# Patient Record
Sex: FEMALE | Race: WHITE | NOT HISPANIC OR LATINO | Employment: OTHER | URBAN - METROPOLITAN AREA
[De-identification: names, ages, dates, MRNs, and addresses within clinical notes are randomized per-mention and may not be internally consistent; named-entity substitution may affect disease eponyms.]

---

## 2017-01-12 ENCOUNTER — LAB VISIT (OUTPATIENT)
Dept: LAB | Facility: HOSPITAL | Age: 67
End: 2017-01-12
Attending: INTERNAL MEDICINE
Payer: COMMERCIAL

## 2017-01-12 DIAGNOSIS — E11.9 TYPE 2 DIABETES MELLITUS WITHOUT COMPLICATION, WITHOUT LONG-TERM CURRENT USE OF INSULIN: Chronic | ICD-10-CM

## 2017-01-12 DIAGNOSIS — E78.5 HYPERLIPIDEMIA, UNSPECIFIED HYPERLIPIDEMIA TYPE: ICD-10-CM

## 2017-01-12 DIAGNOSIS — I10 ESSENTIAL HYPERTENSION: Chronic | ICD-10-CM

## 2017-01-12 DIAGNOSIS — Z11.59 NEED FOR HEPATITIS C SCREENING TEST: ICD-10-CM

## 2017-01-12 LAB
ALBUMIN SERPL BCP-MCNC: 4.3 G/DL
ALP SERPL-CCNC: 104 U/L
ALT SERPL W/O P-5'-P-CCNC: 24 U/L
ANION GAP SERPL CALC-SCNC: 9 MMOL/L
AST SERPL-CCNC: 24 U/L
BASOPHILS # BLD AUTO: 0.02 K/UL
BASOPHILS NFR BLD: 0.2 %
BILIRUB SERPL-MCNC: 0.5 MG/DL
BUN SERPL-MCNC: 11 MG/DL
CALCIUM SERPL-MCNC: 10.2 MG/DL
CHLORIDE SERPL-SCNC: 103 MMOL/L
CHOLEST/HDLC SERPL: 3.1 {RATIO}
CO2 SERPL-SCNC: 25 MMOL/L
CREAT SERPL-MCNC: 0.8 MG/DL
DIFFERENTIAL METHOD: NORMAL
EOSINOPHIL # BLD AUTO: 0.1 K/UL
EOSINOPHIL NFR BLD: 1.2 %
ERYTHROCYTE [DISTWIDTH] IN BLOOD BY AUTOMATED COUNT: 13.4 %
EST. GFR  (AFRICAN AMERICAN): >60 ML/MIN/1.73 M^2
EST. GFR  (NON AFRICAN AMERICAN): >60 ML/MIN/1.73 M^2
GLUCOSE SERPL-MCNC: 104 MG/DL
HCT VFR BLD AUTO: 43.7 %
HCV AB SERPL QL IA: NEGATIVE
HDL/CHOLESTEROL RATIO: 32.6 %
HDLC SERPL-MCNC: 178 MG/DL
HDLC SERPL-MCNC: 58 MG/DL
HGB BLD-MCNC: 15 G/DL
LDLC SERPL CALC-MCNC: 96.2 MG/DL
LYMPHOCYTES # BLD AUTO: 2.9 K/UL
LYMPHOCYTES NFR BLD: 27.2 %
MCH RBC QN AUTO: 29.8 PG
MCHC RBC AUTO-ENTMCNC: 34.3 %
MCV RBC AUTO: 87 FL
MONOCYTES # BLD AUTO: 0.8 K/UL
MONOCYTES NFR BLD: 7.4 %
NEUTROPHILS # BLD AUTO: 6.8 K/UL
NEUTROPHILS NFR BLD: 63.7 %
NONHDLC SERPL-MCNC: 120 MG/DL
PLATELET # BLD AUTO: 280 K/UL
PMV BLD AUTO: 11.5 FL
POTASSIUM SERPL-SCNC: 4 MMOL/L
PROT SERPL-MCNC: 8.2 G/DL
RBC # BLD AUTO: 5.04 M/UL
SODIUM SERPL-SCNC: 137 MMOL/L
TRIGL SERPL-MCNC: 119 MG/DL
WBC # BLD AUTO: 10.68 K/UL

## 2017-01-12 PROCEDURE — 36415 COLL VENOUS BLD VENIPUNCTURE: CPT | Mod: PO

## 2017-01-12 PROCEDURE — 83036 HEMOGLOBIN GLYCOSYLATED A1C: CPT

## 2017-01-12 PROCEDURE — 80053 COMPREHEN METABOLIC PANEL: CPT

## 2017-01-12 PROCEDURE — 80061 LIPID PANEL: CPT

## 2017-01-12 PROCEDURE — 85025 COMPLETE CBC W/AUTO DIFF WBC: CPT

## 2017-01-12 PROCEDURE — 86803 HEPATITIS C AB TEST: CPT

## 2017-01-13 LAB
ESTIMATED AVG GLUCOSE: 128 MG/DL
HBA1C MFR BLD HPLC: 6.1 %

## 2017-01-23 ENCOUNTER — OFFICE VISIT (OUTPATIENT)
Dept: INTERNAL MEDICINE | Facility: CLINIC | Age: 67
End: 2017-01-23
Payer: COMMERCIAL

## 2017-01-23 VITALS
SYSTOLIC BLOOD PRESSURE: 128 MMHG | RESPIRATION RATE: 16 BRPM | WEIGHT: 172.81 LBS | BODY MASS INDEX: 31.8 KG/M2 | TEMPERATURE: 99 F | DIASTOLIC BLOOD PRESSURE: 90 MMHG | HEIGHT: 62 IN | HEART RATE: 90 BPM

## 2017-01-23 DIAGNOSIS — E78.5 HYPERLIPIDEMIA, UNSPECIFIED HYPERLIPIDEMIA TYPE: ICD-10-CM

## 2017-01-23 DIAGNOSIS — E11.9 TYPE 2 DIABETES MELLITUS WITHOUT COMPLICATION, WITHOUT LONG-TERM CURRENT USE OF INSULIN: Primary | Chronic | ICD-10-CM

## 2017-01-23 DIAGNOSIS — E03.9 HYPOTHYROIDISM, UNSPECIFIED TYPE: ICD-10-CM

## 2017-01-23 DIAGNOSIS — I10 ESSENTIAL HYPERTENSION: Chronic | ICD-10-CM

## 2017-01-23 DIAGNOSIS — J45.20 MILD INTERMITTENT ASTHMA WITHOUT COMPLICATION: ICD-10-CM

## 2017-01-23 DIAGNOSIS — J31.0 CHRONIC RHINITIS: Chronic | ICD-10-CM

## 2017-01-23 PROCEDURE — 2022F DILAT RTA XM EVC RTNOPTHY: CPT | Mod: S$GLB,,, | Performed by: INTERNAL MEDICINE

## 2017-01-23 PROCEDURE — 3074F SYST BP LT 130 MM HG: CPT | Mod: S$GLB,,, | Performed by: INTERNAL MEDICINE

## 2017-01-23 PROCEDURE — 90662 IIV NO PRSV INCREASED AG IM: CPT | Mod: S$GLB,,, | Performed by: INTERNAL MEDICINE

## 2017-01-23 PROCEDURE — 99214 OFFICE O/P EST MOD 30 MIN: CPT | Mod: 25,S$GLB,, | Performed by: INTERNAL MEDICINE

## 2017-01-23 PROCEDURE — 99999 PR PBB SHADOW E&M-EST. PATIENT-LVL III: CPT | Mod: PBBFAC,,, | Performed by: INTERNAL MEDICINE

## 2017-01-23 PROCEDURE — 4010F ACE/ARB THERAPY RXD/TAKEN: CPT | Mod: S$GLB,,, | Performed by: INTERNAL MEDICINE

## 2017-01-23 PROCEDURE — 3044F HG A1C LEVEL LT 7.0%: CPT | Mod: S$GLB,,, | Performed by: INTERNAL MEDICINE

## 2017-01-23 PROCEDURE — 90471 IMMUNIZATION ADMIN: CPT | Mod: S$GLB,,, | Performed by: INTERNAL MEDICINE

## 2017-01-23 PROCEDURE — 3080F DIAST BP >= 90 MM HG: CPT | Mod: S$GLB,,, | Performed by: INTERNAL MEDICINE

## 2017-01-23 PROCEDURE — 1159F MED LIST DOCD IN RCRD: CPT | Mod: S$GLB,,, | Performed by: INTERNAL MEDICINE

## 2017-01-23 RX ORDER — METFORMIN HYDROCHLORIDE 500 MG/1
TABLET ORAL
Qty: 30 TABLET | Refills: 11 | Status: SHIPPED | OUTPATIENT
Start: 2017-01-23 | End: 2018-03-12 | Stop reason: SDUPTHER

## 2017-01-23 RX ORDER — LEVOTHYROXINE SODIUM 50 UG/1
50 TABLET ORAL EVERY MORNING
Qty: 30 TABLET | Refills: 11 | Status: SHIPPED | OUTPATIENT
Start: 2017-01-23 | End: 2018-05-18 | Stop reason: SDUPTHER

## 2017-01-23 RX ORDER — INSULIN PUMP SYRINGE, 3 ML
EACH MISCELLANEOUS
Qty: 1 EACH | Refills: 0 | Status: SHIPPED | OUTPATIENT
Start: 2017-01-23 | End: 2017-01-23 | Stop reason: SDUPTHER

## 2017-01-23 RX ORDER — ATORVASTATIN CALCIUM 20 MG/1
30 TABLET, FILM COATED ORAL DAILY
Qty: 30 TABLET | Refills: 11 | Status: SHIPPED | OUTPATIENT
Start: 2017-01-23 | End: 2017-10-27 | Stop reason: DRUGHIGH

## 2017-01-23 RX ORDER — AMLODIPINE BESYLATE 10 MG/1
10 TABLET ORAL DAILY
Qty: 30 TABLET | Refills: 11 | Status: ON HOLD | OUTPATIENT
Start: 2017-01-23 | End: 2017-07-14 | Stop reason: HOSPADM

## 2017-01-23 RX ORDER — INSULIN PUMP SYRINGE, 3 ML
EACH MISCELLANEOUS
Qty: 1 EACH | Refills: 0 | Status: SHIPPED | OUTPATIENT
Start: 2017-01-23 | End: 2022-07-06

## 2017-01-23 RX ORDER — LISINOPRIL 40 MG/1
40 TABLET ORAL DAILY
Qty: 30 TABLET | Refills: 11 | Status: SHIPPED | OUTPATIENT
Start: 2017-01-23 | End: 2017-10-27 | Stop reason: ALTCHOICE

## 2017-01-23 RX ORDER — ALPRAZOLAM 0.25 MG/1
0.25 TABLET ORAL NIGHTLY
Qty: 30 TABLET | Refills: 3 | Status: SHIPPED | OUTPATIENT
Start: 2017-01-23 | End: 2017-07-03 | Stop reason: SDUPTHER

## 2017-01-23 NOTE — PROGRESS NOTES
Subjective:       Patient ID: Noah Bailey is a 66 y.o. female.    Chief Complaint: Diabetes (4 mos fol up w/ labs to review )    HPI   The patient presents for follow-up of diabetes and other medical conditions.  Her blood sugar levels have been stable.  Her vision is good with her current glasses.  She has not experienced any lower extremity numbness or tingling.  Dietary compliance has been fair.  The recent holiday period.  Asthma symptoms have been stable with no recent exacerbations.  She does have chronic rhinitis with postnasal drainage and rhinorrhea.  No severe congestion is present.  She needs a new glucose meter.  Review of Systems   Constitutional: Negative for activity change, appetite change and unexpected weight change.   Eyes: Negative for visual disturbance.   Respiratory: Negative for shortness of breath.    Cardiovascular: Negative for chest pain, palpitations and leg swelling.   Gastrointestinal: Negative for abdominal pain, blood in stool and diarrhea.   Genitourinary: Negative for dysuria, frequency, hematuria and urgency.   Neurological: Negative for weakness, numbness and headaches.   Psychiatric/Behavioral: Negative for sleep disturbance.       Objective:      Physical Exam   Constitutional: She is oriented to person, place, and time. She appears well-developed and well-nourished. No distress.   HENT:   Head: Normocephalic and atraumatic.   Eyes: Conjunctivae and EOM are normal. Pupils are equal, round, and reactive to light. No scleral icterus.   Neck: Normal range of motion. Neck supple. No JVD present. No thyromegaly present.   Cardiovascular: Normal rate, regular rhythm, normal heart sounds and intact distal pulses.  Exam reveals no gallop and no friction rub.    No murmur heard.  Pulmonary/Chest: Effort normal and breath sounds normal. No respiratory distress. She has no wheezes. She has no rales.   Abdominal: Soft. Bowel sounds are normal. She exhibits no mass. There is no  tenderness.   Musculoskeletal: Normal range of motion. She exhibits no edema or tenderness.   Lymphadenopathy:     She has no cervical adenopathy.   Neurological: She is alert and oriented to person, place, and time. No cranial nerve deficit.   Sensory exam is intact in both feet on monofilament and vibration testing.   Skin: Skin is warm and dry. No rash noted.   No foot lesions are present.   Psychiatric: She has a normal mood and affect. Her behavior is normal.   Nursing note and vitals reviewed.      Results for orders placed or performed in visit on 01/12/17   Hepatitis C antibody   Result Value Ref Range    Hepatitis C Ab Negative    CBC auto differential   Result Value Ref Range    WBC 10.68 3.90 - 12.70 K/uL    RBC 5.04 4.00 - 5.40 M/uL    Hemoglobin 15.0 12.0 - 16.0 g/dL    Hematocrit 43.7 37.0 - 48.5 %    MCV 87 82 - 98 fL    MCH 29.8 27.0 - 31.0 pg    MCHC 34.3 32.0 - 36.0 %    RDW 13.4 11.5 - 14.5 %    Platelets 280 150 - 350 K/uL    MPV 11.5 9.2 - 12.9 fL    Gran # 6.8 1.8 - 7.7 K/uL    Lymph # 2.9 1.0 - 4.8 K/uL    Mono # 0.8 0.3 - 1.0 K/uL    Eos # 0.1 0.0 - 0.5 K/uL    Baso # 0.02 0.00 - 0.20 K/uL    Gran% 63.7 38.0 - 73.0 %    Lymph% 27.2 18.0 - 48.0 %    Mono% 7.4 4.0 - 15.0 %    Eosinophil% 1.2 0.0 - 8.0 %    Basophil% 0.2 0.0 - 1.9 %    Differential Method Automated    Comprehensive metabolic panel   Result Value Ref Range    Sodium 137 136 - 145 mmol/L    Potassium 4.0 3.5 - 5.1 mmol/L    Chloride 103 95 - 110 mmol/L    CO2 25 23 - 29 mmol/L    Glucose 104 70 - 110 mg/dL    BUN, Bld 11 8 - 23 mg/dL    Creatinine 0.8 0.5 - 1.4 mg/dL    Calcium 10.2 8.7 - 10.5 mg/dL    Total Protein 8.2 6.0 - 8.4 g/dL    Albumin 4.3 3.5 - 5.2 g/dL    Total Bilirubin 0.5 0.1 - 1.0 mg/dL    Alkaline Phosphatase 104 55 - 135 U/L    AST 24 10 - 40 U/L    ALT 24 10 - 44 U/L    Anion Gap 9 8 - 16 mmol/L    eGFR if African American >60.0 >60 mL/min/1.73 m^2    eGFR if non African American >60.0 >60 mL/min/1.73 m^2    Lipid panel   Result Value Ref Range    Cholesterol 178 120 - 199 mg/dL    Triglycerides 119 30 - 150 mg/dL    HDL 58 40 - 75 mg/dL    LDL Cholesterol 96.2 63.0 - 159.0 mg/dL    HDL/Chol Ratio 32.6 20.0 - 50.0 %    Total Cholesterol/HDL Ratio 3.1 2.0 - 5.0    Non-HDL Cholesterol 120 mg/dL   Hemoglobin A1c   Result Value Ref Range    Hemoglobin A1C 6.1 4.5 - 6.2 %    Estimated Avg Glucose 128 68 - 131 mg/dL       Assessment:       1. Type 2 diabetes mellitus without complication, without long-term current use of insulin    2. Essential hypertension    3. Mild intermittent asthma without complication    4. Hyperlipidemia, unspecified hyperlipidemia type    5. Hypothyroidism, unspecified type    6. Chronic rhinitis        Plan:           Noah was seen today for diabetes.  Prescription orders for Freestyle lite glucose meter and test strips were written.  Influenza vaccine will be administered today.  The patient is up-to-date on pneumonia immunizations.  Current medications will be continued.  Blood tests and a follow-up visit in 4 months will be obtained.  The patient has been encouraged to increase her exercise level.    Diagnoses and all orders for this visit:    Type 2 diabetes mellitus without complication, without long-term current use of insulin    Essential hypertension    Mild intermittent asthma without complication    Hyperlipidemia, unspecified hyperlipidemia type    Hypothyroidism, unspecified type    Chronic rhinitis    Other orders  -     amlodipine (NORVASC) 10 MG tablet; Take 1 tablet (10 mg total) by mouth once daily.  -     levothyroxine (SYNTHROID) 50 MCG tablet; Take 1 tablet (50 mcg total) by mouth every morning.  -     lisinopril (PRINIVIL,ZESTRIL) 40 MG tablet; Take 1 tablet (40 mg total) by mouth once daily.  -     metformin (GLUCOPHAGE) 500 MG tablet; 1 TABLET BY MOUTH before dinner  -     alprazolam (XANAX) 0.25 MG tablet; Take 1 tablet (0.25 mg total) by mouth nightly.  -      atorvastatin (LIPITOR) 20 MG tablet; Take 1.5 tablets (30 mg total) by mouth once daily.  -     theophylline (THEODUR) 200 mg 24 hr capsule; Take 200 mg by mouth once daily.  -     Discontinue: blood sugar diagnostic (FREESTYLE TEST) Strp; Test blood glucose level once daily as directed.  -     Discontinue: blood-glucose meter (FREESTYLE LITE METER) kit; Use as instructed  -     blood sugar diagnostic (FREESTYLE TEST) Strp; Test blood glucose level once daily as directed.  -     blood-glucose meter (FREESTYLE LITE METER) kit; Use as instructed

## 2017-01-24 ENCOUNTER — TELEPHONE (OUTPATIENT)
Dept: INTERNAL MEDICINE | Facility: CLINIC | Age: 67
End: 2017-01-24

## 2017-01-24 NOTE — TELEPHONE ENCOUNTER
Pharmacy called to verify lipitor script. Last note states pt is to take 1.5 tablets daily #30. Discrepancy with quantity; gave verbal for 45 tablets to be dispensed instead of 30 (to compensate for the sig)

## 2017-01-24 NOTE — TELEPHONE ENCOUNTER
----- Message from Wesley Landaverde sent at 1/24/2017 11:31 AM CST -----  Contact: self [phone]  Patient like a printout of her lab result      Please advise pt

## 2017-03-01 RX ORDER — METFORMIN HYDROCHLORIDE 500 MG/1
TABLET ORAL
Qty: 30 TABLET | Refills: 10 | Status: SHIPPED | OUTPATIENT
Start: 2017-03-01 | End: 2017-07-28 | Stop reason: SDUPTHER

## 2017-05-17 ENCOUNTER — LAB VISIT (OUTPATIENT)
Dept: LAB | Facility: HOSPITAL | Age: 67
End: 2017-05-17
Attending: INTERNAL MEDICINE
Payer: COMMERCIAL

## 2017-05-17 DIAGNOSIS — E78.5 HYPERLIPIDEMIA, UNSPECIFIED HYPERLIPIDEMIA TYPE: ICD-10-CM

## 2017-05-17 DIAGNOSIS — E11.9 TYPE 2 DIABETES MELLITUS WITHOUT COMPLICATION, WITHOUT LONG-TERM CURRENT USE OF INSULIN: Chronic | ICD-10-CM

## 2017-05-17 LAB
ALBUMIN SERPL BCP-MCNC: 4.1 G/DL
ALP SERPL-CCNC: 96 U/L
ALT SERPL W/O P-5'-P-CCNC: 17 U/L
ANION GAP SERPL CALC-SCNC: 11 MMOL/L
AST SERPL-CCNC: 17 U/L
BILIRUB SERPL-MCNC: 0.5 MG/DL
BUN SERPL-MCNC: 12 MG/DL
CALCIUM SERPL-MCNC: 9.8 MG/DL
CHLORIDE SERPL-SCNC: 106 MMOL/L
CHOLEST/HDLC SERPL: 3.3 {RATIO}
CO2 SERPL-SCNC: 23 MMOL/L
CREAT SERPL-MCNC: 0.8 MG/DL
EST. GFR  (AFRICAN AMERICAN): >60 ML/MIN/1.73 M^2
EST. GFR  (NON AFRICAN AMERICAN): >60 ML/MIN/1.73 M^2
GLUCOSE SERPL-MCNC: 97 MG/DL
HDL/CHOLESTEROL RATIO: 30.1 %
HDLC SERPL-MCNC: 156 MG/DL
HDLC SERPL-MCNC: 47 MG/DL
LDLC SERPL CALC-MCNC: 87.6 MG/DL
NONHDLC SERPL-MCNC: 109 MG/DL
POTASSIUM SERPL-SCNC: 4.3 MMOL/L
PROT SERPL-MCNC: 7.8 G/DL
SODIUM SERPL-SCNC: 140 MMOL/L
TRIGL SERPL-MCNC: 107 MG/DL

## 2017-05-17 PROCEDURE — 36415 COLL VENOUS BLD VENIPUNCTURE: CPT | Mod: PO

## 2017-05-17 PROCEDURE — 83036 HEMOGLOBIN GLYCOSYLATED A1C: CPT

## 2017-05-17 PROCEDURE — 80061 LIPID PANEL: CPT

## 2017-05-17 PROCEDURE — 80053 COMPREHEN METABOLIC PANEL: CPT

## 2017-05-18 LAB
ESTIMATED AVG GLUCOSE: 137 MG/DL
HBA1C MFR BLD HPLC: 6.4 %

## 2017-05-22 ENCOUNTER — TELEPHONE (OUTPATIENT)
Dept: INTERNAL MEDICINE | Facility: CLINIC | Age: 67
End: 2017-05-22

## 2017-05-22 NOTE — TELEPHONE ENCOUNTER
----- Message from Paty Linares sent at 5/22/2017  8:18 AM CDT -----  Contact: call [phone]     Sooner appointment than the  can schedule.  When is the first available appointment: 08/3/17  What is the nature of the appointment: follow up   What visit type: 4 month follow up   Labs done last week   Patient preference of timeframe to be scheduled:  May   Comments: patient is calling to reschedule because of the weather expected tomorrow

## 2017-05-24 ENCOUNTER — TELEPHONE (OUTPATIENT)
Dept: INTERNAL MEDICINE | Facility: CLINIC | Age: 67
End: 2017-05-24

## 2017-05-24 NOTE — TELEPHONE ENCOUNTER
----- Message from Donny Mcbride sent at 5/24/2017  3:02 PM CDT -----  Contact: self [phone]  Type: Sooner appointment than  is able to schedule    When is the first available appointment?  08/04/17    What is the nature of the appointment? Follow up      What appointment type: EP     Comments:  Patient stated she already had Blood work done , stated she is not able to do 05/29/17 . Please advise , Thanks !

## 2017-06-02 ENCOUNTER — LAB VISIT (OUTPATIENT)
Dept: LAB | Facility: HOSPITAL | Age: 67
End: 2017-06-02
Attending: INTERNAL MEDICINE
Payer: COMMERCIAL

## 2017-06-02 ENCOUNTER — OFFICE VISIT (OUTPATIENT)
Dept: INTERNAL MEDICINE | Facility: CLINIC | Age: 67
End: 2017-06-02
Payer: COMMERCIAL

## 2017-06-02 VITALS
DIASTOLIC BLOOD PRESSURE: 76 MMHG | RESPIRATION RATE: 18 BRPM | WEIGHT: 176.81 LBS | HEART RATE: 111 BPM | HEIGHT: 63 IN | TEMPERATURE: 98 F | SYSTOLIC BLOOD PRESSURE: 140 MMHG | BODY MASS INDEX: 31.33 KG/M2

## 2017-06-02 DIAGNOSIS — F41.9 ANXIETY: Chronic | ICD-10-CM

## 2017-06-02 DIAGNOSIS — J31.0 CHRONIC RHINITIS: Chronic | ICD-10-CM

## 2017-06-02 DIAGNOSIS — E03.9 HYPOTHYROIDISM, UNSPECIFIED TYPE: ICD-10-CM

## 2017-06-02 DIAGNOSIS — J45.20 MILD INTERMITTENT ASTHMA WITHOUT COMPLICATION: ICD-10-CM

## 2017-06-02 DIAGNOSIS — I10 ESSENTIAL HYPERTENSION: Chronic | ICD-10-CM

## 2017-06-02 DIAGNOSIS — E78.5 HYPERLIPIDEMIA, UNSPECIFIED HYPERLIPIDEMIA TYPE: ICD-10-CM

## 2017-06-02 DIAGNOSIS — E11.9 TYPE 2 DIABETES MELLITUS WITHOUT COMPLICATION, WITHOUT LONG-TERM CURRENT USE OF INSULIN: Chronic | ICD-10-CM

## 2017-06-02 DIAGNOSIS — E11.9 TYPE 2 DIABETES MELLITUS WITHOUT COMPLICATION, WITHOUT LONG-TERM CURRENT USE OF INSULIN: Primary | Chronic | ICD-10-CM

## 2017-06-02 LAB
BILIRUB UR QL STRIP: NEGATIVE
CLARITY UR REFRACT.AUTO: CLEAR
COLOR UR AUTO: YELLOW
CREAT UR-MCNC: 69 MG/DL
GLUCOSE UR QL STRIP: NEGATIVE
HGB UR QL STRIP: NEGATIVE
KETONES UR QL STRIP: NEGATIVE
LEUKOCYTE ESTERASE UR QL STRIP: NEGATIVE
MICROALBUMIN UR DL<=1MG/L-MCNC: 5 UG/ML
MICROALBUMIN/CREATININE RATIO: 7.2 UG/MG
NITRITE UR QL STRIP: NEGATIVE
PH UR STRIP: 6 [PH] (ref 5–8)
PROT UR QL STRIP: NEGATIVE
SP GR UR STRIP: 1.01 (ref 1–1.03)
URN SPEC COLLECT METH UR: NORMAL
UROBILINOGEN UR STRIP-ACNC: NEGATIVE EU/DL

## 2017-06-02 PROCEDURE — 99999 PR PBB SHADOW E&M-EST. PATIENT-LVL III: CPT | Mod: PBBFAC,,, | Performed by: INTERNAL MEDICINE

## 2017-06-02 PROCEDURE — 82570 ASSAY OF URINE CREATININE: CPT

## 2017-06-02 PROCEDURE — 4010F ACE/ARB THERAPY RXD/TAKEN: CPT | Mod: S$GLB,,, | Performed by: INTERNAL MEDICINE

## 2017-06-02 PROCEDURE — 81003 URINALYSIS AUTO W/O SCOPE: CPT

## 2017-06-02 PROCEDURE — 99214 OFFICE O/P EST MOD 30 MIN: CPT | Mod: S$GLB,,, | Performed by: INTERNAL MEDICINE

## 2017-06-02 PROCEDURE — 3044F HG A1C LEVEL LT 7.0%: CPT | Mod: S$GLB,,, | Performed by: INTERNAL MEDICINE

## 2017-06-02 NOTE — PROGRESS NOTES
Subjective:       Patient ID: Noah Bailey is a 66 y.o. female.    Chief Complaint: Follow-up    HPI   The patient presents for follow-up of medical conditions.  She has type 2 diabetes mellitus, hypertension, hyperlipidemia, bronchial asthma, and hypothyroidism.  She feels she is doing well overall.  She does not exercise regularly.  She remains physically active.  Her blood sugar levels have been stable.  Her vision is good although she does not drive at night or in the rain.  She has not experienced any flareups of asthma.  No lower extremity paresthesias are noted.  Review of Systems   Constitutional: Negative for activity change, appetite change and unexpected weight change.   Eyes: Negative for visual disturbance.   Respiratory: Negative for shortness of breath.    Cardiovascular: Negative for chest pain, palpitations and leg swelling.   Gastrointestinal: Negative for abdominal pain, blood in stool and diarrhea.   Genitourinary: Negative for dysuria, frequency, hematuria and urgency.   Neurological: Negative for weakness, numbness and headaches.   Psychiatric/Behavioral: Negative for sleep disturbance.       Objective:      Physical Exam   Constitutional: She is oriented to person, place, and time. She appears well-developed and well-nourished. No distress.   HENT:   Head: Normocephalic and atraumatic.   Eyes: Conjunctivae and EOM are normal. Pupils are equal, round, and reactive to light. No scleral icterus.   Neck: Normal range of motion. Neck supple. No JVD present. No thyromegaly present.   Cardiovascular: Normal rate, regular rhythm, normal heart sounds and intact distal pulses.  Exam reveals no gallop and no friction rub.    No murmur heard.  Pulmonary/Chest: Effort normal and breath sounds normal. No respiratory distress. She has no wheezes. She has no rales.   Abdominal: Soft. Bowel sounds are normal. She exhibits no mass. There is no tenderness.   Musculoskeletal: Normal range of motion. She  exhibits no edema or tenderness.   Lymphadenopathy:     She has no cervical adenopathy.   Neurological: She is alert and oriented to person, place, and time. No cranial nerve deficit.   Sensory exam is intact in both feet on monofilament and vibration testing.   Skin: Skin is warm and dry. No rash noted.   No foot lesions are present.   Psychiatric: She has a normal mood and affect. Her behavior is normal.   Nursing note and vitals reviewed.      Results for orders placed or performed in visit on 05/17/17   Comprehensive metabolic panel   Result Value Ref Range    Sodium 140 136 - 145 mmol/L    Potassium 4.3 3.5 - 5.1 mmol/L    Chloride 106 95 - 110 mmol/L    CO2 23 23 - 29 mmol/L    Glucose 97 70 - 110 mg/dL    BUN, Bld 12 8 - 23 mg/dL    Creatinine 0.8 0.5 - 1.4 mg/dL    Calcium 9.8 8.7 - 10.5 mg/dL    Total Protein 7.8 6.0 - 8.4 g/dL    Albumin 4.1 3.5 - 5.2 g/dL    Total Bilirubin 0.5 0.1 - 1.0 mg/dL    Alkaline Phosphatase 96 55 - 135 U/L    AST 17 10 - 40 U/L    ALT 17 10 - 44 U/L    Anion Gap 11 8 - 16 mmol/L    eGFR if African American >60.0 >60 mL/min/1.73 m^2    eGFR if non African American >60.0 >60 mL/min/1.73 m^2   Lipid panel   Result Value Ref Range    Cholesterol 156 120 - 199 mg/dL    Triglycerides 107 30 - 150 mg/dL    HDL 47 40 - 75 mg/dL    LDL Cholesterol 87.6 63.0 - 159.0 mg/dL    HDL/Chol Ratio 30.1 20.0 - 50.0 %    Total Cholesterol/HDL Ratio 3.3 2.0 - 5.0    Non-HDL Cholesterol 109 mg/dL   Hemoglobin A1c   Result Value Ref Range    Hemoglobin A1C 6.4 (H) 4.5 - 6.2 %    Estimated Avg Glucose 137 (H) 68 - 131 mg/dL       Assessment:       1. Type 2 diabetes mellitus without complication, without long-term current use of insulin    2. Essential hypertension    3. Chronic rhinitis    4. Hyperlipidemia, unspecified hyperlipidemia type    5. Hypothyroidism, unspecified type    6. Mild intermittent asthma without complication    7. Anxiety        Plan:          Noah was seen today for follow-up.   Urine studies will be obtained today.  Current medications will be continued.  A prescription for the Zostavax shingles vaccine was given to the patient to take to her pharmacy.  A follow-up visit in 4 months will be obtained along with repeat blood testing.    Diagnoses and all orders for this visit:    Type 2 diabetes mellitus without complication, without long-term current use of insulin  -     Urinalysis; Future  -     Microalbumin/creatinine urine ratio; Future  -     Hemoglobin A1c; Future    Essential hypertension  -     CBC auto differential; Future  -     TSH; Future    Chronic rhinitis    Hyperlipidemia, unspecified hyperlipidemia type  -     Comprehensive metabolic panel; Future    Hypothyroidism, unspecified type  -     TSH; Future  -     T4, free; Future    Mild intermittent asthma without complication    Anxiety    Other orders  -     zoster vaccine live, PF, (ZOSTAVAX) 19,400 unit/0.65 mL injection; Inject 19,400 Units into the skin once. To Pharmacist: Inject once

## 2017-07-07 RX ORDER — ALPRAZOLAM 0.25 MG/1
TABLET ORAL
Qty: 30 TABLET | Refills: 3 | Status: SHIPPED | OUTPATIENT
Start: 2017-07-07 | End: 2017-11-13 | Stop reason: SDUPTHER

## 2017-07-12 ENCOUNTER — HOSPITAL ENCOUNTER (INPATIENT)
Facility: HOSPITAL | Age: 67
LOS: 2 days | Discharge: HOME | DRG: 247 | End: 2017-07-14
Attending: EMERGENCY MEDICINE | Admitting: INTERNAL MEDICINE
Payer: COMMERCIAL

## 2017-07-12 DIAGNOSIS — J45.909 UNCOMPLICATED ASTHMA: ICD-10-CM

## 2017-07-12 DIAGNOSIS — I21.4 NSTEMI (NON-ST ELEVATED MYOCARDIAL INFARCTION): Primary | ICD-10-CM

## 2017-07-12 DIAGNOSIS — R07.9 CHEST PAIN: ICD-10-CM

## 2017-07-12 LAB
ALBUMIN SERPL BCP-MCNC: 4.2 G/DL
ALP SERPL-CCNC: 107 U/L
ALT SERPL W/O P-5'-P-CCNC: 31 U/L
ANION GAP SERPL CALC-SCNC: 11 MMOL/L
AST SERPL-CCNC: 81 U/L
BASOPHILS # BLD AUTO: 0.03 K/UL
BASOPHILS NFR BLD: 0.2 %
BILIRUB SERPL-MCNC: 0.8 MG/DL
BNP SERPL-MCNC: 59 PG/ML
BUN SERPL-MCNC: 10 MG/DL
CALCIUM SERPL-MCNC: 10.4 MG/DL
CHLORIDE SERPL-SCNC: 98 MMOL/L
CK SERPL-CCNC: 1008 U/L
CO2 SERPL-SCNC: 26 MMOL/L
CREAT SERPL-MCNC: 0.8 MG/DL
DIFFERENTIAL METHOD: ABNORMAL
EOSINOPHIL # BLD AUTO: 0 K/UL
EOSINOPHIL NFR BLD: 0.1 %
ERYTHROCYTE [DISTWIDTH] IN BLOOD BY AUTOMATED COUNT: 13 %
EST. GFR  (AFRICAN AMERICAN): >60 ML/MIN/1.73 M^2
EST. GFR  (NON AFRICAN AMERICAN): >60 ML/MIN/1.73 M^2
GLUCOSE SERPL-MCNC: 169 MG/DL
HCT VFR BLD AUTO: 40.6 %
HGB BLD-MCNC: 14.1 G/DL
LYMPHOCYTES # BLD AUTO: 2.4 K/UL
LYMPHOCYTES NFR BLD: 16.5 %
MCH RBC QN AUTO: 29.3 PG
MCHC RBC AUTO-ENTMCNC: 34.7 %
MCV RBC AUTO: 84 FL
MONOCYTES # BLD AUTO: 1 K/UL
MONOCYTES NFR BLD: 6.7 %
NEUTROPHILS # BLD AUTO: 11.2 K/UL
NEUTROPHILS NFR BLD: 76.3 %
PLATELET # BLD AUTO: 302 K/UL
PMV BLD AUTO: 11.5 FL
POTASSIUM SERPL-SCNC: 4.5 MMOL/L
PROT SERPL-MCNC: 8 G/DL
RBC # BLD AUTO: 4.82 M/UL
SODIUM SERPL-SCNC: 135 MMOL/L
TROPONIN I SERPL DL<=0.01 NG/ML-MCNC: 4.4 NG/ML
WBC # BLD AUTO: 14.7 K/UL

## 2017-07-12 PROCEDURE — 80053 COMPREHEN METABOLIC PANEL: CPT

## 2017-07-12 PROCEDURE — 82550 ASSAY OF CK (CPK): CPT

## 2017-07-12 PROCEDURE — 84484 ASSAY OF TROPONIN QUANT: CPT

## 2017-07-12 PROCEDURE — 25000003 PHARM REV CODE 250: Performed by: EMERGENCY MEDICINE

## 2017-07-12 PROCEDURE — 99285 EMERGENCY DEPT VISIT HI MDM: CPT | Mod: 25

## 2017-07-12 PROCEDURE — 11000001 HC ACUTE MED/SURG PRIVATE ROOM

## 2017-07-12 PROCEDURE — 83880 ASSAY OF NATRIURETIC PEPTIDE: CPT

## 2017-07-12 PROCEDURE — 93005 ELECTROCARDIOGRAM TRACING: CPT

## 2017-07-12 PROCEDURE — 93010 ELECTROCARDIOGRAM REPORT: CPT | Mod: ,,, | Performed by: INTERNAL MEDICINE

## 2017-07-12 PROCEDURE — 12000002 HC ACUTE/MED SURGE SEMI-PRIVATE ROOM

## 2017-07-12 PROCEDURE — 63600175 PHARM REV CODE 636 W HCPCS: Performed by: EMERGENCY MEDICINE

## 2017-07-12 PROCEDURE — 96372 THER/PROPH/DIAG INJ SC/IM: CPT

## 2017-07-12 PROCEDURE — 85025 COMPLETE CBC W/AUTO DIFF WBC: CPT

## 2017-07-12 RX ORDER — ENOXAPARIN SODIUM 100 MG/ML
1 INJECTION SUBCUTANEOUS
Status: COMPLETED | OUTPATIENT
Start: 2017-07-12 | End: 2017-07-12

## 2017-07-12 RX ORDER — NITROGLYCERIN 0.4 MG/1
0.4 TABLET SUBLINGUAL EVERY 5 MIN PRN
Status: DISCONTINUED | OUTPATIENT
Start: 2017-07-12 | End: 2017-07-14 | Stop reason: HOSPADM

## 2017-07-12 RX ADMIN — ENOXAPARIN SODIUM 80 MG: 100 INJECTION SUBCUTANEOUS at 10:07

## 2017-07-12 RX ADMIN — LIDOCAINE HYDROCHLORIDE: 20 SOLUTION ORAL; TOPICAL at 08:07

## 2017-07-12 RX ADMIN — TICAGRELOR 180 MG: 90 TABLET ORAL at 09:07

## 2017-07-13 LAB
CHOLEST/HDLC SERPL: 3.5 {RATIO}
DIASTOLIC DYSFUNCTION: YES
ESTIMATED AVG GLUCOSE: 134 MG/DL
HBA1C MFR BLD HPLC: 6.3 %
HDL/CHOLESTEROL RATIO: 28.2 %
HDLC SERPL-MCNC: 156 MG/DL
HDLC SERPL-MCNC: 44 MG/DL
LDLC SERPL CALC-MCNC: 84 MG/DL
NONHDLC SERPL-MCNC: 112 MG/DL
POCT GLUCOSE: 106 MG/DL (ref 70–110)
POCT GLUCOSE: 171 MG/DL (ref 70–110)
POCT GLUCOSE: 99 MG/DL (ref 70–110)
RETIRED EF AND QEF - SEE NOTES: 45 (ref 55–65)
TRIGL SERPL-MCNC: 140 MG/DL
TROPONIN I SERPL DL<=0.01 NG/ML-MCNC: 9.1 NG/ML
TROPONIN I SERPL DL<=0.01 NG/ML-MCNC: 9.2 NG/ML
TSH SERPL DL<=0.005 MIU/L-ACNC: 1.71 UIU/ML

## 2017-07-13 PROCEDURE — B215YZZ FLUOROSCOPY OF LEFT HEART USING OTHER CONTRAST: ICD-10-PCS | Performed by: INTERNAL MEDICINE

## 2017-07-13 PROCEDURE — 80061 LIPID PANEL: CPT

## 2017-07-13 PROCEDURE — 84484 ASSAY OF TROPONIN QUANT: CPT

## 2017-07-13 PROCEDURE — 25000003 PHARM REV CODE 250: Performed by: EMERGENCY MEDICINE

## 2017-07-13 PROCEDURE — 93306 TTE W/DOPPLER COMPLETE: CPT | Mod: 26,,, | Performed by: INTERNAL MEDICINE

## 2017-07-13 PROCEDURE — 83036 HEMOGLOBIN GLYCOSYLATED A1C: CPT

## 2017-07-13 PROCEDURE — 25000003 PHARM REV CODE 250: Performed by: INTERNAL MEDICINE

## 2017-07-13 PROCEDURE — 93010 ELECTROCARDIOGRAM REPORT: CPT | Mod: 77,,, | Performed by: INTERNAL MEDICINE

## 2017-07-13 PROCEDURE — 93010 ELECTROCARDIOGRAM REPORT: CPT | Mod: ,,, | Performed by: INTERNAL MEDICINE

## 2017-07-13 PROCEDURE — 84443 ASSAY THYROID STIM HORMONE: CPT

## 2017-07-13 PROCEDURE — 27000221 HC OXYGEN, UP TO 24 HOURS

## 2017-07-13 PROCEDURE — 93005 ELECTROCARDIOGRAM TRACING: CPT

## 2017-07-13 PROCEDURE — B211YZZ FLUOROSCOPY OF MULTIPLE CORONARY ARTERIES USING OTHER CONTRAST: ICD-10-PCS | Performed by: INTERNAL MEDICINE

## 2017-07-13 PROCEDURE — C1769 GUIDE WIRE: HCPCS

## 2017-07-13 PROCEDURE — 36415 COLL VENOUS BLD VENIPUNCTURE: CPT

## 2017-07-13 PROCEDURE — 84484 ASSAY OF TROPONIN QUANT: CPT | Mod: 91

## 2017-07-13 PROCEDURE — 027034Z DILATION OF CORONARY ARTERY, ONE ARTERY WITH DRUG-ELUTING INTRALUMINAL DEVICE, PERCUTANEOUS APPROACH: ICD-10-PCS | Performed by: INTERNAL MEDICINE

## 2017-07-13 PROCEDURE — 93306 TTE W/DOPPLER COMPLETE: CPT

## 2017-07-13 PROCEDURE — 99152 MOD SED SAME PHYS/QHP 5/>YRS: CPT

## 2017-07-13 PROCEDURE — 4A023N7 MEASUREMENT OF CARDIAC SAMPLING AND PRESSURE, LEFT HEART, PERCUTANEOUS APPROACH: ICD-10-PCS | Performed by: INTERNAL MEDICINE

## 2017-07-13 PROCEDURE — 63600175 PHARM REV CODE 636 W HCPCS

## 2017-07-13 PROCEDURE — 25000003 PHARM REV CODE 250: Performed by: NURSE PRACTITIONER

## 2017-07-13 PROCEDURE — 25500020 PHARM REV CODE 255

## 2017-07-13 PROCEDURE — 92928 PRQ TCAT PLMT NTRAC ST 1 LES: CPT | Mod: LC,,, | Performed by: INTERNAL MEDICINE

## 2017-07-13 PROCEDURE — 99152 MOD SED SAME PHYS/QHP 5/>YRS: CPT | Mod: ,,, | Performed by: INTERNAL MEDICINE

## 2017-07-13 PROCEDURE — 99223 1ST HOSP IP/OBS HIGH 75: CPT | Mod: 25,,, | Performed by: NURSE PRACTITIONER

## 2017-07-13 PROCEDURE — 93458 L HRT ARTERY/VENTRICLE ANGIO: CPT | Mod: 26,59,, | Performed by: INTERNAL MEDICINE

## 2017-07-13 PROCEDURE — 94761 N-INVAS EAR/PLS OXIMETRY MLT: CPT

## 2017-07-13 PROCEDURE — 25000003 PHARM REV CODE 250

## 2017-07-13 PROCEDURE — 11000001 HC ACUTE MED/SURG PRIVATE ROOM

## 2017-07-13 RX ORDER — SODIUM CHLORIDE 9 MG/ML
3 INJECTION, SOLUTION INTRAVENOUS CONTINUOUS
Status: ACTIVE | OUTPATIENT
Start: 2017-07-13 | End: 2017-07-13

## 2017-07-13 RX ORDER — ATORVASTATIN CALCIUM 40 MG/1
80 TABLET, FILM COATED ORAL DAILY
Status: DISCONTINUED | OUTPATIENT
Start: 2017-07-13 | End: 2017-07-13

## 2017-07-13 RX ORDER — GLUCAGON 1 MG
1 KIT INJECTION
Status: DISCONTINUED | OUTPATIENT
Start: 2017-07-13 | End: 2017-07-14 | Stop reason: HOSPADM

## 2017-07-13 RX ORDER — IBUPROFEN 200 MG
24 TABLET ORAL
Status: DISCONTINUED | OUTPATIENT
Start: 2017-07-13 | End: 2017-07-14 | Stop reason: HOSPADM

## 2017-07-13 RX ORDER — FLUTICASONE PROPIONATE 50 MCG
2 SPRAY, SUSPENSION (ML) NASAL DAILY
Status: DISCONTINUED | OUTPATIENT
Start: 2017-07-13 | End: 2017-07-14 | Stop reason: HOSPADM

## 2017-07-13 RX ORDER — INSULIN ASPART 100 [IU]/ML
0-5 INJECTION, SOLUTION INTRAVENOUS; SUBCUTANEOUS
Status: DISCONTINUED | OUTPATIENT
Start: 2017-07-13 | End: 2017-07-14 | Stop reason: HOSPADM

## 2017-07-13 RX ORDER — LEVOTHYROXINE SODIUM 25 UG/1
50 TABLET ORAL EVERY MORNING
Status: DISCONTINUED | OUTPATIENT
Start: 2017-07-13 | End: 2017-07-14 | Stop reason: HOSPADM

## 2017-07-13 RX ORDER — ASPIRIN 81 MG/1
81 TABLET ORAL DAILY
Status: DISCONTINUED | OUTPATIENT
Start: 2017-07-13 | End: 2017-07-14 | Stop reason: HOSPADM

## 2017-07-13 RX ORDER — METOPROLOL TARTRATE 25 MG/1
25 TABLET, FILM COATED ORAL 2 TIMES DAILY
Status: DISCONTINUED | OUTPATIENT
Start: 2017-07-13 | End: 2017-07-14

## 2017-07-13 RX ORDER — OXYCODONE HYDROCHLORIDE 5 MG/1
5 TABLET ORAL EVERY 4 HOURS PRN
Status: DISCONTINUED | OUTPATIENT
Start: 2017-07-13 | End: 2017-07-14 | Stop reason: HOSPADM

## 2017-07-13 RX ORDER — ONDANSETRON 2 MG/ML
4 INJECTION INTRAMUSCULAR; INTRAVENOUS EVERY 12 HOURS PRN
Status: DISCONTINUED | OUTPATIENT
Start: 2017-07-13 | End: 2017-07-14 | Stop reason: HOSPADM

## 2017-07-13 RX ORDER — LISINOPRIL 20 MG/1
40 TABLET ORAL DAILY
Status: DISCONTINUED | OUTPATIENT
Start: 2017-07-14 | End: 2017-07-14 | Stop reason: HOSPADM

## 2017-07-13 RX ORDER — IBUPROFEN 200 MG
16 TABLET ORAL
Status: DISCONTINUED | OUTPATIENT
Start: 2017-07-13 | End: 2017-07-14 | Stop reason: HOSPADM

## 2017-07-13 RX ORDER — DIPHENHYDRAMINE HCL 50 MG
50 CAPSULE ORAL ONCE
Status: DISCONTINUED | OUTPATIENT
Start: 2017-07-13 | End: 2017-07-14 | Stop reason: HOSPADM

## 2017-07-13 RX ADMIN — FLUTICASONE PROPIONATE 2 SPRAY: 50 SPRAY, METERED NASAL at 11:07

## 2017-07-13 RX ADMIN — SODIUM CHLORIDE 3 ML/KG/HR: 0.9 INJECTION, SOLUTION INTRAVENOUS at 03:07

## 2017-07-13 RX ADMIN — METOPROLOL TARTRATE 25 MG: 25 TABLET ORAL at 09:07

## 2017-07-13 RX ADMIN — TICAGRELOR 90 MG: 90 TABLET ORAL at 10:07

## 2017-07-13 RX ADMIN — ASPIRIN 81 MG: 81 TABLET, COATED ORAL at 09:07

## 2017-07-13 RX ADMIN — TICAGRELOR 90 MG: 90 TABLET ORAL at 09:07

## 2017-07-13 RX ADMIN — NITROGLYCERIN 0.4 MG: 0.4 TABLET SUBLINGUAL at 12:07

## 2017-07-13 RX ADMIN — LEVOTHYROXINE SODIUM 50 MCG: 25 TABLET ORAL at 09:07

## 2017-07-13 RX ADMIN — METOPROLOL TARTRATE 25 MG: 25 TABLET ORAL at 10:07

## 2017-07-13 NOTE — PLAN OF CARE
Pt bilateral wrists and groins cleaned and prepped. New gown placed. Verbalized understanding to remain NPO. Will continue to monitor.

## 2017-07-13 NOTE — ED TRIAGE NOTES
Pt states that she has been dealing with chest pain for the past 2 days, states that she has been taking tums, ibuprofen and xanax to relieve to pain but pain came back today, hx htn. Pt states that she feels like she has to belch but unable to get it up.

## 2017-07-13 NOTE — NURSING
More swelling noted at right radial vasc band site.  Dr. Fox assessed pt.  MD ordered to put on another vasc band above the existing one and keep on for 1 hour. Will continue to monitor

## 2017-07-13 NOTE — NURSING TRANSFER
Patient transferred to floor on tele monitor. VSS no c/o of pain.  Patient attached to tele monitor upon arrival in room.   Right radial  site reviewed with RN.  CDI, no hematoma no bleeding.   All questions answered. .

## 2017-07-13 NOTE — PLAN OF CARE
Problem: Patient Care Overview  Goal: Plan of Care Review  Outcome: Ongoing (interventions implemented as appropriate)  Reviewed plan of care with patient. Patient verbalized understanding. Pt returned from cathlab at 1815. VSS per flowsheet. Blood sugar 99. Two radial vascbands to right wrist due to swelling post cath lab. Per Dr. Pimentel, start to remove air at 1900 and continue to monitor swelling. If increase noted; notify MD on call. Pt placed on cardiac diet. Denies N/V, pain. Patient on continuous tele monitoring; NSR; HR 60s-80s. No true red alarms or ectopy noted. Bed alarm set, bed in lowest position, call bell in reach. Will continue to monitor.

## 2017-07-13 NOTE — H&P
[facility]  Cardiology  History and Physical     Patient Name: Noah Bailey  MRN: [id number]  Admission Date: 2017  Code Status: Full Code   Attending Provider: Sameer Pimentel MD   Primary Care Physician: Henry Mcbride MD  Principal Problem:NSTEMI (non-ST elevated myocardial infarction)    Patient information was obtained from patient, past medical records and ER records.     Subjective:     Chief Complaint:  Chest pain     HPI:  67yo female with history of HTN, HLP, DMII and hypothyroidism who presented to the ER with complaints of chest pain for the past 2 days. She reports awakening Tuesday morning with complaints of chest heaviness. She attributed it to GERD due to lack of eating and drinking red wine. She took OTC medications (Tums, Zantac and Pepto) with easing of her symptoms with intermittent recurrence throughout the day. Yesterday morning upon awakening she had recurrent symptoms described as a chest heaviness with no associated symptoms. She reports the pain was worse than Tuesday and continued to occur intermittently throughout the day. Yesterday evening, she went to Urgent Care since her symptoms were not completely resolved and was directed to the ER. Upon arrival to the ER, she continued with mild symptoms and was given ASA and NTG spray with improvement in her pain. She was admitted to [facility] for NSTEMI. She reports a strong family history of CAD with mother with MI age 39-40 with CABG, sister  age 42?CAD and grandmother massive MI age 40s    Past Medical History:   Diagnosis Date    ALLERGIC RHINITIS     Asthma     Colon polyps     Diabetes mellitus     Hyperlipidemia     Hypertension     Hypothyroidism     Umbilical hernia        History reviewed. No pertinent surgical history.    Review of patient's allergies indicates:   Allergen Reactions    Sulfa (sulfonamide antibiotics) Rash       No current facility-administered medications on file prior to  encounter.      Current Outpatient Prescriptions on File Prior to Encounter   Medication Sig    alprazolam (XANAX) 0.25 MG tablet TAKE 1 TABLET BY MOUTH EVERY NIGHT    amlodipine (NORVASC) 10 MG tablet Take 1 tablet (10 mg total) by mouth once daily.    atorvastatin (LIPITOR) 20 MG tablet Take 1.5 tablets (30 mg total) by mouth once daily.    blood sugar diagnostic (FREESTYLE TEST) Strp Test blood glucose level once daily as directed.    blood-glucose meter (FREESTYLE LITE METER) kit Use as instructed    lancets (MICROLET LANCET) Misc Test blood sugar once daily.    levothyroxine (SYNTHROID) 50 MCG tablet Take 1 tablet (50 mcg total) by mouth every morning.    lisinopril (PRINIVIL,ZESTRIL) 40 MG tablet Take 1 tablet (40 mg total) by mouth once daily.    metformin (GLUCOPHAGE) 500 MG tablet 1 TABLET BY MOUTH before dinner    metformin (GLUCOPHAGE) 500 MG tablet TAKE 1 TABLET BY MOUTH DAILY before dinner    theophylline (THEODUR) 200 mg 24 hr capsule Take 200 mg by mouth once daily.    albuterol 90 mcg/actuation inhaler Inhale 2 puffs into the lungs every 6 (six) hours as needed.    beclomethasone (QVAR) 80 mcg/actuation Aero Inhale 1 puff into the lungs 2 (two) times daily. For asthma control.     Family History     Problem Relation (Age of Onset)    Cancer Mother (49)    Glaucoma Paternal Grandfather    Heart disease Mother (40), Father        Social History Main Topics    Smoking status: Former Smoker     Packs/day: 1.00     Years: 20.00     Quit date: 12/16/1994    Smokeless tobacco: Never Used    Alcohol use Yes      Comment: occasional use    Drug use: No    Sexual activity: Not Currently     Review of Systems   Constitution: Negative for chills, decreased appetite, diaphoresis, fever and weakness.   Cardiovascular: Positive for chest pain. Negative for claudication, cyanosis, dyspnea on exertion, irregular heartbeat, leg swelling, near-syncope, orthopnea, palpitations, paroxysmal nocturnal  dyspnea and syncope.   Respiratory: Negative for cough, hemoptysis, shortness of breath and wheezing.    Gastrointestinal: Negative for bloating, abdominal pain, constipation, diarrhea, melena, nausea and vomiting.   Neurological: Negative for dizziness.     Objective:     Vital Signs (Most Recent):  Temp: 98.8 °F (37.1 °C) (07/13/17 0800)  Pulse: 103 (07/13/17 0800)  Resp: 20 (07/13/17 0800)  BP: 113/70 (07/13/17 0800)  SpO2: (!) 94 % (07/13/17 0817) Vital Signs (24h Range):  Temp:  [97.7 °F (36.5 °C)-99.1 °F (37.3 °C)] 98.8 °F (37.1 °C)  Pulse:  [] 103  Resp:  [16-20] 20  SpO2:  [92 %-98 %] 94 %  BP: (104-144)/(63-82) 113/70     Weight: 77.1 kg (169 lb 15.6 oz)  Body mass index is 31.09 kg/m².    SpO2: (!) 94 %  O2 Device (Oxygen Therapy): nasal cannula      Intake/Output Summary (Last 24 hours) at 07/13/17 1020  Last data filed at 07/13/17 0500   Gross per 24 hour   Intake                0 ml   Output                2 ml   Net               -2 ml       Lines/Drains/Airways     Peripheral Intravenous Line                 Peripheral IV - Single Lumen 07/12/17 1947 Left Hand less than 1 day                Physical Exam   Constitutional: She is oriented to person, place, and time. She appears well-developed and well-nourished. No distress.   Cardiovascular: Normal rate and regular rhythm.  Exam reveals no gallop.    No murmur heard.  Pulses:       Radial pulses are 2+ on the right side, and 2+ on the left side.        Dorsalis pedis pulses are 2+ on the right side, and 2+ on the left side.        Posterior tibial pulses are 2+ on the right side, and 2+ on the left side.   Pulmonary/Chest: Effort normal and breath sounds normal. No respiratory distress. She has no wheezes.   Abdominal: Soft. Bowel sounds are normal. She exhibits no distension. There is no tenderness.   Neurological: She is alert and oriented to person, place, and time.   Skin: Skin is warm and dry.       Significant Labs:       Recent Labs  Lab  07/12/17  1942   WBC 14.70*   RBC 4.82   HGB 14.1   HCT 40.6      MCV 84   MCH 29.3   MCHC 34.7       Recent Labs  Lab 07/12/17 2043   *   K 4.5   CL 98   CO2 26   BUN 10   CREATININE 0.8       Recent Labs  Lab 07/12/17 2043 07/13/17  0340   CPK 1008*  --    TROPONINI  --  9.096*       Significant Imaging: Echocardiogram: 2D echo with color flow doppler: ordered; pending results    EKG: NSR with normal axis; STTW abnormality to inferior and lateral leads new when compared to EKG from 2004; subsequent EKG with continued changes but no worsening of abnormality noted     Assessment and Plan:     Type 2 diabetes mellitus    On Metformin; recent HgbA1c 6.4; will hold Metformin due to interaction with IV contrast; accuchecks Q6hrs while NPO then AC & HS when eating; SSI prn; will place on diabetic diet when non fasting         Hypothyroidism    TSH 3.8 in 2016; continue Levothyroxine; will draw TSH with next lab draw         Hyperlipidemia    Continue Lipitor; FLP with LDL 87 earlier this year; repeat FLP pending         Essential hypertension    SBP 110s-140s; on ACEI and CCB at home; will continue ACEI and add BB to regimen due to NSTEMI; will hold CCB for now; monitor BP and adjust meds prn         * NSTEMI (non-ST elevated myocardial infarction)    Initial troponin 4.3 with trend up to 9.0 this AM; EKG with STTW abnormality to inferior and lateral leads with no further changes; loaded with ASA and Brilinta; will place on daily ASA and Brilinta along with statin, ACEI and BB; JIA score 5 (age 66, 3 risk factor, abnormal EKG, elevated troponin and 2 episodes of angina in 24 hours); will proceed with [facility] over stress test given JIA score and concern for USA; will do echocardiogram as well             VTE Risk Mitigation         Ordered     Low Risk of VTE  Once      07/12/17 5617          MICHELLE Jones, ANP  Cardiology   [facility]

## 2017-07-13 NOTE — PLAN OF CARE
07/13/17 1107   Discharge Assessment   Assessment Type Discharge Planning Assessment   Confirmed/corrected address and phone number on facesheet? Yes   Assessment information obtained from? Patient   Expected Length of Stay (days) 1   Communicated expected length of stay with patient/caregiver yes   Prior to hospitilization cognitive status: Alert/Oriented   Prior to hospitalization functional status: Independent   Current cognitive status: Alert/Oriented   Current Functional Status: Independent   Arrived From home or self-care   Lives With alone   Able to Return to Prior Arrangements yes   Is patient able to care for self after discharge? Yes   How many people do you have in your home that can help with your care after discharge? 1   Who are your caregiver(s) and their phone number(s)? Elva Everett (cousin) [phone]   Patient's perception of discharge disposition home or selfcare   Readmission Within The Last 30 Days no previous admission in last 30 days   Patient currently being followed by outpatient case management? No   Patient currently receives home health services? No   Does the patient currently use HME? No   Patient currently receives private duty nursing? No   Patient currently receives any other outside agency services? No   Equipment Currently Used at Home (has RW but does not use)   Do you have any problems affording any of your prescribed medications? No   Is the patient taking medications as prescribed? yes   Do you have any financial concerns preventing you from receiving the healthcare you need? No   Does the patient have transportation to healthcare appointments? Yes   Transportation Available family or friend will provide;car   On Dialysis? No   Does the patient receive services at the [facility]? No   Discharge Plan A Home   Discharge Plan B Home;Home Health   Patient/Family In Agreement With Plan yes

## 2017-07-13 NOTE — PROGRESS NOTES
Pharmacy New Medication Education     Patient accepted medication education.     Pharmacy educated patient on the following medications, using the teach-back method.   ntg      Learners of pharmacy medication education included:  patient     Patient +/- learner response:  verbalize understanding

## 2017-07-13 NOTE — HOSPITAL COURSE
7/12/2017 Presented to the ER with complaints of chest pain. Initial troponin 4.398 with EKG with NSR, normal axis and STTW abnormality to inferior and lateral leads. Loaded with ASA and Brilinta and given dose of SQ Lovenox. Admitted to [facility] for further treatment 7/13/2017 No recurrent chest pain overnight. Repeat EKG with no progressive of STTW abnormality and trend up of troponin to 9.096. Placed on good medication management with ASA, statin, BB, ACEI and Brilinta with plans for C this afternoon for further ischemic evaluation

## 2017-07-13 NOTE — PLAN OF CARE
Problem: Fall Risk (Adult)  Goal: Absence of Falls  Patient will demonstrate the desired outcomes by discharge/transition of care.   Outcome: Ongoing (interventions implemented as appropriate)  No evidence of falls or injury. Refused bed alarm. Gait steady. No distress at this time.

## 2017-07-13 NOTE — ASSESSMENT & PLAN NOTE
SBP 110s-140s; on ACEI and CCB at home; will continue ACEI and add BB to regimen due to NSTEMI; will hold CCB for now; monitor BP and adjust meds prn

## 2017-07-13 NOTE — ASSESSMENT & PLAN NOTE
Initial troponin 4.3 with trend up to 9.0 this AM; EKG with STTW abnormality to inferior and lateral leads with no further changes; loaded with ASA and Brilinta; will place on daily ASA and Brilinta along with statin, ACEI and BB; JIA score 5 (age 66, 3 risk factor, abnormal EKG, elevated troponin and 2 episodes of angina in 24 hours); will proceed with [facility] over stress test given JIA score and concern for USA; will do echocardiogram as well

## 2017-07-13 NOTE — PLAN OF CARE
Problem: Patient Care Overview  Goal: Plan of Care Review  Pt received on 1 lpm NC with SpO2 96 %. No respiratory distress noted. Will continue to monitor.

## 2017-07-13 NOTE — ED PROVIDER NOTES
Encounter Date: 7/12/2017       History     Chief Complaint   Patient presents with    Chest Pain     substernal chest pain that began yesterday, returned today, seen at urgent care, sent to ED, denies n/v or diaphoresis, 324 mg of ASA and 2 SL NTG per EMS pain still 7/10, improves with sitting up     The history is provided by the patient.   Chest Pain   The current episode started yesterday. Chest pain occurs intermittently. The chest pain is currently at 2/10. The quality of the pain is described as heavy. The pain radiates to the left shoulder. Primary symptoms include shortness of breath. Pertinent negatives for primary symptoms include no palpitations and no vomiting.   Associated symptoms include diaphoresis. Treatments tried: ASA and NTG by EMS.   Her past medical history is significant for diabetes, hyperlipidemia and hypertension.   Her family medical history is significant for CAD.     Review of patient's allergies indicates:   Allergen Reactions    Sulfa (sulfonamide antibiotics) Rash     Past Medical History:   Diagnosis Date    ALLERGIC RHINITIS     Asthma     Colon polyps     Diabetes mellitus     Hyperlipidemia     Hypertension     Hypothyroidism     Umbilical hernia      History reviewed. No pertinent surgical history.  Family History   Problem Relation Age of Onset    Cancer Mother 49     ovarian    Heart disease Mother 40     M.I.    Heart disease Father     Glaucoma Paternal Grandfather     Amblyopia Neg Hx     Blindness Neg Hx     Cataracts Neg Hx     Diabetes Neg Hx     Hypertension Neg Hx     Macular degeneration Neg Hx     Retinal detachment Neg Hx     Strabismus Neg Hx     Stroke Neg Hx     Thyroid disease Neg Hx      Social History   Substance Use Topics    Smoking status: Former Smoker     Packs/day: 1.00     Years: 20.00     Quit date: 12/16/1994    Smokeless tobacco: Never Used    Alcohol use Yes      Comment: occasional use     Review of Systems    Constitutional: Positive for diaphoresis.   Respiratory: Positive for shortness of breath.    Cardiovascular: Positive for chest pain. Negative for palpitations.   Gastrointestinal: Negative for vomiting.       Physical Exam     Initial Vitals [07/12/17 1928]   BP Pulse Resp Temp SpO2   124/82 94 16 98.2 °F (36.8 °C) (!) 92 %      MAP       96         Physical Exam    Nursing note and vitals reviewed.  Constitutional: She appears well-developed and well-nourished.   HENT:   Head: Normocephalic and atraumatic.   Eyes: Conjunctivae and EOM are normal. Pupils are equal, round, and reactive to light.   Neck: Normal range of motion. Neck supple.   Cardiovascular: Normal rate and regular rhythm.   Pulmonary/Chest: Breath sounds normal.   Abdominal: Soft. There is no tenderness. There is no rebound and no guarding.   Musculoskeletal: Normal range of motion.   Neurological: She is alert and oriented to person, place, and time. She has normal strength.   Skin: Skin is warm and dry.   Psychiatric: She has a normal mood and affect.         ED Course   Procedures  Labs Reviewed   TROPONIN I - Abnormal; Notable for the following:        Result Value    Troponin I 4.398 (*)     All other components within normal limits   CBC W/ AUTO DIFFERENTIAL - Abnormal; Notable for the following:     WBC 14.70 (*)     Gran # 11.2 (*)     Gran% 76.3 (*)     Lymph% 16.5 (*)     All other components within normal limits   COMPREHENSIVE METABOLIC PANEL - Abnormal; Notable for the following:     Sodium 135 (*)     Glucose 169 (*)     AST 81 (*)     All other components within normal limits   CK - Abnormal; Notable for the following:     CPK 1008 (*)     All other components within normal limits   B-TYPE NATRIURETIC PEPTIDE        Imaging Results          X-Ray Chest 1 View (Final result)     Abnormal  Result time 07/12/17 20:12:59    Final result by Aden Hewitt MD (07/12/17 20:12:59)                 Impression:        1. Bibasilar artifact  versus small pleural effusions.    2. Borderline enlarged cardiac silhouette without evidence of failure.    3. Medial left hemidiaphragm 2 cm convex appearance, presumably secondary to a small area of eventration, with localized pleural thickening or soft tissue mass not entirely excluded. Correlate with any prior imaging from outside facility if/when available. Further evaluation with elective/nonemergent CT thorax can be obtained if 2 year stability cannot be confirmed.          EPIC notification system activated.      Electronically signed by: EBONI CORRALES MD, MD  Date:     07/12/17  Time:    20:12              Narrative:    COMPARISON: Chest radiograph 12/19/03 and right shoulder series 5/10/11    FINDINGS: AP portable  view of the chest. Large body habitus. Cardiac silhouette is upper limits of normal in size without definite evidence of failure. Symmetric subtle increased attenuation at the bilateral lung bases with obscuring of both costophrenic angles, favored to be secondary to artifact from chest wall soft tissues; however, small pleural effusions not entirely excluded. Bibasilar subsegmental scarring versus atelectasis. No large consolidation or pneumothorax. Approximate 2 cm convex appearance of the medial left hemidiaphragm. No acute osseous process seen.   PA and lateral views can be obtained.                                                   ED Course     Clinical Impression:   The primary encounter diagnosis was NSTEMI (non-ST elevated myocardial infarction). A diagnosis of Chest pain was also pertinent to this visit.                           Burke Tomlinson MD  07/12/17 5055

## 2017-07-13 NOTE — NURSING
Dr. Knapp at bedside assessing right radial access.  Swelling noted and MD is aware and is ok with site. Will continue to monitor

## 2017-07-13 NOTE — ASSESSMENT & PLAN NOTE
On Metformin; recent HgbA1c 6.4; will hold Metformin due to interaction with IV contrast; accuchecks Q6hrs while NPO then AC & HS when eating; SSI prn; will place on diabetic diet when non fasting

## 2017-07-13 NOTE — PLAN OF CARE
Problem: Patient Care Overview  Goal: Plan of Care Review  Outcome: Ongoing (interventions implemented as appropriate)  Plan of care reviewed with patient. Instructed plan of care includes labs, and cardiac monitoring. Instructed to call for chest pains or distress. Telemetry sinus. No complaints of shortness of breath. Remained npo as ordered.side rails up x2. Call light in reach. No evidence of ectopy during shift.

## 2017-07-13 NOTE — SUBJECTIVE & OBJECTIVE
Past Medical History:   Diagnosis Date    ALLERGIC RHINITIS     Asthma     Colon polyps     Diabetes mellitus     Hyperlipidemia     Hypertension     Hypothyroidism     Umbilical hernia        History reviewed. No pertinent surgical history.    Review of patient's allergies indicates:   Allergen Reactions    Sulfa (sulfonamide antibiotics) Rash       No current facility-administered medications on file prior to encounter.      Current Outpatient Prescriptions on File Prior to Encounter   Medication Sig    alprazolam (XANAX) 0.25 MG tablet TAKE 1 TABLET BY MOUTH EVERY NIGHT    amlodipine (NORVASC) 10 MG tablet Take 1 tablet (10 mg total) by mouth once daily.    atorvastatin (LIPITOR) 20 MG tablet Take 1.5 tablets (30 mg total) by mouth once daily.    blood sugar diagnostic (FREESTYLE TEST) Strp Test blood glucose level once daily as directed.    blood-glucose meter (FREESTYLE LITE METER) kit Use as instructed    lancets (MICROLET LANCET) Misc Test blood sugar once daily.    levothyroxine (SYNTHROID) 50 MCG tablet Take 1 tablet (50 mcg total) by mouth every morning.    lisinopril (PRINIVIL,ZESTRIL) 40 MG tablet Take 1 tablet (40 mg total) by mouth once daily.    metformin (GLUCOPHAGE) 500 MG tablet 1 TABLET BY MOUTH before dinner    metformin (GLUCOPHAGE) 500 MG tablet TAKE 1 TABLET BY MOUTH DAILY before dinner    theophylline (THEODUR) 200 mg 24 hr capsule Take 200 mg by mouth once daily.    albuterol 90 mcg/actuation inhaler Inhale 2 puffs into the lungs every 6 (six) hours as needed.    beclomethasone (QVAR) 80 mcg/actuation Aero Inhale 1 puff into the lungs 2 (two) times daily. For asthma control.     Family History     Problem Relation (Age of Onset)    Cancer Mother (49)    Glaucoma Paternal Grandfather    Heart disease Mother (40), Father        Social History Main Topics    Smoking status: Former Smoker     Packs/day: 1.00     Years: 20.00     Quit date: 12/16/1994    Smokeless tobacco:  Never Used    Alcohol use Yes      Comment: occasional use    Drug use: No    Sexual activity: Not Currently     Review of Systems   Constitution: Negative for chills, decreased appetite, diaphoresis, fever and weakness.   Cardiovascular: Positive for chest pain. Negative for claudication, cyanosis, dyspnea on exertion, irregular heartbeat, leg swelling, near-syncope, orthopnea, palpitations, paroxysmal nocturnal dyspnea and syncope.   Respiratory: Negative for cough, hemoptysis, shortness of breath and wheezing.    Gastrointestinal: Negative for bloating, abdominal pain, constipation, diarrhea, melena, nausea and vomiting.   Neurological: Negative for dizziness.     Objective:     Vital Signs (Most Recent):  Temp: 98.8 °F (37.1 °C) (07/13/17 0800)  Pulse: 103 (07/13/17 0800)  Resp: 20 (07/13/17 0800)  BP: 113/70 (07/13/17 0800)  SpO2: (!) 94 % (07/13/17 0817) Vital Signs (24h Range):  Temp:  [97.7 °F (36.5 °C)-99.1 °F (37.3 °C)] 98.8 °F (37.1 °C)  Pulse:  [] 103  Resp:  [16-20] 20  SpO2:  [92 %-98 %] 94 %  BP: (104-144)/(63-82) 113/70     Weight: 77.1 kg (169 lb 15.6 oz)  Body mass index is 31.09 kg/m².    SpO2: (!) 94 %  O2 Device (Oxygen Therapy): nasal cannula      Intake/Output Summary (Last 24 hours) at 07/13/17 1020  Last data filed at 07/13/17 0500   Gross per 24 hour   Intake                0 ml   Output                2 ml   Net               -2 ml       Lines/Drains/Airways     Peripheral Intravenous Line                 Peripheral IV - Single Lumen 07/12/17 1947 Left Hand less than 1 day                Physical Exam   Constitutional: She is oriented to person, place, and time. She appears well-developed and well-nourished. No distress.   Cardiovascular: Normal rate and regular rhythm.  Exam reveals no gallop.    No murmur heard.  Pulses:       Radial pulses are 2+ on the right side, and 2+ on the left side.        Dorsalis pedis pulses are 2+ on the right side, and 2+ on the left side.         Posterior tibial pulses are 2+ on the right side, and 2+ on the left side.   Pulmonary/Chest: Effort normal and breath sounds normal. No respiratory distress. She has no wheezes.   Abdominal: Soft. Bowel sounds are normal. She exhibits no distension. There is no tenderness.   Neurological: She is alert and oriented to person, place, and time.   Skin: Skin is warm and dry.       Significant Labs:       Recent Labs  Lab 07/12/17  1942   WBC 14.70*   RBC 4.82   HGB 14.1   HCT 40.6      MCV 84   MCH 29.3   MCHC 34.7       Recent Labs  Lab 07/12/17 2043   *   K 4.5   CL 98   CO2 26   BUN 10   CREATININE 0.8       Recent Labs  Lab 07/12/17 2043 07/13/17  0340   CPK 1008*  --    TROPONINI  --  9.096*       Significant Imaging: Echocardiogram: 2D echo with color flow doppler: ordered; pending results    EKG: NSR with normal axis; STTW abnormality to inferior and lateral leads new when compared to EKG from 2004; subsequent EKG with continued changes but no worsening of abnormality noted

## 2017-07-13 NOTE — PLAN OF CARE
Patient transported to room per stretcher.alert, oriented x4. Oriented to room , call light and plan  Of care, instructed noting to eat or drink. Verbalizes understanding of teaching. Reports chest heaviness and not chest pain. Received I nitro under tongue that relieves chest heaviness. Vital signs stable.

## 2017-07-14 VITALS
DIASTOLIC BLOOD PRESSURE: 65 MMHG | RESPIRATION RATE: 20 BRPM | SYSTOLIC BLOOD PRESSURE: 121 MMHG | HEART RATE: 87 BPM | BODY MASS INDEX: 31.28 KG/M2 | TEMPERATURE: 99 F | WEIGHT: 170 LBS | HEIGHT: 62 IN | OXYGEN SATURATION: 95 %

## 2017-07-14 PROBLEM — I50.30 (HFPEF) HEART FAILURE WITH PRESERVED EJECTION FRACTION: Status: ACTIVE | Noted: 2017-07-14

## 2017-07-14 LAB
ANION GAP SERPL CALC-SCNC: 9 MMOL/L
BASOPHILS # BLD AUTO: 0.02 K/UL
BASOPHILS NFR BLD: 0.2 %
BUN SERPL-MCNC: 12 MG/DL
CALCIUM SERPL-MCNC: 9 MG/DL
CHLORIDE SERPL-SCNC: 107 MMOL/L
CO2 SERPL-SCNC: 20 MMOL/L
CREAT SERPL-MCNC: 0.7 MG/DL
DIFFERENTIAL METHOD: ABNORMAL
EOSINOPHIL # BLD AUTO: 0 K/UL
EOSINOPHIL NFR BLD: 0.3 %
ERYTHROCYTE [DISTWIDTH] IN BLOOD BY AUTOMATED COUNT: 13.3 %
EST. GFR  (AFRICAN AMERICAN): >60 ML/MIN/1.73 M^2
EST. GFR  (NON AFRICAN AMERICAN): >60 ML/MIN/1.73 M^2
GLUCOSE SERPL-MCNC: 99 MG/DL
HCT VFR BLD AUTO: 37.6 %
HGB BLD-MCNC: 12.6 G/DL
LYMPHOCYTES # BLD AUTO: 2 K/UL
LYMPHOCYTES NFR BLD: 15.4 %
MCH RBC QN AUTO: 28.7 PG
MCHC RBC AUTO-ENTMCNC: 33.5 %
MCV RBC AUTO: 86 FL
MONOCYTES # BLD AUTO: 1.5 K/UL
MONOCYTES NFR BLD: 11.7 %
NEUTROPHILS # BLD AUTO: 9.2 K/UL
NEUTROPHILS NFR BLD: 72.1 %
PLATELET # BLD AUTO: 288 K/UL
PMV BLD AUTO: 10.7 FL
POCT GLUCOSE: 96 MG/DL (ref 70–110)
POTASSIUM SERPL-SCNC: 3.6 MMOL/L
RBC # BLD AUTO: 4.39 M/UL
SODIUM SERPL-SCNC: 136 MMOL/L
WBC # BLD AUTO: 12.69 K/UL

## 2017-07-14 PROCEDURE — 99238 HOSP IP/OBS DSCHRG MGMT 30/<: CPT | Mod: ,,, | Performed by: NURSE PRACTITIONER

## 2017-07-14 PROCEDURE — 94761 N-INVAS EAR/PLS OXIMETRY MLT: CPT

## 2017-07-14 PROCEDURE — 36415 COLL VENOUS BLD VENIPUNCTURE: CPT

## 2017-07-14 PROCEDURE — 85025 COMPLETE CBC W/AUTO DIFF WBC: CPT

## 2017-07-14 PROCEDURE — 25000003 PHARM REV CODE 250: Performed by: NURSE PRACTITIONER

## 2017-07-14 PROCEDURE — 80048 BASIC METABOLIC PNL TOTAL CA: CPT

## 2017-07-14 RX ORDER — METOPROLOL SUCCINATE 25 MG/1
25 TABLET, EXTENDED RELEASE ORAL DAILY
Status: DISCONTINUED | OUTPATIENT
Start: 2017-07-14 | End: 2017-07-14 | Stop reason: HOSPADM

## 2017-07-14 RX ORDER — METOPROLOL SUCCINATE 25 MG/1
25 TABLET, EXTENDED RELEASE ORAL DAILY
Qty: 30 TABLET | Refills: 11 | Status: SHIPPED | OUTPATIENT
Start: 2017-07-14 | End: 2017-10-27 | Stop reason: ALTCHOICE

## 2017-07-14 RX ORDER — NITROGLYCERIN 0.4 MG/1
0.4 TABLET SUBLINGUAL EVERY 5 MIN PRN
Qty: 25 TABLET | Refills: 11 | Status: SHIPPED | OUTPATIENT
Start: 2017-07-14 | End: 2019-07-19 | Stop reason: SDUPTHER

## 2017-07-14 RX ORDER — ASPIRIN 81 MG/1
81 TABLET ORAL DAILY
Qty: 30 TABLET | Refills: 11 | Status: SHIPPED | OUTPATIENT
Start: 2017-07-14 | End: 2018-05-31 | Stop reason: SDUPTHER

## 2017-07-14 RX ADMIN — LISINOPRIL 40 MG: 20 TABLET ORAL at 08:07

## 2017-07-14 RX ADMIN — FLUTICASONE PROPIONATE 2 SPRAY: 50 SPRAY, METERED NASAL at 08:07

## 2017-07-14 RX ADMIN — ATORVASTATIN CALCIUM 30 MG: 20 TABLET, FILM COATED ORAL at 08:07

## 2017-07-14 RX ADMIN — METOPROLOL SUCCINATE 25 MG: 25 TABLET, EXTENDED RELEASE ORAL at 08:07

## 2017-07-14 RX ADMIN — LEVOTHYROXINE SODIUM 50 MCG: 25 TABLET ORAL at 06:07

## 2017-07-14 RX ADMIN — TICAGRELOR 90 MG: 90 TABLET ORAL at 09:07

## 2017-07-14 RX ADMIN — ASPIRIN 81 MG: 81 TABLET, COATED ORAL at 08:07

## 2017-07-14 NOTE — PLAN OF CARE
Frequent monitoring of patient , appears sleepy . Blood pressure 100/50. No complaints of chest pains or distress,

## 2017-07-14 NOTE — PLAN OF CARE
Problem: Cardiac: ACS (Acute Coronary Syndrome) (Adult)  Goal: Signs and Symptoms of Listed Potential Problems Will be Absent, Minimized or Managed (Cardiac: ACS)  Signs and symptoms of listed potential problems will be absent, minimized or managed by discharge/transition of care (reference Cardiac: ACS (Acute Coronary Syndrome) (Adult) CPG).   Patient has not experience any signs and symptoms since cardiac procedure. vasc band removed with dressing intact. Right radial pulse palpable. Patient is aware of cardiac stent placed. No evidence of chest heaviness. Instructed to remained careful with upper extremity.telemetry remains sinus.

## 2017-07-14 NOTE — DISCHARGE INSTRUCTIONS
Take Aspirin and Brilinta every day faithfully for one year without interruption to avoid abrupt stent closure. Please do not stop either medication without speaking with your Cardiologist, Dr. Kalyan Knapp    Stop you Amlodipine or Norvasc and this has been replaced by Metoprolol Succinate/Toprol. Your blood pressure has been well controlled on while in the hospital. Monitor your blood pressure at home if possible and your blood pressure will be re evaluated at your follow up to determine if Amlodipine/Norvasc needs to be resumed    Continue all your other medications as prescribed

## 2017-07-14 NOTE — PLAN OF CARE
Attempted to discontinue lower vasc band but small amount of trickle of blood from site. Reapplied air tovasc band.call placed to dr. Sal.

## 2017-07-14 NOTE — PLAN OF CARE
Problem: Patient Care Overview  Goal: Plan of Care Review  Outcome: Ongoing (interventions implemented as appropriate)  Pt's SpO2 91% on RA. No respiratory distress noted. Will continue to monitor SpO2.

## 2017-07-14 NOTE — PROGRESS NOTES
Pharmacy New Medication Education     Patient accepted medication education.     Pharmacy educated patient on the following medications, using the teach-back method.   Asa  Lipitor  D50%  Benadryl  Flonase  Novolog  Synthroid  Lisinopril  Toprol  Zofran  Oxy  brilinta      Learners of pharmacy medication education included:  patient     Patient +/- learner response:  verbalize understanding

## 2017-07-14 NOTE — PLAN OF CARE
vasc band removed from right radial area. No evidence of bleeding or hematoma to site. Slight puffiness noted to sme area. Pressure dressing applied to site. Instructed patient not to raise arm above heart level. Will continue to monitor. Sitting up on side of bed in no distress.

## 2017-07-14 NOTE — DISCHARGE SUMMARY
[facility]  Cardiology  Discharge Summary      Patient Name: Noah Bailey  MRN: [id number]  Admission Date: 2017  Hospital Length of Stay: 2 days  Discharge Date and Time: No discharge date for patient encounter.  Attending Physician: Sameer Pimentel MD  Discharging Provider: MICHELLE Jones, ANP  Primary Care Physician: Henry Mcbride MD    HPI:  65yo female with history of HTN, HLP, DMII and hypothyroidism who presented to the ER with complaints of chest pain for the past 2 days. She reports awakening Tuesday morning with complaints of chest heaviness. She attributed it to GERD due to lack of eating and drinking red wine. She took OTC medications (Tums, Zantac and Pepto) with easing of her symptoms with intermittent recurrence throughout the day. Yesterday morning upon awakening she had recurrent symptoms described as a chest heaviness with no associated symptoms. She reports the pain was worse than Tuesday and continued to occur intermittently throughout the day. Yesterday evening, she went to Urgent Care since her symptoms were not completely resolved and was directed to the ER. Upon arrival to the ER, she continued with mild symptoms and was given ASA and NTG spray with improvement in her pain. She was admitted to [facility] for NSTEMI. She reports a strong family history of CAD with mother with MI age 39-40 with CABG, sister  age 42?CAD and grandmother massive MI age 40s      Procedure(s) (LRB):  [id number]-[id number]-CORONARY (PTCA) (N/A)     Indwelling Lines/Drains at time of discharge:      Hospital Course 2017 admitted with complaints of chest pain concerning for ischemic etiology. EKG with nonspecific changes and initial troponin  4 with trend up to 9. Admitted to [facility] for NSTEMI management. No complaints of recurrent chest pain overnight. Underwent echocardiogram with mildly depressed LV function with EF 45%. Taken to the cath  lab for LHC via right radial access with following results:    LVEDP  5 mmHg  LVEF 55%  Wall Motion: dyskinetic in the inferobasilar wall  LM normal  LAD normal  mLCX 99%  RCA normal  mLCX  Xience 3.0 X 18 (AUGUSTO).    Tolerated procedure well and recovered on telemetry floor overnight. TR band in place post procedure with slight oozing noted requiring long application. Successful removal overnight with no active bleeding. Slight swelling and ecchymosis noted to right radial site with no active bleeding or hematoma noted. Ambulating without any complaints of chest pain or SOB. Deemed ready for discharge and sent home on ASA, statin, BB, ACEI and Brilinta. Was on Norvasc upon admission with holding while admitted with stable BP with SBP 90s-100s. Norvasc held upon discharge and continued on ACEI and BB. Will follow up in cardiology clinic in 2-3 weeks     Consults: none    Significant Diagnostic Studies: Cardiac Graphics: Echocardiogram:   2D echo with color flow doppler:   Results for orders placed or performed during the hospital encounter of 07/12/17   2D echo with color flow doppler   Result Value Ref Range    EF 45 55 - 65    Diastolic Dysfunction Yes (A)        Pending Diagnostic Studies:     None          Final Active Diagnoses:    Diagnosis Date Noted POA    PRINCIPAL PROBLEM:  NSTEMI (non-ST elevated myocardial infarction) [I21.4] 07/12/2017 Yes    (HFpEF) heart failure with preserved ejection fraction [I50.30] 07/14/2017 Yes    Type 2 diabetes mellitus [E11.9] 05/08/2014 Yes     Chronic    Essential hypertension [I10]  Yes     Chronic    Hyperlipidemia [E78.5]  Yes    Hypothyroidism [E03.9]  Yes      Problems Resolved During this Admission:    Diagnosis Date Noted Date Resolved POA       Discharged Condition: good    Follow Up:  Follow-up Information     MICHELLE Jones, ANP. Go on 7/31/2017.    Specialty:  Cardiology  Why:  [email or web address], [email or web address]  Contact information:  [address]  [address]  [phone]                 Patient Instructions:     Diet general   Order Specific Question Answer Comments   Total calories: 1800 Calorie    Additional restrictions: Cardiac (Low Na/Chol)      Activity as tolerated     Lifting restrictions   Order Comments: No heavy lifting over 10lbs for 5-7 days     No dressing needed     Call MD for:   Order Comments: Take Aspirin and Brilinta every day faithfully for one year without interruption to avoid abrupt stent closure. Please do not stop either medication without speaking with your Cardiologist, Dr. Kalyan Knapp    Stop you Amlodipine or Norvasc and this has been replaced by Metoprolol Succinate/Toprol. Your blood pressure has been well controlled on while in the hospital. Monitor your blood pressure at home if possible and your blood pressure will be re evaluated at your follow up to determine if Amlodipine/Norvasc needs to be resumed    Continue all your other medications as prescribed       Medications:  Reconciled Home Medications:   Current Discharge Medication List      START taking these medications    Details   aspirin (ECOTRIN) 81 MG EC tablet Take 1 tablet (81 mg total) by mouth once daily.  Qty: 30 tablet, Refills: 11      metoprolol succinate (TOPROL-XL) 25 MG 24 hr tablet Take 1 tablet (25 mg total) by mouth once daily.  Qty: 30 tablet, Refills: 11      nitroGLYCERIN (NITROSTAT) 0.4 MG SL tablet Place 1 tablet (0.4 mg total) under the tongue every 5 (five) minutes as needed for Chest pain ((Notify MD)).  Qty: 25 tablet, Refills: 11      ticagrelor (BRILINTA) 90 mg tablet Take 1 tablet (90 mg total) by mouth 2 (two) times daily.  Qty: 60 tablet, Refills: 11         CONTINUE these medications which have NOT CHANGED    Details   alprazolam (XANAX) 0.25 MG tablet TAKE 1 TABLET BY MOUTH EVERY NIGHT  Qty: 30 tablet, Refills: 3    Comments: This prescription was filled on 7/3/2017. Any refills authorized will be placed on file.      atorvastatin  (LIPITOR) 20 MG tablet Take 1.5 tablets (30 mg total) by mouth once daily.  Qty: 30 tablet, Refills: 11      blood sugar diagnostic (FREESTYLE TEST) Strp Test blood glucose level once daily as directed.  Qty: 100 strip, Refills: 3    Comments: ICD-10 E11.9      blood-glucose meter (FREESTYLE LITE METER) kit Use as instructed  Qty: 1 each, Refills: 0    Comments: ICD-10 E11.9      lancets (MICROLET LANCET) Misc Test blood sugar once daily.  Qty: 100 each, Refills: 3      levothyroxine (SYNTHROID) 50 MCG tablet Take 1 tablet (50 mcg total) by mouth every morning.  Qty: 30 tablet, Refills: 11      lisinopril (PRINIVIL,ZESTRIL) 40 MG tablet Take 1 tablet (40 mg total) by mouth once daily.  Qty: 30 tablet, Refills: 11      !! metformin (GLUCOPHAGE) 500 MG tablet 1 TABLET BY MOUTH before dinner  Qty: 30 tablet, Refills: 11      !! metformin (GLUCOPHAGE) 500 MG tablet TAKE 1 TABLET BY MOUTH DAILY before dinner  Qty: 30 tablet, Refills: 10    Comments: This prescription was filled today(3/1/2017). Any refills authorized will be placed on file.      theophylline (THEODUR) 200 mg 24 hr capsule Take 200 mg by mouth once daily.  Qty: 30 capsule, Refills: 11      albuterol 90 mcg/actuation inhaler Inhale 2 puffs into the lungs every 6 (six) hours as needed.  Qty: 1 Inhaler, Refills: 3      beclomethasone (QVAR) 80 mcg/actuation Aero Inhale 1 puff into the lungs 2 (two) times daily. For asthma control.  Qty: 120 each, Refills: 3    Comments: replaces Flovent inhaler.       !! - Potential duplicate medications found. Please discuss with provider.      STOP taking these medications       amlodipine (NORVASC) 10 MG tablet Comments:   Reason for Stopping:               Time spent on the discharge of patient: 15 minutes    MICHELLE Jones, ANP  Cardiology  [facility]    I have seen patient with Nurse Practitioner Shellie Gottlieb. I agree with her assessment and plan as written in this note.        Kalyan HENNING  MD Aria, FACC, CCDS  Interventional Cardiology  [facility]

## 2017-07-14 NOTE — PLAN OF CARE
Pt is being discharged. Discharge teaching was reviewed with patient.  Pt verbalized understanding. Refer to clinical references for pt education. IV removed, tip intact, pt tolerated well. Cardiac monitor removed. Pt personal transport picking up pt at 12 noon. Will continue to monitor.

## 2017-07-14 NOTE — PLAN OF CARE
Instructed by dr. Sal to reapplied air to band for one hour and removed air then. To call again if site bleeds again.

## 2017-07-17 ENCOUNTER — PATIENT OUTREACH (OUTPATIENT)
Dept: ADMINISTRATIVE | Facility: CLINIC | Age: 67
End: 2017-07-17

## 2017-07-17 ENCOUNTER — TELEPHONE (OUTPATIENT)
Dept: INTERNAL MEDICINE | Facility: CLINIC | Age: 67
End: 2017-07-17

## 2017-07-17 NOTE — TELEPHONE ENCOUNTER
----- Message from Wesley Landaverde sent at 7/17/2017  2:56 PM CDT -----  Contact: self  or cell   Patient called to give information regarding heart attack she had  07/12.Place a stint in her on 13 of July and was release 14th of July. Patient was admitted in [facility]   would like to speak to nurse to provide more information.    Please advise

## 2017-07-17 NOTE — PATIENT INSTRUCTIONS
Discharge Instructions for Heart Attack  You have had a heart attack (acute myocardial infarction). A heart attack occurs when a vessel that sends blood to your heart suddenly becomes blocked. This causes your heart not to work as well as it should. Follow these guidelines for home care and lifestyle changes.  Home care  · Take your medicines exactly as directed. Dont skip doses. Talk with your healthcare provider if your medicines aren't working for you. Together you can come up with another treatment plan.  · Remember that recovery after a heart attack takes time. Plan to rest for at least 4 to 8 weeks while you recover. Then return to normal activity when your doctor says its OK.  · Ask your doctor about joining a heart rehabilitation program. This can help strengthen your heart and lungs and give you more energy and confidence.  · Tell your doctor if you are feeling depressed. Feelings of sadness are common after a heart attack. But it is important to speak to someone or seek counseling if you are feeling overwhelmed by these feelings.  · Call 911 right away if you have chest pain or pain that goes to your shoulder, neck, or back. Don't drive yourself to the hospital.  · Ask your family members to learn CPR. This is an important skill that can save lives when it's needed.  · Learn to take your own blood pressure and pulse. Keep a record of your results. Ask your doctor when you should seek emergency medical attention. He or she will tell you which blood pressure reading is dangerous.  Lifestyle changes  Your heart attack might have been caused by cardiovascular disease. Your healthcare provider will work with you to make changes to your lifestyle. This will help the heart disease from getting worse. These changes will most likely be a combination of diet and exercise.  Diet  Your healthcare provider will tell you what changes you need to make to your diet. You may need to see a registered dietitian for help  with these diet changes. These changes may include:  · Cutting back on how much fat and cholesterol you eat  · Cutting back on how much salt (sodium) you eat, especially if you have high blood pressure  · Eating more fresh vegetables and fruits  · Eating lean proteins such as fish, poultry, beans, and peas, and eating less red meat and processed meats  · Using low-fat dairy products  · Using vegetable and nut oils in limited amounts  · Limiting how many sweets and processed foods such as chips, cookies, and baked goods you eat  · Limiting how often you eat out. And when you do eat out, making better food choices.  · Not eating fried or greasy foods, or foods high in saturated fat  Exercise  Your healthcare provider may tell you to get more exercise if you haven't been physically active. Depending on your case, your provider may recommend that you get moderate to vigorous physical activity for at least 40 minutes each day, and for at least 3 to 4 days each week. A few examples of moderate to vigorous activity include:  · Walking at a brisk pace, about 3 to 4 miles per hour  · Jogging or running  · Swimming or water aerobics  · Hiking  · Dancing  · Martial arts  · Tennis  · Riding a bicycle or stationary bike  Other changes  Your healthcare provider may also recommend that you:  · Lose weight. If you are overweight or obese, your provider will work with you to lose extra pounds. Making diet changes and getting more exercise can help. A good goal is to lose your 10% of your body weight in one year.  · Stop smoking. Sign up for a stop-smoking program to make it more likely for you to quit for good. You can join a stop-smoking support group. Or ask your doctor about nicotine replacement products.  · Learn to manage stress. Stress management techniques to help you deal with stress in your home and work life. This will help you feel better emotionally and ease the strain on your heart.  Follow-up  Make a follow-up  appointment as directed by our staff.     When to seek medical advice  Call 911 right away if you have:  · Chest pain that goes to your neck, jaw, back, or shoulder  · Shortness of breath  Otherwise, call your doctor immediately if you have:  · Lightheadedness, dizziness, or fainting  · Feeling of irregular heartbeat or fast pulse.   Date Last Reviewed: 10/1/2017© [id number] [employer]. [address] [place] [address], [place] [address]. All rights reserved. This information is not intended as a substitute for professional medical care. Always follow your healthcare professional's instructions.

## 2017-07-28 ENCOUNTER — OFFICE VISIT (OUTPATIENT)
Dept: INTERNAL MEDICINE | Facility: CLINIC | Age: 67
End: 2017-07-28
Payer: COMMERCIAL

## 2017-07-28 VITALS
TEMPERATURE: 98 F | HEART RATE: 78 BPM | DIASTOLIC BLOOD PRESSURE: 84 MMHG | HEIGHT: 63 IN | BODY MASS INDEX: 31.36 KG/M2 | SYSTOLIC BLOOD PRESSURE: 138 MMHG | WEIGHT: 177 LBS

## 2017-07-28 DIAGNOSIS — I25.2 STATUS POST NON-ST ELEVATION MYOCARDIAL INFARCTION (NSTEMI): ICD-10-CM

## 2017-07-28 DIAGNOSIS — I10 ESSENTIAL HYPERTENSION: Chronic | ICD-10-CM

## 2017-07-28 DIAGNOSIS — Z95.5 S/P DRUG ELUTING CORONARY STENT PLACEMENT: ICD-10-CM

## 2017-07-28 DIAGNOSIS — Z98.61 S/P PTCA (PERCUTANEOUS TRANSLUMINAL CORONARY ANGIOPLASTY): ICD-10-CM

## 2017-07-28 DIAGNOSIS — I25.10 CORONARY ARTERY DISEASE INVOLVING NATIVE CORONARY ARTERY OF NATIVE HEART WITHOUT ANGINA PECTORIS: Primary | Chronic | ICD-10-CM

## 2017-07-28 DIAGNOSIS — E11.9 TYPE 2 DIABETES MELLITUS WITHOUT COMPLICATION, WITHOUT LONG-TERM CURRENT USE OF INSULIN: Chronic | ICD-10-CM

## 2017-07-28 PROCEDURE — 3044F HG A1C LEVEL LT 7.0%: CPT | Mod: S$GLB,,, | Performed by: INTERNAL MEDICINE

## 2017-07-28 PROCEDURE — 99999 PR PBB SHADOW E&M-EST. PATIENT-LVL III: CPT | Mod: PBBFAC,,, | Performed by: INTERNAL MEDICINE

## 2017-07-28 PROCEDURE — 4010F ACE/ARB THERAPY RXD/TAKEN: CPT | Mod: S$GLB,,, | Performed by: INTERNAL MEDICINE

## 2017-07-28 PROCEDURE — 99214 OFFICE O/P EST MOD 30 MIN: CPT | Mod: S$GLB,,, | Performed by: INTERNAL MEDICINE

## 2017-07-28 PROCEDURE — 3008F BODY MASS INDEX DOCD: CPT | Mod: S$GLB,,, | Performed by: INTERNAL MEDICINE

## 2017-07-28 RX ORDER — THEOPHYLLINE 400 MG/1
1 TABLET, EXTENDED RELEASE ORAL DAILY
Refills: 11 | COMMUNITY
Start: 2017-07-03 | End: 2017-09-28 | Stop reason: SDUPTHER

## 2017-07-31 ENCOUNTER — OFFICE VISIT (OUTPATIENT)
Dept: CARDIOLOGY | Facility: CLINIC | Age: 67
End: 2017-07-31
Payer: COMMERCIAL

## 2017-07-31 VITALS
WEIGHT: 177 LBS | DIASTOLIC BLOOD PRESSURE: 101 MMHG | HEART RATE: 70 BPM | BODY MASS INDEX: 31.36 KG/M2 | SYSTOLIC BLOOD PRESSURE: 161 MMHG | HEIGHT: 63 IN | OXYGEN SATURATION: 95 %

## 2017-07-31 DIAGNOSIS — I25.10 CORONARY ARTERY DISEASE INVOLVING NATIVE CORONARY ARTERY OF NATIVE HEART WITHOUT ANGINA PECTORIS: Primary | Chronic | ICD-10-CM

## 2017-07-31 DIAGNOSIS — I10 ESSENTIAL HYPERTENSION: Chronic | ICD-10-CM

## 2017-07-31 DIAGNOSIS — I21.4 NSTEMI (NON-ST ELEVATED MYOCARDIAL INFARCTION): ICD-10-CM

## 2017-07-31 DIAGNOSIS — I50.30 (HFPEF) HEART FAILURE WITH PRESERVED EJECTION FRACTION: ICD-10-CM

## 2017-07-31 PROCEDURE — 99999 PR PBB SHADOW E&M-EST. PATIENT-LVL III: CPT | Mod: PBBFAC,,, | Performed by: NURSE PRACTITIONER

## 2017-07-31 PROCEDURE — 99214 OFFICE O/P EST MOD 30 MIN: CPT | Mod: S$GLB,,, | Performed by: NURSE PRACTITIONER

## 2017-07-31 RX ORDER — CLOPIDOGREL BISULFATE 75 MG/1
75 TABLET ORAL DAILY
Qty: 30 TABLET | Refills: 11 | Status: SHIPPED | OUTPATIENT
Start: 2017-07-31 | End: 2018-08-06 | Stop reason: SDUPTHER

## 2017-08-01 NOTE — PROGRESS NOTES
Subjective:       Patient ID: Noah Bailey is a 66 y.o. female history of HTN, HLP, DMII, hypothyroidism, and CAD s/p AUGUSTO mLCx here for hospital follow up.     Chief Complaint: Follow-up    65yo female with history of HTN, HLP, DMII and hypothyroidism who presented to the ER with complaints of chest pain x 2 days. She took OTC medications (Tums, Zantac and Pepto) with easing of her symptoms with intermittent recurrence throughout the day.  She was admitted to [facility] for [place]STEMI. She reports a strong family history of CAD with mother with MI age 39-40 with CABG, sister  age 42?CAD and grandmother massive MI age 40s.   EKG with nonspecific changes and initial troponin  4 with trend up to 9.   Underwent echocardiogram with mildly depressed LV function with EF 45%.   Taken to the cath lab for LHC via right radial access with following results:   LVEDP  5 mmHg  LVEF 55%  Wall Motion: dyskinetic in the inferobasilar wall  LM normal  LAD normal  mLCX 99%  RCA normal  mLCX  Xience 3.0 X 18 (AUGUSTO).   Tolerated procedure well and recovered on telemetry floor overnight. TR band in place post procedure with slight oozing noted requiring long application. Successful removal overnight with no active bleeding. Slight swelling and ecchymosis noted to right radial site with no active bleeding or hematoma noted. Ambulating without any complaints of chest pain or SOB. Deemed ready for discharge and sent home on ASA, statin, BB, ACEI and Brilinta. Was on Norvasc upon admission with holding while admitted with stable BP with SBP 90s-100s. Norvasc held upon discharge and continued on ACEI and BB.  Patient presents today for hospital follow up and is doing well without recurrent chest pain. She reports compliance with cardiac diet and medications including DAPT. She has resumed her ADLs at baseline. Her largest complaint is radicular pain to her right thigh and shoulder pain on the left which is chronic for her and  unchanged from baseline.       Review of Systems   Constitutional: Negative for diaphoresis, fatigue and fever.   HENT: Negative.    Eyes: Negative.    Respiratory: Negative.  Negative for cough, chest tightness and shortness of breath.    Cardiovascular: Negative for chest pain, palpitations and leg swelling.   Gastrointestinal: Negative.    Endocrine: Negative.    Genitourinary: Negative.    Musculoskeletal: Positive for arthralgias and myalgias.   Skin: Negative.    Allergic/Immunologic: Negative.    Neurological: Negative.    Hematological: Negative.    Psychiatric/Behavioral: Negative.        Objective:      Physical Exam   Constitutional: She is oriented to person, place, and time. She appears well-developed and well-nourished. No distress.   HENT:   Head: Normocephalic and atraumatic.   Eyes: Right eye exhibits no discharge. Left eye exhibits no discharge.   Neck: No JVD present.   Cardiovascular: Normal rate and regular rhythm.  Exam reveals no gallop and no friction rub.    No murmur heard.  Pulmonary/Chest: Effort normal and breath sounds normal.   Abdominal: Soft. Bowel sounds are normal.   Musculoskeletal: She exhibits no edema.   Neurological: She is alert and oriented to person, place, and time.   Skin: Skin is warm and dry. Capillary refill takes less than 2 seconds. She is not diaphoretic.   Psychiatric: She has a normal mood and affect. Her behavior is normal. Judgment and thought content normal.       Assessment:       1. Coronary artery disease involving native coronary artery of native heart without angina pectoris    2. Essential hypertension    3. (HFpEF) heart failure with preserved ejection fraction    4. NSTEMI (non-ST elevated myocardial infarction)        Plan:     Noah was seen today for follow-up.    Diagnoses and all orders for this visit:    Coronary artery disease involving native coronary artery of native heart without angina pectoris  -     clopidogrel (PLAVIX) 75 mg tablet; Take  "1 tablet (75 mg total) by mouth once daily.    Essential hypertension    (HFpEF) heart failure with preserved ejection fraction    NSTEMI (non-ST elevated myocardial infarction)  -     clopidogrel (PLAVIX) 75 mg tablet; Take 1 tablet (75 mg total) by mouth once daily.      [facility] reviewed with patient and all recent labs reviewed; all questions answered to her satisfaction   Compliance with medication was discussed with particular attention paid to importance of  DAPT for one year without interruption. The risk of abrupt stent occlusion and MI with early discontinuation was specifically stressed.  Continue current medications and cardiac diet  Regular CV exercise encouraged   RTC 3 mo; sooner for any questions or concers  Proceed to the ED for acute concerns or chest pain unrelieved by NTG    Vitals:    07/31/17 1323   BP: (!) 161/101 BP rechecked manually at end of visit 142/80    Pulse: 70   SpO2: 95%   Weight: 80.3 kg (177 lb)   Height: 5' 2.5" (1.588 m)       Current Outpatient Prescriptions on File Prior to Visit   Medication Sig Dispense Refill    alprazolam (XANAX) 0.25 MG tablet TAKE 1 TABLET BY MOUTH EVERY NIGHT 30 tablet 3    aspirin (ECOTRIN) 81 MG EC tablet Take 1 tablet (81 mg total) by mouth once daily. 30 tablet 11    atorvastatin (LIPITOR) 20 MG tablet Take 1.5 tablets (30 mg total) by mouth once daily. 30 tablet 11    blood sugar diagnostic (FREESTYLE TEST) Strp Test blood glucose level once daily as directed. 100 strip 3    blood-glucose meter (FREESTYLE LITE METER) kit Use as instructed 1 each 0    lancets (MICROLET LANCET) Misc Test blood sugar once daily. 100 each 3    levothyroxine (SYNTHROID) 50 MCG tablet Take 1 tablet (50 mcg total) by mouth every morning. 30 tablet 11    lisinopril (PRINIVIL,ZESTRIL) 40 MG tablet Take 1 tablet (40 mg total) by mouth once daily. 30 tablet 11    metformin (GLUCOPHAGE) 500 MG tablet 1 TABLET BY MOUTH before dinner 30 tablet 11    metoprolol succinate " (TOPROL-XL) 25 MG 24 hr tablet Take 1 tablet (25 mg total) by mouth once daily. 30 tablet 11    nitroGLYCERIN (NITROSTAT) 0.4 MG SL tablet Place 1 tablet (0.4 mg total) under the tongue every 5 (five) minutes as needed for Chest pain ((Shakira RUST)). 25 tablet 11    theophylline (THEODUR) 200 mg 24 hr capsule Take 200 mg by mouth once daily. 30 capsule 11    theophylline 400 mg Tb24 Take 1 tablet by mouth once daily.  11    albuterol 90 mcg/actuation inhaler Inhale 2 puffs into the lungs every 6 (six) hours as needed. 1 Inhaler 3    beclomethasone (QVAR) 80 mcg/actuation Aero Inhale 1 puff into the lungs 2 (two) times daily. For asthma control. 120 each 3     No current facility-administered medications on file prior to visit.        Results for orders placed or performed during the hospital encounter of 07/12/17   Troponin I   Result Value Ref Range    Troponin I 4.398 (H) 0.000 - 0.026 ng/mL   CBC auto differential   Result Value Ref Range    WBC 14.70 (H) 3.90 - 12.70 K/uL    RBC 4.82 4.00 - 5.40 M/uL    Hemoglobin 14.1 12.0 - 16.0 g/dL    Hematocrit 40.6 37.0 - 48.5 %    MCV 84 82 - 98 fL    MCH 29.3 27.0 - 31.0 pg    MCHC 34.7 32.0 - 36.0 %    RDW 13.0 11.5 - 14.5 %    Platelets 302 150 - 350 K/uL    MPV 11.5 9.2 - 12.9 fL    Gran # 11.2 (H) 1.8 - 7.7 K/uL    Lymph # 2.4 1.0 - 4.8 K/uL    Mono # 1.0 0.3 - 1.0 K/uL    Eos # 0.0 0.0 - 0.5 K/uL    Baso # 0.03 0.00 - 0.20 K/uL    Gran% 76.3 (H) 38.0 - 73.0 %    Lymph% 16.5 (L) 18.0 - 48.0 %    Mono% 6.7 4.0 - 15.0 %    Eosinophil% 0.1 0.0 - 8.0 %    Basophil% 0.2 0.0 - 1.9 %    Differential Method Automated    Brain natriuretic peptide   Result Value Ref Range    BNP 59 0 - 99 pg/mL   Comprehensive metabolic panel   Result Value Ref Range    Sodium 135 (L) 136 - 145 mmol/L    Potassium 4.5 3.5 - 5.1 mmol/L    Chloride 98 95 - 110 mmol/L    CO2 26 23 - 29 mmol/L    Glucose 169 (H) 70 - 110 mg/dL    BUN, Bld 10 8 - 23 mg/dL    Creatinine 0.8 0.5 - 1.4 mg/dL     Calcium 10.4 8.7 - 10.5 mg/dL    Total Protein 8.0 6.0 - 8.4 g/dL    Albumin 4.2 3.5 - 5.2 g/dL    Total Bilirubin 0.8 0.1 - 1.0 mg/dL    Alkaline Phosphatase 107 55 - 135 U/L    AST 81 (H) 10 - 40 U/L    ALT 31 10 - 44 U/L    Anion Gap 11 8 - 16 mmol/L    eGFR if African American >60 >60 mL/min/1.73 m^2    eGFR if non African American >60 >60 mL/min/1.73 m^2   CPK   Result Value Ref Range    CPK 1008 (H) 20 - 180 U/L   Troponin I   Result Value Ref Range    Troponin I 9.096 (H) 0.000 - 0.026 ng/mL   Hemoglobin A1c if not done in past 6 weeks   Result Value Ref Range    Hemoglobin A1C 6.3 (H) 4.0 - 5.6 %    Estimated Avg Glucose 134 (H) 68 - 131 mg/dL   Lipid panel   Result Value Ref Range    Cholesterol 156 120 - 199 mg/dL    Triglycerides 140 30 - 150 mg/dL    HDL 44 40 - 75 mg/dL    LDL Cholesterol 84.0 63.0 - 159.0 mg/dL    HDL/Chol Ratio 28.2 20.0 - 50.0 %    Total Cholesterol/HDL Ratio 3.5 2.0 - 5.0    Non-HDL Cholesterol 112 mg/dL   TSH   Result Value Ref Range    TSH 1.712 0.400 - 4.000 uIU/mL   Troponin I   Result Value Ref Range    Troponin I 9.197 (H) 0.000 - 0.026 ng/mL   CBC auto differential   Result Value Ref Range    WBC 12.69 3.90 - 12.70 K/uL    RBC 4.39 4.00 - 5.40 M/uL    Hemoglobin 12.6 12.0 - 16.0 g/dL    Hematocrit 37.6 37.0 - 48.5 %    MCV 86 82 - 98 fL    MCH 28.7 27.0 - 31.0 pg    MCHC 33.5 32.0 - 36.0 %    RDW 13.3 11.5 - 14.5 %    Platelets 288 150 - 350 K/uL    MPV 10.7 9.2 - 12.9 fL    Gran # 9.2 (H) 1.8 - 7.7 K/uL    Lymph # 2.0 1.0 - 4.8 K/uL    Mono # 1.5 (H) 0.3 - 1.0 K/uL    Eos # 0.0 0.0 - 0.5 K/uL    Baso # 0.02 0.00 - 0.20 K/uL    Gran% 72.1 38.0 - 73.0 %    Lymph% 15.4 (L) 18.0 - 48.0 %    Mono% 11.7 4.0 - 15.0 %    Eosinophil% 0.3 0.0 - 8.0 %    Basophil% 0.2 0.0 - 1.9 %    Differential Method Automated    Basic metabolic panel   Result Value Ref Range    Sodium 136 136 - 145 mmol/L    Potassium 3.6 3.5 - 5.1 mmol/L    Chloride 107 95 - 110 mmol/L    CO2 20 (L) 23 - 29 mmol/L     Glucose 99 70 - 110 mg/dL    BUN, Bld 12 8 - 23 mg/dL    Creatinine 0.7 0.5 - 1.4 mg/dL    Calcium 9.0 8.7 - 10.5 mg/dL    Anion Gap 9 8 - 16 mmol/L    eGFR if African American >60 >60 mL/min/1.73 m^2    eGFR if non African American >60 >60 mL/min/1.73 m^2   2D echo with color flow doppler   Result Value Ref Range    EF 45 55 - 65    Diastolic Dysfunction Yes (A)    Cath lab procedure   Result Value Ref Range    Coronary Stenosis >= 50% (A)     Coronary Stent Yes (A)    POCT glucose   Result Value Ref Range    POCT Glucose 106 70 - 110 mg/dL   POCT glucose   Result Value Ref Range    POCT Glucose 99 70 - 110 mg/dL   POCT glucose   Result Value Ref Range    POCT Glucose 171 (H) 70 - 110 mg/dL   POCT glucose   Result Value Ref Range    POCT Glucose 96 70 - 110 mg/dL

## 2017-08-04 NOTE — PROGRESS NOTES
Subjective:       Patient ID: Noah Bailey is a 66 y.o. female.    Chief Complaint: Follow-up (hospital)    HPI   The patient presents following hospitalization on 7/12/17 for NSTEMI.  The patient underwent PTCA with stenting of the left mid circumflex artery.  The patient had presented with recurrent chest pain of 2 days duration.  She is currently on aspirin and Brilinta which she is to use for the next year without interruption.  She is doing well.  Amlodipine was discontinued and metoprolol was added.    Blood sugar levels have been ranging from 80-90.  Her vision is stable.  No lower extremity paresthesias are noted.  Review of Systems   Constitutional: Negative for activity change, appetite change and unexpected weight change.   Eyes: Negative for visual disturbance.   Respiratory: Negative for shortness of breath.    Cardiovascular: Negative for chest pain, palpitations and leg swelling.   Gastrointestinal: Negative for abdominal pain, blood in stool and diarrhea.   Genitourinary: Negative for dysuria, frequency, hematuria and urgency.   Neurological: Negative for weakness, numbness and headaches.   Psychiatric/Behavioral: Negative for sleep disturbance.       Objective:      Physical Exam   Constitutional: She is oriented to person, place, and time. She appears well-developed and well-nourished. No distress.   HENT:   Head: Normocephalic and atraumatic.   Eyes: Conjunctivae and EOM are normal. No scleral icterus.   Neck: Normal range of motion. Neck supple. No JVD present. No thyromegaly present.   Cardiovascular: Normal rate, regular rhythm, normal heart sounds and intact distal pulses.  Exam reveals no gallop and no friction rub.    No murmur heard.  Pulmonary/Chest: Effort normal and breath sounds normal. No respiratory distress. She has no wheezes. She has no rales.   Abdominal: Soft. Bowel sounds are normal. She exhibits no mass. There is no tenderness.   Musculoskeletal: Normal range of motion. She  exhibits no tenderness.   Lymphadenopathy:     She has no cervical adenopathy.   Neurological: She is alert and oriented to person, place, and time.   Skin: Skin is warm and dry. No rash noted.   No foot lesions are present.  Ecchymoses of the anterior forearm are present.   Nursing note and vitals reviewed.      Assessment:       1. Coronary artery disease involving native coronary artery of native heart without angina pectoris    2. Status post non-ST elevation myocardial infarction (NSTEMI)    3. S/P PTCA (percutaneous transluminal coronary angioplasty)    4. S/P drug eluting coronary stent placement    5. Type 2 diabetes mellitus without complication, without long-term current use of insulin    6. Essential hypertension        Plan:       Noah was seen today for follow-up.  Current therapy will be continued.  The patient will follow-up with cardiology as recommended.  A routine follow-up visit in 10/17 as scheduled will be obtained.    Diagnoses and all orders for this visit:    Coronary artery disease involving native coronary artery of native heart without angina pectoris    Status post non-ST elevation myocardial infarction (NSTEMI)    S/P PTCA (percutaneous transluminal coronary angioplasty)    S/P drug eluting coronary stent placement    Type 2 diabetes mellitus without complication, without long-term current use of insulin    Essential hypertension

## 2017-08-07 LAB
CORONARY STENOSIS: ABNORMAL
CORONARY STENT: YES

## 2017-09-29 RX ORDER — THEOPHYLLINE 400 MG/1
1 TABLET, EXTENDED RELEASE ORAL DAILY
Qty: 30 TABLET | Refills: 6 | Status: SHIPPED | OUTPATIENT
Start: 2017-09-29 | End: 2018-05-31 | Stop reason: SDUPTHER

## 2017-10-27 ENCOUNTER — OFFICE VISIT (OUTPATIENT)
Dept: CARDIOLOGY | Facility: CLINIC | Age: 67
End: 2017-10-27
Payer: COMMERCIAL

## 2017-10-27 ENCOUNTER — LAB VISIT (OUTPATIENT)
Dept: LAB | Facility: HOSPITAL | Age: 67
End: 2017-10-27
Attending: INTERNAL MEDICINE
Payer: COMMERCIAL

## 2017-10-27 VITALS
BODY MASS INDEX: 31.66 KG/M2 | SYSTOLIC BLOOD PRESSURE: 171 MMHG | DIASTOLIC BLOOD PRESSURE: 103 MMHG | OXYGEN SATURATION: 94 % | HEIGHT: 63 IN | WEIGHT: 178.69 LBS | HEART RATE: 85 BPM

## 2017-10-27 DIAGNOSIS — I10 ESSENTIAL HYPERTENSION: Chronic | ICD-10-CM

## 2017-10-27 DIAGNOSIS — E03.9 HYPOTHYROIDISM, UNSPECIFIED TYPE: ICD-10-CM

## 2017-10-27 DIAGNOSIS — E11.9 TYPE 2 DIABETES MELLITUS WITHOUT COMPLICATION, WITHOUT LONG-TERM CURRENT USE OF INSULIN: Chronic | ICD-10-CM

## 2017-10-27 DIAGNOSIS — I50.30 (HFPEF) HEART FAILURE WITH PRESERVED EJECTION FRACTION: ICD-10-CM

## 2017-10-27 DIAGNOSIS — E78.5 HYPERLIPIDEMIA, UNSPECIFIED HYPERLIPIDEMIA TYPE: ICD-10-CM

## 2017-10-27 DIAGNOSIS — I25.10 CORONARY ARTERY DISEASE INVOLVING NATIVE CORONARY ARTERY OF NATIVE HEART WITHOUT ANGINA PECTORIS: Primary | Chronic | ICD-10-CM

## 2017-10-27 PROBLEM — I21.4 NSTEMI (NON-ST ELEVATED MYOCARDIAL INFARCTION): Status: RESOLVED | Noted: 2017-07-12 | Resolved: 2017-10-27

## 2017-10-27 LAB
ALBUMIN SERPL BCP-MCNC: 4 G/DL
ALP SERPL-CCNC: 108 U/L
ALT SERPL W/O P-5'-P-CCNC: 25 U/L
ANION GAP SERPL CALC-SCNC: 9 MMOL/L
AST SERPL-CCNC: 22 U/L
BASOPHILS # BLD AUTO: 0.06 K/UL
BASOPHILS NFR BLD: 0.7 %
BILIRUB SERPL-MCNC: 0.5 MG/DL
BUN SERPL-MCNC: 14 MG/DL
CALCIUM SERPL-MCNC: 9.9 MG/DL
CHLORIDE SERPL-SCNC: 104 MMOL/L
CO2 SERPL-SCNC: 25 MMOL/L
CREAT SERPL-MCNC: 0.8 MG/DL
DIFFERENTIAL METHOD: NORMAL
EOSINOPHIL # BLD AUTO: 0.1 K/UL
EOSINOPHIL NFR BLD: 1.6 %
ERYTHROCYTE [DISTWIDTH] IN BLOOD BY AUTOMATED COUNT: 14 %
EST. GFR  (AFRICAN AMERICAN): >60 ML/MIN/1.73 M^2
EST. GFR  (NON AFRICAN AMERICAN): >60 ML/MIN/1.73 M^2
ESTIMATED AVG GLUCOSE: 126 MG/DL
GLUCOSE SERPL-MCNC: 110 MG/DL
HBA1C MFR BLD HPLC: 6 %
HCT VFR BLD AUTO: 42.1 %
HGB BLD-MCNC: 13.7 G/DL
IMM GRANULOCYTES # BLD AUTO: 0.03 K/UL
IMM GRANULOCYTES NFR BLD AUTO: 0.3 %
LYMPHOCYTES # BLD AUTO: 2.2 K/UL
LYMPHOCYTES NFR BLD: 24.4 %
MCH RBC QN AUTO: 28.7 PG
MCHC RBC AUTO-ENTMCNC: 32.5 G/DL
MCV RBC AUTO: 88 FL
MONOCYTES # BLD AUTO: 0.8 K/UL
MONOCYTES NFR BLD: 9.2 %
NEUTROPHILS # BLD AUTO: 5.7 K/UL
NEUTROPHILS NFR BLD: 63.8 %
NRBC BLD-RTO: 0 /100 WBC
PLATELET # BLD AUTO: 282 K/UL
PMV BLD AUTO: 11.1 FL
POTASSIUM SERPL-SCNC: 4.5 MMOL/L
PROT SERPL-MCNC: 7.9 G/DL
RBC # BLD AUTO: 4.77 M/UL
SODIUM SERPL-SCNC: 138 MMOL/L
T4 FREE SERPL-MCNC: 1.05 NG/DL
TSH SERPL DL<=0.005 MIU/L-ACNC: 3.24 UIU/ML
WBC # BLD AUTO: 8.99 K/UL

## 2017-10-27 PROCEDURE — 84443 ASSAY THYROID STIM HORMONE: CPT

## 2017-10-27 PROCEDURE — 84439 ASSAY OF FREE THYROXINE: CPT

## 2017-10-27 PROCEDURE — 85025 COMPLETE CBC W/AUTO DIFF WBC: CPT

## 2017-10-27 PROCEDURE — 36415 COLL VENOUS BLD VENIPUNCTURE: CPT | Mod: PO

## 2017-10-27 PROCEDURE — 99999 PR PBB SHADOW E&M-EST. PATIENT-LVL III: CPT | Mod: PBBFAC,,, | Performed by: INTERNAL MEDICINE

## 2017-10-27 PROCEDURE — 99215 OFFICE O/P EST HI 40 MIN: CPT | Mod: S$GLB,,, | Performed by: INTERNAL MEDICINE

## 2017-10-27 PROCEDURE — 83036 HEMOGLOBIN GLYCOSYLATED A1C: CPT

## 2017-10-27 PROCEDURE — 80053 COMPREHEN METABOLIC PANEL: CPT

## 2017-10-27 RX ORDER — AMLODIPINE AND BENAZEPRIL HYDROCHLORIDE 10; 40 MG/1; MG/1
1 CAPSULE ORAL DAILY
Qty: 90 CAPSULE | Refills: 3 | Status: SHIPPED | OUTPATIENT
Start: 2017-10-27 | End: 2018-10-05 | Stop reason: SDUPTHER

## 2017-10-27 RX ORDER — ATORVASTATIN CALCIUM 40 MG/1
40 TABLET, FILM COATED ORAL DAILY
Qty: 90 TABLET | Refills: 3 | Status: SHIPPED | OUTPATIENT
Start: 2017-10-27 | End: 2018-03-12

## 2017-10-31 NOTE — PROGRESS NOTES
"Chief Complaint:  Noah Bailey is a 67 y.o. female who presents for evaluation of CAD    HPI:   67 year-old female.  NSTEMI in 2017 - PCI of LAD.  Returns for 3 month follow-up.  Asymptomatic.  Compliant with medications.  No specific complaints.  Did not complete cardiac rehab and does not desire to participate (costs "too much.").    Patient Active Problem List    Diagnosis Date Noted    Coronary artery disease involving native coronary artery of native heart without angina pectoris 2017    (HFpEF) heart failure with preserved ejection fraction 2017    Anxiety 2015    Type 2 diabetes mellitus 2014    Chalazion - Right Eye 2012    Myopia - Both Eyes 2012    Nuclear sclerosis - Both Eyes 2012    Bronchial asthma     Chronic rhinitis     Essential hypertension     Hyperlipidemia     Hypothyroidism     Colon polyps     Umbilical hernia        Right Arm BP - Sittin/103  Left Arm BP - Sittin/90    Current Outpatient Prescriptions   Medication Sig    alprazolam (XANAX) 0.25 MG tablet TAKE 1 TABLET BY MOUTH EVERY NIGHT    aspirin (ECOTRIN) 81 MG EC tablet Take 1 tablet (81 mg total) by mouth once daily.    blood sugar diagnostic (FREESTYLE TEST) Strp Test blood glucose level once daily as directed.    blood-glucose meter (FREESTYLE LITE METER) kit Use as instructed    clopidogrel (PLAVIX) 75 mg tablet Take 1 tablet (75 mg total) by mouth once daily.    lancets (MICROLET LANCET) Misc Test blood sugar once daily.    levothyroxine (SYNTHROID) 50 MCG tablet Take 1 tablet (50 mcg total) by mouth every morning.    metformin (GLUCOPHAGE) 500 MG tablet 1 TABLET BY MOUTH before dinner    nitroGLYCERIN (NITROSTAT) 0.4 MG SL tablet Place 1 tablet (0.4 mg total) under the tongue every 5 (five) minutes as needed for Chest pain ((Notify MD)).    theophylline 400 mg Tb24 TAKE 1 TABLET BY MOUTH once DAILY (Patient taking differently: Take 0.5 tablets " by mouth once daily. )    albuterol 90 mcg/actuation inhaler Inhale 2 puffs into the lungs every 6 (six) hours as needed.    amlodipine-benazepril (LOTREL) 10-40 mg per capsule Take 1 capsule by mouth once daily.    atorvastatin (LIPITOR) 40 MG tablet Take 1 tablet (40 mg total) by mouth once daily.    beclomethasone (QVAR) 80 mcg/actuation Aero Inhale 1 puff into the lungs 2 (two) times daily. For asthma control.     No current facility-administered medications for this visit.        Review of Systems   Constitution: Negative for diaphoresis, weakness, malaise/fatigue, weight gain and weight loss.   HENT: Negative for nosebleeds.    Cardiovascular: Negative for chest pain, claudication, cyanosis, dyspnea on exertion, irregular heartbeat, leg swelling, near-syncope, orthopnea, palpitations, paroxysmal nocturnal dyspnea and syncope.   Respiratory: Negative for cough, hemoptysis, shortness of breath, sleep disturbances due to breathing, snoring, sputum production and wheezing.    Hematologic/Lymphatic: Negative for bleeding problem. Does not bruise/bleed easily.   Skin: Negative for poor wound healing and rash.   Musculoskeletal: Negative for myalgias.   Gastrointestinal: Negative for abdominal pain, heartburn, hematemesis, hematochezia, hemorrhoids and melena.   Genitourinary: Negative for hematuria.   Neurological: Negative for dizziness, focal weakness, light-headedness and loss of balance.   Psychiatric/Behavioral: Negative for altered mental status, depression and memory loss.         Objective:     Physical Exam   Constitutional: She is oriented to person, place, and time. She appears well-developed and well-nourished. No distress. She is not intubated.   HENT:   Head: Atraumatic.   Neck: No JVD present.   Cardiovascular: Normal rate, regular rhythm, S1 normal, S2 normal, normal heart sounds and intact distal pulses.  PMI is not displaced.  Exam reveals no gallop, no S3, no S4, no distant heart sounds, no  friction rub, no midsystolic click and no opening snap.    No murmur heard.  Pulses:       Carotid pulses are 2+ on the right side, and 2+ on the left side.       Radial pulses are 2+ on the right side, and 2+ on the left side.   Pulmonary/Chest: Effort normal and breath sounds normal. No accessory muscle usage. No apnea, no tachypnea and no bradypnea. She is not intubated. No respiratory distress. She has no decreased breath sounds. She has no wheezes. She has no rhonchi. She has no rales. She exhibits no tenderness.   Abdominal: Soft. Normal aorta and bowel sounds are normal. She exhibits no distension, no abdominal bruit, no pulsatile midline mass and no mass. There is no tenderness.   Musculoskeletal: She exhibits no edema.   Neurological: She is alert and oriented to person, place, and time.   Skin: Skin is warm. No rash noted. No erythema. No pallor.   Psychiatric: She has a normal mood and affect. Her behavior is normal. Judgment and thought content normal.   Vitals reviewed.      LABS    CMP  Sodium   Date Value Ref Range Status   10/27/2017 138 136 - 145 mmol/L Final     Potassium   Date Value Ref Range Status   10/27/2017 4.5 3.5 - 5.1 mmol/L Final     Chloride   Date Value Ref Range Status   10/27/2017 104 95 - 110 mmol/L Final     CO2   Date Value Ref Range Status   10/27/2017 25 23 - 29 mmol/L Final     Glucose   Date Value Ref Range Status   10/27/2017 110 70 - 110 mg/dL Final     BUN, Bld   Date Value Ref Range Status   10/27/2017 14 8 - 23 mg/dL Final     Creatinine   Date Value Ref Range Status   10/27/2017 0.8 0.5 - 1.4 mg/dL Final     Calcium   Date Value Ref Range Status   10/27/2017 9.9 8.7 - 10.5 mg/dL Final     Total Protein   Date Value Ref Range Status   10/27/2017 7.9 6.0 - 8.4 g/dL Final     Albumin   Date Value Ref Range Status   10/27/2017 4.0 3.5 - 5.2 g/dL Final     Total Bilirubin   Date Value Ref Range Status   10/27/2017 0.5 0.1 - 1.0 mg/dL Final     Comment:     For infants and  newborns, interpretation of results should be based  on gestational age, weight and in agreement with clinical  observations.  Premature Infant recommended reference ranges:  Up to 24 hours.............<8.0 mg/dL  Up to 48 hours............<12.0 mg/dL  3-5 days..................<15.0 mg/dL  6-29 days.................<15.0 mg/dL       Alkaline Phosphatase   Date Value Ref Range Status   10/27/2017 108 55 - 135 U/L Final     AST   Date Value Ref Range Status   10/27/2017 22 10 - 40 U/L Final     ALT   Date Value Ref Range Status   10/27/2017 25 10 - 44 U/L Final     Anion Gap   Date Value Ref Range Status   10/27/2017 9 8 - 16 mmol/L Final     eGFR if    Date Value Ref Range Status   10/27/2017 >60.0 >60 mL/min/1.73 m^2 Final     eGFR if non    Date Value Ref Range Status   10/27/2017 >60.0 >60 mL/min/1.73 m^2 Final     Comment:     Calculation used to obtain the estimated glomerular filtration  rate (eGFR) is the CKD-EPI equation. Since race is unknown   in our information system, the eGFR values for   -American and Non--American patients are given   for each creatinine result.         Lab Results   Component Value Date    WBC 8.99 10/27/2017    HGB 13.7 10/27/2017    HCT 42.1 10/27/2017    MCV 88 10/27/2017     10/27/2017       ECHOCARDIOGRAM: July 2017  EF 45-50%  Grade 1 dd    ECG:    STRESS TESTING:    CARDIAC CATHETERIZATION:    Assessment:     1. Coronary artery disease involving native coronary artery of native heart without angina pectoris    2. (HFpEF) heart failure with preserved ejection fraction    3. Essential hypertension    4. Hyperlipidemia, unspecified hyperlipidemia type    5. Type 2 diabetes mellitus without complication, without long-term current use of insulin        Plan:       Continue with current medical plan and lifestyle changes:  GDMT for CAD and h/o PCI:  DAPT for 12+ months; ACEi (for DM2); high intensity statin.  RTC 3 months    I  have reviewed the patient's medical history in detail and updated the computerized patient record.    Orders Placed This Encounter   Procedures    Lipid panel     Standing Status:   Future     Standing Expiration Date:   12/26/2018     -In today's visit, at least 4 established conditions that pose a risk to life or bodily function have been addressed and the conditions are severe.    -In today's visit, monitoring for drug toxicity was accomplished.      Follow up as scheduled. Return sooner for concerns or questions      She expressed verbal understanding and agreed with the plan          Kalyan Knapp MD, FACC, CCDS  Interventional Cardiology  [facility]

## 2017-11-13 ENCOUNTER — OFFICE VISIT (OUTPATIENT)
Dept: INTERNAL MEDICINE | Facility: CLINIC | Age: 67
End: 2017-11-13
Payer: COMMERCIAL

## 2017-11-13 VITALS
BODY MASS INDEX: 31.68 KG/M2 | DIASTOLIC BLOOD PRESSURE: 80 MMHG | HEART RATE: 126 BPM | HEIGHT: 63 IN | WEIGHT: 178.81 LBS | TEMPERATURE: 98 F | SYSTOLIC BLOOD PRESSURE: 134 MMHG

## 2017-11-13 DIAGNOSIS — I50.30 (HFPEF) HEART FAILURE WITH PRESERVED EJECTION FRACTION: ICD-10-CM

## 2017-11-13 DIAGNOSIS — F41.9 ANXIETY: Chronic | ICD-10-CM

## 2017-11-13 DIAGNOSIS — J45.20 MILD INTERMITTENT ASTHMA WITHOUT COMPLICATION: ICD-10-CM

## 2017-11-13 DIAGNOSIS — E03.9 HYPOTHYROIDISM, UNSPECIFIED TYPE: ICD-10-CM

## 2017-11-13 DIAGNOSIS — E11.9 TYPE 2 DIABETES MELLITUS WITHOUT COMPLICATION, WITHOUT LONG-TERM CURRENT USE OF INSULIN: Chronic | ICD-10-CM

## 2017-11-13 DIAGNOSIS — E78.5 HYPERLIPIDEMIA, UNSPECIFIED HYPERLIPIDEMIA TYPE: ICD-10-CM

## 2017-11-13 DIAGNOSIS — I10 ESSENTIAL HYPERTENSION: Primary | Chronic | ICD-10-CM

## 2017-11-13 DIAGNOSIS — J31.0 CHRONIC RHINITIS, UNSPECIFIED TYPE: Chronic | ICD-10-CM

## 2017-11-13 DIAGNOSIS — I25.10 CORONARY ARTERY DISEASE INVOLVING NATIVE CORONARY ARTERY OF NATIVE HEART WITHOUT ANGINA PECTORIS: Chronic | ICD-10-CM

## 2017-11-13 PROCEDURE — 99214 OFFICE O/P EST MOD 30 MIN: CPT | Mod: S$GLB,,, | Performed by: INTERNAL MEDICINE

## 2017-11-13 PROCEDURE — 99999 PR PBB SHADOW E&M-EST. PATIENT-LVL IV: CPT | Mod: PBBFAC,,, | Performed by: INTERNAL MEDICINE

## 2017-11-13 RX ORDER — METOPROLOL SUCCINATE 25 MG/1
25 TABLET, EXTENDED RELEASE ORAL DAILY
Qty: 30 TABLET | Refills: 11 | Status: SHIPPED | OUTPATIENT
Start: 2017-11-13 | End: 2018-03-12 | Stop reason: SDUPTHER

## 2017-11-13 RX ORDER — ALPRAZOLAM 0.25 MG/1
0.25 TABLET ORAL NIGHTLY
Qty: 30 TABLET | Refills: 3 | Status: SHIPPED | OUTPATIENT
Start: 2017-11-13 | End: 2017-11-30 | Stop reason: SDUPTHER

## 2017-11-21 NOTE — PROGRESS NOTES
Subjective:       Patient ID: Noah Bailey is a 67 y.o. female.    Chief Complaint: Follow-up    HPI   The patient presents for follow-up of hypertension and other medical conditions.  She is currently on Lotrel in place of lisinopril and amlodipine.  She is also using Plavix.  Blood glucose diary was reviewed.  Blood sugar levels have been less than 100.  Her vision is stable.  No lower extremity paresthesias are noted.  Review of Systems   Constitutional: Negative for activity change, appetite change and unexpected weight change.   Eyes: Negative for visual disturbance.   Respiratory: Negative for shortness of breath.    Cardiovascular: Negative for chest pain, palpitations and leg swelling.   Gastrointestinal: Negative for abdominal pain, blood in stool and diarrhea.   Genitourinary: Negative for dysuria, frequency, hematuria and urgency.   Neurological: Negative for weakness, numbness and headaches.   Psychiatric/Behavioral: Negative for sleep disturbance.       Objective:      Physical Exam   Constitutional: She is oriented to person, place, and time. She appears well-developed and well-nourished. No distress.   HENT:   Head: Normocephalic and atraumatic.   Eyes: Conjunctivae and EOM are normal. Pupils are equal, round, and reactive to light. No scleral icterus.   Neck: Normal range of motion. Neck supple. No JVD present. No thyromegaly present.   Cardiovascular: Normal rate, regular rhythm, normal heart sounds and intact distal pulses.  Exam reveals no gallop and no friction rub.    No murmur heard.  Pulmonary/Chest: Effort normal and breath sounds normal. No respiratory distress. She has no wheezes. She has no rales.   Abdominal: Soft. Bowel sounds are normal. She exhibits no mass. There is no tenderness.   Musculoskeletal: Normal range of motion. She exhibits no edema or tenderness.   Lymphadenopathy:     She has no cervical adenopathy.   Neurological: She is alert and oriented to person, place, and  time. No cranial nerve deficit.   Sensory exam is intact in both feet on monofilament and vibration testing.   Skin: Skin is warm and dry. No rash noted.   No foot lesions are present.   Psychiatric: She has a normal mood and affect. Her behavior is normal.   Nursing note and vitals reviewed.      Results for orders placed or performed in visit on 10/27/17   CBC auto differential   Result Value Ref Range    WBC 8.99 3.90 - 12.70 K/uL    RBC 4.77 4.00 - 5.40 M/uL    Hemoglobin 13.7 12.0 - 16.0 g/dL    Hematocrit 42.1 37.0 - 48.5 %    MCV 88 82 - 98 fL    MCH 28.7 27.0 - 31.0 pg    MCHC 32.5 32.0 - 36.0 g/dL    RDW 14.0 11.5 - 14.5 %    Platelets 282 150 - 350 K/uL    MPV 11.1 9.2 - 12.9 fL    Immature Granulocytes 0.3 0.0 - 0.5 %    Gran # 5.7 1.8 - 7.7 K/uL    Immature Grans (Abs) 0.03 0.00 - 0.04 K/uL    Lymph # 2.2 1.0 - 4.8 K/uL    Mono # 0.8 0.3 - 1.0 K/uL    Eos # 0.1 0.0 - 0.5 K/uL    Baso # 0.06 0.00 - 0.20 K/uL    nRBC 0 0 /100 WBC    Gran% 63.8 38.0 - 73.0 %    Lymph% 24.4 18.0 - 48.0 %    Mono% 9.2 4.0 - 15.0 %    Eosinophil% 1.6 0.0 - 8.0 %    Basophil% 0.7 0.0 - 1.9 %    Differential Method Automated    Comprehensive metabolic panel   Result Value Ref Range    Sodium 138 136 - 145 mmol/L    Potassium 4.5 3.5 - 5.1 mmol/L    Chloride 104 95 - 110 mmol/L    CO2 25 23 - 29 mmol/L    Glucose 110 70 - 110 mg/dL    BUN, Bld 14 8 - 23 mg/dL    Creatinine 0.8 0.5 - 1.4 mg/dL    Calcium 9.9 8.7 - 10.5 mg/dL    Total Protein 7.9 6.0 - 8.4 g/dL    Albumin 4.0 3.5 - 5.2 g/dL    Total Bilirubin 0.5 0.1 - 1.0 mg/dL    Alkaline Phosphatase 108 55 - 135 U/L    AST 22 10 - 40 U/L    ALT 25 10 - 44 U/L    Anion Gap 9 8 - 16 mmol/L    eGFR if African American >60.0 >60 mL/min/1.73 m^2    eGFR if non African American >60.0 >60 mL/min/1.73 m^2   Hemoglobin A1c   Result Value Ref Range    Hemoglobin A1C 6.0 (H) 4.0 - 5.6 %    Estimated Avg Glucose 126 68 - 131 mg/dL   TSH   Result Value Ref Range    TSH 3.238 0.400 - 4.000  uIU/mL   T4, free   Result Value Ref Range    Free T4 1.05 0.71 - 1.51 ng/dL       Assessment:       1. Essential hypertension    2. Type 2 diabetes mellitus without complication, without long-term current use of insulin    3. Mild intermittent asthma without complication    4. Anxiety    5. Chronic rhinitis, unspecified type    6. Hyperlipidemia, unspecified hyperlipidemia type    7. Hypothyroidism, unspecified type    8. (HFpEF) heart failure with preserved ejection fraction    9. Coronary artery disease involving native coronary artery of native heart without angina pectoris        Plan:       Noah was seen today for follow-up.  Cardiology consultation will be obtained for coronary artery disease follow-up.  Influenza vaccine will be administered today.  Current medications will be continued.  The patient is to return to clinic in 4 months.    Diagnoses and all orders for this visit:    Essential hypertension    Type 2 diabetes mellitus without complication, without long-term current use of insulin    Mild intermittent asthma without complication    Anxiety    Chronic rhinitis, unspecified type    Hyperlipidemia, unspecified hyperlipidemia type    Hypothyroidism, unspecified type    (HFpEF) heart failure with preserved ejection fraction  -     Ambulatory consult to Cardiology    Coronary artery disease involving native coronary artery of native heart without angina pectoris  -     Ambulatory consult to Cardiology    Other orders  -     ALPRAZolam (XANAX) 0.25 MG tablet; Take 1 tablet (0.25 mg total) by mouth nightly.  -     metoprolol succinate (TOPROL-XL) 25 MG 24 hr tablet; Take 1 tablet (25 mg total) by mouth once daily.

## 2017-11-30 RX ORDER — ALPRAZOLAM 0.25 MG/1
TABLET ORAL
Qty: 30 TABLET | Refills: 3 | Status: SHIPPED | OUTPATIENT
Start: 2017-11-30 | End: 2018-03-12 | Stop reason: SDUPTHER

## 2017-11-30 RX ORDER — LEVOTHYROXINE SODIUM 50 UG/1
TABLET ORAL
Qty: 30 TABLET | Refills: 11 | Status: SHIPPED | OUTPATIENT
Start: 2017-11-30 | End: 2018-11-16 | Stop reason: SDUPTHER

## 2017-12-29 RX ORDER — LISINOPRIL 40 MG/1
TABLET ORAL
Qty: 30 TABLET | Refills: 10 | Status: SHIPPED | OUTPATIENT
Start: 2017-12-29 | End: 2018-03-12

## 2018-01-30 RX ORDER — METFORMIN HYDROCHLORIDE 500 MG/1
TABLET ORAL
Qty: 30 TABLET | Refills: 10 | Status: SHIPPED | OUTPATIENT
Start: 2018-01-30 | End: 2018-11-16 | Stop reason: SDUPTHER

## 2018-01-30 RX ORDER — ATORVASTATIN CALCIUM 20 MG/1
TABLET, FILM COATED ORAL
Qty: 45 TABLET | Refills: 10 | Status: SHIPPED | OUTPATIENT
Start: 2018-01-30 | End: 2018-03-02 | Stop reason: CLARIF

## 2018-03-02 DIAGNOSIS — I25.10 CORONARY ARTERY DISEASE INVOLVING NATIVE CORONARY ARTERY OF NATIVE HEART WITHOUT ANGINA PECTORIS: Chronic | ICD-10-CM

## 2018-03-02 RX ORDER — ATORVASTATIN CALCIUM 20 MG/1
20 TABLET, FILM COATED ORAL DAILY
Qty: 30 TABLET | Refills: 11 | Status: SHIPPED | OUTPATIENT
Start: 2018-03-02 | End: 2018-03-12

## 2018-03-02 RX ORDER — ATORVASTATIN CALCIUM 40 MG/1
40 TABLET, FILM COATED ORAL DAILY
Qty: 90 TABLET | Refills: 3 | Status: CANCELLED | OUTPATIENT
Start: 2018-03-02

## 2018-03-02 RX ORDER — BLOOD SUGAR DIAGNOSTIC
STRIP MISCELLANEOUS
Qty: 100 STRIP | Refills: 3 | Status: SHIPPED | OUTPATIENT
Start: 2018-03-02 | End: 2019-08-23 | Stop reason: SDUPTHER

## 2018-03-02 NOTE — TELEPHONE ENCOUNTER
----- Message from Wesley Landaverde sent at 3/2/2018  3:24 PM CST -----  Contact: self [phone]  Patient would like to speak to nurse regarding atorvastatin (LIPITOR)20 MG tablet.  Patient will be charge $9.00 per pharmacy per direction as 1&1/2 per day if she take 1 per day it $0 charge.    Please advise

## 2018-03-02 NOTE — TELEPHONE ENCOUNTER
----- Message from Wesley Landaverde sent at 3/2/2018  3:24 PM CST -----  Contact: self [phone]  Patient would like to speak to nurse regarding atorvastatin (LIPITOR)20 MG tablet.  Patient will be charge $9.00 per pharmacy per direction as 1&1/2 per day if she take 1 per day it $0 charge.    Please advise

## 2018-03-12 ENCOUNTER — LAB VISIT (OUTPATIENT)
Dept: LAB | Facility: HOSPITAL | Age: 68
End: 2018-03-12
Attending: INTERNAL MEDICINE
Payer: COMMERCIAL

## 2018-03-12 ENCOUNTER — TELEPHONE (OUTPATIENT)
Dept: CARDIOLOGY | Facility: CLINIC | Age: 68
End: 2018-03-12

## 2018-03-12 ENCOUNTER — OFFICE VISIT (OUTPATIENT)
Dept: INTERNAL MEDICINE | Facility: CLINIC | Age: 68
End: 2018-03-12
Payer: COMMERCIAL

## 2018-03-12 VITALS
HEART RATE: 103 BPM | BODY MASS INDEX: 32.46 KG/M2 | RESPIRATION RATE: 16 BRPM | WEIGHT: 180.31 LBS | SYSTOLIC BLOOD PRESSURE: 122 MMHG | DIASTOLIC BLOOD PRESSURE: 74 MMHG

## 2018-03-12 DIAGNOSIS — I10 ESSENTIAL HYPERTENSION: Chronic | ICD-10-CM

## 2018-03-12 DIAGNOSIS — E03.9 HYPOTHYROIDISM, UNSPECIFIED TYPE: ICD-10-CM

## 2018-03-12 DIAGNOSIS — J45.20 MILD INTERMITTENT ASTHMA WITHOUT COMPLICATION: ICD-10-CM

## 2018-03-12 DIAGNOSIS — I25.10 CORONARY ARTERY DISEASE INVOLVING NATIVE CORONARY ARTERY OF NATIVE HEART WITHOUT ANGINA PECTORIS: Chronic | ICD-10-CM

## 2018-03-12 DIAGNOSIS — E11.9 TYPE 2 DIABETES MELLITUS WITHOUT COMPLICATION, WITHOUT LONG-TERM CURRENT USE OF INSULIN: Primary | Chronic | ICD-10-CM

## 2018-03-12 DIAGNOSIS — E11.9 TYPE 2 DIABETES MELLITUS WITHOUT COMPLICATION, WITHOUT LONG-TERM CURRENT USE OF INSULIN: Chronic | ICD-10-CM

## 2018-03-12 DIAGNOSIS — E78.5 HYPERLIPIDEMIA, UNSPECIFIED HYPERLIPIDEMIA TYPE: ICD-10-CM

## 2018-03-12 LAB
ALBUMIN SERPL BCP-MCNC: 4.2 G/DL
ALP SERPL-CCNC: 109 U/L
ALT SERPL W/O P-5'-P-CCNC: 21 U/L
ANION GAP SERPL CALC-SCNC: 13 MMOL/L
AST SERPL-CCNC: 21 U/L
BILIRUB SERPL-MCNC: 0.6 MG/DL
BUN SERPL-MCNC: 13 MG/DL
CALCIUM SERPL-MCNC: 9.8 MG/DL
CHLORIDE SERPL-SCNC: 104 MMOL/L
CHOLEST SERPL-MCNC: 164 MG/DL
CHOLEST/HDLC SERPL: 3.1 {RATIO}
CO2 SERPL-SCNC: 22 MMOL/L
CREAT SERPL-MCNC: 0.8 MG/DL
EST. GFR  (AFRICAN AMERICAN): >60 ML/MIN/1.73 M^2
EST. GFR  (NON AFRICAN AMERICAN): >60 ML/MIN/1.73 M^2
ESTIMATED AVG GLUCOSE: 126 MG/DL
GLUCOSE SERPL-MCNC: 104 MG/DL
HBA1C MFR BLD HPLC: 6 %
HDLC SERPL-MCNC: 53 MG/DL
HDLC SERPL: 32.3 %
LDLC SERPL CALC-MCNC: 87 MG/DL
NONHDLC SERPL-MCNC: 111 MG/DL
POTASSIUM SERPL-SCNC: 3.8 MMOL/L
PROT SERPL-MCNC: 8 G/DL
SODIUM SERPL-SCNC: 139 MMOL/L
TRIGL SERPL-MCNC: 120 MG/DL

## 2018-03-12 PROCEDURE — 36415 COLL VENOUS BLD VENIPUNCTURE: CPT | Mod: PO

## 2018-03-12 PROCEDURE — 99999 PR PBB SHADOW E&M-EST. PATIENT-LVL III: CPT | Mod: PBBFAC,,, | Performed by: INTERNAL MEDICINE

## 2018-03-12 PROCEDURE — 80061 LIPID PANEL: CPT

## 2018-03-12 PROCEDURE — 3044F HG A1C LEVEL LT 7.0%: CPT | Mod: CPTII,S$GLB,, | Performed by: INTERNAL MEDICINE

## 2018-03-12 PROCEDURE — 83036 HEMOGLOBIN GLYCOSYLATED A1C: CPT

## 2018-03-12 PROCEDURE — 80053 COMPREHEN METABOLIC PANEL: CPT

## 2018-03-12 PROCEDURE — 3078F DIAST BP <80 MM HG: CPT | Mod: CPTII,S$GLB,, | Performed by: INTERNAL MEDICINE

## 2018-03-12 PROCEDURE — 99214 OFFICE O/P EST MOD 30 MIN: CPT | Mod: S$GLB,,, | Performed by: INTERNAL MEDICINE

## 2018-03-12 PROCEDURE — 3074F SYST BP LT 130 MM HG: CPT | Mod: CPTII,S$GLB,, | Performed by: INTERNAL MEDICINE

## 2018-03-12 RX ORDER — ALPRAZOLAM 0.25 MG/1
0.25 TABLET ORAL NIGHTLY
Qty: 30 TABLET | Refills: 3 | Status: SHIPPED | OUTPATIENT
Start: 2018-03-12 | End: 2018-03-29 | Stop reason: SDUPTHER

## 2018-03-12 RX ORDER — METOPROLOL SUCCINATE 50 MG/1
50 TABLET, EXTENDED RELEASE ORAL DAILY
Qty: 30 TABLET | Refills: 11 | Status: SHIPPED | OUTPATIENT
Start: 2018-03-12 | End: 2018-11-16 | Stop reason: SDUPTHER

## 2018-03-12 NOTE — PROGRESS NOTES
Subjective:       Patient ID: Noah Bailey is a 67 y.o. female.    Chief Complaint: Follow-up; Hypertension; and Diabetes    HPI   The patient presents for follow-up of diabetes, hypertension, hyperlipidemia, asthma, and coronary artery disease.  The patient reports her blood sugar levels have ranged from .  She has not experienced any hypoglycemia.  Her asthma has been stable.  She has not used her rescue inhaler in recent months.  She has moderate symptoms of ALLERGIC rhinitis.  She has congestion and postnasal drainage.    Intermittent acute anxiety episodes and noted.  She denies having any chronic depression or chronic anxiety.    Review of Systems   Constitutional: Negative for activity change, appetite change and unexpected weight change.   Eyes: Negative for visual disturbance.   Respiratory: Negative for shortness of breath.    Cardiovascular: Negative for chest pain, palpitations and leg swelling.   Gastrointestinal: Negative for abdominal pain, blood in stool and diarrhea.   Genitourinary: Negative for dysuria, frequency, hematuria and urgency.   Neurological: Negative for weakness, numbness and headaches.   Psychiatric/Behavioral: Negative for sleep disturbance. The patient is nervous/anxious.        Objective:      Physical Exam   Constitutional: She is oriented to person, place, and time. She appears well-developed and well-nourished. No distress.   HENT:   Head: Normocephalic and atraumatic.   Eyes: Conjunctivae and EOM are normal. Pupils are equal, round, and reactive to light. No scleral icterus.   Neck: Normal range of motion. Neck supple. No JVD present. No thyromegaly present.   Cardiovascular: Normal rate, regular rhythm, normal heart sounds and intact distal pulses.  Exam reveals no gallop and no friction rub.    No murmur heard.  Pulmonary/Chest: Effort normal and breath sounds normal. No respiratory distress. She has no wheezes. She has no rales.   Abdominal: Soft. Bowel sounds are  normal. She exhibits no mass. There is no tenderness.   Musculoskeletal: Normal range of motion. She exhibits no edema or tenderness.   Lymphadenopathy:     She has no cervical adenopathy.   Neurological: She is alert and oriented to person, place, and time. No cranial nerve deficit.   Sensory exam is intact in both feet on monofilament and vibration testing.   Skin: Skin is warm and dry. No rash noted.   No foot lesions are present.   Psychiatric: She has a normal mood and affect. Her behavior is normal.   Nursing note and vitals reviewed.      Assessment:       1. Type 2 diabetes mellitus without complication, without long-term current use of insulin    2. Essential hypertension    3. Hyperlipidemia, unspecified hyperlipidemia type    4. Mild intermittent asthma without complication    5. Hypothyroidism, unspecified type    6. Coronary artery disease involving native coronary artery of native heart without angina pectoris        Plan:       Noah was seen today for follow-up, hypertension and diabetes.  Lisinopril will be discontinued.  The patient should continue Lotrel as recommended.  She should resume metoprolol.  Fasting blood tests will be obtained today and follow-up visit in 4 months will be obtained.    Diagnoses and all orders for this visit:    Type 2 diabetes mellitus without complication, without long-term current use of insulin  -     Hemoglobin A1c; Future  -     Comprehensive metabolic panel; Future  -     Lipid panel; Future    Essential hypertension  -     Hemoglobin A1c; Future  -     Comprehensive metabolic panel; Future  -     Lipid panel; Future    Hyperlipidemia, unspecified hyperlipidemia type    Mild intermittent asthma without complication    Hypothyroidism, unspecified type    Coronary artery disease involving native coronary artery of native heart without angina pectoris    Other orders  -     metoprolol succinate (TOPROL-XL) 50 MG 24 hr tablet; Take 1 tablet (50 mg total) by mouth  once daily. For heart and blood presure  -     ALPRAZolam (XANAX) 0.25 MG tablet; Take 1 tablet (0.25 mg total) by mouth nightly.

## 2018-03-21 ENCOUNTER — TELEPHONE (OUTPATIENT)
Dept: INTERNAL MEDICINE | Facility: CLINIC | Age: 68
End: 2018-03-21

## 2018-03-21 NOTE — TELEPHONE ENCOUNTER
----- Message from Sarah Kapoor sent at 3/21/2018 11:19 AM CDT -----  Contact: Self/[phone]  Patient would like to get test results.  Name of test (lab, mammo, etc.):  Lab  Date of test:  3/12  Ordering provider: HERACLIO CARLOS  Where was the test performed:  [facility]  Comments:  Pt is requesting results be mailed.

## 2018-03-21 NOTE — TELEPHONE ENCOUNTER
The patient's blood tests showed very good control of diabetes with a hemoglobin A1c of 6%.  Her fasting blood sugar level of 104 was within acceptable range.  Her kidney and liver function tests were normal.

## 2018-03-21 NOTE — TELEPHONE ENCOUNTER
Patient calling for your comments on 3/12 labs.  please advise what we can tell her.    Thanks aguilar

## 2018-03-29 RX ORDER — ALPRAZOLAM 0.25 MG/1
TABLET ORAL
Qty: 30 TABLET | Refills: 2 | Status: SHIPPED | OUTPATIENT
Start: 2018-03-29 | End: 2018-06-28 | Stop reason: SDUPTHER

## 2018-03-29 NOTE — TELEPHONE ENCOUNTER
----- Message from Alexa Cesar sent at 3/29/2018 10:23 AM CDT -----  Contact: self/ [phone]  Patient would like to get test results.  Name of test (lab, mammo, etc.):  lab  Date of test:  3/12/18  Ordering provider:   Where was the test performed:    Comments:  2nd request for her labs to be mailed to her. Please call to confirm.    Please call patient about her Bp  Medication/ dosage.

## 2018-03-29 NOTE — TELEPHONE ENCOUNTER
----- Message from Alexa Cesar sent at 3/29/2018 10:23 AM CDT -----  Contact: self/ [phone]  Patient would like to get test results.  Name of test (lab, mammo, etc.):  lab  Date of test:  3/12/18  Ordering provider:   Where was the test performed:    Comments:  2nd request for her labs to be mailed to her. Please call to confirm.    Please call patient about her Bp  Medication/ dosage.

## 2018-05-18 ENCOUNTER — OFFICE VISIT (OUTPATIENT)
Dept: CARDIOLOGY | Facility: CLINIC | Age: 68
End: 2018-05-18
Payer: COMMERCIAL

## 2018-05-18 ENCOUNTER — TELEPHONE (OUTPATIENT)
Dept: INTERNAL MEDICINE | Facility: CLINIC | Age: 68
End: 2018-05-18

## 2018-05-18 VITALS
BODY MASS INDEX: 31.64 KG/M2 | WEIGHT: 178.56 LBS | HEIGHT: 63 IN | HEART RATE: 74 BPM | SYSTOLIC BLOOD PRESSURE: 134 MMHG | DIASTOLIC BLOOD PRESSURE: 72 MMHG

## 2018-05-18 DIAGNOSIS — J45.20 MILD INTERMITTENT ASTHMA WITHOUT COMPLICATION: ICD-10-CM

## 2018-05-18 DIAGNOSIS — E03.9 HYPOTHYROIDISM, UNSPECIFIED TYPE: ICD-10-CM

## 2018-05-18 DIAGNOSIS — E11.9 TYPE 2 DIABETES MELLITUS WITHOUT COMPLICATION, WITHOUT LONG-TERM CURRENT USE OF INSULIN: Chronic | ICD-10-CM

## 2018-05-18 DIAGNOSIS — E78.5 HYPERLIPIDEMIA, UNSPECIFIED HYPERLIPIDEMIA TYPE: ICD-10-CM

## 2018-05-18 DIAGNOSIS — I50.30 (HFPEF) HEART FAILURE WITH PRESERVED EJECTION FRACTION: ICD-10-CM

## 2018-05-18 DIAGNOSIS — I10 ESSENTIAL HYPERTENSION: Chronic | ICD-10-CM

## 2018-05-18 DIAGNOSIS — I25.10 CORONARY ARTERY DISEASE INVOLVING NATIVE CORONARY ARTERY OF NATIVE HEART WITHOUT ANGINA PECTORIS: Primary | Chronic | ICD-10-CM

## 2018-05-18 PROCEDURE — 99214 OFFICE O/P EST MOD 30 MIN: CPT | Mod: S$GLB,,, | Performed by: INTERNAL MEDICINE

## 2018-05-18 PROCEDURE — 3044F HG A1C LEVEL LT 7.0%: CPT | Mod: CPTII,S$GLB,, | Performed by: INTERNAL MEDICINE

## 2018-05-18 PROCEDURE — 99999 PR PBB SHADOW E&M-EST. PATIENT-LVL III: CPT | Mod: PBBFAC,,, | Performed by: INTERNAL MEDICINE

## 2018-05-18 PROCEDURE — 3078F DIAST BP <80 MM HG: CPT | Mod: CPTII,S$GLB,, | Performed by: INTERNAL MEDICINE

## 2018-05-18 PROCEDURE — 3075F SYST BP GE 130 - 139MM HG: CPT | Mod: CPTII,S$GLB,, | Performed by: INTERNAL MEDICINE

## 2018-05-18 RX ORDER — ALBUTEROL SULFATE 90 UG/1
2 AEROSOL, METERED RESPIRATORY (INHALATION) EVERY 6 HOURS PRN
Qty: 1 INHALER | Refills: 3 | Status: SHIPPED | OUTPATIENT
Start: 2018-05-18 | End: 2019-08-05 | Stop reason: SDUPTHER

## 2018-05-18 RX ORDER — ATORVASTATIN CALCIUM 20 MG/1
20 TABLET, FILM COATED ORAL DAILY
Refills: 11 | COMMUNITY
Start: 2018-04-30 | End: 2018-10-31 | Stop reason: SDUPTHER

## 2018-05-18 NOTE — PROGRESS NOTES
"Subjective:   Patient ID:  Noah Bailey is a 67 y.o. female who presents for follow-up of Coronary Artery Disease      HPI: Former patient of Dr. Knapp present for a follow up. Known CAD, s/p LCX stent in July 2017.  Decided not to participate in the cardiac rehab do to co-pay. Exercises sporadically.    Mos of the symptoms are from seasonal allergies and chronic asthma     Lab Results   Component Value Date     03/12/2018    K 3.8 03/12/2018     03/12/2018    CO2 22 (L) 03/12/2018    BUN 13 03/12/2018    CREATININE 0.8 03/12/2018     03/12/2018    HGBA1C 6.0 (H) 03/12/2018    AST 21 03/12/2018    ALT 21 03/12/2018    ALBUMIN 4.2 03/12/2018    PROT 8.0 03/12/2018    BILITOT 0.6 03/12/2018    WBC 8.99 10/27/2017    HGB 13.7 10/27/2017    HCT 42.1 10/27/2017    MCV 88 10/27/2017     10/27/2017    TSH 3.238 10/27/2017    CHOL 164 03/12/2018    HDL 53 03/12/2018    LDLCALC 87.0 03/12/2018    TRIG 120 03/12/2018       Review of Systems   Constitution: Positive for malaise/fatigue and weight gain.   HENT: Negative.    Eyes: Negative.    Cardiovascular: Positive for dyspnea on exertion, leg swelling and palpitations. Negative for chest pain, claudication, irregular heartbeat, near-syncope and syncope.   Respiratory: Positive for wheezing. Negative for cough, shortness of breath and snoring.    Endocrine: Negative.  Negative for cold intolerance, heat intolerance, polydipsia, polyphagia and polyuria.   Skin: Negative.    Musculoskeletal: Positive for muscle cramps and muscle weakness.   Gastrointestinal: Positive for heartburn.   Genitourinary: Negative.    Neurological: Positive for dizziness.   Psychiatric/Behavioral: Positive for depression. The patient is nervous/anxious.        Objective:   Physical Exam   Constitutional: She is oriented to person, place, and time. She appears well-developed and well-nourished.   /72   Pulse 74   Ht 5' 2.5" (1.588 m)   Wt 81 kg (178 lb 9.2 oz)   " BMI 32.14 kg/m²      HENT:   Head: Normocephalic.   Eyes: Pupils are equal, round, and reactive to light.   Neck: Normal range of motion. Neck supple. Carotid bruit is not present. No thyromegaly present.   Cardiovascular: Normal rate, regular rhythm, normal heart sounds and intact distal pulses.  Exam reveals no gallop and no friction rub.    No murmur heard.  Pulses:       Carotid pulses are 2+ on the right side, and 2+ on the left side.       Radial pulses are 2+ on the right side, and 2+ on the left side.        Femoral pulses are 2+ on the right side, and 2+ on the left side.       Popliteal pulses are 2+ on the right side, and 2+ on the left side.        Dorsalis pedis pulses are 2+ on the right side, and 2+ on the left side.        Posterior tibial pulses are 2+ on the right side, and 2+ on the left side.   Pulmonary/Chest: Effort normal. No respiratory distress. She has wheezes. She has no rales. She exhibits no tenderness.   Abdominal: Soft. Bowel sounds are normal.   Musculoskeletal: Normal range of motion. She exhibits no edema.   Neurological: She is alert and oriented to person, place, and time.   Skin: Skin is warm and dry.   Psychiatric: She has a normal mood and affect.   Nursing note and vitals reviewed.        Assessment and Plan:     Problem List Items Addressed This Visit        Cardiology Problems    Essential hypertension (Chronic)    Relevant Orders    2D echo with color flow doppler    Coronary artery disease involving native coronary artery of native heart without angina pectoris - Primary (Chronic)    Relevant Orders    2D echo with color flow doppler    Hyperlipidemia    (HFpEF) heart failure with preserved ejection fraction    Relevant Orders    2D echo with color flow doppler       Other    Type 2 diabetes mellitus (Chronic)    Bronchial asthma    Hypothyroidism          Patient's Medications   New Prescriptions    No medications on file   Previous Medications    ALBUTEROL 90  MCG/ACTUATION INHALER    Inhale 2 puffs into the lungs every 6 (six) hours as needed.    ALPRAZOLAM (XANAX) 0.25 MG TABLET    TAKE 1 TABLET BY MOUTH AT BEDTIME    AMLODIPINE-BENAZEPRIL (LOTREL) 10-40 MG PER CAPSULE    Take 1 capsule by mouth once daily.    ASPIRIN (ECOTRIN) 81 MG EC TABLET    Take 1 tablet (81 mg total) by mouth once daily.    ATORVASTATIN (LIPITOR) 20 MG TABLET    Take 20 mg by mouth once daily.    BECLOMETHASONE (QVAR) 80 MCG/ACTUATION AERO    Inhale 1 puff into the lungs 2 (two) times daily. For asthma control.    BLOOD-GLUCOSE METER (FREESTYLE LITE METER) KIT    Use as instructed    CLOPIDOGREL (PLAVIX) 75 MG TABLET    Take 1 tablet (75 mg total) by mouth once daily.    LANCETS (MICROLET LANCET) MISC    Test blood sugar once daily.    LEVOTHYROXINE (SYNTHROID) 50 MCG TABLET    TAKE 1 TABLET BY MOUTH EVERY MORNING    METFORMIN (GLUCOPHAGE) 500 MG TABLET    TAKE 1 TABLET BY MOUTH DAILY before dinner    METOPROLOL SUCCINATE (TOPROL-XL) 50 MG 24 HR TABLET    Take 1 tablet (50 mg total) by mouth once daily. For heart and blood presure    NITROGLYCERIN (NITROSTAT) 0.4 MG SL TABLET    Place 1 tablet (0.4 mg total) under the tongue every 5 (five) minutes as needed for Chest pain ((Notify MD)).    PRECISION XTRA TEST STRP    test blood glucose level once daily as directed    THEOPHYLLINE 400 MG TB24    TAKE 1 TABLET BY MOUTH once DAILY   Modified Medications    No medications on file   Discontinued Medications    LEVOTHYROXINE (SYNTHROID) 50 MCG TABLET    Take 1 tablet (50 mcg total) by mouth every morning.   Advised to follow with PCP regarding asthma and use of inhalers.  Continue on the present regimen.  No assessment of LV function after intervention. Prior to it it was 45%, which is mildly reduced.  Review and advise.  40 minutes face to face time with Counseling More Than 50% of the Appointment Time was spent discussing cardiac rehab. Life style modifications, exercise and weight loss  Follow-up in  about 1 year (around 5/18/2019).

## 2018-05-18 NOTE — TELEPHONE ENCOUNTER
----- Message from Paty Linares sent at 5/18/2018 12:11 PM CDT -----  Contact: call pt at [phone]  Calling to speak with you about an appointment she needs, patient states you would know what it is she needs

## 2018-05-18 NOTE — TELEPHONE ENCOUNTER
----- Message from Paty Linares sent at 5/18/2018 12:11 PM CDT -----  Contact: call pt at [phone]  Calling to speak with you about an appointment she needs, patient states you would know what it is she needs

## 2018-05-31 RX ORDER — THEOPHYLLINE 400 MG/1
TABLET, EXTENDED RELEASE ORAL
Qty: 30 TABLET | Refills: 10 | Status: SHIPPED | OUTPATIENT
Start: 2018-05-31 | End: 2019-07-24 | Stop reason: SDUPTHER

## 2018-06-18 ENCOUNTER — TELEPHONE (OUTPATIENT)
Dept: INTERNAL MEDICINE | Facility: CLINIC | Age: 68
End: 2018-06-18

## 2018-06-18 NOTE — TELEPHONE ENCOUNTER
----- Message from Otilia Yoon sent at 6/18/2018  1:14 PM CDT -----  Contact: Patient [phone]  Patient got a summons for jury duty for 07/17/2018 and she needs a letter stating that she cannot participate.    Please call and advise.    Thank You

## 2018-06-19 NOTE — TELEPHONE ENCOUNTER
Spoke with patient she was notiifed that her letter is ready, she requested that letter be mailed out to her .

## 2018-06-29 RX ORDER — ALPRAZOLAM 0.25 MG/1
TABLET ORAL
Qty: 30 TABLET | Refills: 0 | Status: SHIPPED | OUTPATIENT
Start: 2018-06-29 | End: 2018-07-16 | Stop reason: SDUPTHER

## 2018-07-16 ENCOUNTER — CLINICAL SUPPORT (OUTPATIENT)
Dept: CARDIOLOGY | Facility: CLINIC | Age: 68
End: 2018-07-16
Attending: INTERNAL MEDICINE
Payer: COMMERCIAL

## 2018-07-16 ENCOUNTER — OFFICE VISIT (OUTPATIENT)
Dept: INTERNAL MEDICINE | Facility: CLINIC | Age: 68
End: 2018-07-16
Payer: COMMERCIAL

## 2018-07-16 ENCOUNTER — LAB VISIT (OUTPATIENT)
Dept: LAB | Facility: HOSPITAL | Age: 68
End: 2018-07-16
Attending: INTERNAL MEDICINE
Payer: COMMERCIAL

## 2018-07-16 VITALS
SYSTOLIC BLOOD PRESSURE: 130 MMHG | DIASTOLIC BLOOD PRESSURE: 63 MMHG | BODY MASS INDEX: 32.5 KG/M2 | HEART RATE: 63 BPM | HEIGHT: 63 IN | TEMPERATURE: 98 F | OXYGEN SATURATION: 95 % | WEIGHT: 183.44 LBS

## 2018-07-16 DIAGNOSIS — I10 ESSENTIAL HYPERTENSION: Chronic | ICD-10-CM

## 2018-07-16 DIAGNOSIS — I50.30 (HFPEF) HEART FAILURE WITH PRESERVED EJECTION FRACTION: ICD-10-CM

## 2018-07-16 DIAGNOSIS — J45.20 MILD INTERMITTENT ASTHMA WITHOUT COMPLICATION: ICD-10-CM

## 2018-07-16 DIAGNOSIS — E11.9 TYPE 2 DIABETES MELLITUS WITHOUT COMPLICATION, WITHOUT LONG-TERM CURRENT USE OF INSULIN: Primary | Chronic | ICD-10-CM

## 2018-07-16 DIAGNOSIS — I25.10 CORONARY ARTERY DISEASE INVOLVING NATIVE CORONARY ARTERY OF NATIVE HEART WITHOUT ANGINA PECTORIS: Chronic | ICD-10-CM

## 2018-07-16 DIAGNOSIS — E03.9 HYPOTHYROIDISM, UNSPECIFIED TYPE: ICD-10-CM

## 2018-07-16 DIAGNOSIS — E11.9 TYPE 2 DIABETES MELLITUS WITHOUT COMPLICATION, WITHOUT LONG-TERM CURRENT USE OF INSULIN: Chronic | ICD-10-CM

## 2018-07-16 DIAGNOSIS — E78.5 HYPERLIPIDEMIA, UNSPECIFIED HYPERLIPIDEMIA TYPE: ICD-10-CM

## 2018-07-16 DIAGNOSIS — F41.9 ANXIETY: Chronic | ICD-10-CM

## 2018-07-16 LAB
ALBUMIN SERPL BCP-MCNC: 4.3 G/DL
ALP SERPL-CCNC: 105 U/L
ALT SERPL W/O P-5'-P-CCNC: 22 U/L
ANION GAP SERPL CALC-SCNC: 11 MMOL/L
AORTIC VALVE STENOSIS: NORMAL
AST SERPL-CCNC: 20 U/L
BASOPHILS # BLD AUTO: 0.05 K/UL
BASOPHILS NFR BLD: 0.5 %
BILIRUB SERPL-MCNC: 0.7 MG/DL
BUN SERPL-MCNC: 9 MG/DL
CALCIUM SERPL-MCNC: 10.5 MG/DL
CHLORIDE SERPL-SCNC: 105 MMOL/L
CHOLEST SERPL-MCNC: 156 MG/DL
CHOLEST/HDLC SERPL: 3.7 {RATIO}
CO2 SERPL-SCNC: 24 MMOL/L
CREAT SERPL-MCNC: 0.8 MG/DL
DIFFERENTIAL METHOD: NORMAL
EOSINOPHIL # BLD AUTO: 0.1 K/UL
EOSINOPHIL NFR BLD: 1.1 %
ERYTHROCYTE [DISTWIDTH] IN BLOOD BY AUTOMATED COUNT: 14 %
EST. GFR  (AFRICAN AMERICAN): >60 ML/MIN/1.73 M^2
EST. GFR  (NON AFRICAN AMERICAN): >60 ML/MIN/1.73 M^2
ESTIMATED PA SYSTOLIC PRESSURE: 31.94
GLUCOSE SERPL-MCNC: 106 MG/DL
HCT VFR BLD AUTO: 44.7 %
HDLC SERPL-MCNC: 42 MG/DL
HDLC SERPL: 26.9 %
HGB BLD-MCNC: 14.3 G/DL
IMM GRANULOCYTES # BLD AUTO: 0.04 K/UL
IMM GRANULOCYTES NFR BLD AUTO: 0.4 %
LDLC SERPL CALC-MCNC: 83.6 MG/DL
LYMPHOCYTES # BLD AUTO: 2.4 K/UL
LYMPHOCYTES NFR BLD: 24.3 %
MCH RBC QN AUTO: 28.6 PG
MCHC RBC AUTO-ENTMCNC: 32 G/DL
MCV RBC AUTO: 89 FL
MONOCYTES # BLD AUTO: 0.8 K/UL
MONOCYTES NFR BLD: 8.2 %
NEUTROPHILS # BLD AUTO: 6.6 K/UL
NEUTROPHILS NFR BLD: 65.5 %
NONHDLC SERPL-MCNC: 114 MG/DL
NRBC BLD-RTO: 0 /100 WBC
PLATELET # BLD AUTO: 324 K/UL
PMV BLD AUTO: 11.2 FL
POTASSIUM SERPL-SCNC: 4.2 MMOL/L
PROT SERPL-MCNC: 8.1 G/DL
RBC # BLD AUTO: 5 M/UL
RETIRED EF AND QEF - SEE NOTES: 65 (ref 55–65)
SODIUM SERPL-SCNC: 140 MMOL/L
TRICUSPID VALVE REGURGITATION: NORMAL
TRIGL SERPL-MCNC: 152 MG/DL
TSH SERPL DL<=0.005 MIU/L-ACNC: 2.25 UIU/ML
WBC # BLD AUTO: 10.03 K/UL

## 2018-07-16 PROCEDURE — 3044F HG A1C LEVEL LT 7.0%: CPT | Mod: CPTII,S$GLB,, | Performed by: INTERNAL MEDICINE

## 2018-07-16 PROCEDURE — 80053 COMPREHEN METABOLIC PANEL: CPT

## 2018-07-16 PROCEDURE — 84443 ASSAY THYROID STIM HORMONE: CPT

## 2018-07-16 PROCEDURE — 99214 OFFICE O/P EST MOD 30 MIN: CPT | Mod: S$GLB,,, | Performed by: INTERNAL MEDICINE

## 2018-07-16 PROCEDURE — 3075F SYST BP GE 130 - 139MM HG: CPT | Mod: CPTII,S$GLB,, | Performed by: INTERNAL MEDICINE

## 2018-07-16 PROCEDURE — 99999 PR PBB SHADOW E&M-EST. PATIENT-LVL III: CPT | Mod: PBBFAC,,, | Performed by: INTERNAL MEDICINE

## 2018-07-16 PROCEDURE — 85025 COMPLETE CBC W/AUTO DIFF WBC: CPT

## 2018-07-16 PROCEDURE — 36415 COLL VENOUS BLD VENIPUNCTURE: CPT | Mod: PO

## 2018-07-16 PROCEDURE — 83036 HEMOGLOBIN GLYCOSYLATED A1C: CPT

## 2018-07-16 PROCEDURE — 80061 LIPID PANEL: CPT

## 2018-07-16 PROCEDURE — 93306 TTE W/DOPPLER COMPLETE: CPT | Mod: S$GLB,,, | Performed by: INTERNAL MEDICINE

## 2018-07-16 PROCEDURE — 3078F DIAST BP <80 MM HG: CPT | Mod: CPTII,S$GLB,, | Performed by: INTERNAL MEDICINE

## 2018-07-16 RX ORDER — ALPRAZOLAM 0.25 MG/1
0.25 TABLET ORAL NIGHTLY
Qty: 30 TABLET | Refills: 2 | Status: SHIPPED | OUTPATIENT
Start: 2018-07-16 | End: 2018-10-30 | Stop reason: SDUPTHER

## 2018-07-17 ENCOUNTER — TELEPHONE (OUTPATIENT)
Dept: CARDIOLOGY | Facility: CLINIC | Age: 68
End: 2018-07-17

## 2018-07-17 LAB
ESTIMATED AVG GLUCOSE: 128 MG/DL
HBA1C MFR BLD HPLC: 6.1 %

## 2018-07-17 NOTE — TELEPHONE ENCOUNTER
----- Message from Roshni Manzano MD sent at 7/16/2018 12:41 PM CDT -----  Please inform the patient that CFD was ormal. Heart function has normalized. Great news and no changes in the present management. Follow up in 1 year

## 2018-07-20 DIAGNOSIS — Z12.39 BREAST CANCER SCREENING: ICD-10-CM

## 2018-07-23 NOTE — PROGRESS NOTES
Subjective:       Patient ID: Noah Bailey is a 67 y.o. female.    Chief Complaint: Follow-up (4 month   )    HPI    the patient presents for follow-up of diabetes, hypertension, asthma, hyperlipidemia, hypothyroidism, and coronary artery disease.  The patient is not experienced any chest pain or decrease in exercise tolerance with her current level of activity.  The she has not experienced any wheezing or shortness of breath.  She checks her blood sugars once a day.  Blood sugar readings have been in the 80-90 range -nonfasting.  She has noted decreased visual acuity in the left time.  She also has noted floaters in her field of vision.    She is using the nicotine gum intermittently.  She quit smoking on 12/16/1994.  Review of Systems   Constitutional: Negative for activity change, appetite change and unexpected weight change.   Eyes: Positive for visual disturbance.   Respiratory: Negative for shortness of breath.    Cardiovascular: Negative for chest pain, palpitations and leg swelling.   Gastrointestinal: Negative for abdominal pain, blood in stool and diarrhea.   Genitourinary: Negative for dysuria, frequency, hematuria and urgency.   Neurological: Negative for weakness, numbness and headaches.   Psychiatric/Behavioral: Negative for sleep disturbance.       Objective:      Physical Exam   Constitutional: She is oriented to person, place, and time. She appears well-developed and well-nourished. No distress.   The patient has gained 3 lb since 03/12/2018.    HENT:   Head: Normocephalic and atraumatic.   Eyes: Conjunctivae and EOM are normal. Pupils are equal, round, and reactive to light. No scleral icterus.   Neck: Normal range of motion. Neck supple. No JVD present. No thyromegaly present.   Cardiovascular: Normal rate, regular rhythm, normal heart sounds and intact distal pulses.  Exam reveals no gallop and no friction rub.    No murmur heard.  Pulmonary/Chest: Effort normal and breath sounds normal. No  respiratory distress. She has no wheezes. She has no rales.   Abdominal: Soft. Bowel sounds are normal. She exhibits no mass. There is no tenderness.   Musculoskeletal: Normal range of motion. She exhibits no edema or tenderness.   Lymphadenopathy:     She has no cervical adenopathy.   Neurological: She is alert and oriented to person, place, and time. No cranial nerve deficit.   Sensory exam is intact in both feet on monofilament and vibration testing.   Skin: Skin is warm and dry. No rash noted.   No foot lesions are present.   Psychiatric: She has a normal mood and affect. Her behavior is normal.   Nursing note and vitals reviewed.          Assessment:       1. Type 2 diabetes mellitus without complication, without long-term current use of insulin    2. Essential hypertension    3. Coronary artery disease involving native coronary artery of native heart without angina pectoris    4. Mild intermittent asthma without complication    5. Hyperlipidemia, unspecified hyperlipidemia type    6. Hypothyroidism, unspecified type    7. Anxiety        Plan:           Noah was seen today for follow-up. Eye examination was recommended.  Blood and urine tests will be obtained.  A prescription for the Shingrix shingles vaccine was given to the patient to take to her pharmacist.  Current therapy will be continued.  A follow-up visit in 4 months is recommended.    Diagnoses and all orders for this visit:    Type 2 diabetes mellitus without complication, without long-term current use of insulin  -     URINALYSIS  -     MICROALBUMIN / CREATININE RATIO URINE  -     Comprehensive metabolic panel; Future  -     Hemoglobin A1c; Future  -     TSH; Future  -     Lipid panel; Future  -     CBC auto differential; Future  -     Microalbumin/creatinine urine ratio; Future    Essential hypertension  -     URINALYSIS  -     MICROALBUMIN / CREATININE RATIO URINE  -     Comprehensive metabolic panel; Future  -     Hemoglobin A1c; Future  -      TSH; Future  -     Lipid panel; Future  -     CBC auto differential; Future  -     Urinalysis; Future    Coronary artery disease involving native coronary artery of native heart without angina pectoris    Mild intermittent asthma without complication    Hyperlipidemia, unspecified hyperlipidemia type    Hypothyroidism, unspecified type    Anxiety    Other orders  -     ALPRAZolam (XANAX) 0.25 MG tablet; Take 1 tablet (0.25 mg total) by mouth every evening.  -     varicella-zoster [id number], PF, (SHINGRIX, PF,) 50 mcg/0.5 mL injection; Inject 0.5 mLs into the muscle once. for 1 dose

## 2018-07-27 ENCOUNTER — TELEPHONE (OUTPATIENT)
Dept: INTERNAL MEDICINE | Facility: CLINIC | Age: 68
End: 2018-07-27

## 2018-07-27 NOTE — TELEPHONE ENCOUNTER
----- Message from Shobha Villanueva sent at 7/27/2018 12:13 PM CDT -----  Contact: Self Call  [phone]  Patient would like to get test results    Name of test (lab, mammo, etc.):   Labs    Date of test:  7/16    Ordering provider: Dr. Mcbride    Where was the test performed:  [facility]    Would you prefer a response via [facility]?:  Phone     Comments:  Patient is still waiting on her hard copy of her labs from 7/16/18.

## 2018-08-06 DIAGNOSIS — I21.4 NSTEMI (NON-ST ELEVATED MYOCARDIAL INFARCTION): ICD-10-CM

## 2018-08-06 DIAGNOSIS — I25.10 CORONARY ARTERY DISEASE INVOLVING NATIVE CORONARY ARTERY OF NATIVE HEART WITHOUT ANGINA PECTORIS: Chronic | ICD-10-CM

## 2018-08-06 RX ORDER — CLOPIDOGREL BISULFATE 75 MG/1
75 TABLET ORAL DAILY
Qty: 30 TABLET | Refills: 11 | Status: SHIPPED | OUTPATIENT
Start: 2018-08-06 | End: 2019-07-19 | Stop reason: ALTCHOICE

## 2018-08-06 RX ORDER — ASPIRIN 81 MG/1
81 TABLET ORAL DAILY
Qty: 30 TABLET | Refills: 6 | Status: SHIPPED | OUTPATIENT
Start: 2018-08-06 | End: 2019-03-19 | Stop reason: SDUPTHER

## 2018-08-24 ENCOUNTER — TELEPHONE (OUTPATIENT)
Dept: ENDOSCOPY | Facility: HOSPITAL | Age: 68
End: 2018-08-24

## 2018-08-24 NOTE — TELEPHONE ENCOUNTER
Letter sent to pt regarding colonoscopy that was due November 2017 and not booked. Pt was asked to call to schedule.

## 2018-08-30 ENCOUNTER — TELEPHONE (OUTPATIENT)
Dept: ENDOSCOPY | Facility: HOSPITAL | Age: 68
End: 2018-08-30

## 2018-10-05 DIAGNOSIS — I50.30 (HFPEF) HEART FAILURE WITH PRESERVED EJECTION FRACTION: ICD-10-CM

## 2018-10-05 DIAGNOSIS — I25.10 CORONARY ARTERY DISEASE INVOLVING NATIVE CORONARY ARTERY OF NATIVE HEART WITHOUT ANGINA PECTORIS: Chronic | ICD-10-CM

## 2018-10-05 DIAGNOSIS — I10 ESSENTIAL HYPERTENSION: Chronic | ICD-10-CM

## 2018-10-05 RX ORDER — AMLODIPINE AND BENAZEPRIL HYDROCHLORIDE 10; 40 MG/1; MG/1
1 CAPSULE ORAL DAILY
Qty: 90 CAPSULE | Refills: 3 | Status: SHIPPED | OUTPATIENT
Start: 2018-10-05 | End: 2019-08-23 | Stop reason: SDUPTHER

## 2018-10-05 NOTE — TELEPHONE ENCOUNTER
----- Message from Melissa Obando sent at 10/5/2018 10:09 AM CDT -----  Contact: patient [phone]  Pt called to request a refill of amlodipine and pharmacy told pt they have requested this with no results.Pt is almost out of this and needs it filled today.Please call pt to confirm when this has been ordered.    [facility] [phone]

## 2018-10-30 RX ORDER — ALPRAZOLAM 0.25 MG/1
TABLET ORAL
Qty: 30 TABLET | Refills: 1 | Status: SHIPPED | OUTPATIENT
Start: 2018-10-30 | End: 2018-11-16 | Stop reason: SDUPTHER

## 2018-10-31 RX ORDER — ATORVASTATIN CALCIUM 20 MG/1
20 TABLET, FILM COATED ORAL DAILY
Qty: 30 TABLET | Refills: 11 | Status: SHIPPED | OUTPATIENT
Start: 2018-10-31 | End: 2019-09-09 | Stop reason: SDUPTHER

## 2018-11-16 ENCOUNTER — OFFICE VISIT (OUTPATIENT)
Dept: INTERNAL MEDICINE | Facility: CLINIC | Age: 68
End: 2018-11-16
Payer: COMMERCIAL

## 2018-11-16 ENCOUNTER — LAB VISIT (OUTPATIENT)
Dept: LAB | Facility: HOSPITAL | Age: 68
End: 2018-11-16
Attending: INTERNAL MEDICINE
Payer: COMMERCIAL

## 2018-11-16 VITALS
DIASTOLIC BLOOD PRESSURE: 70 MMHG | HEIGHT: 63 IN | RESPIRATION RATE: 18 BRPM | SYSTOLIC BLOOD PRESSURE: 134 MMHG | BODY MASS INDEX: 33.28 KG/M2 | WEIGHT: 187.81 LBS | HEART RATE: 88 BPM | OXYGEN SATURATION: 96 %

## 2018-11-16 DIAGNOSIS — I10 ESSENTIAL HYPERTENSION: Primary | Chronic | ICD-10-CM

## 2018-11-16 DIAGNOSIS — E78.5 HYPERLIPIDEMIA, UNSPECIFIED HYPERLIPIDEMIA TYPE: ICD-10-CM

## 2018-11-16 DIAGNOSIS — E03.9 HYPOTHYROIDISM, UNSPECIFIED TYPE: ICD-10-CM

## 2018-11-16 DIAGNOSIS — I50.30 (HFPEF) HEART FAILURE WITH PRESERVED EJECTION FRACTION: ICD-10-CM

## 2018-11-16 DIAGNOSIS — J45.20 MILD INTERMITTENT ASTHMA WITHOUT COMPLICATION: ICD-10-CM

## 2018-11-16 DIAGNOSIS — J31.0 CHRONIC RHINITIS: Chronic | ICD-10-CM

## 2018-11-16 DIAGNOSIS — E11.9 TYPE 2 DIABETES MELLITUS WITHOUT COMPLICATION, WITHOUT LONG-TERM CURRENT USE OF INSULIN: Chronic | ICD-10-CM

## 2018-11-16 LAB
ALBUMIN SERPL BCP-MCNC: 4.2 G/DL
ALP SERPL-CCNC: 115 U/L
ALT SERPL W/O P-5'-P-CCNC: 25 U/L
ANION GAP SERPL CALC-SCNC: 9 MMOL/L
AST SERPL-CCNC: 21 U/L
BILIRUB SERPL-MCNC: 0.6 MG/DL
BUN SERPL-MCNC: 9 MG/DL
CALCIUM SERPL-MCNC: 10.1 MG/DL
CHLORIDE SERPL-SCNC: 104 MMOL/L
CHOLEST SERPL-MCNC: 155 MG/DL
CHOLEST/HDLC SERPL: 3.4 {RATIO}
CO2 SERPL-SCNC: 26 MMOL/L
CREAT SERPL-MCNC: 0.8 MG/DL
EST. GFR  (AFRICAN AMERICAN): >60 ML/MIN/1.73 M^2
EST. GFR  (NON AFRICAN AMERICAN): >60 ML/MIN/1.73 M^2
ESTIMATED AVG GLUCOSE: 134 MG/DL
GLUCOSE SERPL-MCNC: 131 MG/DL
HBA1C MFR BLD HPLC: 6.3 %
HDLC SERPL-MCNC: 45 MG/DL
HDLC SERPL: 29 %
LDLC SERPL CALC-MCNC: 79.6 MG/DL
NONHDLC SERPL-MCNC: 110 MG/DL
POTASSIUM SERPL-SCNC: 3.8 MMOL/L
PROT SERPL-MCNC: 8 G/DL
SODIUM SERPL-SCNC: 139 MMOL/L
TRIGL SERPL-MCNC: 152 MG/DL

## 2018-11-16 PROCEDURE — 99999 PR PBB SHADOW E&M-EST. PATIENT-LVL III: CPT | Mod: PBBFAC,,, | Performed by: INTERNAL MEDICINE

## 2018-11-16 PROCEDURE — 3044F HG A1C LEVEL LT 7.0%: CPT | Mod: CPTII,S$GLB,, | Performed by: INTERNAL MEDICINE

## 2018-11-16 PROCEDURE — 1101F PT FALLS ASSESS-DOCD LE1/YR: CPT | Mod: CPTII,S$GLB,, | Performed by: INTERNAL MEDICINE

## 2018-11-16 PROCEDURE — 83036 HEMOGLOBIN GLYCOSYLATED A1C: CPT

## 2018-11-16 PROCEDURE — 3075F SYST BP GE 130 - 139MM HG: CPT | Mod: CPTII,S$GLB,, | Performed by: INTERNAL MEDICINE

## 2018-11-16 PROCEDURE — 36415 COLL VENOUS BLD VENIPUNCTURE: CPT | Mod: PO

## 2018-11-16 PROCEDURE — 80061 LIPID PANEL: CPT

## 2018-11-16 PROCEDURE — 90471 IMMUNIZATION ADMIN: CPT | Mod: S$GLB,,, | Performed by: INTERNAL MEDICINE

## 2018-11-16 PROCEDURE — 99214 OFFICE O/P EST MOD 30 MIN: CPT | Mod: 25,S$GLB,, | Performed by: INTERNAL MEDICINE

## 2018-11-16 PROCEDURE — 80053 COMPREHEN METABOLIC PANEL: CPT

## 2018-11-16 PROCEDURE — 90662 IIV NO PRSV INCREASED AG IM: CPT | Mod: S$GLB,,, | Performed by: INTERNAL MEDICINE

## 2018-11-16 PROCEDURE — 3078F DIAST BP <80 MM HG: CPT | Mod: CPTII,S$GLB,, | Performed by: INTERNAL MEDICINE

## 2018-11-16 RX ORDER — METFORMIN HYDROCHLORIDE 500 MG/1
TABLET ORAL
Qty: 30 TABLET | Refills: 10
Start: 2018-11-16 | End: 2019-09-09 | Stop reason: SDUPTHER

## 2018-11-16 RX ORDER — ALPRAZOLAM 0.25 MG/1
0.25 TABLET ORAL NIGHTLY
Qty: 30 TABLET | Refills: 2
Start: 2018-11-16 | End: 2018-12-27 | Stop reason: SDUPTHER

## 2018-11-16 RX ORDER — METOPROLOL SUCCINATE 50 MG/1
50 TABLET, EXTENDED RELEASE ORAL DAILY
Qty: 30 TABLET | Refills: 11
Start: 2018-11-16 | End: 2019-03-19 | Stop reason: SDUPTHER

## 2018-11-16 RX ORDER — LEVOTHYROXINE SODIUM 50 UG/1
50 TABLET ORAL EVERY MORNING
Qty: 30 TABLET | Refills: 11
Start: 2018-11-16 | End: 2019-09-09 | Stop reason: SDUPTHER

## 2018-11-16 NOTE — PROGRESS NOTES
Subjective:       Patient ID: Noah Bailey is a 68 y.o. female.    Chief Complaint: Follow-up (4 month)    HPI     The patient presents for follow-up of diabetes, hypertension, hyperlipidemia, and asthma.  She also has hypothyroidism and chronic rhinitis.  Her asthma has been stable.  She uses Benadryl as needed for rhinitis symptoms.  The patient reports she has not checked her blood sugars recently.  Dietary compliance is fair.  No hypoglycemia has been noted.  She is tolerating her blood pressure medication side effects.  Review of Systems   Constitutional: Negative for activity change, appetite change and unexpected weight change.   Eyes: Negative for visual disturbance.   Respiratory: Negative for shortness of breath.    Cardiovascular: Negative for chest pain, palpitations and leg swelling.   Gastrointestinal: Negative for abdominal pain, blood in stool and diarrhea.   Genitourinary: Negative for dysuria, frequency, hematuria and urgency.   Neurological: Negative for weakness, numbness and headaches.   Psychiatric/Behavioral: Negative for sleep disturbance.       Objective:      Physical Exam   Constitutional: She is oriented to person, place, and time. She appears well-developed and well-nourished. No distress.   The patient has gained 4 lb since 7/16/2018.   HENT:   Head: Normocephalic and atraumatic.   Eyes: Conjunctivae and EOM are normal. Pupils are equal, round, and reactive to light. No scleral icterus.   Neck: Normal range of motion. Neck supple. No JVD present. No thyromegaly present.   Cardiovascular: Normal rate, regular rhythm, normal heart sounds and intact distal pulses. Exam reveals no gallop and no friction rub.   No murmur heard.  Pulmonary/Chest: Effort normal and breath sounds normal. No respiratory distress. She has no wheezes. She has no rales.   Abdominal: Soft. Bowel sounds are normal. She exhibits no mass. There is no tenderness.   Musculoskeletal: Normal range of motion. She  exhibits no edema or tenderness.   Lymphadenopathy:     She has no cervical adenopathy.   Neurological: She is alert and oriented to person, place, and time. No cranial nerve deficit.   Sensory exam is intact in both feet on monofilament testing.   Skin: Skin is warm and dry. No rash noted.   No foot lesions are present.   Psychiatric: She has a normal mood and affect. Her behavior is normal.   Nursing note and vitals reviewed.      Results for orders placed or performed in visit on 07/16/18   Urinalysis   Result Value Ref Range    Specimen UA Urine, Clean Catch     Color, UA Straw Yellow, Straw, Sienna    Appearance, UA Clear Clear    pH, UA 6.0 5.0 - 8.0    Specific Gravity, UA 1.005 1.005 - 1.030    Protein, UA Negative Negative    Glucose, UA Negative Negative    Ketones, UA Negative Negative    Bilirubin (UA) Negative Negative    Occult Blood UA 2+ (A) Negative    Nitrite, UA Negative Negative    Urobilinogen, UA Negative <2.0 EU/dL    Leukocytes, UA Trace (A) Negative   Microalbumin/creatinine urine ratio   Result Value Ref Range    Microalbum.,U,Random 7.0 ug/mL    Creatinine, Random Ur 50.0 15.0 - 325.0 mg/dL    Microalb Creat Ratio 14.0 0.0 - 30.0 ug/mg   Urinalysis Microscopic   Result Value Ref Range    RBC, UA 5 (H) 0 - 4 /hpf    WBC, UA 0 0 - 5 /hpf    Bacteria, UA Rare None-Occ /hpf    Squam Epithel, UA 0 /hpf    Microscopic Comment SEE COMMENT        Other labs reviewed with the patient.  Assessment:       1. Essential hypertension    2. Type 2 diabetes mellitus without complication, without long-term current use of insulin    3. Mild intermittent asthma without complication    4. Hyperlipidemia, unspecified hyperlipidemia type    5. Hypothyroidism, unspecified type    6. (HFpEF) heart failure with preserved ejection fraction    7. Chronic rhinitis        Plan:       Noah was seen today for follow-up.  Influenza vaccine will be administered today.  Current medications will be continued.  Blood tests  will be obtained today.  The patient is to return to clinic in 4 months.    Diagnoses and all orders for this visit:    Essential hypertension    Type 2 diabetes mellitus without complication, without long-term current use of insulin    Mild intermittent asthma without complication  -     Hemoglobin A1c; Future    Hyperlipidemia, unspecified hyperlipidemia type  -     Comprehensive metabolic panel; Future  -     Lipid panel; Future    Hypothyroidism, unspecified type    (HFpEF) heart failure with preserved ejection fraction    Chronic rhinitis    Other orders  -     metoprolol succinate (TOPROL-XL) 50 MG 24 hr tablet; Take 1 tablet (50 mg total) by mouth once daily. For heart and blood presure  -     levothyroxine (SYNTHROID) 50 MCG tablet; Take 1 tablet (50 mcg total) by mouth every morning.  -     ALPRAZolam (XANAX) 0.25 MG tablet; Take 1 tablet (0.25 mg total) by mouth every evening.  -     metFORMIN (GLUCOPHAGE) 500 MG tablet; TAKE 1 TABLET BY MOUTH DAILY before dinner  -     Influenza - High Dose (65+) (PF) (IM)

## 2018-11-30 ENCOUNTER — TELEPHONE (OUTPATIENT)
Dept: INTERNAL MEDICINE | Facility: CLINIC | Age: 68
End: 2018-11-30

## 2018-12-19 ENCOUNTER — TELEPHONE (OUTPATIENT)
Dept: INTERNAL MEDICINE | Facility: CLINIC | Age: 68
End: 2018-12-19

## 2018-12-19 NOTE — TELEPHONE ENCOUNTER
----- Message from Melissa Obando sent at 12/19/2018  1:53 PM CST -----  Contact: patient [phone]  Pt is waiting for lab results to be mailed to her from November. Please call patient and send the results again. Pt understands it is probably just a mix up but she likes to keep her results.

## 2018-12-19 NOTE — TELEPHONE ENCOUNTER
----- Message from Melissa Obando sent at 12/19/2018  1:53 PM CST -----  Contact: patient [phone]  Pt is waiting for lab results to be mailed to her from November. Please call patient and send the results again. Pt understands it is probably just a mix up but she likes to keep her results.

## 2018-12-27 RX ORDER — METFORMIN HYDROCHLORIDE 500 MG/1
TABLET ORAL
Qty: 30 TABLET | Refills: 10 | Status: SHIPPED | OUTPATIENT
Start: 2018-12-27 | End: 2019-03-19 | Stop reason: SDUPTHER

## 2018-12-27 RX ORDER — ALPRAZOLAM 0.25 MG/1
TABLET ORAL
Qty: 30 TABLET | Refills: 1 | Status: SHIPPED | OUTPATIENT
Start: 2018-12-27 | End: 2019-03-19 | Stop reason: SDUPTHER

## 2018-12-27 NOTE — TELEPHONE ENCOUNTER
Xanax refills called to [facility] at pharmacy    [facility] - [place], [place] - [address]   [address] [place] [address]   Phone: [phone] Fax: [phone]     As dr cronin approved.

## 2018-12-28 ENCOUNTER — TELEPHONE (OUTPATIENT)
Dept: INTERNAL MEDICINE | Facility: CLINIC | Age: 68
End: 2018-12-28

## 2018-12-28 NOTE — TELEPHONE ENCOUNTER
rx's were refilled in November but rx was marked as not print.    I called levothyroxine  into pharmacy recorder as dr cronin approved in November.

## 2018-12-28 NOTE — TELEPHONE ENCOUNTER
----- Message from Keke Winston sent at 12/28/2018  1:50 PM CST -----  Contact: Noah Bailey [phone]  The patient is stating that the pharmacy is still waiting on approval for levothyroxine (SYNTHROID) 50 MCG tablet. The pharmacy is [facility] - [place], [place] - [address] [phone] (Phone)  [phone] (Fax)

## 2019-03-19 ENCOUNTER — LAB VISIT (OUTPATIENT)
Dept: LAB | Facility: HOSPITAL | Age: 69
End: 2019-03-19
Attending: INTERNAL MEDICINE
Payer: COMMERCIAL

## 2019-03-19 ENCOUNTER — OFFICE VISIT (OUTPATIENT)
Dept: INTERNAL MEDICINE | Facility: CLINIC | Age: 69
End: 2019-03-19
Payer: COMMERCIAL

## 2019-03-19 VITALS
DIASTOLIC BLOOD PRESSURE: 84 MMHG | TEMPERATURE: 99 F | OXYGEN SATURATION: 95 % | SYSTOLIC BLOOD PRESSURE: 121 MMHG | WEIGHT: 182.31 LBS | HEIGHT: 63 IN | HEART RATE: 87 BPM | BODY MASS INDEX: 32.3 KG/M2

## 2019-03-19 DIAGNOSIS — E11.9 TYPE 2 DIABETES MELLITUS WITHOUT COMPLICATION, WITHOUT LONG-TERM CURRENT USE OF INSULIN: Primary | Chronic | ICD-10-CM

## 2019-03-19 DIAGNOSIS — I25.10 CORONARY ARTERY DISEASE INVOLVING NATIVE CORONARY ARTERY OF NATIVE HEART WITHOUT ANGINA PECTORIS: Chronic | ICD-10-CM

## 2019-03-19 DIAGNOSIS — I10 ESSENTIAL HYPERTENSION: Chronic | ICD-10-CM

## 2019-03-19 DIAGNOSIS — J45.20 MILD INTERMITTENT ASTHMA WITHOUT COMPLICATION: ICD-10-CM

## 2019-03-19 DIAGNOSIS — E03.9 HYPOTHYROIDISM, UNSPECIFIED TYPE: ICD-10-CM

## 2019-03-19 DIAGNOSIS — E11.9 TYPE 2 DIABETES MELLITUS WITHOUT COMPLICATION, WITHOUT LONG-TERM CURRENT USE OF INSULIN: Chronic | ICD-10-CM

## 2019-03-19 DIAGNOSIS — I21.4 NSTEMI (NON-ST ELEVATED MYOCARDIAL INFARCTION): ICD-10-CM

## 2019-03-19 DIAGNOSIS — E78.5 HYPERLIPIDEMIA, UNSPECIFIED HYPERLIPIDEMIA TYPE: ICD-10-CM

## 2019-03-19 DIAGNOSIS — F41.9 ANXIETY: Chronic | ICD-10-CM

## 2019-03-19 LAB
ALBUMIN SERPL BCP-MCNC: 4.2 G/DL
ALP SERPL-CCNC: 108 U/L
ALT SERPL W/O P-5'-P-CCNC: 22 U/L
ANION GAP SERPL CALC-SCNC: 11 MMOL/L
AST SERPL-CCNC: 20 U/L
BILIRUB SERPL-MCNC: 0.5 MG/DL
BUN SERPL-MCNC: 10 MG/DL
CALCIUM SERPL-MCNC: 10 MG/DL
CHLORIDE SERPL-SCNC: 109 MMOL/L
CHOLEST SERPL-MCNC: 160 MG/DL
CHOLEST/HDLC SERPL: 3.6 {RATIO}
CO2 SERPL-SCNC: 19 MMOL/L
CREAT SERPL-MCNC: 0.7 MG/DL
EST. GFR  (AFRICAN AMERICAN): >60 ML/MIN/1.73 M^2
EST. GFR  (NON AFRICAN AMERICAN): >60 ML/MIN/1.73 M^2
ESTIMATED AVG GLUCOSE: 131 MG/DL
GLUCOSE SERPL-MCNC: 110 MG/DL
HBA1C MFR BLD HPLC: 6.2 %
HDLC SERPL-MCNC: 44 MG/DL
HDLC SERPL: 27.5 %
LDLC SERPL CALC-MCNC: 92.2 MG/DL
NONHDLC SERPL-MCNC: 116 MG/DL
POTASSIUM SERPL-SCNC: 4.3 MMOL/L
PROT SERPL-MCNC: 7.8 G/DL
SODIUM SERPL-SCNC: 139 MMOL/L
TRIGL SERPL-MCNC: 119 MG/DL

## 2019-03-19 PROCEDURE — 99999 PR PBB SHADOW E&M-EST. PATIENT-LVL III: ICD-10-PCS | Mod: PBBFAC,,, | Performed by: INTERNAL MEDICINE

## 2019-03-19 PROCEDURE — 3074F PR MOST RECENT SYSTOLIC BLOOD PRESSURE < 130 MM HG: ICD-10-PCS | Mod: CPTII,S$GLB,, | Performed by: INTERNAL MEDICINE

## 2019-03-19 PROCEDURE — 3074F SYST BP LT 130 MM HG: CPT | Mod: CPTII,S$GLB,, | Performed by: INTERNAL MEDICINE

## 2019-03-19 PROCEDURE — 1101F PT FALLS ASSESS-DOCD LE1/YR: CPT | Mod: CPTII,S$GLB,, | Performed by: INTERNAL MEDICINE

## 2019-03-19 PROCEDURE — 3044F HG A1C LEVEL LT 7.0%: CPT | Mod: CPTII,S$GLB,, | Performed by: INTERNAL MEDICINE

## 2019-03-19 PROCEDURE — 99214 PR OFFICE/OUTPT VISIT, EST, LEVL IV, 30-39 MIN: ICD-10-PCS | Mod: S$GLB,,, | Performed by: INTERNAL MEDICINE

## 2019-03-19 PROCEDURE — 3044F PR MOST RECENT HEMOGLOBIN A1C LEVEL <7.0%: ICD-10-PCS | Mod: CPTII,S$GLB,, | Performed by: INTERNAL MEDICINE

## 2019-03-19 PROCEDURE — 1101F PR PT FALLS ASSESS DOC 0-1 FALLS W/OUT INJ PAST YR: ICD-10-PCS | Mod: CPTII,S$GLB,, | Performed by: INTERNAL MEDICINE

## 2019-03-19 PROCEDURE — 80053 COMPREHEN METABOLIC PANEL: CPT

## 2019-03-19 PROCEDURE — 3079F DIAST BP 80-89 MM HG: CPT | Mod: CPTII,S$GLB,, | Performed by: INTERNAL MEDICINE

## 2019-03-19 PROCEDURE — 36415 COLL VENOUS BLD VENIPUNCTURE: CPT | Mod: PO

## 2019-03-19 PROCEDURE — 99999 PR PBB SHADOW E&M-EST. PATIENT-LVL III: CPT | Mod: PBBFAC,,, | Performed by: INTERNAL MEDICINE

## 2019-03-19 PROCEDURE — 83036 HEMOGLOBIN GLYCOSYLATED A1C: CPT

## 2019-03-19 PROCEDURE — 3079F PR MOST RECENT DIASTOLIC BLOOD PRESSURE 80-89 MM HG: ICD-10-PCS | Mod: CPTII,S$GLB,, | Performed by: INTERNAL MEDICINE

## 2019-03-19 PROCEDURE — 99214 OFFICE O/P EST MOD 30 MIN: CPT | Mod: S$GLB,,, | Performed by: INTERNAL MEDICINE

## 2019-03-19 PROCEDURE — 80061 LIPID PANEL: CPT

## 2019-03-19 RX ORDER — ALPRAZOLAM 0.25 MG/1
0.25 TABLET ORAL NIGHTLY
Qty: 30 TABLET | Refills: 2 | Status: SHIPPED | OUTPATIENT
Start: 2019-03-19 | End: 2019-09-09 | Stop reason: SDUPTHER

## 2019-03-19 RX ORDER — METOPROLOL SUCCINATE 50 MG/1
50 TABLET, EXTENDED RELEASE ORAL DAILY
Qty: 30 TABLET | Refills: 11 | Status: SHIPPED | OUTPATIENT
Start: 2019-03-19 | End: 2020-03-10 | Stop reason: SDUPTHER

## 2019-03-19 RX ORDER — ASPIRIN 81 MG/1
81 TABLET ORAL DAILY
Qty: 30 TABLET | Refills: 11 | Status: SHIPPED | OUTPATIENT
Start: 2019-03-19 | End: 2020-04-15 | Stop reason: SDUPTHER

## 2019-03-19 NOTE — PROGRESS NOTES
Subjective:       Patient ID: Noah Bailey is a 68 y.o. female.    Chief Complaint: Follow-up (4 mos)    HPI   The patient presents for follow-up of medical conditions which include type 2 diabetes mellitus, hypertension, hyperlipidemia, and asthma.  Patient relates her asthma has been stable.  No severe exacerbations have been noted. She does have chronic allergic rhinitis.  She uses Benadryl as needed.  She did not obtain relief with use of Flonase.  She occasionally uses Afrin nasal spray.    She occasionally checks her blood sugars.  She feels she is managing her carbohydrate intake fairly well.  She is tolerating her blood pressure medication well without side effects.    She has not been exercising recently.  She denies having any exertional chest pain dyspnea.  She states she just isn't very motivated to exercise these days.    Review of Systems   Constitutional: Positive for activity change. Negative for appetite change and unexpected weight change.   HENT: Positive for congestion and postnasal drip.    Eyes: Negative for visual disturbance.   Respiratory: Negative for shortness of breath.    Cardiovascular: Negative for chest pain, palpitations and leg swelling.   Gastrointestinal: Negative for abdominal pain, blood in stool and diarrhea.   Genitourinary: Negative for dysuria, frequency, hematuria and urgency.   Neurological: Negative for weakness, numbness and headaches.   Psychiatric/Behavioral: Negative for sleep disturbance.       Objective:      Physical Exam   Constitutional: She is oriented to person, place, and time. She appears well-developed and well-nourished. No distress.   HENT:   Head: Normocephalic and atraumatic.   Eyes: Conjunctivae and EOM are normal. Pupils are equal, round, and reactive to light. No scleral icterus.   Neck: Normal range of motion. Neck supple. No JVD present. No thyromegaly present.   Cardiovascular: Normal rate, regular rhythm, normal heart sounds and intact distal  pulses. Exam reveals no gallop and no friction rub.   No murmur heard.  Pulmonary/Chest: Effort normal and breath sounds normal. No respiratory distress. She has no wheezes. She has no rales.   Abdominal: Soft. Bowel sounds are normal. She exhibits no mass. There is no tenderness.   Musculoskeletal: Normal range of motion. She exhibits no edema or tenderness.   Lymphadenopathy:     She has no cervical adenopathy.   Neurological: She is alert and oriented to person, place, and time. No cranial nerve deficit.   Sensory exam is intact in both feet on monofilament and vibration testing.   Skin: Skin is warm and dry. No rash noted.   No foot lesions are present.   Psychiatric: She has a normal mood and affect. Her behavior is normal.   Nursing note and vitals reviewed.      Results for orders placed or performed in visit on 11/16/18   Comprehensive metabolic panel   Result Value Ref Range    Sodium 139 136 - 145 mmol/L    Potassium 3.8 3.5 - 5.1 mmol/L    Chloride 104 95 - 110 mmol/L    CO2 26 23 - 29 mmol/L    Glucose 131 (H) 70 - 110 mg/dL    BUN, Bld 9 8 - 23 mg/dL    Creatinine 0.8 0.5 - 1.4 mg/dL    Calcium 10.1 8.7 - 10.5 mg/dL    Total Protein 8.0 6.0 - 8.4 g/dL    Albumin 4.2 3.5 - 5.2 g/dL    Total Bilirubin 0.6 0.1 - 1.0 mg/dL    Alkaline Phosphatase 115 55 - 135 U/L    AST 21 10 - 40 U/L    ALT 25 10 - 44 U/L    Anion Gap 9 8 - 16 mmol/L    eGFR if African American >60.0 >60 mL/min/1.73 m^2    eGFR if non African American >60.0 >60 mL/min/1.73 m^2   Lipid panel   Result Value Ref Range    Cholesterol 155 120 - 199 mg/dL    Triglycerides 152 (H) 30 - 150 mg/dL    HDL 45 40 - 75 mg/dL    LDL Cholesterol 79.6 63.0 - 159.0 mg/dL    HDL/Chol Ratio 29.0 20.0 - 50.0 %    Total Cholesterol/HDL Ratio 3.4 2.0 - 5.0    Non-HDL Cholesterol 110 mg/dL   Hemoglobin A1c   Result Value Ref Range    Hemoglobin A1C 6.3 (H) 4.0 - 5.6 %    Estimated Avg Glucose 134 (H) 68 - 131 mg/dL       Assessment:       1. Type 2 diabetes  mellitus without complication, without long-term current use of insulin    2. Hypothyroidism, unspecified type    3. Hyperlipidemia, unspecified hyperlipidemia type    4. Essential hypertension    5. Anxiety    6. Mild intermittent asthma without complication    7. Coronary artery disease involving native coronary artery of native heart without angina pectoris    8. NSTEMI (non-ST elevated myocardial infarction)        Plan:       Noah was seen today for follow-up.  Blood tests will be obtained today.  Current medications will be continued.  The patient was advised to take the new shingles vaccine.  She will consider it.  A follow-up visit in September 2019 is recommended.    Diagnoses and all orders for this visit:    Type 2 diabetes mellitus without complication, without long-term current use of insulin    Hypothyroidism, unspecified type    Hyperlipidemia, unspecified hyperlipidemia type    Essential hypertension    Anxiety    Mild intermittent asthma without complication    Coronary artery disease involving native coronary artery of native heart without angina pectoris  -     aspirin (ASPIR-LOW) 81 MG EC tablet; Take 1 tablet (81 mg total) by mouth once daily.    NSTEMI (non-ST elevated myocardial infarction)  -     aspirin (ASPIR-LOW) 81 MG EC tablet; Take 1 tablet (81 mg total) by mouth once daily.    Other orders  -     metoprolol succinate (TOPROL-XL) 50 MG 24 hr tablet; Take 1 tablet (50 mg total) by mouth once daily. For heart and blood presure  -     ALPRAZolam (XANAX) 0.25 MG tablet; Take 1 tablet (0.25 mg total) by mouth every evening.

## 2019-04-02 ENCOUNTER — TELEPHONE (OUTPATIENT)
Dept: CARDIOLOGY | Facility: CLINIC | Age: 69
End: 2019-04-02

## 2019-04-02 DIAGNOSIS — I10 ESSENTIAL HYPERTENSION: ICD-10-CM

## 2019-04-02 DIAGNOSIS — I25.10 CORONARY ARTERY DISEASE INVOLVING NATIVE CORONARY ARTERY OF NATIVE HEART WITHOUT ANGINA PECTORIS: Primary | ICD-10-CM

## 2019-04-02 DIAGNOSIS — E03.9 HYPOTHYROIDISM, UNSPECIFIED TYPE: ICD-10-CM

## 2019-04-02 DIAGNOSIS — E78.5 HYPERLIPIDEMIA, UNSPECIFIED HYPERLIPIDEMIA TYPE: ICD-10-CM

## 2019-04-02 DIAGNOSIS — E11.9 TYPE 2 DIABETES MELLITUS WITHOUT COMPLICATION, WITHOUT LONG-TERM CURRENT USE OF INSULIN: ICD-10-CM

## 2019-04-02 DIAGNOSIS — I21.4 NSTEMI (NON-ST ELEVATED MYOCARDIAL INFARCTION): ICD-10-CM

## 2019-04-02 DIAGNOSIS — I50.30 (HFPEF) HEART FAILURE WITH PRESERVED EJECTION FRACTION: ICD-10-CM

## 2019-07-09 ENCOUNTER — LAB VISIT (OUTPATIENT)
Dept: LAB | Facility: HOSPITAL | Age: 69
End: 2019-07-09
Attending: INTERNAL MEDICINE
Payer: COMMERCIAL

## 2019-07-09 DIAGNOSIS — E03.9 HYPOTHYROIDISM, UNSPECIFIED TYPE: ICD-10-CM

## 2019-07-09 DIAGNOSIS — I10 ESSENTIAL HYPERTENSION: ICD-10-CM

## 2019-07-09 DIAGNOSIS — E11.9 TYPE 2 DIABETES MELLITUS WITHOUT COMPLICATION, WITHOUT LONG-TERM CURRENT USE OF INSULIN: ICD-10-CM

## 2019-07-09 DIAGNOSIS — I21.4 NSTEMI (NON-ST ELEVATED MYOCARDIAL INFARCTION): ICD-10-CM

## 2019-07-09 DIAGNOSIS — I50.30 (HFPEF) HEART FAILURE WITH PRESERVED EJECTION FRACTION: ICD-10-CM

## 2019-07-09 DIAGNOSIS — E78.5 HYPERLIPIDEMIA, UNSPECIFIED HYPERLIPIDEMIA TYPE: ICD-10-CM

## 2019-07-09 DIAGNOSIS — I25.10 CORONARY ARTERY DISEASE INVOLVING NATIVE CORONARY ARTERY OF NATIVE HEART WITHOUT ANGINA PECTORIS: ICD-10-CM

## 2019-07-09 LAB
ALBUMIN SERPL BCP-MCNC: 4 G/DL (ref 3.5–5.2)
ALP SERPL-CCNC: 124 U/L (ref 55–135)
ALT SERPL W/O P-5'-P-CCNC: 16 U/L (ref 10–44)
ANION GAP SERPL CALC-SCNC: 13 MMOL/L (ref 8–16)
AST SERPL-CCNC: 17 U/L (ref 10–40)
BILIRUB SERPL-MCNC: 0.5 MG/DL (ref 0.1–1)
BUN SERPL-MCNC: 10 MG/DL (ref 8–23)
CALCIUM SERPL-MCNC: 10.3 MG/DL (ref 8.7–10.5)
CHLORIDE SERPL-SCNC: 103 MMOL/L (ref 95–110)
CHOLEST SERPL-MCNC: 153 MG/DL (ref 120–199)
CHOLEST/HDLC SERPL: 3.3 {RATIO} (ref 2–5)
CO2 SERPL-SCNC: 21 MMOL/L (ref 23–29)
CREAT SERPL-MCNC: 0.7 MG/DL (ref 0.5–1.4)
EST. GFR  (AFRICAN AMERICAN): >60 ML/MIN/1.73 M^2
EST. GFR  (NON AFRICAN AMERICAN): >60 ML/MIN/1.73 M^2
ESTIMATED AVG GLUCOSE: 134 MG/DL (ref 68–131)
GLUCOSE SERPL-MCNC: 101 MG/DL (ref 70–110)
HBA1C MFR BLD HPLC: 6.3 % (ref 4–5.6)
HDLC SERPL-MCNC: 47 MG/DL (ref 40–75)
HDLC SERPL: 30.7 % (ref 20–50)
LDLC SERPL CALC-MCNC: 80 MG/DL (ref 63–159)
NONHDLC SERPL-MCNC: 106 MG/DL
POTASSIUM SERPL-SCNC: 3.9 MMOL/L (ref 3.5–5.1)
PROT SERPL-MCNC: 8 G/DL (ref 6–8.4)
SODIUM SERPL-SCNC: 137 MMOL/L (ref 136–145)
TRIGL SERPL-MCNC: 130 MG/DL (ref 30–150)
TSH SERPL DL<=0.005 MIU/L-ACNC: 1.26 UIU/ML (ref 0.4–4)

## 2019-07-09 PROCEDURE — 83036 HEMOGLOBIN GLYCOSYLATED A1C: CPT

## 2019-07-09 PROCEDURE — 36415 COLL VENOUS BLD VENIPUNCTURE: CPT | Mod: PO

## 2019-07-09 PROCEDURE — 84443 ASSAY THYROID STIM HORMONE: CPT

## 2019-07-09 PROCEDURE — 80061 LIPID PANEL: CPT

## 2019-07-09 PROCEDURE — 80053 COMPREHEN METABOLIC PANEL: CPT

## 2019-07-10 ENCOUNTER — TELEPHONE (OUTPATIENT)
Dept: CARDIOLOGY | Facility: CLINIC | Age: 69
End: 2019-07-10

## 2019-07-10 NOTE — TELEPHONE ENCOUNTER
----- Message from Roshni Manzano MD sent at 7/10/2019 11:43 AM CDT -----  Please mail patient the blood work results and inform the patient that we will discuss it in detail during the upcoming visit. It looks good.

## 2019-07-18 NOTE — TELEPHONE ENCOUNTER
Elva called stating that the only number she has for pt is the one we have listed. Elva stated that she will tell pt call me if she talks to her.

## 2019-07-19 ENCOUNTER — DOCUMENTATION ONLY (OUTPATIENT)
Dept: CARDIOLOGY | Facility: CLINIC | Age: 69
End: 2019-07-19

## 2019-07-19 ENCOUNTER — OFFICE VISIT (OUTPATIENT)
Dept: CARDIOLOGY | Facility: CLINIC | Age: 69
End: 2019-07-19
Payer: COMMERCIAL

## 2019-07-19 VITALS
SYSTOLIC BLOOD PRESSURE: 116 MMHG | DIASTOLIC BLOOD PRESSURE: 59 MMHG | BODY MASS INDEX: 32.36 KG/M2 | HEIGHT: 63 IN | HEART RATE: 89 BPM | WEIGHT: 182.63 LBS

## 2019-07-19 DIAGNOSIS — I50.30 (HFPEF) HEART FAILURE WITH PRESERVED EJECTION FRACTION: ICD-10-CM

## 2019-07-19 DIAGNOSIS — E03.4 HYPOTHYROIDISM DUE TO ACQUIRED ATROPHY OF THYROID: ICD-10-CM

## 2019-07-19 DIAGNOSIS — I10 ESSENTIAL HYPERTENSION: Chronic | ICD-10-CM

## 2019-07-19 DIAGNOSIS — E11.00 TYPE 2 DIABETES MELLITUS WITH HYPEROSMOLARITY WITHOUT COMA, WITHOUT LONG-TERM CURRENT USE OF INSULIN: Chronic | ICD-10-CM

## 2019-07-19 DIAGNOSIS — E78.2 MIXED HYPERLIPIDEMIA: ICD-10-CM

## 2019-07-19 DIAGNOSIS — F41.9 ANXIETY: Chronic | ICD-10-CM

## 2019-07-19 DIAGNOSIS — I25.10 CORONARY ARTERY DISEASE INVOLVING NATIVE CORONARY ARTERY OF NATIVE HEART WITHOUT ANGINA PECTORIS: Primary | Chronic | ICD-10-CM

## 2019-07-19 PROCEDURE — 3074F SYST BP LT 130 MM HG: CPT | Mod: CPTII,S$GLB,, | Performed by: INTERNAL MEDICINE

## 2019-07-19 PROCEDURE — 3078F DIAST BP <80 MM HG: CPT | Mod: CPTII,S$GLB,, | Performed by: INTERNAL MEDICINE

## 2019-07-19 PROCEDURE — 1101F PT FALLS ASSESS-DOCD LE1/YR: CPT | Mod: CPTII,S$GLB,, | Performed by: INTERNAL MEDICINE

## 2019-07-19 PROCEDURE — 3044F HG A1C LEVEL LT 7.0%: CPT | Mod: CPTII,S$GLB,, | Performed by: INTERNAL MEDICINE

## 2019-07-19 PROCEDURE — 1101F PR PT FALLS ASSESS DOC 0-1 FALLS W/OUT INJ PAST YR: ICD-10-PCS | Mod: CPTII,S$GLB,, | Performed by: INTERNAL MEDICINE

## 2019-07-19 PROCEDURE — 99999 PR PBB SHADOW E&M-EST. PATIENT-LVL III: CPT | Mod: PBBFAC,,, | Performed by: INTERNAL MEDICINE

## 2019-07-19 PROCEDURE — 3078F PR MOST RECENT DIASTOLIC BLOOD PRESSURE < 80 MM HG: ICD-10-PCS | Mod: CPTII,S$GLB,, | Performed by: INTERNAL MEDICINE

## 2019-07-19 PROCEDURE — 99214 PR OFFICE/OUTPT VISIT, EST, LEVL IV, 30-39 MIN: ICD-10-PCS | Mod: S$GLB,,, | Performed by: INTERNAL MEDICINE

## 2019-07-19 PROCEDURE — 3074F PR MOST RECENT SYSTOLIC BLOOD PRESSURE < 130 MM HG: ICD-10-PCS | Mod: CPTII,S$GLB,, | Performed by: INTERNAL MEDICINE

## 2019-07-19 PROCEDURE — 99214 OFFICE O/P EST MOD 30 MIN: CPT | Mod: S$GLB,,, | Performed by: INTERNAL MEDICINE

## 2019-07-19 PROCEDURE — 3044F PR MOST RECENT HEMOGLOBIN A1C LEVEL <7.0%: ICD-10-PCS | Mod: CPTII,S$GLB,, | Performed by: INTERNAL MEDICINE

## 2019-07-19 PROCEDURE — 99999 PR PBB SHADOW E&M-EST. PATIENT-LVL III: ICD-10-PCS | Mod: PBBFAC,,, | Performed by: INTERNAL MEDICINE

## 2019-07-19 RX ORDER — NITROGLYCERIN 0.4 MG/1
0.4 TABLET SUBLINGUAL EVERY 5 MIN PRN
Qty: 25 TABLET | Refills: 11 | Status: SHIPPED | OUTPATIENT
Start: 2019-07-19 | End: 2021-09-30 | Stop reason: SDUPTHER

## 2019-07-19 NOTE — PROGRESS NOTES
"Subjective:   Patient ID:  Noah Bailey is a 68 y.o. female who presents for follow-up of Coronary Artery Disease      HPI: Recent history: taking medications as instructed, no medication side effects noted, no TIA's, no chest pain on exertion, no dyspnea on exertion and no swelling of ankles. Patient's symptoms have been unchanged. No medication side effects.  2 years since coronary stent placement.      Lab Results   Component Value Date     07/09/2019    K 3.9 07/09/2019     07/09/2019    CO2 21 (L) 07/09/2019    BUN 10 07/09/2019    CREATININE 0.7 07/09/2019     07/09/2019    HGBA1C 6.3 (H) 07/09/2019    AST 17 07/09/2019    ALT 16 07/09/2019    ALBUMIN 4.0 07/09/2019    PROT 8.0 07/09/2019    BILITOT 0.5 07/09/2019    WBC 10.03 07/16/2018    HGB 14.3 07/16/2018    HCT 44.7 07/16/2018    MCV 89 07/16/2018     07/16/2018    TSH 1.263 07/09/2019    CHOL 153 07/09/2019    HDL 47 07/09/2019    LDLCALC 80.0 07/09/2019    TRIG 130 07/09/2019       Review of Systems   Constitution: Positive for malaise/fatigue.   HENT: Negative.    Eyes: Negative.    Cardiovascular: Positive for leg swelling. Negative for chest pain, claudication, dyspnea on exertion, irregular heartbeat, near-syncope, palpitations and syncope.   Respiratory: Positive for cough and wheezing. Negative for shortness of breath and snoring.    Endocrine: Negative.  Negative for cold intolerance, heat intolerance, polydipsia, polyphagia and polyuria.   Skin: Negative.    Musculoskeletal: Negative.    Gastrointestinal: Negative.    Genitourinary: Negative.    Neurological: Negative.    Psychiatric/Behavioral: The patient is nervous/anxious.        Objective:   Physical Exam   Constitutional: She is oriented to person, place, and time. She appears well-developed and well-nourished.   BP (!) 116/59   Pulse 89   Ht 5' 2.5" (1.588 m)   Wt 82.9 kg (182 lb 10.4 oz)   BMI 32.87 kg/m²   Anxious   HENT:   Head: Normocephalic.   Eyes: " Pupils are equal, round, and reactive to light.   Neck: Normal range of motion. Neck supple. Carotid bruit is not present. No thyromegaly present.   Cardiovascular: Normal rate, regular rhythm, normal heart sounds and intact distal pulses. Exam reveals no gallop and no friction rub.   No murmur heard.  Pulses:       Carotid pulses are 2+ on the right side, and 2+ on the left side.       Radial pulses are 2+ on the right side, and 2+ on the left side.        Femoral pulses are 2+ on the right side, and 2+ on the left side.       Popliteal pulses are 2+ on the right side, and 2+ on the left side.        Dorsalis pedis pulses are 2+ on the right side, and 2+ on the left side.        Posterior tibial pulses are 2+ on the right side, and 2+ on the left side.   Pulmonary/Chest: Effort normal and breath sounds normal. No respiratory distress. She has no wheezes. She has no rales. She exhibits no tenderness.   Abdominal: Soft. Bowel sounds are normal.   obese   Musculoskeletal: Normal range of motion. She exhibits no edema.   Neurological: She is alert and oriented to person, place, and time.   Skin: Skin is warm and dry.   Psychiatric: She has a normal mood and affect.   Nursing note and vitals reviewed.        Assessment and Plan:     Problem List Items Addressed This Visit        Cardiology Problems    Essential hypertension (Chronic)    Coronary artery disease involving native coronary artery of native heart without angina pectoris - Primary (Chronic)    Hyperlipidemia    (HFpEF) heart failure with preserved ejection fraction       Other    Type 2 diabetes mellitus (Chronic)    Anxiety (Chronic)    Hypothyroidism due to acquired atrophy of thyroid          Patient's Medications   New Prescriptions    No medications on file   Previous Medications    ALBUTEROL 90 MCG/ACTUATION INHALER    Inhale 2 puffs into the lungs every 6 (six) hours as needed.    ALPRAZOLAM (XANAX) 0.25 MG TABLET    Take 1 tablet (0.25 mg total) by  mouth every evening.    AMLODIPINE-BENAZEPRIL (LOTREL) 10-40 MG PER CAPSULE    Take 1 capsule by mouth once daily.    ASPIRIN (ASPIR-LOW) 81 MG EC TABLET    Take 1 tablet (81 mg total) by mouth once daily.    ATORVASTATIN (LIPITOR) 20 MG TABLET    Take 1 tablet (20 mg total) by mouth once daily.    BLOOD-GLUCOSE METER (FREESTYLE LITE METER) KIT    Use as instructed    CLOPIDOGREL (PLAVIX) 75 MG TABLET    Take 1 tablet (75 mg total) by mouth once daily.    LANCETS (MICROLET LANCET) MISC    Test blood sugar once daily.    LEVOTHYROXINE (SYNTHROID) 50 MCG TABLET    Take 1 tablet (50 mcg total) by mouth every morning.    METFORMIN (GLUCOPHAGE) 500 MG TABLET    TAKE 1 TABLET BY MOUTH DAILY before dinner    METOPROLOL SUCCINATE (TOPROL-XL) 50 MG 24 HR TABLET    Take 1 tablet (50 mg total) by mouth once daily. For heart and blood presure    NITROGLYCERIN (NITROSTAT) 0.4 MG SL TABLET    Place 1 tablet (0.4 mg total) under the tongue every 5 (five) minutes as needed for Chest pain ((Notify MD)).    PRECISION XTRA TEST STRP    test blood glucose level once daily as directed    THEOPHYLLINE 400 MG TB24    TAKE 1 TABLET BY MOUTH DAILY   Modified Medications    No medications on file   Discontinued Medications    No medications on file   May d/c Plavix, continue on baby ASA  Weight loss, exercise, low salt diet  encouraged  Follow up in about 1 year (around 7/19/2020).

## 2019-07-22 RX ORDER — CLOPIDOGREL BISULFATE 75 MG/1
TABLET ORAL
Qty: 30 TABLET | Refills: 11 | Status: SHIPPED | OUTPATIENT
Start: 2019-07-22 | End: 2019-09-09

## 2019-07-24 DIAGNOSIS — Z01.00 DIABETIC EYE EXAM: Primary | ICD-10-CM

## 2019-07-24 DIAGNOSIS — E11.9 DIABETIC EYE EXAM: Primary | ICD-10-CM

## 2019-07-24 RX ORDER — THEOPHYLLINE 400 MG/1
TABLET, EXTENDED RELEASE ORAL
Qty: 30 TABLET | Refills: 10 | Status: SHIPPED | OUTPATIENT
Start: 2019-07-24 | End: 2020-05-18 | Stop reason: SDUPTHER

## 2019-07-26 ENCOUNTER — TELEPHONE (OUTPATIENT)
Dept: INTERNAL MEDICINE | Facility: CLINIC | Age: 69
End: 2019-07-26

## 2019-07-26 NOTE — TELEPHONE ENCOUNTER
----- Message from Linda Lane sent at 7/26/2019 11:19 AM CDT -----  Contact: [facility] [phone]  Patient is calling for an RX refill or new RX.  Is this a refill or new RX:  New   RX name and strength: theophylline 400 mg Tb24  Directions (copy/paste from chart):    Is this a 30 day or 90 day RX:  90  Local pharmacy or mail order pharmacy:  Local   Pharmacy name and phone # (copy/paste from chart): [facility]/pharmacy #[id number] - [place] - [address] [facility] [phone] (Phone)  [phone] (Fax)  Comments:        
Pharmacy notified.  
- - -

## 2019-08-02 ENCOUNTER — TELEPHONE (OUTPATIENT)
Dept: INTERNAL MEDICINE | Facility: CLINIC | Age: 69
End: 2019-08-02

## 2019-08-02 NOTE — TELEPHONE ENCOUNTER
----- Message from Linda Lane sent at 8/2/2019 11:52 AM CDT -----  Contact: [facility] [phone]  Patient is calling for an RX refill or new RX.  Is this a refill or new RX:  New   RX name and strength: theophylline 400 mg Tb24  Directions (copy/paste from chart):  TAKE 1 TABLET BY MOUTH DAILY  Is this a 30 day or 90 day RX:  30  Local pharmacy or mail order pharmacy:  Local   Pharmacy name and phone # (copy/paste from chart):   [facility]/pharmacy #[id number] - [place] - [address] [phone] (Phone)  [phone] (Fax)  Comments:  Per requesting to have script resent

## 2019-08-05 ENCOUNTER — HOSPITAL ENCOUNTER (EMERGENCY)
Facility: HOSPITAL | Age: 69
Discharge: HOME | End: 2019-08-06
Attending: EMERGENCY MEDICINE
Payer: COMMERCIAL

## 2019-08-05 DIAGNOSIS — J45.901 MILD ASTHMA WITH EXACERBATION, UNSPECIFIED WHETHER PERSISTENT: Primary | ICD-10-CM

## 2019-08-05 DIAGNOSIS — R06.02 SHORTNESS OF BREATH: ICD-10-CM

## 2019-08-05 PROCEDURE — 99284 EMERGENCY DEPT VISIT MOD MDM: CPT | Mod: 25

## 2019-08-05 PROCEDURE — 25000242 PHARM REV CODE 250 ALT 637 W/ HCPCS: Performed by: EMERGENCY MEDICINE

## 2019-08-05 PROCEDURE — 94644 CONT INHLJ TX 1ST HOUR: CPT

## 2019-08-05 PROCEDURE — 94640 AIRWAY INHALATION TREATMENT: CPT

## 2019-08-05 RX ORDER — DOXYCYCLINE 100 MG/1
100 CAPSULE ORAL 2 TIMES DAILY
Qty: 20 CAPSULE | Refills: 0 | Status: SHIPPED | OUTPATIENT
Start: 2019-08-05 | End: 2019-08-15

## 2019-08-05 RX ORDER — PREDNISONE 20 MG/1
40 TABLET ORAL
Status: COMPLETED | OUTPATIENT
Start: 2019-08-05 | End: 2019-08-06

## 2019-08-05 RX ORDER — ALBUTEROL SULFATE 90 UG/1
2 AEROSOL, METERED RESPIRATORY (INHALATION) EVERY 6 HOURS PRN
Qty: 1 INHALER | Refills: 3 | Status: SHIPPED | OUTPATIENT
Start: 2019-08-05 | End: 2019-08-06

## 2019-08-05 RX ORDER — ALBUTEROL SULFATE 2.5 MG/.5ML
10 SOLUTION RESPIRATORY (INHALATION) ONCE
Status: COMPLETED | OUTPATIENT
Start: 2019-08-05 | End: 2019-08-05

## 2019-08-05 RX ADMIN — ALBUTEROL SULFATE 10 MG: 2.5 SOLUTION RESPIRATORY (INHALATION) at 10:08

## 2019-08-05 NOTE — TELEPHONE ENCOUNTER
Patient  C/o having chest tighted  Unable to cough ,c/o wheezing,needs refill on her inhaler ,requested antibiotic ,she c/o slight fever .She was notified that we would call in medication to her pharmacy.

## 2019-08-05 NOTE — TELEPHONE ENCOUNTER
----- Message from May Lynn sent at 8/5/2019  8:03 AM CDT -----  Contact: self    Patient would like to get medical advice.  Symptoms (please be specific):  Cough, stuffy nose slight fever  How long has patient had these symptoms:  2  Pharmacy name and phone # (copy/paste from chart):  [facility]/pharmacy #[id number] - [place] - [address] [phone] (Phone)  [phone] (Fax)  Any drug allergies (copy/paste from chart):      Would the patient rather a call back or a response via [facility]?:  Call back  Comments:

## 2019-08-06 VITALS
OXYGEN SATURATION: 99 % | DIASTOLIC BLOOD PRESSURE: 64 MMHG | HEART RATE: 97 BPM | TEMPERATURE: 98 F | WEIGHT: 180 LBS | HEIGHT: 62 IN | RESPIRATION RATE: 23 BRPM | SYSTOLIC BLOOD PRESSURE: 134 MMHG | BODY MASS INDEX: 33.13 KG/M2

## 2019-08-06 PROCEDURE — 63600175 PHARM REV CODE 636 W HCPCS: Performed by: EMERGENCY MEDICINE

## 2019-08-06 RX ORDER — PREDNISONE 20 MG/1
40 TABLET ORAL DAILY
Qty: 10 TABLET | Refills: 0 | Status: SHIPPED | OUTPATIENT
Start: 2019-08-06 | End: 2019-08-11

## 2019-08-06 RX ORDER — BENZONATATE 100 MG/1
100 CAPSULE ORAL 3 TIMES DAILY PRN
Qty: 20 CAPSULE | Refills: 0 | Status: SHIPPED | OUTPATIENT
Start: 2019-08-06 | End: 2019-09-09

## 2019-08-06 RX ORDER — ALBUTEROL SULFATE 90 UG/1
1-2 AEROSOL, METERED RESPIRATORY (INHALATION) EVERY 6 HOURS PRN
Qty: 1 INHALER | Refills: 0 | Status: SHIPPED | OUTPATIENT
Start: 2019-08-06 | End: 2022-01-14 | Stop reason: SDUPTHER

## 2019-08-06 RX ADMIN — PREDNISONE 40 MG: 20 TABLET ORAL at 12:08

## 2019-08-06 NOTE — ED NOTES
Report received, care assumed. Patient sitting up on stretcher. Reports that she has been coughing and wheezing unrelieved by home neb tx and inhaler.  Patient denies home O2 use. Resp labored, tachypneac. Patient talking a lot. Having long conversations. Smiling and friendly. Patient reports that she has NP cough and chest feels tight. Patient states she came to hospital because she has been trying to clear her chest but is unable to expectorate sputum.

## 2019-08-06 NOTE — ED PROVIDER NOTES
"Encounter Date: 8/5/2019    SCRIBE #1 NOTE: I, Jorge L Plummer, am scribing for, and in the presence of,  Dr. Chen. I have scribed the entire note.       History     Chief Complaint   Patient presents with    Shortness of Breath     pt reports hx of asthma and pneumonia and states that this feels like both. reports SOB, speaking in long complete sentences. Pt reports sore throat, ear pain, wheezing, and coughing. Moving too fast makes pt "lose breath". Pt reports began taking doxycycline today and has started feeling better yet.        Pt is a 69 yo WF with pmhx of asthma, DM, HTN who presents to the ED with the complaint of SOB. Pt states that since Saturday evening she has been experiencing a productive cough, a flare up of her asthma with intermittent SOB. Pt states that she has been using her home nebulizer with some relief. Pt states that she called her PCP today regarding the aforementioned complaints and the PCP called in a prescription for doxycycline. Pt denies any CP/fever/chills when asked. Furthermore, pt denies any leg swelling, or hx of DVT/PE when asked.     The history is provided by the patient.     Review of patient's allergies indicates:   Allergen Reactions    Sulfa (sulfonamide antibiotics) Rash     Past Medical History:   Diagnosis Date    ALLERGIC RHINITIS     Asthma     Colon polyps     Diabetes mellitus     Hyperlipidemia     Hypertension     Hypothyroidism     Umbilical hernia      No past surgical history on file.  Family History   Problem Relation Age of Onset    Cancer Mother 49        ovarian    Heart disease Mother 40        M.I.    Hyperlipidemia Mother     Heart disease Father     Glaucoma Paternal Grandfather     Heart failure Sister     Hyperlipidemia Maternal Grandmother     Amblyopia Neg Hx     Blindness Neg Hx     Cataracts Neg Hx     Diabetes Neg Hx     Hypertension Neg Hx     Macular degeneration Neg Hx     Retinal detachment Neg Hx     " Strabismus Neg Hx     Stroke Neg Hx     Thyroid disease Neg Hx     Heart attack Neg Hx      Social History     Tobacco Use    Smoking status: Former Smoker     Packs/day: 1.00     Years: 20.00     Pack years: 20.00     Last attempt to quit: 1994     Years since quittin.6    Smokeless tobacco: Never Used   Substance Use Topics    Alcohol use: Yes     Comment: occasional use    Drug use: No     Review of Systems   Constitutional: Negative for chills, diaphoresis and fever.   HENT: Negative for ear pain, facial swelling, sinus pressure and sore throat.    Eyes: Negative for photophobia and visual disturbance.   Respiratory: Positive for cough, shortness of breath and wheezing. Negative for choking and chest tightness.    Cardiovascular: Negative for chest pain, palpitations and leg swelling.   Gastrointestinal: Negative for abdominal pain, diarrhea, nausea and vomiting.   Genitourinary: Negative for dysuria.   Musculoskeletal: Negative for back pain and myalgias.   Skin: Negative for pallor and wound.   Neurological: Negative for dizziness, light-headedness and headaches.   Psychiatric/Behavioral: Negative for agitation and confusion.   All other systems reviewed and are negative.      Physical Exam     Initial Vitals [19]   BP Pulse Resp Temp SpO2   (!) 182/81 (!) 127 (!) 24 97.8 °F (36.6 °C) 96 %      MAP       --         Physical Exam    Nursing note and vitals reviewed.  Constitutional: She appears well-developed and well-nourished. No distress.   Well appearing  No acute distress  Non toxic appearing  Speaking in complete sentences   HENT:   Head: Normocephalic and atraumatic.   Right Ear: External ear normal.   Left Ear: External ear normal.   Nose: Nose normal.   Mouth/Throat: Oropharynx is clear and moist. No oropharyngeal exudate.   Eyes: Conjunctivae and EOM are normal. Pupils are equal, round, and reactive to light.   Neck: Normal range of motion. Neck supple.   Cardiovascular:  Normal rate, regular rhythm, normal heart sounds and intact distal pulses.   Pulmonary/Chest: No respiratory distress. She has wheezes in the right upper field, the right middle field, the right lower field, the left upper field, the left middle field and the left lower field.   Speaking in complete sentences  RR WNL  Diffuse wheezing throughout  No accessory muscle usage  No respiratory distress    Abdominal: Soft. Bowel sounds are normal. She exhibits no distension. There is no tenderness. There is no rebound.   Musculoskeletal: Normal range of motion. She exhibits no edema or tenderness.   No leg swelling, tenderness or edema noted.    Neurological: She is alert and oriented to person, place, and time. She has normal strength and normal reflexes. No cranial nerve deficit.   Skin: Skin is warm and dry.   Psychiatric: She has a normal mood and affect.         ED Course   Procedures  Labs Reviewed - No data to display       Imaging Results          X-Ray Chest PA And Lateral (Final result)  Result time 08/05/19 22:52:01    Final result by Rena Ravi MD (08/05/19 22:52:01)                 Impression:      Mild bibasilar chronic atelectasis or scarring.  Otherwise no acute cardiopulmonary process identified.      Electronically signed by: Rena Ravi MD  Date:    08/05/2019  Time:    22:52             Narrative:    EXAMINATION:  XR CHEST PA AND LATERAL    CLINICAL HISTORY:  Shortness of breath    TECHNIQUE:  PA and lateral views of the chest were performed.    COMPARISON:  July 2017.    FINDINGS:  Cardiac silhouette is normal in size.  Lungs are symmetrically expanded.  Linear opacities are visualized within the lung bases which may reflect chronic plate atelectasis or scarring.  Otherwise no evidence of focal consolidative process, pneumothorax, or significant effusion.  No acute osseous abnormality identified.                                 Medical Decision Making:   Independently Interpreted Test(s):   I  have ordered and independently interpreted X-rays - see prior notes.  Clinical Tests:   Radiological Study: Ordered and Reviewed  ED Management:  - Pt administered breathing treatment and PO steroid with improvement of wheezing. No findings on CXR to suggest PNA or other abnormal cardiopulmonary process per my interpretation. Pt not requiring supplemental O2 during her ED to stay to maintain adequate O2 saturation. Will discharge pt to home with rx for PO steroids, inhaler as she states she used her last one. No further emergent intervention required at this time. Will have pt f/u with her PCP in next 2-3 days for a formal evaluation.   - No further intervention is indicated at this time after having taken into account the patient's history, physical exam findings, and empirical and objective data obtained during the patient's emergency department workup.   - The patient is at low risk for an emergent medical condition at this time, and I am of the belief that that it is safe to discharge the patient from the emergency department.   - The patient is instructed to follow up as outpatient as indicated on the discharge paperwork.    - I have discussed the specifics of the workup with the patient and the patient has verbalized understanding of the details of the workup, the diagnosis, the treatment plan, and the need for outpatient follow-up.    - Although the patient has no emergent etiology today this does not preclude the development of an emergent condition so, in addition, I have advised the patient that they can return to the ED and/or activate EMS at any time with worsening of their symptoms, change of their symptoms, or with any other medical complaint.    - The patient remained comfortable and stable during their visit in the ED.    - Discharge and follow-up instructions discussed with the patient who expressed understanding and willingness to comply with my recommendations.  - Results of all emergency  department tests  discussed thoroughly with patient; all patient questions answered; pt in agreement with plan  - Pt instructed to follow up with PCP in 2-3 days for recheck of today's complaints  - Pt given strict emergency department return precautions for any new or worsening of symptoms  - Pt discharged from the emergency department in stable condition, in no acute distress              Scribe Attestation:   Scribe #1: I performed the above scribed service and the documentation accurately describes the services I performed. I attest to the accuracy of the note.               Clinical Impression:       ICD-10-CM ICD-9-CM   1. Mild asthma with exacerbation, unspecified whether persistent J45.901 493.92   2. Shortness of breath R06.02 786.05           I, Sanjay Chen,  personally performed the services described in this documentation. All medical record entries made by the scribe were at my direction and in my presence.  I have reviewed the chart and agree that the record reflects my personal performance and is accurate and complete. Sanjay Chen M.D. 8:46 PM08/07/2019                      Sanjay Chen MD  08/07/19 [id number]

## 2019-08-06 NOTE — ED NOTES
"After vitals pt reports taking breathing treatment at home. Reports used breathing treatment at 1800, states has been feeling "shaky" since treatment. Reports feeling difference following treatment.   "

## 2019-08-06 NOTE — ED NOTES
Patient asking to be placed on oxygen stating she is SOB. 97% on RA. Placed patient on 1L NC for comfort.

## 2019-08-08 ENCOUNTER — TELEPHONE (OUTPATIENT)
Dept: INTERNAL MEDICINE | Facility: CLINIC | Age: 69
End: 2019-08-08

## 2019-08-08 NOTE — TELEPHONE ENCOUNTER
----- Message from Luma Yang sent at 8/8/2019 10:25 AM CDT -----  Contact: Pt self Home [phone] or Mobile [phone]  Patient said that she was at [facility] location on Monday due to her having shortness of breath. She would like a call back from you so that she can speak with you about this please.

## 2019-08-23 DIAGNOSIS — I50.30 (HFPEF) HEART FAILURE WITH PRESERVED EJECTION FRACTION: ICD-10-CM

## 2019-08-23 DIAGNOSIS — I25.10 CORONARY ARTERY DISEASE INVOLVING NATIVE CORONARY ARTERY OF NATIVE HEART WITHOUT ANGINA PECTORIS: Chronic | ICD-10-CM

## 2019-08-23 DIAGNOSIS — I10 ESSENTIAL HYPERTENSION: Chronic | ICD-10-CM

## 2019-08-23 RX ORDER — BLOOD SUGAR DIAGNOSTIC
STRIP MISCELLANEOUS
Qty: 100 STRIP | Refills: 3 | Status: SHIPPED | OUTPATIENT
Start: 2019-08-23 | End: 2020-08-18

## 2019-08-23 RX ORDER — AMLODIPINE AND BENAZEPRIL HYDROCHLORIDE 10; 40 MG/1; MG/1
CAPSULE ORAL
Qty: 90 CAPSULE | Refills: 3 | Status: SHIPPED | OUTPATIENT
Start: 2019-08-23 | End: 2019-09-09

## 2019-08-30 RX ORDER — LANCETS
EACH MISCELLANEOUS
Qty: 100 EACH | Refills: 3 | Status: SHIPPED | OUTPATIENT
Start: 2019-08-30

## 2019-09-09 ENCOUNTER — OFFICE VISIT (OUTPATIENT)
Dept: INTERNAL MEDICINE | Facility: CLINIC | Age: 69
End: 2019-09-09
Payer: COMMERCIAL

## 2019-09-09 VITALS
SYSTOLIC BLOOD PRESSURE: 128 MMHG | TEMPERATURE: 98 F | HEART RATE: 88 BPM | WEIGHT: 186.5 LBS | DIASTOLIC BLOOD PRESSURE: 68 MMHG | BODY MASS INDEX: 34.32 KG/M2 | HEIGHT: 62 IN

## 2019-09-09 DIAGNOSIS — I50.30 (HFPEF) HEART FAILURE WITH PRESERVED EJECTION FRACTION: ICD-10-CM

## 2019-09-09 DIAGNOSIS — E11.00 TYPE 2 DIABETES MELLITUS WITH HYPEROSMOLARITY WITHOUT COMA, WITHOUT LONG-TERM CURRENT USE OF INSULIN: Chronic | ICD-10-CM

## 2019-09-09 DIAGNOSIS — J45.20 MILD INTERMITTENT ASTHMA WITHOUT COMPLICATION: Primary | ICD-10-CM

## 2019-09-09 DIAGNOSIS — E78.2 MIXED HYPERLIPIDEMIA: ICD-10-CM

## 2019-09-09 DIAGNOSIS — E03.4 HYPOTHYROIDISM DUE TO ACQUIRED ATROPHY OF THYROID: ICD-10-CM

## 2019-09-09 DIAGNOSIS — I10 ESSENTIAL HYPERTENSION: Chronic | ICD-10-CM

## 2019-09-09 PROCEDURE — 3044F HG A1C LEVEL LT 7.0%: CPT | Mod: CPTII,S$GLB,, | Performed by: INTERNAL MEDICINE

## 2019-09-09 PROCEDURE — 1101F PT FALLS ASSESS-DOCD LE1/YR: CPT | Mod: CPTII,S$GLB,, | Performed by: INTERNAL MEDICINE

## 2019-09-09 PROCEDURE — 3078F DIAST BP <80 MM HG: CPT | Mod: CPTII,S$GLB,, | Performed by: INTERNAL MEDICINE

## 2019-09-09 PROCEDURE — 3074F SYST BP LT 130 MM HG: CPT | Mod: CPTII,S$GLB,, | Performed by: INTERNAL MEDICINE

## 2019-09-09 PROCEDURE — 1101F PR PT FALLS ASSESS DOC 0-1 FALLS W/OUT INJ PAST YR: ICD-10-PCS | Mod: CPTII,S$GLB,, | Performed by: INTERNAL MEDICINE

## 2019-09-09 PROCEDURE — 99999 PR PBB SHADOW E&M-EST. PATIENT-LVL III: ICD-10-PCS | Mod: PBBFAC,,, | Performed by: INTERNAL MEDICINE

## 2019-09-09 PROCEDURE — 99214 PR OFFICE/OUTPT VISIT, EST, LEVL IV, 30-39 MIN: ICD-10-PCS | Mod: S$GLB,,, | Performed by: INTERNAL MEDICINE

## 2019-09-09 PROCEDURE — 3074F PR MOST RECENT SYSTOLIC BLOOD PRESSURE < 130 MM HG: ICD-10-PCS | Mod: CPTII,S$GLB,, | Performed by: INTERNAL MEDICINE

## 2019-09-09 PROCEDURE — 3044F PR MOST RECENT HEMOGLOBIN A1C LEVEL <7.0%: ICD-10-PCS | Mod: CPTII,S$GLB,, | Performed by: INTERNAL MEDICINE

## 2019-09-09 PROCEDURE — 3078F PR MOST RECENT DIASTOLIC BLOOD PRESSURE < 80 MM HG: ICD-10-PCS | Mod: CPTII,S$GLB,, | Performed by: INTERNAL MEDICINE

## 2019-09-09 PROCEDURE — 99999 PR PBB SHADOW E&M-EST. PATIENT-LVL III: CPT | Mod: PBBFAC,,, | Performed by: INTERNAL MEDICINE

## 2019-09-09 PROCEDURE — 99214 OFFICE O/P EST MOD 30 MIN: CPT | Mod: S$GLB,,, | Performed by: INTERNAL MEDICINE

## 2019-09-09 RX ORDER — ALBUTEROL SULFATE 0.83 MG/ML
2.5 SOLUTION RESPIRATORY (INHALATION) EVERY 4 HOURS PRN
Qty: 1 BOX | Refills: 3 | Status: SHIPPED | OUTPATIENT
Start: 2019-09-09 | End: 2020-09-08

## 2019-09-09 RX ORDER — LEVOTHYROXINE SODIUM 50 UG/1
50 TABLET ORAL EVERY MORNING
Qty: 30 TABLET | Refills: 11 | Status: SHIPPED | OUTPATIENT
Start: 2019-09-09 | End: 2020-09-13

## 2019-09-09 RX ORDER — ALPRAZOLAM 0.25 MG/1
0.25 TABLET ORAL NIGHTLY
Qty: 30 TABLET | Refills: 2 | Status: SHIPPED | OUTPATIENT
Start: 2019-09-09 | End: 2020-01-13 | Stop reason: SDUPTHER

## 2019-09-09 RX ORDER — AMLODIPINE AND OLMESARTAN MEDOXOMIL 10; 40 MG/1; MG/1
1 TABLET ORAL DAILY
Qty: 90 TABLET | Refills: 3 | Status: SHIPPED | OUTPATIENT
Start: 2019-09-09 | End: 2020-09-08

## 2019-09-09 RX ORDER — ATORVASTATIN CALCIUM 20 MG/1
20 TABLET, FILM COATED ORAL DAILY
Qty: 30 TABLET | Refills: 11 | Status: SHIPPED | OUTPATIENT
Start: 2019-09-09 | End: 2020-09-12 | Stop reason: SDUPTHER

## 2019-09-09 RX ORDER — METFORMIN HYDROCHLORIDE 500 MG/1
TABLET ORAL
Qty: 30 TABLET | Refills: 11 | Status: SHIPPED | OUTPATIENT
Start: 2019-09-09 | End: 2020-09-12 | Stop reason: SDUPTHER

## 2019-09-09 NOTE — PROGRESS NOTES
Subjective:       Patient ID: Noah Bailey is a 68 y.o. female.    Chief Complaint: Diabetes (4 month )    HPI   The patient presents for follow-up of medical conditions.  She was recently treated for an exacerbation of asthma.  Her symptoms have stabilized.    Blood sugar levels have ranged from .  No hypoglycemia has been noted. She is tolerating her blood pressure medication well without side effects.    Active medical conditions include asthma, hypertension, type 2 diabetes mellitus, hyperlipidemia, coronary artery disease, hypothyroidism, and anxiety.    Review of Systems   Constitutional: Negative for activity change, appetite change and unexpected weight change.   Eyes: Negative for visual disturbance.   Respiratory: Negative for shortness of breath.    Cardiovascular: Negative for chest pain, palpitations and leg swelling.   Gastrointestinal: Negative for abdominal pain, blood in stool and diarrhea.   Genitourinary: Negative for dysuria, frequency, hematuria and urgency.   Neurological: Negative for weakness, numbness and headaches.   Psychiatric/Behavioral: Negative for sleep disturbance.       Objective:      Physical Exam   Constitutional: She is oriented to person, place, and time. She appears well-developed and well-nourished. No distress.   The patient has gained 4 lb since 03/19/2019.   HENT:   Head: Normocephalic and atraumatic.   Eyes: Pupils are equal, round, and reactive to light. Conjunctivae and EOM are normal. No scleral icterus.   Neck: Normal range of motion. Neck supple. No JVD present. No thyromegaly present.   Cardiovascular: Normal rate, regular rhythm, normal heart sounds and intact distal pulses. Exam reveals no gallop and no friction rub.   No murmur heard.  Pulmonary/Chest: Effort normal and breath sounds normal. No respiratory distress. She has no wheezes. She has no rales.   Abdominal: Soft. Bowel sounds are normal. She exhibits no mass. There is no tenderness.    Musculoskeletal: Normal range of motion. She exhibits no edema or tenderness.   Lymphadenopathy:     She has no cervical adenopathy.   Neurological: She is alert and oriented to person, place, and time. No cranial nerve deficit.   Sensory exam is intact in both feet on monofilament and vibration testing.   Skin: Skin is warm and dry. No rash noted.   No foot lesions are present.   Psychiatric: She has a normal mood and affect. Her behavior is normal.   Nursing note and vitals reviewed.      Results for orders placed or performed in visit on 07/09/19   Lipid panel   Result Value Ref Range    Cholesterol 153 120 - 199 mg/dL    Triglycerides 130 30 - 150 mg/dL    HDL 47 40 - 75 mg/dL    LDL Cholesterol 80.0 63.0 - 159.0 mg/dL    Hdl/Cholesterol Ratio 30.7 20.0 - 50.0 %    Total Cholesterol/HDL Ratio 3.3 2.0 - 5.0    Non-HDL Cholesterol 106 mg/dL   TSH   Result Value Ref Range    TSH 1.263 0.400 - 4.000 uIU/mL   Comprehensive metabolic panel   Result Value Ref Range    Sodium 137 136 - 145 mmol/L    Potassium 3.9 3.5 - 5.1 mmol/L    Chloride 103 95 - 110 mmol/L    CO2 21 (L) 23 - 29 mmol/L    Glucose 101 70 - 110 mg/dL    BUN, Bld 10 8 - 23 mg/dL    Creatinine 0.7 0.5 - 1.4 mg/dL    Calcium 10.3 8.7 - 10.5 mg/dL    Total Protein 8.0 6.0 - 8.4 g/dL    Albumin 4.0 3.5 - 5.2 g/dL    Total Bilirubin 0.5 0.1 - 1.0 mg/dL    Alkaline Phosphatase 124 55 - 135 U/L    AST 17 10 - 40 U/L    ALT 16 10 - 44 U/L    Anion Gap 13 8 - 16 mmol/L    eGFR if African American >60.0 >60 mL/min/1.73 m^2    eGFR if non African American >60.0 >60 mL/min/1.73 m^2   Hemoglobin A1c   Result Value Ref Range    Hemoglobin A1C 6.3 (H) 4.0 - 5.6 %    Estimated Avg Glucose 134 (H) 68 - 131 mg/dL       Assessment:       1. Mild intermittent asthma without complication    2. Type 2 diabetes mellitus with hyperosmolarity without coma, without long-term current use of insulin    3. (HFpEF) heart failure with preserved ejection fraction    4. Essential  hypertension    5. Mixed hyperlipidemia    6. Hypothyroidism due to acquired atrophy of thyroid        Plan:       Noah was seen today for diabetes.  Lotrel will be discontinued in view of concerns regarding the long-term use of ACE inhibitors and the reported increased incidence of lung cancer.  Amlodipine/olmesartan will be ordered as a substitute.  Routine medications will be continued.  Blood tests and a follow-up visit in 4 months will be obtained.    Diagnoses and all orders for this visit:    Mild intermittent asthma without complication    Type 2 diabetes mellitus with hyperosmolarity without coma, without long-term current use of insulin  -     Hemoglobin A1c; Future    (HFpEF) heart failure with preserved ejection fraction    Essential hypertension    Mixed hyperlipidemia  -     Comprehensive metabolic panel; Future    Hypothyroidism due to acquired atrophy of thyroid    Other orders  -     amlodipine-olmesartan (KARISSA) 10-40 mg per tablet; Take 1 tablet by mouth once daily. For blood pressure  -     ALPRAZolam (XANAX) 0.25 MG tablet; Take 1 tablet (0.25 mg total) by mouth every evening.  -     levothyroxine (SYNTHROID) 50 MCG tablet; Take 1 tablet (50 mcg total) by mouth every morning.  -     metFORMIN (GLUCOPHAGE) 500 MG tablet; TAKE 1 TABLET BY MOUTH DAILY before dinner  -     atorvastatin (LIPITOR) 20 MG tablet; Take 1 tablet (20 mg total) by mouth once daily.  -     albuterol (PROVENTIL) 2.5 mg /3 mL (0.083 %) nebulizer solution; Take 3 mLs (2.5 mg total) by nebulization every 4 (four) hours as needed for Wheezing or Shortness of Breath. Rescue

## 2019-10-04 DIAGNOSIS — Z12.39 BREAST CANCER SCREENING: ICD-10-CM

## 2019-12-30 ENCOUNTER — PATIENT OUTREACH (OUTPATIENT)
Dept: ADMINISTRATIVE | Facility: HOSPITAL | Age: 69
End: 2019-12-30

## 2020-01-08 ENCOUNTER — CLINICAL SUPPORT (OUTPATIENT)
Dept: INTERNAL MEDICINE | Facility: CLINIC | Age: 70
End: 2020-01-08
Payer: COMMERCIAL

## 2020-01-08 ENCOUNTER — LAB VISIT (OUTPATIENT)
Dept: LAB | Facility: HOSPITAL | Age: 70
End: 2020-01-08
Attending: INTERNAL MEDICINE
Payer: COMMERCIAL

## 2020-01-08 DIAGNOSIS — E78.2 MIXED HYPERLIPIDEMIA: ICD-10-CM

## 2020-01-08 DIAGNOSIS — E11.00 TYPE 2 DIABETES MELLITUS WITH HYPEROSMOLARITY WITHOUT COMA, WITHOUT LONG-TERM CURRENT USE OF INSULIN: Chronic | ICD-10-CM

## 2020-01-08 LAB
ALBUMIN SERPL BCP-MCNC: 4.3 G/DL (ref 3.5–5.2)
ALP SERPL-CCNC: 133 U/L (ref 55–135)
ALT SERPL W/O P-5'-P-CCNC: 21 U/L (ref 10–44)
ANION GAP SERPL CALC-SCNC: 9 MMOL/L (ref 8–16)
AST SERPL-CCNC: 18 U/L (ref 10–40)
BILIRUB SERPL-MCNC: 0.6 MG/DL (ref 0.1–1)
BUN SERPL-MCNC: 10 MG/DL (ref 8–23)
CALCIUM SERPL-MCNC: 10.8 MG/DL (ref 8.7–10.5)
CHLORIDE SERPL-SCNC: 105 MMOL/L (ref 95–110)
CO2 SERPL-SCNC: 27 MMOL/L (ref 23–29)
CREAT SERPL-MCNC: 0.9 MG/DL (ref 0.5–1.4)
EST. GFR  (AFRICAN AMERICAN): >60 ML/MIN/1.73 M^2
EST. GFR  (NON AFRICAN AMERICAN): >60 ML/MIN/1.73 M^2
ESTIMATED AVG GLUCOSE: 146 MG/DL (ref 68–131)
GLUCOSE SERPL-MCNC: 126 MG/DL (ref 70–110)
HBA1C MFR BLD HPLC: 6.7 % (ref 4–5.6)
POTASSIUM SERPL-SCNC: 5.8 MMOL/L (ref 3.5–5.1)
PROT SERPL-MCNC: 8.3 G/DL (ref 6–8.4)
SODIUM SERPL-SCNC: 141 MMOL/L (ref 136–145)

## 2020-01-08 PROCEDURE — 36415 COLL VENOUS BLD VENIPUNCTURE: CPT | Mod: PO

## 2020-01-08 PROCEDURE — 80053 COMPREHEN METABOLIC PANEL: CPT

## 2020-01-08 PROCEDURE — 83036 HEMOGLOBIN GLYCOSYLATED A1C: CPT

## 2020-01-13 ENCOUNTER — OFFICE VISIT (OUTPATIENT)
Dept: INTERNAL MEDICINE | Facility: CLINIC | Age: 70
End: 2020-01-13
Payer: COMMERCIAL

## 2020-01-13 VITALS
RESPIRATION RATE: 14 BRPM | WEIGHT: 184.75 LBS | HEART RATE: 88 BPM | HEIGHT: 62 IN | DIASTOLIC BLOOD PRESSURE: 82 MMHG | SYSTOLIC BLOOD PRESSURE: 140 MMHG | BODY MASS INDEX: 34 KG/M2 | TEMPERATURE: 98 F

## 2020-01-13 DIAGNOSIS — E11.00 TYPE 2 DIABETES MELLITUS WITH HYPEROSMOLARITY WITHOUT COMA, WITHOUT LONG-TERM CURRENT USE OF INSULIN: Primary | ICD-10-CM

## 2020-01-13 DIAGNOSIS — E78.2 MIXED HYPERLIPIDEMIA: ICD-10-CM

## 2020-01-13 DIAGNOSIS — F41.9 ANXIETY: ICD-10-CM

## 2020-01-13 DIAGNOSIS — I10 ESSENTIAL HYPERTENSION: ICD-10-CM

## 2020-01-13 DIAGNOSIS — E03.9 HYPOTHYROIDISM, UNSPECIFIED TYPE: ICD-10-CM

## 2020-01-13 PROCEDURE — 3044F PR MOST RECENT HEMOGLOBIN A1C LEVEL <7.0%: ICD-10-PCS | Mod: CPTII,S$GLB,, | Performed by: INTERNAL MEDICINE

## 2020-01-13 PROCEDURE — 1126F PR PAIN SEVERITY QUANTIFIED, NO PAIN PRESENT: ICD-10-PCS | Mod: S$GLB,,, | Performed by: INTERNAL MEDICINE

## 2020-01-13 PROCEDURE — 1159F MED LIST DOCD IN RCRD: CPT | Mod: S$GLB,,, | Performed by: INTERNAL MEDICINE

## 2020-01-13 PROCEDURE — 3077F SYST BP >= 140 MM HG: CPT | Mod: CPTII,S$GLB,, | Performed by: INTERNAL MEDICINE

## 2020-01-13 PROCEDURE — 3079F PR MOST RECENT DIASTOLIC BLOOD PRESSURE 80-89 MM HG: ICD-10-PCS | Mod: CPTII,S$GLB,, | Performed by: INTERNAL MEDICINE

## 2020-01-13 PROCEDURE — 99214 OFFICE O/P EST MOD 30 MIN: CPT | Mod: S$GLB,,, | Performed by: INTERNAL MEDICINE

## 2020-01-13 PROCEDURE — 99999 PR PBB SHADOW E&M-EST. PATIENT-LVL III: CPT | Mod: PBBFAC,,, | Performed by: INTERNAL MEDICINE

## 2020-01-13 PROCEDURE — 99214 PR OFFICE/OUTPT VISIT, EST, LEVL IV, 30-39 MIN: ICD-10-PCS | Mod: S$GLB,,, | Performed by: INTERNAL MEDICINE

## 2020-01-13 PROCEDURE — 1126F AMNT PAIN NOTED NONE PRSNT: CPT | Mod: S$GLB,,, | Performed by: INTERNAL MEDICINE

## 2020-01-13 PROCEDURE — 1101F PR PT FALLS ASSESS DOC 0-1 FALLS W/OUT INJ PAST YR: ICD-10-PCS | Mod: CPTII,S$GLB,, | Performed by: INTERNAL MEDICINE

## 2020-01-13 PROCEDURE — 99999 PR PBB SHADOW E&M-EST. PATIENT-LVL III: ICD-10-PCS | Mod: PBBFAC,,, | Performed by: INTERNAL MEDICINE

## 2020-01-13 PROCEDURE — 3077F PR MOST RECENT SYSTOLIC BLOOD PRESSURE >= 140 MM HG: ICD-10-PCS | Mod: CPTII,S$GLB,, | Performed by: INTERNAL MEDICINE

## 2020-01-13 PROCEDURE — 1101F PT FALLS ASSESS-DOCD LE1/YR: CPT | Mod: CPTII,S$GLB,, | Performed by: INTERNAL MEDICINE

## 2020-01-13 PROCEDURE — 3044F HG A1C LEVEL LT 7.0%: CPT | Mod: CPTII,S$GLB,, | Performed by: INTERNAL MEDICINE

## 2020-01-13 PROCEDURE — 3079F DIAST BP 80-89 MM HG: CPT | Mod: CPTII,S$GLB,, | Performed by: INTERNAL MEDICINE

## 2020-01-13 PROCEDURE — 1159F PR MEDICATION LIST DOCUMENTED IN MEDICAL RECORD: ICD-10-PCS | Mod: S$GLB,,, | Performed by: INTERNAL MEDICINE

## 2020-01-13 RX ORDER — ALPRAZOLAM 0.25 MG/1
0.25 TABLET ORAL NIGHTLY
Qty: 30 TABLET | Refills: 2 | Status: SHIPPED | OUTPATIENT
Start: 2020-01-13 | End: 2020-05-18 | Stop reason: SDUPTHER

## 2020-01-13 NOTE — PROGRESS NOTES
Subjective:       Patient ID: Noah Bailey is a 69 y.o. female.    Chief Complaint: Follow-up (4 Month)    HPI   The patient presents for follow-up of medical conditions which include type 2 diabetes mellitus, hypertension hyperlipidemia hypothyroidism anxiety.  She also has coronary disease and asthma.  She does note occasional wheezing.  She reports that blood sugar levels have been variable.  No hypoglycemia has been noted. She exercises occasionally.    Review of Systems   Constitutional: Negative for activity change, appetite change and unexpected weight change.   Eyes: Negative for visual disturbance.   Respiratory: Positive for shortness of breath and wheezing.    Cardiovascular: Negative for chest pain, palpitations and leg swelling.   Gastrointestinal: Negative for abdominal pain, blood in stool and diarrhea.   Genitourinary: Negative for dysuria, frequency, hematuria and urgency.   Neurological: Negative for weakness, numbness and headaches.   Psychiatric/Behavioral: Negative for sleep disturbance.       Objective:      Physical Exam   Constitutional: She is oriented to person, place, and time. She appears well-developed and well-nourished. No distress.   HENT:   Head: Normocephalic and atraumatic.   Eyes: Pupils are equal, round, and reactive to light. Conjunctivae and EOM are normal. No scleral icterus.   Neck: Normal range of motion. Neck supple. No JVD present. No thyromegaly present.   Cardiovascular: Normal rate, regular rhythm, normal heart sounds and intact distal pulses. Exam reveals no gallop and no friction rub.   No murmur heard.  Pulmonary/Chest: Effort normal and breath sounds normal. No respiratory distress. She has no wheezes. She has no rales.   Abdominal: Soft. Bowel sounds are normal. She exhibits no mass. There is no tenderness.   Musculoskeletal: Normal range of motion. She exhibits no edema or tenderness.   Lymphadenopathy:     She has no cervical adenopathy.   Neurological: She  is alert and oriented to person, place, and time. No cranial nerve deficit.   Sensory exam is intact in both feet on monofilament and vibration testing.   Skin: Skin is warm and dry. No rash noted.   No foot lesions are present.   Psychiatric: She has a normal mood and affect. Her behavior is normal.   Nursing note and vitals reviewed.      Results for orders placed or performed in visit on 01/08/20   Comprehensive metabolic panel   Result Value Ref Range    Sodium 141 136 - 145 mmol/L    Potassium 5.8 (H) 3.5 - 5.1 mmol/L    Chloride 105 95 - 110 mmol/L    CO2 27 23 - 29 mmol/L    Glucose 126 (H) 70 - 110 mg/dL    BUN, Bld 10 8 - 23 mg/dL    Creatinine 0.9 0.5 - 1.4 mg/dL    Calcium 10.8 (H) 8.7 - 10.5 mg/dL    Total Protein 8.3 6.0 - 8.4 g/dL    Albumin 4.3 3.5 - 5.2 g/dL    Total Bilirubin 0.6 0.1 - 1.0 mg/dL    Alkaline Phosphatase 133 55 - 135 U/L    AST 18 10 - 40 U/L    ALT 21 10 - 44 U/L    Anion Gap 9 8 - 16 mmol/L    eGFR if African American >60.0 >60 mL/min/1.73 m^2    eGFR if non African American >60.0 >60 mL/min/1.73 m^2   Hemoglobin A1c   Result Value Ref Range    Hemoglobin A1C 6.7 (H) 4.0 - 5.6 %    Estimated Avg Glucose 146 (H) 68 - 131 mg/dL       Assessment:       1. Type 2 diabetes mellitus with hyperosmolarity without coma, without long-term current use of insulin    2. Mixed hyperlipidemia    3. Essential hypertension    4. Hypothyroidism, unspecified type    5. Anxiety        Plan:       Noah was seen today for follow-up.  Current therapy will be continued.  Diet and exercise therapy were discussed with the patient.  She has been very compliant with her medication.  Current medications will be continued.  Blood and urine tests will be obtained 4 months.  The patient is to return to clinic in 4 months.    Diagnoses and all orders for this visit:    Type 2 diabetes mellitus with hyperosmolarity without coma, without long-term current use of insulin  -     Comprehensive metabolic panel;  Future  -     Hemoglobin A1c; Future  -     CBC auto differential; Future  -     Lipid panel; Future  -     URINALYSIS; Future  -     MICROALBUMIN / CREATININE RATIO URINE; Future    Mixed hyperlipidemia    Essential hypertension  -     Comprehensive metabolic panel; Future  -     Hemoglobin A1c; Future  -     CBC auto differential; Future  -     Lipid panel; Future  -     URINALYSIS; Future  -     MICROALBUMIN / CREATININE RATIO URINE; Future    Hypothyroidism, unspecified type    Anxiety    Other orders  -     varicella-zoster [id number], PF, (SHINGRIX, PF,) 50 mcg/0.5 mL injection; Inject 0.5 mLs into the muscle once. for 1 dose  -     ALPRAZolam (XANAX) 0.25 MG tablet; Take 1 tablet (0.25 mg total) by mouth every evening.

## 2020-02-29 ENCOUNTER — HOSPITAL ENCOUNTER (OUTPATIENT)
Facility: HOSPITAL | Age: 70
Discharge: HOME | End: 2020-03-02
Attending: EMERGENCY MEDICINE | Admitting: HOSPITALIST
Payer: COMMERCIAL

## 2020-02-29 DIAGNOSIS — I50.30 HEART FAILURE WITH PRESERVED EJECTION FRACTION, UNSPECIFIED HF CHRONICITY: ICD-10-CM

## 2020-02-29 DIAGNOSIS — R07.9 CHEST PAIN: ICD-10-CM

## 2020-02-29 DIAGNOSIS — I25.10 CORONARY ARTERY DISEASE INVOLVING NATIVE CORONARY ARTERY OF NATIVE HEART WITHOUT ANGINA PECTORIS: Chronic | ICD-10-CM

## 2020-02-29 DIAGNOSIS — J44.1 COPD EXACERBATION: Primary | ICD-10-CM

## 2020-02-29 LAB
ALBUMIN SERPL BCP-MCNC: 4.2 G/DL (ref 3.5–5.2)
ALLENS TEST: ABNORMAL
ALP SERPL-CCNC: 123 U/L (ref 55–135)
ALT SERPL W/O P-5'-P-CCNC: 21 U/L (ref 10–44)
ANION GAP SERPL CALC-SCNC: 11 MMOL/L (ref 8–16)
AST SERPL-CCNC: 22 U/L (ref 10–40)
BASOPHILS # BLD AUTO: 0.03 K/UL (ref 0–0.2)
BASOPHILS NFR BLD: 0.4 % (ref 0–1.9)
BILIRUB SERPL-MCNC: 0.3 MG/DL (ref 0.1–1)
BNP SERPL-MCNC: 14 PG/ML (ref 0–99)
BUN SERPL-MCNC: 7 MG/DL (ref 8–23)
CALCIUM SERPL-MCNC: 10 MG/DL (ref 8.7–10.5)
CHLORIDE SERPL-SCNC: 97 MMOL/L (ref 95–110)
CO2 SERPL-SCNC: 20 MMOL/L (ref 23–29)
CREAT SERPL-MCNC: 0.7 MG/DL (ref 0.5–1.4)
DELSYS: ABNORMAL
DIFFERENTIAL METHOD: ABNORMAL
EOSINOPHIL # BLD AUTO: 0 K/UL (ref 0–0.5)
EOSINOPHIL NFR BLD: 0.2 % (ref 0–8)
ERYTHROCYTE [DISTWIDTH] IN BLOOD BY AUTOMATED COUNT: 13.6 % (ref 11.5–14.5)
EST. GFR  (AFRICAN AMERICAN): >60 ML/MIN/1.73 M^2
EST. GFR  (NON AFRICAN AMERICAN): >60 ML/MIN/1.73 M^2
GLUCOSE SERPL-MCNC: 145 MG/DL (ref 70–110)
HCO3 UR-SCNC: 16.3 MMOL/L (ref 24–28)
HCT VFR BLD AUTO: 39.2 % (ref 37–48.5)
HGB BLD-MCNC: 13.2 G/DL (ref 12–16)
IMM GRANULOCYTES # BLD AUTO: 0.06 K/UL (ref 0–0.04)
IMM GRANULOCYTES NFR BLD AUTO: 0.7 % (ref 0–0.5)
LYMPHOCYTES # BLD AUTO: 1 K/UL (ref 1–4.8)
LYMPHOCYTES NFR BLD: 11.5 % (ref 18–48)
MCH RBC QN AUTO: 28 PG (ref 27–31)
MCHC RBC AUTO-ENTMCNC: 33.7 G/DL (ref 32–36)
MCV RBC AUTO: 83 FL (ref 82–98)
MODE: ABNORMAL
MONOCYTES # BLD AUTO: 0.8 K/UL (ref 0.3–1)
MONOCYTES NFR BLD: 9.5 % (ref 4–15)
NEUTROPHILS # BLD AUTO: 6.6 K/UL (ref 1.8–7.7)
NEUTROPHILS NFR BLD: 77.7 % (ref 38–73)
NRBC BLD-RTO: 0 /100 WBC
PCO2 BLDA: 27.2 MMHG (ref 35–45)
PH SMN: 7.38 [PH] (ref 7.35–7.45)
PLATELET # BLD AUTO: 230 K/UL (ref 150–350)
PMV BLD AUTO: 11.3 FL (ref 9.2–12.9)
PO2 BLDA: 74 MMHG (ref 80–100)
POC BE: -9 MMOL/L
POC SATURATED O2: 95 % (ref 95–100)
POC TCO2: 17 MMOL/L (ref 23–27)
POCT GLUCOSE: 186 MG/DL (ref 70–110)
POCT GLUCOSE: 259 MG/DL (ref 70–110)
POTASSIUM SERPL-SCNC: 4 MMOL/L (ref 3.5–5.1)
PROT SERPL-MCNC: 8.2 G/DL (ref 6–8.4)
RBC # BLD AUTO: 4.71 M/UL (ref 4–5.4)
SAMPLE: ABNORMAL
SITE: ABNORMAL
SODIUM SERPL-SCNC: 128 MMOL/L (ref 136–145)
TROPONIN I SERPL DL<=0.01 NG/ML-MCNC: 0.02 NG/ML (ref 0–0.03)
TROPONIN I SERPL DL<=0.01 NG/ML-MCNC: <0.006 NG/ML (ref 0–0.03)
TROPONIN I SERPL DL<=0.01 NG/ML-MCNC: <0.006 NG/ML (ref 0–0.03)
WBC # BLD AUTO: 8.44 K/UL (ref 3.9–12.7)

## 2020-02-29 PROCEDURE — 63600175 PHARM REV CODE 636 W HCPCS: Performed by: NURSE PRACTITIONER

## 2020-02-29 PROCEDURE — 63600175 PHARM REV CODE 636 W HCPCS: Performed by: EMERGENCY MEDICINE

## 2020-02-29 PROCEDURE — 93005 ELECTROCARDIOGRAM TRACING: CPT

## 2020-02-29 PROCEDURE — 93010 EKG 12-LEAD: ICD-10-PCS | Mod: ,,, | Performed by: INTERNAL MEDICINE

## 2020-02-29 PROCEDURE — 80053 COMPREHEN METABOLIC PANEL: CPT

## 2020-02-29 PROCEDURE — 25000242 PHARM REV CODE 250 ALT 637 W/ HCPCS: Performed by: NURSE PRACTITIONER

## 2020-02-29 PROCEDURE — 85025 COMPLETE CBC W/AUTO DIFF WBC: CPT

## 2020-02-29 PROCEDURE — 94640 AIRWAY INHALATION TREATMENT: CPT

## 2020-02-29 PROCEDURE — 93010 ELECTROCARDIOGRAM REPORT: CPT | Mod: ,,, | Performed by: INTERNAL MEDICINE

## 2020-02-29 PROCEDURE — 96372 THER/PROPH/DIAG INJ SC/IM: CPT | Performed by: HOSPITALIST

## 2020-02-29 PROCEDURE — G0378 HOSPITAL OBSERVATION PER HR: HCPCS

## 2020-02-29 PROCEDURE — 82962 GLUCOSE BLOOD TEST: CPT

## 2020-02-29 PROCEDURE — 36600 WITHDRAWAL OF ARTERIAL BLOOD: CPT

## 2020-02-29 PROCEDURE — 36415 COLL VENOUS BLD VENIPUNCTURE: CPT

## 2020-02-29 PROCEDURE — 99285 EMERGENCY DEPT VISIT HI MDM: CPT | Mod: 25

## 2020-02-29 PROCEDURE — 25000003 PHARM REV CODE 250: Performed by: NURSE PRACTITIONER

## 2020-02-29 PROCEDURE — 82803 BLOOD GASES ANY COMBINATION: CPT

## 2020-02-29 PROCEDURE — 94761 N-INVAS EAR/PLS OXIMETRY MLT: CPT

## 2020-02-29 PROCEDURE — 83880 ASSAY OF NATRIURETIC PEPTIDE: CPT

## 2020-02-29 PROCEDURE — 84484 ASSAY OF TROPONIN QUANT: CPT

## 2020-02-29 PROCEDURE — 84484 ASSAY OF TROPONIN QUANT: CPT | Mod: 91

## 2020-02-29 PROCEDURE — 25000242 PHARM REV CODE 250 ALT 637 W/ HCPCS: Performed by: EMERGENCY MEDICINE

## 2020-02-29 PROCEDURE — 99900035 HC TECH TIME PER 15 MIN (STAT)

## 2020-02-29 RX ORDER — PREDNISONE 20 MG/1
40 TABLET ORAL DAILY
Status: DISCONTINUED | OUTPATIENT
Start: 2020-02-29 | End: 2020-03-02 | Stop reason: HOSPADM

## 2020-02-29 RX ORDER — LEVOFLOXACIN 750 MG/1
750 TABLET ORAL DAILY
Status: DISCONTINUED | OUTPATIENT
Start: 2020-02-29 | End: 2020-03-02 | Stop reason: HOSPADM

## 2020-02-29 RX ORDER — ATORVASTATIN CALCIUM 20 MG/1
20 TABLET, FILM COATED ORAL NIGHTLY
Status: DISCONTINUED | OUTPATIENT
Start: 2020-02-29 | End: 2020-03-02 | Stop reason: HOSPADM

## 2020-02-29 RX ORDER — ACETAMINOPHEN 325 MG/1
650 TABLET ORAL EVERY 4 HOURS PRN
Status: DISCONTINUED | OUTPATIENT
Start: 2020-02-29 | End: 2020-03-02 | Stop reason: HOSPADM

## 2020-02-29 RX ORDER — INSULIN ASPART 100 [IU]/ML
0-5 INJECTION, SOLUTION INTRAVENOUS; SUBCUTANEOUS
Status: DISCONTINUED | OUTPATIENT
Start: 2020-02-29 | End: 2020-03-02 | Stop reason: HOSPADM

## 2020-02-29 RX ORDER — HYDRALAZINE HYDROCHLORIDE 20 MG/ML
10 INJECTION INTRAMUSCULAR; INTRAVENOUS EVERY 8 HOURS PRN
Status: DISCONTINUED | OUTPATIENT
Start: 2020-02-29 | End: 2020-03-02 | Stop reason: HOSPADM

## 2020-02-29 RX ORDER — IPRATROPIUM BROMIDE AND ALBUTEROL SULFATE 2.5; .5 MG/3ML; MG/3ML
3 SOLUTION RESPIRATORY (INHALATION)
Status: DISCONTINUED | OUTPATIENT
Start: 2020-02-29 | End: 2020-03-02 | Stop reason: HOSPADM

## 2020-02-29 RX ORDER — IPRATROPIUM BROMIDE AND ALBUTEROL SULFATE 2.5; .5 MG/3ML; MG/3ML
3 SOLUTION RESPIRATORY (INHALATION) ONCE
Status: COMPLETED | OUTPATIENT
Start: 2020-02-29 | End: 2020-02-29

## 2020-02-29 RX ORDER — IPRATROPIUM BROMIDE AND ALBUTEROL SULFATE 2.5; .5 MG/3ML; MG/3ML
6 SOLUTION RESPIRATORY (INHALATION) ONCE
Status: COMPLETED | OUTPATIENT
Start: 2020-02-29 | End: 2020-02-29

## 2020-02-29 RX ORDER — TALC
6 POWDER (GRAM) TOPICAL NIGHTLY PRN
Status: DISCONTINUED | OUTPATIENT
Start: 2020-02-29 | End: 2020-03-02 | Stop reason: HOSPADM

## 2020-02-29 RX ORDER — SODIUM CHLORIDE 0.9 % (FLUSH) 0.9 %
3 SYRINGE (ML) INJECTION
Status: DISCONTINUED | OUTPATIENT
Start: 2020-02-29 | End: 2020-03-02 | Stop reason: HOSPADM

## 2020-02-29 RX ORDER — ALBUTEROL SULFATE 2.5 MG/.5ML
7.5 SOLUTION RESPIRATORY (INHALATION) ONCE
Status: COMPLETED | OUTPATIENT
Start: 2020-02-29 | End: 2020-02-29

## 2020-02-29 RX ORDER — SODIUM CHLORIDE 0.9 % (FLUSH) 0.9 %
10 SYRINGE (ML) INJECTION
Status: DISCONTINUED | OUTPATIENT
Start: 2020-02-29 | End: 2020-03-02 | Stop reason: HOSPADM

## 2020-02-29 RX ORDER — LEVOTHYROXINE SODIUM 25 UG/1
50 TABLET ORAL EVERY MORNING
Status: DISCONTINUED | OUTPATIENT
Start: 2020-03-01 | End: 2020-03-02 | Stop reason: HOSPADM

## 2020-02-29 RX ORDER — ENOXAPARIN SODIUM 100 MG/ML
40 INJECTION SUBCUTANEOUS EVERY 24 HOURS
Status: DISCONTINUED | OUTPATIENT
Start: 2020-02-29 | End: 2020-03-02 | Stop reason: HOSPADM

## 2020-02-29 RX ORDER — ALBUTEROL SULFATE 90 UG/1
1 AEROSOL, METERED RESPIRATORY (INHALATION) EVERY 6 HOURS PRN
Status: DISCONTINUED | OUTPATIENT
Start: 2020-02-29 | End: 2020-03-02 | Stop reason: HOSPADM

## 2020-02-29 RX ORDER — AMLODIPINE BESYLATE 5 MG/1
10 TABLET ORAL DAILY
Status: DISCONTINUED | OUTPATIENT
Start: 2020-03-01 | End: 2020-03-02 | Stop reason: HOSPADM

## 2020-02-29 RX ORDER — AMOXICILLIN 250 MG
1 CAPSULE ORAL 2 TIMES DAILY
Status: DISCONTINUED | OUTPATIENT
Start: 2020-02-29 | End: 2020-03-02 | Stop reason: HOSPADM

## 2020-02-29 RX ORDER — ONDANSETRON 2 MG/ML
4 INJECTION INTRAMUSCULAR; INTRAVENOUS EVERY 6 HOURS PRN
Status: DISCONTINUED | OUTPATIENT
Start: 2020-02-29 | End: 2020-03-02 | Stop reason: HOSPADM

## 2020-02-29 RX ORDER — GLUCAGON 1 MG
1 KIT INJECTION
Status: DISCONTINUED | OUTPATIENT
Start: 2020-02-29 | End: 2020-03-02 | Stop reason: HOSPADM

## 2020-02-29 RX ORDER — ONDANSETRON 4 MG/1
4 TABLET, ORALLY DISINTEGRATING ORAL EVERY 6 HOURS PRN
Status: DISCONTINUED | OUTPATIENT
Start: 2020-02-29 | End: 2020-03-02 | Stop reason: HOSPADM

## 2020-02-29 RX ORDER — PREDNISONE 20 MG/1
60 TABLET ORAL
Status: COMPLETED | OUTPATIENT
Start: 2020-02-29 | End: 2020-02-29

## 2020-02-29 RX ORDER — METOPROLOL SUCCINATE 50 MG/1
50 TABLET, EXTENDED RELEASE ORAL DAILY
Status: DISCONTINUED | OUTPATIENT
Start: 2020-03-01 | End: 2020-03-02 | Stop reason: HOSPADM

## 2020-02-29 RX ORDER — ASPIRIN 81 MG/1
81 TABLET ORAL DAILY
Status: DISCONTINUED | OUTPATIENT
Start: 2020-03-01 | End: 2020-03-02 | Stop reason: HOSPADM

## 2020-02-29 RX ORDER — IBUPROFEN 200 MG
16 TABLET ORAL
Status: DISCONTINUED | OUTPATIENT
Start: 2020-02-29 | End: 2020-03-02 | Stop reason: HOSPADM

## 2020-02-29 RX ORDER — IBUPROFEN 200 MG
24 TABLET ORAL
Status: DISCONTINUED | OUTPATIENT
Start: 2020-02-29 | End: 2020-03-02 | Stop reason: HOSPADM

## 2020-02-29 RX ORDER — NITROGLYCERIN 0.4 MG/1
0.4 TABLET SUBLINGUAL EVERY 5 MIN PRN
Status: DISCONTINUED | OUTPATIENT
Start: 2020-02-29 | End: 2020-03-02 | Stop reason: HOSPADM

## 2020-02-29 RX ADMIN — INSULIN ASPART 1 UNITS: 100 INJECTION, SOLUTION INTRAVENOUS; SUBCUTANEOUS at 09:02

## 2020-02-29 RX ADMIN — IPRATROPIUM BROMIDE AND ALBUTEROL SULFATE 3 ML: .5; 3 SOLUTION RESPIRATORY (INHALATION) at 01:02

## 2020-02-29 RX ADMIN — LEVOFLOXACIN 750 MG: 750 TABLET, FILM COATED ORAL at 02:02

## 2020-02-29 RX ADMIN — ENOXAPARIN SODIUM 40 MG: 100 INJECTION SUBCUTANEOUS at 05:02

## 2020-02-29 RX ADMIN — ALBUTEROL SULFATE 7.5 MG: 2.5 SOLUTION RESPIRATORY (INHALATION) at 12:02

## 2020-02-29 RX ADMIN — IPRATROPIUM BROMIDE AND ALBUTEROL SULFATE 3 ML: .5; 3 SOLUTION RESPIRATORY (INHALATION) at 08:02

## 2020-02-29 RX ADMIN — SENNOSIDES AND DOCUSATE SODIUM 1 TABLET: 8.6; 5 TABLET ORAL at 08:02

## 2020-02-29 RX ADMIN — PREDNISONE 60 MG: 20 TABLET ORAL at 12:02

## 2020-02-29 RX ADMIN — ATORVASTATIN CALCIUM 20 MG: 20 TABLET, FILM COATED ORAL at 08:02

## 2020-02-29 RX ADMIN — ALBUTEROL SULFATE 1 PUFF: 90 AEROSOL, METERED RESPIRATORY (INHALATION) at 11:02

## 2020-02-29 RX ADMIN — Medication 6 MG: at 10:02

## 2020-02-29 RX ADMIN — IPRATROPIUM BROMIDE AND ALBUTEROL SULFATE 6 ML: .5; 3 SOLUTION RESPIRATORY (INHALATION) at 12:02

## 2020-02-29 NOTE — ED TRIAGE NOTES
Pt reports intermittent midsternal chest pain that started yesterday. PT states she was not sure if her chest pain was from wheezing and her coughing. PT is not in any acute distress.

## 2020-02-29 NOTE — HPI
"Ms. Noah Bailey is a 70 y/o female who lives in [place], [place]; she lives alone. Her PCP is Dr. Henry Mcbride. She has a past medical history of asthma , diabetes mellitus, hypertension, hypothyroidism, allergic rhinitis, colon polyps, hyperlipidemia, and an umbilical hernia. The patient has a history of a cardiac stent. She does not smoke cigarettes or use illicit drugs. She endorses occasional wine drinking.     She presents to [facility] with complaints of chest pain. She reports that her pain is  Non-radiating midsternal chest pain that started on yesterday. She denies palpitations, nausea, vomiting, diarrhea, or trauma. She reports taking ASA and nitroglycerin prior to EMS arrival. Per the patient she states " It feels like a led ball in my chest".      Her ED workup includes Na 128, glucose 145, normal troponin. CXR--Left costophrenic angle not well visualized which may be related to pleural thickening/scarring versus trace pleural effusion.  No focal consolidation. She will admit to the CDU for observation.              "

## 2020-02-29 NOTE — ED PROVIDER NOTES
"Encounter Date: 2/29/2020    SCRIBE #1 NOTE: I, Sherice Capellan, am scribing for, and in the presence of,  Dr. Db Swann. I have scribed the entire note.       History     Chief Complaint   Patient presents with    Chest Pain     PT presents to ED today c/o intermittent non radiating midsternal chest pain onset yesterday. Pt denies N/V. PT reports taking ASA prior to EMS arrival. PT states " feels like a led ball in my chest"      Noah Bailey is a 69 y.o. female who  has a past medical history of ALLERGIC RHINITIS, Asthma, Colon polyps, Diabetes mellitus, Hyperlipidemia, Hypertension, Hypothyroidism, and Umbilical hernia.    The patient presents to the ED via EMS due to chest pain with initial onset yesterday. Pain is intermittent, located to mid sternal chest and non radiating. She states "it feels like a led ball in my chest". Patient denies any shortness of breath, nausea or vomiting. She notes she took Aspirin prior to EMS arrival.     The history is provided by the patient.     Review of patient's allergies indicates:   Allergen Reactions    Sulfa (sulfonamide antibiotics) Rash     Past Medical History:   Diagnosis Date    ALLERGIC RHINITIS     Asthma     Colon polyps     Diabetes mellitus     Hyperlipidemia     Hypertension     Hypothyroidism     Umbilical hernia      History reviewed. No pertinent surgical history.  Family History   Problem Relation Age of Onset    Cancer Mother 49        ovarian    Heart disease Mother 40        M.I.    Hyperlipidemia Mother     Heart disease Father     Glaucoma Paternal Grandfather     Heart failure Sister     Hyperlipidemia Maternal Grandmother     Amblyopia Neg Hx     Blindness Neg Hx     Cataracts Neg Hx     Diabetes Neg Hx     Hypertension Neg Hx     Macular degeneration Neg Hx     Retinal detachment Neg Hx     Strabismus Neg Hx     Stroke Neg Hx     Thyroid disease Neg Hx     Heart attack Neg Hx      Social History     Tobacco Use    " Smoking status: Former Smoker     Packs/day: 1.00     Years: 20.00     Pack years: 20.00     Last attempt to quit: 1994     Years since quittin.2    Smokeless tobacco: Never Used   Substance Use Topics    Alcohol use: Yes     Comment: occasional use    Drug use: No     Review of Systems   Constitutional: Negative for chills, fatigue and fever.   HENT: Negative for facial swelling, trouble swallowing and voice change.    Eyes: Negative for photophobia, pain and redness.   Respiratory: Negative for cough, choking and shortness of breath.    Cardiovascular: Positive for chest pain. Negative for palpitations and leg swelling.   Gastrointestinal: Negative for abdominal pain, diarrhea, nausea and vomiting.   Genitourinary: Negative for difficulty urinating, frequency and urgency.   Musculoskeletal: Negative for back pain, neck pain and neck stiffness.   Neurological: Negative for seizures, speech difficulty, light-headedness, numbness and headaches.   All other systems reviewed and are negative.      Physical Exam     Initial Vitals   BP Pulse Resp Temp SpO2   20 1103 20 1103 20 1202 20 1103 20 1103   136/74 89 16 98.2 °F (36.8 °C) 95 %      MAP       --                Physical Exam    Nursing note and vitals reviewed.  Constitutional: She appears well-developed and well-nourished. No distress.   HENT:   Head: Normocephalic and atraumatic.   Mouth/Throat: Oropharynx is clear and moist.   Eyes: Conjunctivae and EOM are normal. Pupils are equal, round, and reactive to light.   Neck: Normal range of motion. Neck supple.   Cardiovascular: Normal rate, regular rhythm, normal heart sounds and intact distal pulses.   Pulmonary/Chest: No respiratory distress. She has wheezes (bilaterally). She has no rhonchi. She has no rales.   Abdominal: Soft. Bowel sounds are normal. She exhibits no distension. There is no tenderness.   Musculoskeletal: Normal range of motion. She exhibits no edema  or tenderness.   Neurological: She is alert and oriented to person, place, and time. She has normal strength. No cranial nerve deficit.   Skin: Skin is warm and dry. Capillary refill takes less than 2 seconds.         ED Course   Procedures  Labs Reviewed   CBC W/ AUTO DIFFERENTIAL - Abnormal; Notable for the following components:       Result Value    Immature Granulocytes 0.7 (*)     Immature Grans (Abs) 0.06 (*)     Gran% 77.7 (*)     Lymph% 11.5 (*)     All other components within normal limits   COMPREHENSIVE METABOLIC PANEL - Abnormal; Notable for the following components:    Sodium 128 (*)     CO2 20 (*)     Glucose 145 (*)     BUN, Bld 7 (*)     All other components within normal limits   ISTAT PROCEDURE - Abnormal; Notable for the following components:    POC PCO2 27.2 (*)     POC PO2 74 (*)     POC HCO3 16.3 (*)     POC TCO2 17 (*)     All other components within normal limits   POCT GLUCOSE - Abnormal; Notable for the following components:    POCT Glucose 186 (*)     All other components within normal limits   B-TYPE NATRIURETIC PEPTIDE   TROPONIN I   TROPONIN I          X-Rays:   Independently Interpreted Readings:   Other Readings:  Reviewed by myself, read by radiology.     Imaging Results          X-Ray Chest AP Portable (Final result)  Result time 02/29/20 12:27:57    Final result by Aden Hewitt MD (02/29/20 12:27:57)                 Impression:      Left costophrenic angle not well visualized which may be related to pleural thickening/scarring versus trace pleural effusion.  No focal consolidation.      Electronically signed by: Aden Hewitt MD  Date:    02/29/2020  Time:    12:27             Narrative:    EXAMINATION:  XR CHEST AP PORTABLE    CLINICAL HISTORY:  chest pain;    TECHNIQUE:  Single frontal view of the chest was performed.    COMPARISON:  Chest radiograph 08/05/2019    FINDINGS:  Monitoring leads overlie the chest.  Large body habitus.    Cardiomediastinal silhouette is midline and  stable without convincing evidence of failure.  Bibasilar scattered linear opacities consistent with subsegmental scarring versus atelectasis similar to prior.  Left costophrenic angle not well visualized similar to prior which may be related to pleural thickening/scarring, with trace pleural effusion not excluded.  The lungs are otherwise well expanded without focal consolidation or pneumothorax.  No acute osseous process seen.  PA and lateral views can be obtained.                              Medical Decision Making:   Clinical Tests:   Lab Tests: Ordered and Reviewed  Radiological Study: Ordered and Reviewed  Medical Tests: Ordered and Reviewed                   ED Course as of Mar 01 0615   Sat Feb 29, 2020   1114 EKG shows normal sinus rhythm rate of 85 beats per minute with no significant ST changes.  Normal QTC    [MB]      ED Course User Index  [MB] Db Swann MD          Patient and feel discomfort.  She will be admitted for further evaluation.      Clinical Impression:   Final diagnoses:  [R07.9] Chest pain  2.  Wheezing          ED Disposition Condition    Observation            Scribe Attestation               Db Swann MD  03/01/20 0615

## 2020-02-29 NOTE — PLAN OF CARE
Pt on room air in no apparent distress.  Breathing tx. Given with good pt. Effort.  Will cont. To monitor.

## 2020-03-01 LAB
ANION GAP SERPL CALC-SCNC: 9 MMOL/L (ref 8–16)
ANION GAP SERPL CALC-SCNC: 9 MMOL/L (ref 8–16)
BASOPHILS # BLD AUTO: 0.02 K/UL (ref 0–0.2)
BASOPHILS # BLD AUTO: 0.02 K/UL (ref 0–0.2)
BASOPHILS NFR BLD: 0.2 % (ref 0–1.9)
BASOPHILS NFR BLD: 0.2 % (ref 0–1.9)
BUN SERPL-MCNC: 10 MG/DL (ref 8–23)
BUN SERPL-MCNC: 10 MG/DL (ref 8–23)
CALCIUM SERPL-MCNC: 10.9 MG/DL (ref 8.7–10.5)
CALCIUM SERPL-MCNC: 10.9 MG/DL (ref 8.7–10.5)
CHLORIDE SERPL-SCNC: 107 MMOL/L (ref 95–110)
CHLORIDE SERPL-SCNC: 107 MMOL/L (ref 95–110)
CHOLEST SERPL-MCNC: 164 MG/DL (ref 120–199)
CHOLEST/HDLC SERPL: 3.5 {RATIO} (ref 2–5)
CO2 SERPL-SCNC: 25 MMOL/L (ref 23–29)
CO2 SERPL-SCNC: 25 MMOL/L (ref 23–29)
CREAT SERPL-MCNC: 0.8 MG/DL (ref 0.5–1.4)
CREAT SERPL-MCNC: 0.8 MG/DL (ref 0.5–1.4)
DIFFERENTIAL METHOD: ABNORMAL
DIFFERENTIAL METHOD: ABNORMAL
EOSINOPHIL # BLD AUTO: 0 K/UL (ref 0–0.5)
EOSINOPHIL # BLD AUTO: 0 K/UL (ref 0–0.5)
EOSINOPHIL NFR BLD: 0 % (ref 0–8)
EOSINOPHIL NFR BLD: 0 % (ref 0–8)
ERYTHROCYTE [DISTWIDTH] IN BLOOD BY AUTOMATED COUNT: 14.3 % (ref 11.5–14.5)
ERYTHROCYTE [DISTWIDTH] IN BLOOD BY AUTOMATED COUNT: 14.3 % (ref 11.5–14.5)
EST. GFR  (AFRICAN AMERICAN): >60 ML/MIN/1.73 M^2
EST. GFR  (AFRICAN AMERICAN): >60 ML/MIN/1.73 M^2
EST. GFR  (NON AFRICAN AMERICAN): >60 ML/MIN/1.73 M^2
EST. GFR  (NON AFRICAN AMERICAN): >60 ML/MIN/1.73 M^2
ESTIMATED AVG GLUCOSE: 143 MG/DL (ref 68–131)
GLUCOSE SERPL-MCNC: 139 MG/DL (ref 70–110)
GLUCOSE SERPL-MCNC: 139 MG/DL (ref 70–110)
HBA1C MFR BLD HPLC: 6.6 % (ref 4–5.6)
HCT VFR BLD AUTO: 41.2 % (ref 37–48.5)
HCT VFR BLD AUTO: 41.2 % (ref 37–48.5)
HDLC SERPL-MCNC: 47 MG/DL (ref 40–75)
HDLC SERPL: 28.7 % (ref 20–50)
HGB BLD-MCNC: 13.4 G/DL (ref 12–16)
HGB BLD-MCNC: 13.4 G/DL (ref 12–16)
IMM GRANULOCYTES # BLD AUTO: 0.06 K/UL (ref 0–0.04)
IMM GRANULOCYTES # BLD AUTO: 0.06 K/UL (ref 0–0.04)
IMM GRANULOCYTES NFR BLD AUTO: 0.6 % (ref 0–0.5)
IMM GRANULOCYTES NFR BLD AUTO: 0.6 % (ref 0–0.5)
LDLC SERPL CALC-MCNC: 99 MG/DL (ref 63–159)
LYMPHOCYTES # BLD AUTO: 1.4 K/UL (ref 1–4.8)
LYMPHOCYTES # BLD AUTO: 1.4 K/UL (ref 1–4.8)
LYMPHOCYTES NFR BLD: 13.7 % (ref 18–48)
LYMPHOCYTES NFR BLD: 13.7 % (ref 18–48)
MAGNESIUM SERPL-MCNC: 2.1 MG/DL (ref 1.6–2.6)
MAGNESIUM SERPL-MCNC: 2.1 MG/DL (ref 1.6–2.6)
MCH RBC QN AUTO: 28.3 PG (ref 27–31)
MCH RBC QN AUTO: 28.3 PG (ref 27–31)
MCHC RBC AUTO-ENTMCNC: 32.5 G/DL (ref 32–36)
MCHC RBC AUTO-ENTMCNC: 32.5 G/DL (ref 32–36)
MCV RBC AUTO: 87 FL (ref 82–98)
MCV RBC AUTO: 87 FL (ref 82–98)
MONOCYTES # BLD AUTO: 1.3 K/UL (ref 0.3–1)
MONOCYTES # BLD AUTO: 1.3 K/UL (ref 0.3–1)
MONOCYTES NFR BLD: 13.1 % (ref 4–15)
MONOCYTES NFR BLD: 13.1 % (ref 4–15)
NEUTROPHILS # BLD AUTO: 7.4 K/UL (ref 1.8–7.7)
NEUTROPHILS # BLD AUTO: 7.4 K/UL (ref 1.8–7.7)
NEUTROPHILS NFR BLD: 72.4 % (ref 38–73)
NEUTROPHILS NFR BLD: 72.4 % (ref 38–73)
NONHDLC SERPL-MCNC: 117 MG/DL
NRBC BLD-RTO: 0 /100 WBC
NRBC BLD-RTO: 0 /100 WBC
PHOSPHATE SERPL-MCNC: 4.5 MG/DL (ref 2.7–4.5)
PLATELET # BLD AUTO: 280 K/UL (ref 150–350)
PLATELET # BLD AUTO: 280 K/UL (ref 150–350)
PMV BLD AUTO: 10.5 FL (ref 9.2–12.9)
PMV BLD AUTO: 10.5 FL (ref 9.2–12.9)
POCT GLUCOSE: 125 MG/DL (ref 70–110)
POCT GLUCOSE: 147 MG/DL (ref 70–110)
POCT GLUCOSE: 151 MG/DL (ref 70–110)
POCT GLUCOSE: 175 MG/DL (ref 70–110)
POCT GLUCOSE: 209 MG/DL (ref 70–110)
POTASSIUM SERPL-SCNC: 3.6 MMOL/L (ref 3.5–5.1)
POTASSIUM SERPL-SCNC: 3.8 MMOL/L (ref 3.5–5.1)
POTASSIUM SERPL-SCNC: 3.9 MMOL/L (ref 3.5–5.1)
POTASSIUM SERPL-SCNC: 6.2 MMOL/L (ref 3.5–5.1)
POTASSIUM SERPL-SCNC: 6.5 MMOL/L (ref 3.5–5.1)
POTASSIUM SERPL-SCNC: 6.5 MMOL/L (ref 3.5–5.1)
RBC # BLD AUTO: 4.74 M/UL (ref 4–5.4)
RBC # BLD AUTO: 4.74 M/UL (ref 4–5.4)
SODIUM SERPL-SCNC: 141 MMOL/L (ref 136–145)
SODIUM SERPL-SCNC: 141 MMOL/L (ref 136–145)
TRIGL SERPL-MCNC: 90 MG/DL (ref 30–150)
WBC # BLD AUTO: 10.16 K/UL (ref 3.9–12.7)
WBC # BLD AUTO: 10.16 K/UL (ref 3.9–12.7)

## 2020-03-01 PROCEDURE — 36415 COLL VENOUS BLD VENIPUNCTURE: CPT

## 2020-03-01 PROCEDURE — 25000242 PHARM REV CODE 250 ALT 637 W/ HCPCS: Performed by: NURSE PRACTITIONER

## 2020-03-01 PROCEDURE — 80061 LIPID PANEL: CPT

## 2020-03-01 PROCEDURE — G0378 HOSPITAL OBSERVATION PER HR: HCPCS

## 2020-03-01 PROCEDURE — 84100 ASSAY OF PHOSPHORUS: CPT

## 2020-03-01 PROCEDURE — 83735 ASSAY OF MAGNESIUM: CPT

## 2020-03-01 PROCEDURE — 85025 COMPLETE CBC W/AUTO DIFF WBC: CPT

## 2020-03-01 PROCEDURE — 63600175 PHARM REV CODE 636 W HCPCS: Performed by: NURSE PRACTITIONER

## 2020-03-01 PROCEDURE — 94640 AIRWAY INHALATION TREATMENT: CPT

## 2020-03-01 PROCEDURE — 25000003 PHARM REV CODE 250: Performed by: NURSE PRACTITIONER

## 2020-03-01 PROCEDURE — 83036 HEMOGLOBIN GLYCOSYLATED A1C: CPT

## 2020-03-01 PROCEDURE — 94761 N-INVAS EAR/PLS OXIMETRY MLT: CPT

## 2020-03-01 PROCEDURE — 84132 ASSAY OF SERUM POTASSIUM: CPT | Mod: 91

## 2020-03-01 PROCEDURE — 96372 THER/PROPH/DIAG INJ SC/IM: CPT | Performed by: HOSPITALIST

## 2020-03-01 PROCEDURE — 80048 BASIC METABOLIC PNL TOTAL CA: CPT

## 2020-03-01 RX ADMIN — ENOXAPARIN SODIUM 40 MG: 100 INJECTION SUBCUTANEOUS at 04:03

## 2020-03-01 RX ADMIN — IPRATROPIUM BROMIDE AND ALBUTEROL SULFATE 3 ML: .5; 3 SOLUTION RESPIRATORY (INHALATION) at 07:03

## 2020-03-01 RX ADMIN — LEVOFLOXACIN 750 MG: 750 TABLET, FILM COATED ORAL at 08:03

## 2020-03-01 RX ADMIN — SENNOSIDES AND DOCUSATE SODIUM 1 TABLET: 8.6; 5 TABLET ORAL at 08:03

## 2020-03-01 RX ADMIN — LEVOTHYROXINE SODIUM 50 MCG: 25 TABLET ORAL at 07:03

## 2020-03-01 RX ADMIN — ATORVASTATIN CALCIUM 20 MG: 20 TABLET, FILM COATED ORAL at 08:03

## 2020-03-01 RX ADMIN — IPRATROPIUM BROMIDE AND ALBUTEROL SULFATE 3 ML: .5; 3 SOLUTION RESPIRATORY (INHALATION) at 01:03

## 2020-03-01 RX ADMIN — INSULIN ASPART 2 UNITS: 100 INJECTION, SOLUTION INTRAVENOUS; SUBCUTANEOUS at 04:03

## 2020-03-01 RX ADMIN — ASPIRIN 81 MG: 81 TABLET, COATED ORAL at 08:03

## 2020-03-01 RX ADMIN — PREDNISONE 40 MG: 20 TABLET ORAL at 08:03

## 2020-03-01 RX ADMIN — METOPROLOL SUCCINATE 50 MG: 50 TABLET, EXTENDED RELEASE ORAL at 08:03

## 2020-03-01 RX ADMIN — AMLODIPINE BESYLATE 10 MG: 5 TABLET ORAL at 08:03

## 2020-03-01 RX ADMIN — SODIUM POLYSTYRENE SULFONATE 30 G: 15 SUSPENSION ORAL; RECTAL at 11:03

## 2020-03-01 NOTE — ASSESSMENT & PLAN NOTE
Chronic rhinitis  Bronchial asthma    Will order duo-nebs. Started PO steroids. Supplemental O2 as needed. Started 5 days of PO Levaquin.

## 2020-03-01 NOTE — SUBJECTIVE & OBJECTIVE
Interval History: she slept well overnight. No distress noted. No complaints of pain.     Review of Systems   Constitutional: Negative for activity change, appetite change, fatigue and fever.   HENT: Negative for trouble swallowing.    Eyes: Negative for visual disturbance.   Respiratory: Negative for cough, shortness of breath and wheezing.    Cardiovascular: Negative for chest pain, palpitations and leg swelling.   Gastrointestinal: Negative for abdominal distention, abdominal pain, blood in stool, diarrhea, nausea and vomiting.   Genitourinary: Negative for difficulty urinating and hematuria.   Musculoskeletal: Negative for arthralgias, back pain and myalgias.   Skin: Negative for color change.   Neurological: Negative for light-headedness.   Hematological: Does not bruise/bleed easily.   Psychiatric/Behavioral: Negative for agitation, confusion and hallucinations.     Objective:     Vital Signs (Most Recent):  Temp: 97.8 °F (36.6 °C) (03/01/20 0715)  Pulse: 76 (03/01/20 0800)  Resp: 18 (03/01/20 0725)  BP: (!) 156/65 (03/01/20 0715)  SpO2: 95 % (03/01/20 0725) Vital Signs (24h Range):  Temp:  [97.8 °F (36.6 °C)-98.3 °F (36.8 °C)] 97.8 °F (36.6 °C)  Pulse:  [] 76  Resp:  [16-29] 18  SpO2:  [93 %-97 %] 95 %  BP: (123-171)/(56-82) 156/65     Weight: 85.2 kg (187 lb 13.3 oz)  Body mass index is 33.27 kg/m².    Intake/Output Summary (Last 24 hours) at 3/1/2020 0836  Last data filed at 3/1/2020 0755  Gross per 24 hour   Intake 588 ml   Output 1800 ml   Net -1212 ml      Physical Exam   Constitutional: She is oriented to person, place, and time. She appears well-developed and well-nourished.   HENT:   Head: Normocephalic.   Eyes: Pupils are equal, round, and reactive to light.   Neck: Normal range of motion.   Cardiovascular: Intact distal pulses.   Pulmonary/Chest: Effort normal and breath sounds normal.   Abdominal: Soft. Bowel sounds are normal.   Musculoskeletal: Normal range of motion.   Neurological: She is  alert and oriented to person, place, and time.   Skin: Skin is warm and dry. Capillary refill takes less than 2 seconds.   Psychiatric: She has a normal mood and affect.   Nursing note and vitals reviewed.      Significant Labs:   A1C:   Recent Labs   Lab 01/08/20  0924 03/01/20  0539   HGBA1C 6.7* 6.6*     BMP:   Recent Labs   Lab 03/01/20  0539 03/01/20  0730   *  139*  --      141  --    K 6.5*  6.5* 6.2*     107  --    CO2 25  25  --    BUN 10  10  --    CREATININE 0.8  0.8  --    CALCIUM 10.9*  10.9*  --    MG 2.1  2.1  --      CBC:   Recent Labs   Lab 02/29/20  1157   WBC 8.44   HGB 13.2   HCT 39.2        Lipid Panel:   Recent Labs   Lab 03/01/20  0539   CHOL 164   HDL 47   LDLCALC 99.0   TRIG 90   CHOLHDL 28.7     Magnesium:   Recent Labs   Lab 03/01/20  0539   MG 2.1  2.1       Significant Imaging: I have reviewed all pertinent imaging results/findings within the past 24 hours.

## 2020-03-01 NOTE — H&P
"[facility] Medicine  History & Physical    Patient Name: Noah Bailey  MRN: [id number]  Admission Date: 2/29/2020  Attending Physician: Brendon Luna DO   Primary Care Provider: Henry Mcbride MD         Patient information was obtained from patient, past medical records and ER records.     Subjective:     Principal Problem:Chest pain    Chief Complaint:   Chief Complaint   Patient presents with    Chest Pain     PT presents to ED today c/o intermittent non radiating midsternal chest pain onset yesterday. Pt denies N/V. PT reports taking ASA prior to EMS arrival. PT states " feels like a led ball in my chest"         HPI: Ms. Noah Bailey is a 68 y/o female who lives in [place], [place]; she lives alone. Her PCP is Dr. Henry Mcbride. She has a past medical history of asthma , diabetes mellitus, hypertension, hypothyroidism, allergic rhinitis, colon polyps, hyperlipidemia, and an umbilical hernia. The patient states she had a cardiac stent placed in 2017. She does not smoke cigarettes or use illicit drugs. She endorses occasional wine drinking.     She presents to [facility] with complaints of chest pain. She reports that her pain is  Non-radiating midsternal chest pain that started on yesterday. She denies palpitations, nausea, vomiting, diarrhea, or trauma. She reports taking ASA and nitroglycerin prior to EMS arrival. Per the patient she states " It feels like a led ball in my chest".      Her ED workup includes Na 128, glucose 145, normal troponin. CXR--Left costophrenic angle not well visualized which may be related to pleural thickening/scarring versus trace pleural effusion.  No focal consolidation. She will admit to the CDU for observation.                Past Medical History:   Diagnosis Date    ALLERGIC RHINITIS     Asthma     Colon polyps     Diabetes mellitus     Hyperlipidemia     Hypertension     Hypothyroidism     Umbilical hernia  "       History reviewed. No pertinent surgical history.    Review of patient's allergies indicates:   Allergen Reactions    Sulfa (sulfonamide antibiotics) Rash       No current facility-administered medications on file prior to encounter.      Current Outpatient Medications on File Prior to Encounter   Medication Sig    albuterol (PROVENTIL) 2.5 mg /3 mL (0.083 %) nebulizer solution Take 3 mLs (2.5 mg total) by nebulization every 4 (four) hours as needed for Wheezing or Shortness of Breath. Rescue    albuterol (PROVENTIL/VENTOLIN HFA) 90 mcg/actuation inhaler Inhale 1-2 puffs into the lungs every 6 (six) hours as needed for Wheezing. Rescue    amlodipine-olmesartan (KARISSA) 10-40 mg per tablet Take 1 tablet by mouth once daily. For blood pressure    aspirin (ASPIR-LOW) 81 MG EC tablet Take 1 tablet (81 mg total) by mouth once daily.    atorvastatin (LIPITOR) 20 MG tablet Take 1 tablet (20 mg total) by mouth once daily. (Patient taking differently: Take 20 mg by mouth every evening. )    levothyroxine (SYNTHROID) 50 MCG tablet Take 1 tablet (50 mcg total) by mouth every morning.    metFORMIN (GLUCOPHAGE) 500 MG tablet TAKE 1 TABLET BY MOUTH DAILY before dinner    metoprolol succinate (TOPROL-XL) 50 MG 24 hr tablet Take 1 tablet (50 mg total) by mouth once daily. For heart and blood presure    theophylline 400 mg Tb24 TAKE 1 TABLET BY MOUTH DAILY    ALPRAZolam (XANAX) 0.25 MG tablet Take 1 tablet (0.25 mg total) by mouth every evening.    blood-glucose meter (FREESTYLE LITE METER) kit Use as instructed    lancets (MICROLET LANCET) Misc Test blood sugar once daily.    nitroGLYCERIN (NITROSTAT) 0.4 MG SL tablet Place 1 tablet (0.4 mg total) under the tongue every 5 (five) minutes as needed for Chest pain ((Shakira RUST)).    PRECISION XTRA TEST Strp USE AS DIRECTED DAILY     Family History     Problem Relation (Age of Onset)    Cancer Mother (49)    Glaucoma Paternal Grandfather    Heart disease Mother (40),  Father    Heart failure Sister    Hyperlipidemia Mother, Maternal Grandmother        Tobacco Use    Smoking status: Former Smoker     Packs/day: 1.00     Years: 20.00     Pack years: 20.00     Last attempt to quit: 1994     Years since quittin.2    Smokeless tobacco: Never Used   Substance and Sexual Activity    Alcohol use: Yes     Comment: occasional use    Drug use: No    Sexual activity: Not Currently     Review of Systems   Constitutional: Negative for activity change, appetite change, fatigue and fever.   HENT: Negative for trouble swallowing.    Eyes: Negative for visual disturbance.   Cardiovascular: Positive for chest pain (chest pressure). Negative for palpitations.   Gastrointestinal: Negative for abdominal distention, abdominal pain, diarrhea, nausea and vomiting.   Genitourinary: Negative for difficulty urinating and hematuria.   Musculoskeletal: Negative for gait problem.   Skin: Negative for pallor.   Neurological: Negative for weakness.   Hematological: Does not bruise/bleed easily.   Psychiatric/Behavioral: Negative for agitation, confusion and hallucinations. The patient is not nervous/anxious.      Objective:     Vital Signs (Most Recent):  Temp: 98.2 °F (36.8 °C) (20 1601)  Pulse: 104 (20 1601)  Resp: 17 (20 1601)  BP: (!) 141/65 (20 1601)  SpO2: (!) 94 % (20 1601) Vital Signs (24h Range):  Temp:  [98.2 °F (36.8 °C)] 98.2 °F (36.8 °C)  Pulse:  [] 104  Resp:  [16-29] 17  SpO2:  [93 %-97 %] 94 %  BP: (136-171)/(65-82) 141/65     Weight: 85.2 kg (187 lb 13.3 oz)  Body mass index is 33.27 kg/m².    Physical Exam   Constitutional: She is oriented to person, place, and time. She appears well-developed and well-nourished.   HENT:   Head: Normocephalic and atraumatic.   Eyes: Pupils are equal, round, and reactive to light.   Neck: Normal range of motion.   Cardiovascular: Intact distal pulses.   Pulmonary/Chest: Effort normal and breath sounds normal.    Abdominal: Soft. Bowel sounds are normal.   Musculoskeletal: Normal range of motion.   Neurological: She is alert and oriented to person, place, and time.   Skin: Skin is warm and dry. Capillary refill takes less than 2 seconds.   Psychiatric: She has a normal mood and affect. Her behavior is normal. Judgment and thought content normal.   Nursing note and vitals reviewed.        CRANIAL NERVES     CN III, IV, VI   Pupils are equal, round, and reactive to light.       Significant Labs:   CBC:   Recent Labs   Lab 02/29/20  1157   WBC 8.44   HGB 13.2   HCT 39.2        CMP:   Recent Labs   Lab 02/29/20  1157   *   K 4.0   CL 97   CO2 20*   *   BUN 7*   CREATININE 0.7   CALCIUM 10.0   PROT 8.2   ALBUMIN 4.2   BILITOT 0.3   ALKPHOS 123   AST 22   ALT 21   ANIONGAP 11   EGFRNONAA >60     Cardiac Markers:   Recent Labs   Lab 02/29/20  1157   BNP 14     Magnesium: No results for input(s): MG in the last 48 hours.  Troponin:   Recent Labs   Lab 02/29/20  1157 02/29/20  1513   TROPONINI <0.006 <0.006       Significant Imaging: I have reviewed all pertinent imaging results/findings within the past 24 hours.    Assessment/Plan:     * Chest pain  (HFpEF) heart failure with preserved ejection fraction  Coronary artery disease involving native coronary artery of native heart without angina pectoris  Last TTE completed in 2018---EF of 60-65%. Trending troponin---negative x2. Per the patient she had  1 stent in the past. She states she only takes a baby ASA for anticoagulation. Nitro prn and we have ordered supplemental O2.     COPD exacerbation  Chronic rhinitis  Bronchial asthma    Will order duo-nebs. Started PO steroids. Supplemental O2 as needed. Started 5 days of PO Levaquin.      Anxiety  Calm on exam.     Type 2 diabetes mellitus  Check hemoglobin A1C. Holding home meds. Will check BG q 4 hr. Monitor with low dose SSI prn.       Hypothyroidism due to acquired atrophy of thyroid  Resume home med.        Hyperlipidemia  Resume statin. Get FLP in the AM.      Essential hypertension  Will resume home med. Will also add Hydralazine 10 mg IV prn.        VTE Risk Mitigation (From admission, onward)         Ordered     enoxaparin injection 40 mg  Daily      02/29/20 1332     IP VTE HIGH RISK PATIENT  Once      02/29/20 1332                   Angelina Yu NP  Department of Hospital Medicine   [facility]

## 2020-03-01 NOTE — SUBJECTIVE & OBJECTIVE
Past Medical History:   Diagnosis Date    ALLERGIC RHINITIS     Asthma     Colon polyps     Diabetes mellitus     Hyperlipidemia     Hypertension     Hypothyroidism     Umbilical hernia        History reviewed. No pertinent surgical history.    Review of patient's allergies indicates:   Allergen Reactions    Sulfa (sulfonamide antibiotics) Rash       No current facility-administered medications on file prior to encounter.      Current Outpatient Medications on File Prior to Encounter   Medication Sig    albuterol (PROVENTIL) 2.5 mg /3 mL (0.083 %) nebulizer solution Take 3 mLs (2.5 mg total) by nebulization every 4 (four) hours as needed for Wheezing or Shortness of Breath. Rescue    albuterol (PROVENTIL/VENTOLIN HFA) 90 mcg/actuation inhaler Inhale 1-2 puffs into the lungs every 6 (six) hours as needed for Wheezing. Rescue    amlodipine-olmesartan (KARISSA) 10-40 mg per tablet Take 1 tablet by mouth once daily. For blood pressure    aspirin (ASPIR-LOW) 81 MG EC tablet Take 1 tablet (81 mg total) by mouth once daily.    atorvastatin (LIPITOR) 20 MG tablet Take 1 tablet (20 mg total) by mouth once daily. (Patient taking differently: Take 20 mg by mouth every evening. )    levothyroxine (SYNTHROID) 50 MCG tablet Take 1 tablet (50 mcg total) by mouth every morning.    metFORMIN (GLUCOPHAGE) 500 MG tablet TAKE 1 TABLET BY MOUTH DAILY before dinner    metoprolol succinate (TOPROL-XL) 50 MG 24 hr tablet Take 1 tablet (50 mg total) by mouth once daily. For heart and blood presure    theophylline 400 mg Tb24 TAKE 1 TABLET BY MOUTH DAILY    ALPRAZolam (XANAX) 0.25 MG tablet Take 1 tablet (0.25 mg total) by mouth every evening.    blood-glucose meter (FREESTYLE LITE METER) kit Use as instructed    lancets (MICROLET LANCET) Misc Test blood sugar once daily.    nitroGLYCERIN (NITROSTAT) 0.4 MG SL tablet Place 1 tablet (0.4 mg total) under the tongue every 5 (five) minutes as needed for Chest pain ((Notify  MD)).    PRECISION XTRA TEST Strp USE AS DIRECTED DAILY     Family History     Problem Relation (Age of Onset)    Cancer Mother (49)    Glaucoma Paternal Grandfather    Heart disease Mother (40), Father    Heart failure Sister    Hyperlipidemia Mother, Maternal Grandmother        Tobacco Use    Smoking status: Former Smoker     Packs/day: 1.00     Years: 20.00     Pack years: 20.00     Last attempt to quit: 1994     Years since quittin.2    Smokeless tobacco: Never Used   Substance and Sexual Activity    Alcohol use: Yes     Comment: occasional use    Drug use: No    Sexual activity: Not Currently     Review of Systems   Constitutional: Negative for activity change, appetite change, fatigue and fever.   HENT: Negative for trouble swallowing.    Eyes: Negative for visual disturbance.   Cardiovascular: Positive for chest pain (chest pressure). Negative for palpitations.   Gastrointestinal: Negative for abdominal distention, abdominal pain, diarrhea, nausea and vomiting.   Genitourinary: Negative for difficulty urinating and hematuria.   Musculoskeletal: Negative for gait problem.   Skin: Negative for pallor.   Neurological: Negative for weakness.   Hematological: Does not bruise/bleed easily.   Psychiatric/Behavioral: Negative for agitation, confusion and hallucinations. The patient is not nervous/anxious.      Objective:     Vital Signs (Most Recent):  Temp: 98.2 °F (36.8 °C) (20 1601)  Pulse: 104 (20 1601)  Resp: 17 (20 1601)  BP: (!) 141/65 (20 1601)  SpO2: (!) 94 % (20 1601) Vital Signs (24h Range):  Temp:  [98.2 °F (36.8 °C)] 98.2 °F (36.8 °C)  Pulse:  [] 104  Resp:  [16-29] 17  SpO2:  [93 %-97 %] 94 %  BP: (136-171)/(65-82) 141/65     Weight: 85.2 kg (187 lb 13.3 oz)  Body mass index is 33.27 kg/m².    Physical Exam   Constitutional: She is oriented to person, place, and time. She appears well-developed and well-nourished.   HENT:   Head: Normocephalic and  atraumatic.   Eyes: Pupils are equal, round, and reactive to light.   Neck: Normal range of motion.   Cardiovascular: Intact distal pulses.   Pulmonary/Chest: Effort normal and breath sounds normal.   Abdominal: Soft. Bowel sounds are normal.   Musculoskeletal: Normal range of motion.   Neurological: She is alert and oriented to person, place, and time.   Skin: Skin is warm and dry. Capillary refill takes less than 2 seconds.   Psychiatric: She has a normal mood and affect. Her behavior is normal. Judgment and thought content normal.   Nursing note and vitals reviewed.        CRANIAL NERVES     CN III, IV, VI   Pupils are equal, round, and reactive to light.       Significant Labs:   CBC:   Recent Labs   Lab 02/29/20  1157   WBC 8.44   HGB 13.2   HCT 39.2        CMP:   Recent Labs   Lab 02/29/20  1157   *   K 4.0   CL 97   CO2 20*   *   BUN 7*   CREATININE 0.7   CALCIUM 10.0   PROT 8.2   ALBUMIN 4.2   BILITOT 0.3   ALKPHOS 123   AST 22   ALT 21   ANIONGAP 11   EGFRNONAA >60     Cardiac Markers:   Recent Labs   Lab 02/29/20  1157   BNP 14     Magnesium: No results for input(s): MG in the last 48 hours.  Troponin:   Recent Labs   Lab 02/29/20  1157 02/29/20  1513   TROPONINI <0.006 <0.006       Significant Imaging: I have reviewed all pertinent imaging results/findings within the past 24 hours.

## 2020-03-01 NOTE — PLAN OF CARE
Pt AAO x 4.  VSS.  Pt remained afebrile throughout this shift.   Pt remained free of falls this shift.   Pt had no c/o pain this shift.  Blood glucose monitored, corrected via SSI.  Plan of care reviewed. Patient verbalizes understanding.   Pt moving/turing independently. Frequent weight shifting encouraged.  Patient SR on monitor.   Bed low, side rails up x 2, wheels locked, call light in reach.   Bed alarm maintained for safety.   Patient instructed to call for assistance.   Hourly rounding completed.   24 hour chart check completed.  Will continue to monitor.

## 2020-03-01 NOTE — ASSESSMENT & PLAN NOTE
(HFpEF) heart failure with preserved ejection fraction  Coronary artery disease involving native coronary artery of native heart without angina pectoris  Last TTE completed in 2018---EF of 60-65%. Trending troponin---negative x2. Per the patient she had  1 stent in the past. She states she only takes a baby ASA for anticoagulation. Nitro prn and we have ordered supplemental O2.

## 2020-03-01 NOTE — ASSESSMENT & PLAN NOTE
(HFpEF) heart failure with preserved ejection fraction  Coronary artery disease involving native coronary artery of native heart without angina pectoris  Last TTE completed in 2018---EF of 60-65%. Trending troponin---negative x3. Per the patient she had  1 stent in the past. She states she only takes a baby ASA for anticoagulation. Nitro prn and we have ordered supplemental O2.

## 2020-03-01 NOTE — PLAN OF CARE
Plan of care reviewed with patient. Patient verbalized complete understanding. Fall precautions maintained this shift. Bed in lowest position, locked, call light within reach, and slip resistant socks on. Nurse instructed patient to notify staff for any assistance and patient verbalized complete understanding. Patient on telemetry monitor throughout the shift with no ectopy noted. Will cont to monitor.

## 2020-03-01 NOTE — HOSPITAL COURSE
The patient was admitted to the CDU for observation. She was given steroids and abx in the ED. Hyperkalemia was noted this morning. Kayexalate was given this morning. Cardiology was consulted. Troponin was trended. No complaints of chest pain noted. Patient had no changes on the monitor. troponin's were stable. Will d.c home with prednisone and abx Levaquin. Will need to follow up with her PCP in 1 week.

## 2020-03-01 NOTE — PROGRESS NOTES
"[facility] Medicine  Progress Note    Patient Name: Noah Bailey  MRN: [id number]  Patient Class: OP- Observation   Admission Date: 2/29/2020  Length of Stay: 0 days  Attending Physician: Brendon Luna DO  Primary Care Provider: Henry Mcbride MD        Subjective:     Principal Problem:Chest pain        HPI:  Ms. Noah Bailey is a 70 y/o female who lives in [place], [place]; she lives alone. Her PCP is Dr. Henry Mcbride. She has a past medical history of asthma , diabetes mellitus, hypertension, hypothyroidism, allergic rhinitis, colon polyps, hyperlipidemia, and an umbilical hernia. The patient has a history of a cardiac stent. She does not smoke cigarettes or use illicit drugs. She endorses occasional wine drinking.     She presents to [facility] with complaints of chest pain. She reports that her pain is  Non-radiating midsternal chest pain that started on yesterday. She denies palpitations, nausea, vomiting, diarrhea, or trauma. She reports taking ASA and nitroglycerin prior to EMS arrival. Per the patient she states " It feels like a led ball in my chest".      Her ED workup includes Na 128, glucose 145, normal troponin. CXR--Left costophrenic angle not well visualized which may be related to pleural thickening/scarring versus trace pleural effusion.  No focal consolidation. She will admit to the CDU for observation.                Overview/Hospital Course:  The patient was admitted to the CDU for observation. She was given steroids and abx in the ED. Hyperkalemia was noted this morning. Kayexalate was given this morning. Cardiology was consulted. Troponin was trended.     Interval History: she slept well overnight. No distress noted. No complaints of pain.     Review of Systems   Constitutional: Negative for activity change, appetite change, fatigue and fever.   HENT: Negative for trouble swallowing.    Eyes: Negative for visual disturbance. "   Respiratory: Negative for cough, shortness of breath and wheezing.    Cardiovascular: Negative for chest pain, palpitations and leg swelling.   Gastrointestinal: Negative for abdominal distention, abdominal pain, blood in stool, diarrhea, nausea and vomiting.   Genitourinary: Negative for difficulty urinating and hematuria.   Musculoskeletal: Negative for arthralgias, back pain and myalgias.   Skin: Negative for color change.   Neurological: Negative for light-headedness.   Hematological: Does not bruise/bleed easily.   Psychiatric/Behavioral: Negative for agitation, confusion and hallucinations.     Objective:     Vital Signs (Most Recent):  Temp: 97.8 °F (36.6 °C) (03/01/20 0715)  Pulse: 76 (03/01/20 0800)  Resp: 18 (03/01/20 0725)  BP: (!) 156/65 (03/01/20 0715)  SpO2: 95 % (03/01/20 0725) Vital Signs (24h Range):  Temp:  [97.8 °F (36.6 °C)-98.3 °F (36.8 °C)] 97.8 °F (36.6 °C)  Pulse:  [] 76  Resp:  [16-29] 18  SpO2:  [93 %-97 %] 95 %  BP: (123-171)/(56-82) 156/65     Weight: 85.2 kg (187 lb 13.3 oz)  Body mass index is 33.27 kg/m².    Intake/Output Summary (Last 24 hours) at 3/1/2020 0836  Last data filed at 3/1/2020 0755  Gross per 24 hour   Intake 588 ml   Output 1800 ml   Net -1212 ml      Physical Exam   Constitutional: She is oriented to person, place, and time. She appears well-developed and well-nourished.   HENT:   Head: Normocephalic.   Eyes: Pupils are equal, round, and reactive to light.   Neck: Normal range of motion.   Cardiovascular: Intact distal pulses.   Pulmonary/Chest: Effort normal and breath sounds normal.   Abdominal: Soft. Bowel sounds are normal.   Musculoskeletal: Normal range of motion.   Neurological: She is alert and oriented to person, place, and time.   Skin: Skin is warm and dry. Capillary refill takes less than 2 seconds.   Psychiatric: She has a normal mood and affect.   Nursing note and vitals reviewed.      Significant Labs:   A1C:   Recent Labs   Lab 01/08/20  0915  03/01/20  0539   HGBA1C 6.7* 6.6*     BMP:   Recent Labs   Lab 03/01/20  0539 03/01/20  0730   *  139*  --      141  --    K 6.5*  6.5* 6.2*     107  --    CO2 25  25  --    BUN 10  10  --    CREATININE 0.8  0.8  --    CALCIUM 10.9*  10.9*  --    MG 2.1  2.1  --      CBC:   Recent Labs   Lab 02/29/20  1157   WBC 8.44   HGB 13.2   HCT 39.2        Lipid Panel:   Recent Labs   Lab 03/01/20  0539   CHOL 164   HDL 47   LDLCALC 99.0   TRIG 90   CHOLHDL 28.7     Magnesium:   Recent Labs   Lab 03/01/20  0539   MG 2.1  2.1       Significant Imaging: I have reviewed all pertinent imaging results/findings within the past 24 hours.      Assessment/Plan:      * Chest pain  (HFpEF) heart failure with preserved ejection fraction  Coronary artery disease involving native coronary artery of native heart without angina pectoris  Last TTE completed in 2018---EF of 60-65%. Trending troponin---negative x3. Per the patient she had  1 stent in the past. She states she only takes a baby ASA for anticoagulation. Nitro prn and we have ordered supplemental O2.     COPD exacerbation  Chronic rhinitis  Bronchial asthma    Will order duo-nebs. Started PO steroids. Supplemental O2 as needed. Started 5 days of PO Levaquin.      Anxiety  Calm on exam.     Type 2 diabetes mellitus  Check hemoglobin A1C. Holding home meds. Will check BG q 4 hr. Monitor with low dose SSI prn.       Hypothyroidism due to acquired atrophy of thyroid  Resume home med.       Hyperlipidemia  Resume statin. FLP completed.     Essential hypertension  Will resume home med. Will also add Hydralazine 10 mg IV prn.        VTE Risk Mitigation (From admission, onward)         Ordered     enoxaparin injection 40 mg  Daily      02/29/20 1332     IP VTE HIGH RISK PATIENT  Once      02/29/20 1332                      Angelina Yu NP  Department of Hospital Medicine   [facility]

## 2020-03-02 ENCOUNTER — TELEPHONE (OUTPATIENT)
Dept: INTERNAL MEDICINE | Facility: CLINIC | Age: 70
End: 2020-03-02

## 2020-03-02 VITALS
DIASTOLIC BLOOD PRESSURE: 70 MMHG | BODY MASS INDEX: 34.25 KG/M2 | SYSTOLIC BLOOD PRESSURE: 125 MMHG | HEIGHT: 63 IN | WEIGHT: 193.31 LBS | TEMPERATURE: 99 F | RESPIRATION RATE: 20 BRPM | OXYGEN SATURATION: 95 % | HEART RATE: 104 BPM

## 2020-03-02 PROBLEM — R07.9 CHEST PAIN: Status: RESOLVED | Noted: 2020-02-29 | Resolved: 2020-03-02

## 2020-03-02 PROBLEM — D72.829 LEUKOCYTOSIS: Status: ACTIVE | Noted: 2020-03-02

## 2020-03-02 LAB
ANION GAP SERPL CALC-SCNC: 16 MMOL/L (ref 8–16)
ANION GAP SERPL CALC-SCNC: 16 MMOL/L (ref 8–16)
BASOPHILS # BLD AUTO: 0.03 K/UL (ref 0–0.2)
BASOPHILS # BLD AUTO: 0.03 K/UL (ref 0–0.2)
BASOPHILS NFR BLD: 0.2 % (ref 0–1.9)
BASOPHILS NFR BLD: 0.2 % (ref 0–1.9)
BUN SERPL-MCNC: 12 MG/DL (ref 8–23)
BUN SERPL-MCNC: 12 MG/DL (ref 8–23)
CALCIUM SERPL-MCNC: 10.1 MG/DL (ref 8.7–10.5)
CALCIUM SERPL-MCNC: 10.1 MG/DL (ref 8.7–10.5)
CHLORIDE SERPL-SCNC: 105 MMOL/L (ref 95–110)
CHLORIDE SERPL-SCNC: 105 MMOL/L (ref 95–110)
CO2 SERPL-SCNC: 23 MMOL/L (ref 23–29)
CO2 SERPL-SCNC: 23 MMOL/L (ref 23–29)
CREAT SERPL-MCNC: 0.8 MG/DL (ref 0.5–1.4)
CREAT SERPL-MCNC: 0.8 MG/DL (ref 0.5–1.4)
DIFFERENTIAL METHOD: ABNORMAL
DIFFERENTIAL METHOD: ABNORMAL
EOSINOPHIL # BLD AUTO: 0 K/UL (ref 0–0.5)
EOSINOPHIL # BLD AUTO: 0 K/UL (ref 0–0.5)
EOSINOPHIL NFR BLD: 0 % (ref 0–8)
EOSINOPHIL NFR BLD: 0 % (ref 0–8)
ERYTHROCYTE [DISTWIDTH] IN BLOOD BY AUTOMATED COUNT: 14.6 % (ref 11.5–14.5)
ERYTHROCYTE [DISTWIDTH] IN BLOOD BY AUTOMATED COUNT: 14.6 % (ref 11.5–14.5)
EST. GFR  (AFRICAN AMERICAN): >60 ML/MIN/1.73 M^2
EST. GFR  (AFRICAN AMERICAN): >60 ML/MIN/1.73 M^2
EST. GFR  (NON AFRICAN AMERICAN): >60 ML/MIN/1.73 M^2
EST. GFR  (NON AFRICAN AMERICAN): >60 ML/MIN/1.73 M^2
GLUCOSE SERPL-MCNC: 112 MG/DL (ref 70–110)
GLUCOSE SERPL-MCNC: 112 MG/DL (ref 70–110)
HCT VFR BLD AUTO: 41.1 % (ref 37–48.5)
HCT VFR BLD AUTO: 41.1 % (ref 37–48.5)
HGB BLD-MCNC: 13.6 G/DL (ref 12–16)
HGB BLD-MCNC: 13.6 G/DL (ref 12–16)
IMM GRANULOCYTES # BLD AUTO: 0.06 K/UL (ref 0–0.04)
IMM GRANULOCYTES # BLD AUTO: 0.06 K/UL (ref 0–0.04)
IMM GRANULOCYTES NFR BLD AUTO: 0.4 % (ref 0–0.5)
IMM GRANULOCYTES NFR BLD AUTO: 0.4 % (ref 0–0.5)
LYMPHOCYTES # BLD AUTO: 1.9 K/UL (ref 1–4.8)
LYMPHOCYTES # BLD AUTO: 1.9 K/UL (ref 1–4.8)
LYMPHOCYTES NFR BLD: 13.4 % (ref 18–48)
LYMPHOCYTES NFR BLD: 13.4 % (ref 18–48)
MAGNESIUM SERPL-MCNC: 2.1 MG/DL (ref 1.6–2.6)
MAGNESIUM SERPL-MCNC: 2.1 MG/DL (ref 1.6–2.6)
MCH RBC QN AUTO: 28.2 PG (ref 27–31)
MCH RBC QN AUTO: 28.2 PG (ref 27–31)
MCHC RBC AUTO-ENTMCNC: 33.1 G/DL (ref 32–36)
MCHC RBC AUTO-ENTMCNC: 33.1 G/DL (ref 32–36)
MCV RBC AUTO: 85 FL (ref 82–98)
MCV RBC AUTO: 85 FL (ref 82–98)
MONOCYTES # BLD AUTO: 1.5 K/UL (ref 0.3–1)
MONOCYTES # BLD AUTO: 1.5 K/UL (ref 0.3–1)
MONOCYTES NFR BLD: 10.7 % (ref 4–15)
MONOCYTES NFR BLD: 10.7 % (ref 4–15)
NEUTROPHILS # BLD AUTO: 10.6 K/UL (ref 1.8–7.7)
NEUTROPHILS # BLD AUTO: 10.6 K/UL (ref 1.8–7.7)
NEUTROPHILS NFR BLD: 75.3 % (ref 38–73)
NEUTROPHILS NFR BLD: 75.3 % (ref 38–73)
NRBC BLD-RTO: 0 /100 WBC
NRBC BLD-RTO: 0 /100 WBC
PHOSPHATE SERPL-MCNC: 3.9 MG/DL (ref 2.7–4.5)
PLATELET # BLD AUTO: 330 K/UL (ref 150–350)
PLATELET # BLD AUTO: 330 K/UL (ref 150–350)
PMV BLD AUTO: 11.6 FL (ref 9.2–12.9)
PMV BLD AUTO: 11.6 FL (ref 9.2–12.9)
POCT GLUCOSE: 110 MG/DL (ref 70–110)
POCT GLUCOSE: 140 MG/DL (ref 70–110)
POTASSIUM SERPL-SCNC: 3.7 MMOL/L (ref 3.5–5.1)
POTASSIUM SERPL-SCNC: 5.5 MMOL/L (ref 3.5–5.1)
POTASSIUM SERPL-SCNC: 5.5 MMOL/L (ref 3.5–5.1)
RBC # BLD AUTO: 4.83 M/UL (ref 4–5.4)
RBC # BLD AUTO: 4.83 M/UL (ref 4–5.4)
SODIUM SERPL-SCNC: 144 MMOL/L (ref 136–145)
SODIUM SERPL-SCNC: 144 MMOL/L (ref 136–145)
WBC # BLD AUTO: 14.07 K/UL (ref 3.9–12.7)
WBC # BLD AUTO: 14.07 K/UL (ref 3.9–12.7)

## 2020-03-02 PROCEDURE — 96374 THER/PROPH/DIAG INJ IV PUSH: CPT | Performed by: HOSPITALIST

## 2020-03-02 PROCEDURE — 25000242 PHARM REV CODE 250 ALT 637 W/ HCPCS: Performed by: NURSE PRACTITIONER

## 2020-03-02 PROCEDURE — G0378 HOSPITAL OBSERVATION PER HR: HCPCS

## 2020-03-02 PROCEDURE — 94761 N-INVAS EAR/PLS OXIMETRY MLT: CPT

## 2020-03-02 PROCEDURE — 84100 ASSAY OF PHOSPHORUS: CPT

## 2020-03-02 PROCEDURE — 94640 AIRWAY INHALATION TREATMENT: CPT

## 2020-03-02 PROCEDURE — 83735 ASSAY OF MAGNESIUM: CPT

## 2020-03-02 PROCEDURE — 80048 BASIC METABOLIC PNL TOTAL CA: CPT

## 2020-03-02 PROCEDURE — 63600175 PHARM REV CODE 636 W HCPCS: Performed by: NURSE PRACTITIONER

## 2020-03-02 PROCEDURE — 85025 COMPLETE CBC W/AUTO DIFF WBC: CPT

## 2020-03-02 PROCEDURE — 25000003 PHARM REV CODE 250: Performed by: NURSE PRACTITIONER

## 2020-03-02 PROCEDURE — 84132 ASSAY OF SERUM POTASSIUM: CPT | Mod: 91

## 2020-03-02 PROCEDURE — 36415 COLL VENOUS BLD VENIPUNCTURE: CPT

## 2020-03-02 RX ORDER — BENZONATATE 100 MG/1
200 CAPSULE ORAL 3 TIMES DAILY PRN
Status: DISCONTINUED | OUTPATIENT
Start: 2020-03-02 | End: 2020-03-02 | Stop reason: HOSPADM

## 2020-03-02 RX ORDER — LEVOFLOXACIN 750 MG/1
750 TABLET ORAL DAILY
Qty: 2 TABLET | Refills: 0 | Status: SHIPPED | OUTPATIENT
Start: 2020-03-02 | End: 2020-03-04

## 2020-03-02 RX ORDER — PREDNISONE 20 MG/1
40 TABLET ORAL DAILY
Qty: 4 TABLET | Refills: 0 | Status: SHIPPED | OUTPATIENT
Start: 2020-03-02 | End: 2020-03-04

## 2020-03-02 RX ORDER — MAGNESIUM SULFATE HEPTAHYDRATE 40 MG/ML
2 INJECTION, SOLUTION INTRAVENOUS ONCE
Status: COMPLETED | OUTPATIENT
Start: 2020-03-02 | End: 2020-03-02

## 2020-03-02 RX ORDER — GUAIFENESIN 100 MG/5ML
200 SOLUTION ORAL EVERY 4 HOURS PRN
Qty: 118 ML | Refills: 0 | Status: SHIPPED | OUTPATIENT
Start: 2020-03-02 | End: 2020-03-12

## 2020-03-02 RX ORDER — GUAIFENESIN 100 MG/5ML
200 SOLUTION ORAL EVERY 4 HOURS PRN
Status: DISCONTINUED | OUTPATIENT
Start: 2020-03-02 | End: 2020-03-02 | Stop reason: HOSPADM

## 2020-03-02 RX ADMIN — LEVOFLOXACIN 750 MG: 750 TABLET, FILM COATED ORAL at 09:03

## 2020-03-02 RX ADMIN — LEVOTHYROXINE SODIUM 50 MCG: 25 TABLET ORAL at 06:03

## 2020-03-02 RX ADMIN — ALBUTEROL SULFATE 1 PUFF: 90 AEROSOL, METERED RESPIRATORY (INHALATION) at 04:03

## 2020-03-02 RX ADMIN — AMLODIPINE BESYLATE 10 MG: 5 TABLET ORAL at 09:03

## 2020-03-02 RX ADMIN — BENZONATATE 200 MG: 100 CAPSULE ORAL at 05:03

## 2020-03-02 RX ADMIN — ASPIRIN 81 MG: 81 TABLET, COATED ORAL at 09:03

## 2020-03-02 RX ADMIN — MAGNESIUM SULFATE IN WATER 2 G: 40 INJECTION, SOLUTION INTRAVENOUS at 09:03

## 2020-03-02 RX ADMIN — SENNOSIDES AND DOCUSATE SODIUM 1 TABLET: 8.6; 5 TABLET ORAL at 09:03

## 2020-03-02 RX ADMIN — IPRATROPIUM BROMIDE AND ALBUTEROL SULFATE 3 ML: .5; 3 SOLUTION RESPIRATORY (INHALATION) at 07:03

## 2020-03-02 RX ADMIN — PREDNISONE 40 MG: 20 TABLET ORAL at 09:03

## 2020-03-02 RX ADMIN — METOPROLOL SUCCINATE 50 MG: 50 TABLET, EXTENDED RELEASE ORAL at 09:03

## 2020-03-02 NOTE — PLAN OF CARE
03/02/20 1436   Final Note   Assessment Type Discharge Planning Assessment   Anticipated Discharge Disposition Home   What phone number can be called within the next 1-3 days to see how you are doing after discharge? [phone]   Hospital Follow Up  Appt(s) scheduled? Yes   Discharge plans and expectations educations in teach back method with documentation complete? Yes   Right Care Referral Info   Post Acute Recommendation No Care

## 2020-03-02 NOTE — PROGRESS NOTES
Pharmacy New Medication Education    Patient and/or Caregiver ACCEPTED medication education.    Pharmacy has provided education on the name, indication, and possible side effects of the medication(s) prescribed, using teach-back method.     Learners of pharmacy medication education includes:  patient    The following medications have also been discussed, during this admission.     Current Facility-Administered Medications   Medication Frequency    acetaminophen tablet 650 mg Q4H PRN    albuterol inhaler 1 puff Q6H PRN    albuterol-ipratropium 2.5 mg-0.5 mg/3 mL nebulizer solution 3 mL Q6H WAKE    amLODIPine tablet 10 mg Daily    aspirin EC tablet 81 mg Daily    atorvastatin tablet 20 mg QHS    benzonatate capsule 200 mg TID PRN    dextrose 10% (D10W) Bolus PRN    dextrose 10% (D10W) Bolus PRN    enoxaparin injection 40 mg Daily    glucagon (human recombinant) injection 1 mg PRN    glucose chewable tablet 16 g PRN    glucose chewable tablet 24 g PRN    guaifenesin 100 mg/5 ml syrup 200 mg Q4H PRN    hydrALAZINE injection 10 mg Q8H PRN    insulin aspart U-100 pen 0-5 Units QID (AC + HS) PRN    levoFLOXacin tablet 750 mg Daily    levothyroxine tablet 50 mcg QAM    magnesium sulfate 2g in water 50mL IVPB (premix) Once    melatonin tablet 6 mg Nightly PRN    metoprolol succinate (TOPROL-XL) 24 hr tablet 50 mg Daily    nitroGLYCERIN SL tablet 0.4 mg Q5 Min PRN    ondansetron disintegrating tablet 4 mg Q6H PRN    ondansetron injection 4 mg Q6H PRN    predniSONE tablet 40 mg Daily    senna-docusate 8.6-50 mg per tablet 1 tablet BID    sodium chloride 0.9% flush 10 mL PRN    sodium chloride 0.9% flush 3 mL PRN          Thank you  Titus Blanchard, PharmD

## 2020-03-02 NOTE — ASSESSMENT & PLAN NOTE
(HFpEF) heart failure with preserved ejection fraction  Coronary artery disease involving native coronary artery of native heart without angina pectoris  Last TTE completed in 2018---EF of 60-65%. Trending troponin---negative x3. Per the patient she had  1 stent in the past. She states she only takes a baby ASA for anticoagulation. Nitro prn and we have ordered supplemental O2. Will order outpatient TTE

## 2020-03-02 NOTE — PROGRESS NOTES
Patient:   RICH MARROQUIN            MRN: [id number]            FIN: [id number]               Age:   37 years     Sex:  MALE     :  82   Associated Diagnoses:   None   Author:   COLLINS SOUZA      H&P dictated # [id number].              Electronically Signed On 2018 09:05  __________________________________________________   COLLINS SOUZA     Pharmacy New Medication Education    Patient and/or Caregiver ACCEPTED medication education.    Pharmacy has provided education on the name, indication, and possible side effects of the medication(s) prescribed, using teach-back method.     Learners of pharmacy medication education includes:  patient    The following medications have also been discussed, during this admission.     Current Facility-Administered Medications   Medication Frequency    acetaminophen tablet 650 mg Q4H PRN    albuterol inhaler 1 puff Q6H PRN    albuterol-ipratropium 2.5 mg-0.5 mg/3 mL nebulizer solution 3 mL Q6H WAKE    amLODIPine tablet 10 mg Daily    aspirin EC tablet 81 mg Daily    atorvastatin tablet 20 mg QHS    benzonatate capsule 200 mg TID PRN    dextrose 10% (D10W) Bolus PRN    dextrose 10% (D10W) Bolus PRN    enoxaparin injection 40 mg Daily    glucagon (human recombinant) injection 1 mg PRN    glucose chewable tablet 16 g PRN    glucose chewable tablet 24 g PRN    guaifenesin 100 mg/5 ml syrup 200 mg Q4H PRN    hydrALAZINE injection 10 mg Q8H PRN    insulin aspart U-100 pen 0-5 Units QID (AC + HS) PRN    levoFLOXacin tablet 750 mg Daily    levothyroxine tablet 50 mcg QAM    melatonin tablet 6 mg Nightly PRN    metoprolol succinate (TOPROL-XL) 24 hr tablet 50 mg Daily    nitroGLYCERIN SL tablet 0.4 mg Q5 Min PRN    ondansetron disintegrating tablet 4 mg Q6H PRN    ondansetron injection 4 mg Q6H PRN    predniSONE tablet 40 mg Daily    senna-docusate 8.6-50 mg per tablet 1 tablet BID    sodium chloride 0.9% flush 10 mL PRN    sodium chloride 0.9% flush 3 mL PRN          Thank you  Titus Blanchard, PharmD

## 2020-03-02 NOTE — ASSESSMENT & PLAN NOTE
Chronic rhinitis  Bronchial asthma    Will order duo-nebs. Started PO steroids. Supplemental O2 as needed. Started 5 days of PO Levaquin. Will send home with RX for levaquin and steroids. Follow up with PCP in 1 week.

## 2020-03-02 NOTE — ASSESSMENT & PLAN NOTE
Check hemoglobin A1C. Resume home meds on d/c . Will check BG q 4 hr. Monitor with low dose SSI prn.

## 2020-03-02 NOTE — PLAN OF CARE
"Patient presents with    Chest Pain       PT presents to ED today c/o intermittent non radiating midsternal chest pain onset yesterday. Pt denies N/V. PT reports taking ASA prior to EMS arrival. PT states " feels like a led ball in my chest"          HPI: Ms. Noah Bailey is a 68 y/o female who lives in [place], [place]; she lives alone. Her PCP is Dr. Henry Mcbride. She has a past medical history of asthma    , diabetes mellitus, hypertension, hypothyroidism, allergic rhinitis, colon polyps, hyperlipidemia, and an umbilical hernia. The patient states she had a cardiac stent placed in 2017. She does not smoke cigarettes or use illicit drugs. She endorses occasional wine drinking.      The pt's currently in CDU. The pt lives alone in [place]. The pt is independent with all adl's and uses a nebulizer at home. The pt has a rolling walker but doesn't use it. The pt's friend Al is transporting her home at d/c. The pt's a retired  and has a lot of friends who offer a lot of support. The Sw wrote her contact info and name on the white board in the pt's room. The Sw gave the pt a d/c planning brochure and encouraged her to call should she have any further question or concerns. The Sw will follow the pt throughout her transition of care and will assist with her d/c needs.     03/02/20 1002   Discharge Assessment   Assessment Type Discharge Planning Assessment   Confirmed/corrected address and phone number on facesheet? Yes   Assessment information obtained from? Patient   Prior to hospitilization cognitive status: Alert/Oriented   Prior to hospitalization functional status: Independent   Current cognitive status: Alert/Oriented   Current Functional Status: Independent   Lives With alone   Able to Return to Prior Arrangements yes   Is patient able to care for self after discharge? Yes   Who are your caregiver(s) and their phone number(s)? Jaleesa Quiñones(friend)[phone]   Patient's perception of " discharge disposition home or selfcare   Readmission Within the Last 30 Days no previous admission in last 30 days   Patient currently being followed by outpatient case management? No   Patient currently receives any other outside agency services? No   Equipment Currently Used at Home walker, rolling;nebulizer   Do you have any problems affording any of your prescribed medications? No   Is the patient taking medications as prescribed? yes   Does the patient have transportation home? Yes   Transportation Anticipated family or friend will provide   Does the patient receive services at the [facility]? No   Discharge Plan A Home with family   Discharge Plan B Home Health   DME Needed Upon Discharge  none   Patient/Family in Agreement with Plan yes

## 2020-03-02 NOTE — PLAN OF CARE
Patient on RA with sats as documented.  Patient given aerosol treatment with no adverse reactions noted. Patient instructed on proper use.  Will continue to monitor.

## 2020-03-02 NOTE — PLAN OF CARE
Pt AAO x 4.  VSS in NAD  Pt remained afebrile throughout this shift with no hospital acquired illnesses  Pt remained free of falls this shift.   Pt moving/turing independently    Pt has no complaints at this time and is ready to transition home for self care with family support.

## 2020-03-02 NOTE — DISCHARGE SUMMARY
"[facility] Hospital Medicine  Discharge Summary      Patient Name: Noah Bailey  MRN: [id number]  Admission Date: 2/29/2020  Hospital Length of Stay: 0 days  Discharge Date and Time:  03/02/2020 8:50 AM  Attending Physician: Brendon Luna DO   Discharging Provider: Angelina Yu NP  Primary Care Provider: Henry Mcbride MD      HPI:   Ms. Noah Bailey is a 68 y/o female who lives in [place], [place]; she lives alone. Her PCP is Dr. Henry Mcbride. She has a past medical history of asthma , diabetes mellitus, hypertension, hypothyroidism, allergic rhinitis, colon polyps, hyperlipidemia, and an umbilical hernia. The patient has a history of a cardiac stent. She does not smoke cigarettes or use illicit drugs. She endorses occasional wine drinking.     She presents to [facility] with complaints of chest pain. She reports that her pain is  Non-radiating midsternal chest pain that started on yesterday. She denies palpitations, nausea, vomiting, diarrhea, or trauma. She reports taking ASA and nitroglycerin prior to EMS arrival. Per the patient she states " It feels like a led ball in my chest".      Her ED workup includes Na 128, glucose 145, normal troponin. CXR--Left costophrenic angle not well visualized which may be related to pleural thickening/scarring versus trace pleural effusion.  No focal consolidation. She will admit to the CDU for observation.                * No surgery found *      Hospital Course:   The patient was admitted to the CDU for observation. She was given steroids and abx in the ED. Hyperkalemia was noted this morning. Kayexalate was given this morning. Cardiology was consulted. Troponin was trended. No complaints of chest pain noted. Patient had no changes on the monitor. troponin's were stable. Will d.c home with prednisone and abx Levaquin. Will need to follow up with her PCP in 1 week.      Consults:   Consults (From admission, " onward)        Status Ordering Provider     Inpatient consult to [facility]     Provider:  Errol Heath MD    Acknowledged DULCE MARIA LOMELI          * Chest pain-resolved as of 3/2/2020  (HFpEF) heart failure with preserved ejection fraction  Coronary artery disease involving native coronary artery of native heart without angina pectoris  Last TTE completed in 2018---EF of 60-65%. Trending troponin---negative x3. Per the patient she had  1 stent in the past. She states she only takes a baby ASA for anticoagulation. Nitro prn and we have ordered supplemental O2. Will order outpatient TTE    Leukocytosis  Likely  2/2 to prednisone. Will follow      COPD exacerbation  Chronic rhinitis  Bronchial asthma    Will order duo-nebs. Started PO steroids. Supplemental O2 as needed. Started 5 days of PO Levaquin. Will send home with RX for levaquin and steroids. Follow up with PCP in 1 week.       Anxiety  Calm on exam.     Type 2 diabetes mellitus  Check hemoglobin A1C. Resume home meds on d/c . Will check BG q 4 hr. Monitor with low dose SSI prn.       Hypothyroidism due to acquired atrophy of thyroid  Resume home med.       Hyperlipidemia  Resume statin. FLP completed.     Essential hypertension  Will resume home med. Will also add Hydralazine 10 mg IV prn.      Chronic rhinitis          Final Active Diagnoses:    Diagnosis Date Noted POA    Leukocytosis [D72.829] 03/02/2020 No    COPD exacerbation [J44.1] 02/29/2020 Yes    Coronary artery disease involving native coronary artery of native heart without angina pectoris [I25.10] 07/28/2017 Yes     Chronic    (HFpEF) heart failure with preserved ejection fraction [I50.30] 07/14/2017 Yes    Anxiety [F41.9] 05/14/2015 Yes     Chronic    Type 2 diabetes mellitus [E11.9] 05/08/2014 Yes     Chronic    Essential hypertension [I10]  Yes     Chronic    Chronic rhinitis [J31.0]  Yes     Chronic    Hyperlipidemia [E78.5]  Yes    Hypothyroidism due  to acquired atrophy of thyroid [E03.4]  Yes    Bronchial asthma [J45.909]  Yes      Problems Resolved During this Admission:    Diagnosis Date Noted Date Resolved POA    PRINCIPAL PROBLEM:  Chest pain [R07.9] 02/29/2020 03/02/2020 Yes       Discharged Condition: stable    Disposition: Home or Self Care    Follow Up:  Follow-up Information     Henry Mcbride MD In 1 week.    Specialty:  Internal Medicine  Why:  hospital follow up  Contact information:  [address] [facility] [place] [address]  [phone]                 Patient Instructions:      Diet Cardiac     Diet diabetic     Notify your health care provider if you experience any of the following:  temperature >100.4     Notify your health care provider if you experience any of the following:  persistent nausea and vomiting or diarrhea     Notify your health care provider if you experience any of the following:  severe uncontrolled pain     Notify your health care provider if you experience any of the following:  difficulty breathing or increased cough     Notify your health care provider if you experience any of the following:  severe persistent headache     Notify your health care provider if you experience any of the following:  worsening rash     Notify your health care provider if you experience any of the following:  persistent dizziness, light-headedness, or visual disturbances     Notify your health care provider if you experience any of the following:  increased confusion or weakness     Activity as tolerated       Significant Diagnostic Studies: Labs:   BMP:   Recent Labs   Lab 02/29/20  1157 03/01/20  0539  03/01/20  1952 03/02/20  0020 03/02/20  0432   * 139*  139*  --   --   --  112*  112*   * 141  141  --   --   --  144  144   K 4.0 6.5*  6.5*   < > 3.8 3.7 5.5*  5.5*   CL 97 107  107  --   --   --  105  105   CO2 20* 25  25  --   --   --  23  23   BUN 7* 10  10  --   --   --  12  12   CREATININE 0.7 0.8   0.8  --   --   --  0.8  0.8   CALCIUM 10.0 10.9*  10.9*  --   --   --  10.1  10.1   MG  --  2.1  2.1  --   --   --  2.1  2.1    < > = values in this interval not displayed.   , CBC   Recent Labs   Lab 02/29/20  1157 03/01/20  0539 03/02/20  0432   WBC 8.44 10.16  10.16 14.07*  14.07*   HGB 13.2 13.4  13.4 13.6  13.6   HCT 39.2 41.2  41.2 41.1  41.1    280  280 330  330   , Troponin   Recent Labs   Lab 02/29/20  1840   TROPONINI 0.020    and A1C:   Recent Labs   Lab 01/08/20  0924 03/01/20  0539   HGBA1C 6.7* 6.6*       Pending Diagnostic Studies:     Procedure Component Value Units Date/Time    NM Myocardial Perfusion Spect Multi Pharmacologic [[id number]]     Order Status:  Sent Lab Status:  No result          Medications:  Reconciled Home Medications:      Medication List      START taking these medications    guaifenesin 100 mg/5 ml 100 mg/5 mL syrup  Commonly known as:  ROBITUSSIN  Take 10 mLs (200 mg total) by mouth every 4 (four) hours as needed for Congestion.     levoFLOXacin 750 MG tablet  Commonly known as:  LEVAQUIN  Take 1 tablet (750 mg total) by mouth once daily. for 2 days     predniSONE 20 MG tablet  Commonly known as:  DELTASONE  Take 2 tablets (40 mg total) by mouth once daily. for 2 days        CHANGE how you take these medications    atorvastatin 20 MG tablet  Commonly known as:  LIPITOR  Take 1 tablet (20 mg total) by mouth once daily.  What changed:  when to take this        CONTINUE taking these medications    * albuterol 90 mcg/actuation inhaler  Commonly known as:  PROVENTIL/VENTOLIN HFA  Inhale 1-2 puffs into the lungs every 6 (six) hours as needed for Wheezing. Rescue     * albuterol 2.5 mg /3 mL (0.083 %) nebulizer solution  Commonly known as:  PROVENTIL  Take 3 mLs (2.5 mg total) by nebulization every 4 (four) hours as needed for Wheezing or Shortness of Breath. Rescue     ALPRAZolam 0.25 MG tablet  Commonly known as:  XANAX  Take 1 tablet (0.25 mg total) by mouth  every evening.     amlodipine-olmesartan 10-40 mg per tablet  Commonly known as:  KARISSA  Take 1 tablet by mouth once daily. For blood pressure     aspirin 81 MG EC tablet  Commonly known as:  Aspir-Low  Take 1 tablet (81 mg total) by mouth once daily.     blood-glucose meter kit  Commonly known as:  FreeStyle Lite Meter  Use as instructed     lancets Misc  Commonly known as:  Microlet Lancet  Test blood sugar once daily.     levothyroxine 50 MCG tablet  Commonly known as:  SYNTHROID  Take 1 tablet (50 mcg total) by mouth every morning.     metFORMIN 500 MG tablet  Commonly known as:  GLUCOPHAGE  TAKE 1 TABLET BY MOUTH DAILY before dinner     metoprolol succinate 50 MG 24 hr tablet  Commonly known as:  TOPROL-XL  Take 1 tablet (50 mg total) by mouth once daily. For heart and blood presure     nitroGLYCERIN 0.4 MG SL tablet  Commonly known as:  NITROSTAT  Place 1 tablet (0.4 mg total) under the tongue every 5 (five) minutes as needed for Chest pain ((Notify MD)).     Precision Xtra Test Strp  Generic drug:  blood sugar diagnostic  USE AS DIRECTED DAILY     theophylline 400 mg Tb24  TAKE 1 TABLET BY MOUTH DAILY         * This list has 2 medication(s) that are the same as other medications prescribed for you. Read the directions carefully, and ask your doctor or other care provider to review them with you.                Indwelling Lines/Drains at time of discharge:   Lines/Drains/Airways     None                 Time spent on the discharge of patient: 35 minutes  Patient was seen and examined on the date of discharge and determined to be suitable for discharge.         Angelina Yu NP  Department of Hospital Medicine  [facility]

## 2020-03-04 ENCOUNTER — NURSE TRIAGE (OUTPATIENT)
Dept: ADMINISTRATIVE | Facility: CLINIC | Age: 70
End: 2020-03-04

## 2020-03-04 ENCOUNTER — PATIENT OUTREACH (OUTPATIENT)
Dept: ADMINISTRATIVE | Facility: OTHER | Age: 70
End: 2020-03-04

## 2020-03-04 DIAGNOSIS — E11.9 DIABETES MELLITUS WITHOUT COMPLICATION: Primary | ICD-10-CM

## 2020-03-04 NOTE — TELEPHONE ENCOUNTER
Called patient several times today and could not get though like if phone was having technical issues.

## 2020-03-04 NOTE — TELEPHONE ENCOUNTER
Spoke with patient stated that she is having difficulty breathing with activities.  Stated that she was recently released from the hospital and she does not feel like she is getting any better.  Advised patient to go to the ED.  Patient declined. Will send message to PCP.     Reason for Disposition   MODERATE difficulty breathing (e.g., speaks in phrases, SOB even at rest, pulse 100-120) of new onset or worse than normal    Additional Information   Negative: Breathing stopped and hasn't returned   Negative: Choking on something   Negative: SEVERE difficulty breathing (e.g., struggling for each breath, speaks in single words, pulse > 120)   Negative: Bluish (or gray) lips or face   Negative: Difficult to awaken or acting confused (e.g., disoriented, slurred speech)   Negative: Passed out (i.e., fainted, collapsed and was not responding)   Negative: Wheezing started suddenly after medicine, an allergic food, or bee sting   Negative: Slow, shallow and weak breathing   Negative: Stridor   Negative: Sounds like a life-threatening emergency to the triager    Protocols used: BREATHING DIFFICULTY-A-OH

## 2020-03-06 RX ORDER — AMOXICILLIN AND CLAVULANATE POTASSIUM 875; 125 MG/1; MG/1
1 TABLET, FILM COATED ORAL EVERY 12 HOURS
Qty: 20 TABLET | Refills: 0 | Status: SHIPPED | OUTPATIENT
Start: 2020-03-06 | End: 2020-03-16 | Stop reason: SDUPTHER

## 2020-03-06 RX ORDER — PROMETHAZINE HYDROCHLORIDE AND CODEINE PHOSPHATE 6.25; 1 MG/5ML; MG/5ML
5 SOLUTION ORAL
Qty: 240 ML | Refills: 0 | Status: SHIPPED | OUTPATIENT
Start: 2020-03-06 | End: 2020-03-16 | Stop reason: SDUPTHER

## 2020-03-06 NOTE — TELEPHONE ENCOUNTER
----- Message from Shobha Villanueva sent at 3/6/2020 12:35 PM CST -----  Contact: self  [phone]  Please be sure to call her from her message left earlier.

## 2020-03-06 NOTE — TELEPHONE ENCOUNTER
----- Message from Jordyn Byers sent at 3/6/2020  9:10 AM CST -----  Contact: [phone]  Patient is requesting if the doctor can call in a cough medication for her cough to [facility]..  Patient is coughing a lot and need the medication and also a antibiotic to feel better.    Please call and advise, thank you.

## 2020-03-11 RX ORDER — METOPROLOL SUCCINATE 50 MG/1
50 TABLET, EXTENDED RELEASE ORAL DAILY
Qty: 30 TABLET | Refills: 11 | Status: SHIPPED | OUTPATIENT
Start: 2020-03-11 | End: 2021-01-26

## 2020-03-17 RX ORDER — PROMETHAZINE HYDROCHLORIDE AND CODEINE PHOSPHATE 6.25; 1 MG/5ML; MG/5ML
5 SOLUTION ORAL
Qty: 240 ML | Refills: 0 | Status: SHIPPED | OUTPATIENT
Start: 2020-03-17 | End: 2020-03-27

## 2020-03-17 RX ORDER — AMOXICILLIN AND CLAVULANATE POTASSIUM 875; 125 MG/1; MG/1
1 TABLET, FILM COATED ORAL EVERY 12 HOURS
Qty: 20 TABLET | Refills: 0 | Status: SHIPPED | OUTPATIENT
Start: 2020-03-17 | End: 2020-10-20

## 2020-04-15 DIAGNOSIS — I21.4 NSTEMI (NON-ST ELEVATED MYOCARDIAL INFARCTION): ICD-10-CM

## 2020-04-15 DIAGNOSIS — I25.10 CORONARY ARTERY DISEASE INVOLVING NATIVE CORONARY ARTERY OF NATIVE HEART WITHOUT ANGINA PECTORIS: Chronic | ICD-10-CM

## 2020-04-15 RX ORDER — ASPIRIN 81 MG/1
81 TABLET ORAL DAILY
Qty: 30 TABLET | Refills: 11 | Status: SHIPPED | OUTPATIENT
Start: 2020-04-15 | End: 2021-01-28

## 2020-05-11 ENCOUNTER — TELEPHONE (OUTPATIENT)
Dept: INTERNAL MEDICINE | Facility: CLINIC | Age: 70
End: 2020-05-11

## 2020-05-11 DIAGNOSIS — Z20.822 EXPOSURE TO COVID-19 VIRUS: Primary | ICD-10-CM

## 2020-05-11 NOTE — TELEPHONE ENCOUNTER
----- Message from Otilia Yoon sent at 5/11/2020  8:54 AM CDT -----  Contact: Patient [phone]  Patient would like to add the antibody test to her tab that is scheduled for 05/18/2020.    Please call and advise.    Thank You

## 2020-05-18 ENCOUNTER — LAB VISIT (OUTPATIENT)
Dept: LAB | Facility: HOSPITAL | Age: 70
End: 2020-05-18
Attending: INTERNAL MEDICINE
Payer: COMMERCIAL

## 2020-05-18 ENCOUNTER — OFFICE VISIT (OUTPATIENT)
Dept: INTERNAL MEDICINE | Facility: CLINIC | Age: 70
End: 2020-05-18
Payer: COMMERCIAL

## 2020-05-18 VITALS
BODY MASS INDEX: 30.39 KG/M2 | OXYGEN SATURATION: 95 % | WEIGHT: 171.5 LBS | TEMPERATURE: 98 F | HEIGHT: 63 IN | RESPIRATION RATE: 16 BRPM | SYSTOLIC BLOOD PRESSURE: 122 MMHG | DIASTOLIC BLOOD PRESSURE: 66 MMHG | HEART RATE: 90 BPM

## 2020-05-18 DIAGNOSIS — E78.2 MIXED HYPERLIPIDEMIA: ICD-10-CM

## 2020-05-18 DIAGNOSIS — E11.00 TYPE 2 DIABETES MELLITUS WITH HYPEROSMOLARITY WITHOUT COMA, WITHOUT LONG-TERM CURRENT USE OF INSULIN: ICD-10-CM

## 2020-05-18 DIAGNOSIS — J45.41 MODERATE PERSISTENT ASTHMA WITH ACUTE EXACERBATION: Primary | ICD-10-CM

## 2020-05-18 DIAGNOSIS — M65.312 TRIGGER FINGER OF LEFT THUMB: ICD-10-CM

## 2020-05-18 DIAGNOSIS — I10 ESSENTIAL HYPERTENSION: ICD-10-CM

## 2020-05-18 DIAGNOSIS — E03.9 HYPOTHYROIDISM, UNSPECIFIED TYPE: ICD-10-CM

## 2020-05-18 PROCEDURE — 1126F AMNT PAIN NOTED NONE PRSNT: CPT | Mod: S$GLB,,, | Performed by: INTERNAL MEDICINE

## 2020-05-18 PROCEDURE — 1101F PR PT FALLS ASSESS DOC 0-1 FALLS W/OUT INJ PAST YR: ICD-10-PCS | Mod: CPTII,S$GLB,, | Performed by: INTERNAL MEDICINE

## 2020-05-18 PROCEDURE — 1101F PT FALLS ASSESS-DOCD LE1/YR: CPT | Mod: CPTII,S$GLB,, | Performed by: INTERNAL MEDICINE

## 2020-05-18 PROCEDURE — 99999 PR PBB SHADOW E&M-EST. PATIENT-LVL IV: ICD-10-PCS | Mod: PBBFAC,,, | Performed by: INTERNAL MEDICINE

## 2020-05-18 PROCEDURE — 3044F PR MOST RECENT HEMOGLOBIN A1C LEVEL <7.0%: ICD-10-PCS | Mod: CPTII,S$GLB,, | Performed by: INTERNAL MEDICINE

## 2020-05-18 PROCEDURE — 3078F PR MOST RECENT DIASTOLIC BLOOD PRESSURE < 80 MM HG: ICD-10-PCS | Mod: CPTII,S$GLB,, | Performed by: INTERNAL MEDICINE

## 2020-05-18 PROCEDURE — 1159F PR MEDICATION LIST DOCUMENTED IN MEDICAL RECORD: ICD-10-PCS | Mod: S$GLB,,, | Performed by: INTERNAL MEDICINE

## 2020-05-18 PROCEDURE — 99999 PR PBB SHADOW E&M-EST. PATIENT-LVL IV: CPT | Mod: PBBFAC,,, | Performed by: INTERNAL MEDICINE

## 2020-05-18 PROCEDURE — 3074F SYST BP LT 130 MM HG: CPT | Mod: CPTII,S$GLB,, | Performed by: INTERNAL MEDICINE

## 2020-05-18 PROCEDURE — 3074F PR MOST RECENT SYSTOLIC BLOOD PRESSURE < 130 MM HG: ICD-10-PCS | Mod: CPTII,S$GLB,, | Performed by: INTERNAL MEDICINE

## 2020-05-18 PROCEDURE — 99214 OFFICE O/P EST MOD 30 MIN: CPT | Mod: S$GLB,,, | Performed by: INTERNAL MEDICINE

## 2020-05-18 PROCEDURE — 1159F MED LIST DOCD IN RCRD: CPT | Mod: S$GLB,,, | Performed by: INTERNAL MEDICINE

## 2020-05-18 PROCEDURE — 1126F PR PAIN SEVERITY QUANTIFIED, NO PAIN PRESENT: ICD-10-PCS | Mod: S$GLB,,, | Performed by: INTERNAL MEDICINE

## 2020-05-18 PROCEDURE — 3078F DIAST BP <80 MM HG: CPT | Mod: CPTII,S$GLB,, | Performed by: INTERNAL MEDICINE

## 2020-05-18 PROCEDURE — 3044F HG A1C LEVEL LT 7.0%: CPT | Mod: CPTII,S$GLB,, | Performed by: INTERNAL MEDICINE

## 2020-05-18 PROCEDURE — 99214 PR OFFICE/OUTPT VISIT, EST, LEVL IV, 30-39 MIN: ICD-10-PCS | Mod: S$GLB,,, | Performed by: INTERNAL MEDICINE

## 2020-05-18 RX ORDER — FLUTICASONE PROPIONATE AND SALMETEROL 250; 50 UG/1; UG/1
1 POWDER RESPIRATORY (INHALATION) 2 TIMES DAILY
Qty: 60 EACH | Refills: 6 | Status: SHIPPED | OUTPATIENT
Start: 2020-05-18 | End: 2020-12-14

## 2020-05-18 RX ORDER — THEOPHYLLINE 400 MG/1
1 TABLET, EXTENDED RELEASE ORAL DAILY
Qty: 30 TABLET | Refills: 10 | Status: SHIPPED | OUTPATIENT
Start: 2020-05-18 | End: 2020-10-20 | Stop reason: SDUPTHER

## 2020-05-18 RX ORDER — THEOPHYLLINE 400 MG/1
1 TABLET, EXTENDED RELEASE ORAL DAILY
Qty: 30 TABLET | Refills: 11 | Status: SHIPPED | OUTPATIENT
Start: 2020-05-18 | End: 2021-05-13 | Stop reason: SDUPTHER

## 2020-05-18 RX ORDER — ALPRAZOLAM 0.25 MG/1
0.25 TABLET ORAL NIGHTLY PRN
Qty: 30 TABLET | Refills: 3 | Status: SHIPPED | OUTPATIENT
Start: 2020-05-18 | End: 2021-01-28

## 2020-05-18 NOTE — PROGRESS NOTES
Subjective:       Patient ID: Noah Bailey is a 69 y.o. female.    Chief Complaint: Follow-up (4 Mo)    HPI   The patient is status post hospitalization from 02/29/2020 through 03/02/2020 for evaluation of chest pain and asthma exacerbation.  The patient is currently using her Proventil inhaler twice daily.  We discussed adding the Advair Diskus inhaler for maintenance therapy.  We also discussed obtaining pulmonary function tests to assess for any persistent airways obstruction.  She has not been diagnosed to have COPD.  She denies having any productive cough at this time.  She denies having any chills or fever.    Other active medical conditions include type 2 diabetes mellitus, hypertension, and hypothyroidism.  Patient also has been noting a locking of her left thumb.  We discussed obtaining hand surgery consultation for evaluation of this.    The patient reports her blood sugar levels have been variable.  She is currently on metformin therapy.  She has not experienced any hypoglycemic episodes.    Review of Systems   Constitutional: Negative for activity change, appetite change, chills, fever and unexpected weight change.   Eyes: Negative for visual disturbance.   Respiratory: Positive for cough, shortness of breath and wheezing. Negative for chest tightness.    Cardiovascular: Negative for chest pain, palpitations and leg swelling.   Gastrointestinal: Negative for abdominal pain, blood in stool and diarrhea.   Genitourinary: Negative for dysuria, frequency, hematuria and urgency.   Musculoskeletal: Positive for arthralgias (Left thumb joint pain is noted with locking.).   Neurological: Negative for weakness, numbness and headaches.   Psychiatric/Behavioral: Negative for sleep disturbance.       Objective:      Physical Exam   Constitutional: She is oriented to person, place, and time. She appears well-developed and well-nourished. No distress.   HENT:   Head: Normocephalic and atraumatic.   Eyes: Pupils  are equal, round, and reactive to light. Conjunctivae and EOM are normal. No scleral icterus.   Neck: Normal range of motion. Neck supple. No JVD present. No thyromegaly present.   Cardiovascular: Normal rate, regular rhythm, normal heart sounds and intact distal pulses. Exam reveals no gallop and no friction rub.   No murmur heard.  Pulmonary/Chest: Effort normal and breath sounds normal. No respiratory distress. She has no wheezes. She has no rales.   Abdominal: Soft. Bowel sounds are normal. She exhibits no mass. There is no tenderness.   Musculoskeletal: Normal range of motion. She exhibits no edema.   There is locking of the left thumb on range-of-motion testing.   Lymphadenopathy:     She has no cervical adenopathy.   Neurological: She is alert and oriented to person, place, and time. No cranial nerve deficit.   Sensory exam is intact in both feet on monofilament  testing.   Skin: Skin is warm and dry. No rash noted.   No foot lesions are present.   Psychiatric: She has a normal mood and affect. Her behavior is normal.   Nursing note and vitals reviewed.      Results for orders placed or performed in visit on 05/18/20   Comprehensive metabolic panel   Result Value Ref Range    Sodium 141 136 - 145 mmol/L    Potassium 4.8 3.5 - 5.1 mmol/L    Chloride 107 95 - 110 mmol/L    CO2 25 23 - 29 mmol/L    Glucose 116 (H) 70 - 110 mg/dL    BUN, Bld 12 8 - 23 mg/dL    Creatinine 0.9 0.5 - 1.4 mg/dL    Calcium 10.7 (H) 8.7 - 10.5 mg/dL    Total Protein 8.3 6.0 - 8.4 g/dL    Albumin 4.4 3.5 - 5.2 g/dL    Total Bilirubin 0.5 0.1 - 1.0 mg/dL    Alkaline Phosphatase 121 55 - 135 U/L    AST 26 10 - 40 U/L    ALT 26 10 - 44 U/L    Anion Gap 9 8 - 16 mmol/L    eGFR if African American >60.0 >60 mL/min/1.73 m^2    eGFR if non African American >60.0 >60 mL/min/1.73 m^2   Hemoglobin A1c   Result Value Ref Range    Hemoglobin A1C 6.3 (H) 4.0 - 5.6 %    Estimated Avg Glucose 134 (H) 68 - 131 mg/dL   CBC auto differential   Result  Value Ref Range    WBC 10.23 3.90 - 12.70 K/uL    RBC 5.05 4.00 - 5.40 M/uL    Hemoglobin 14.5 12.0 - 16.0 g/dL    Hematocrit 46.4 37.0 - 48.5 %    Mean Corpuscular Volume 92 82 - 98 fL    Mean Corpuscular Hemoglobin 28.7 27.0 - 31.0 pg    Mean Corpuscular Hemoglobin Conc 31.3 (L) 32.0 - 36.0 g/dL    RDW 15.7 (H) 11.5 - 14.5 %    Platelets 356 (H) 150 - 350 K/uL    MPV 10.9 9.2 - 12.9 fL    Immature Granulocytes 0.3 0.0 - 0.5 %    Gran # (ANC) 7.1 1.8 - 7.7 K/uL    Immature Grans (Abs) 0.03 0.00 - 0.04 K/uL    Lymph # 2.1 1.0 - 4.8 K/uL    Mono # 0.8 0.3 - 1.0 K/uL    Eos # 0.1 0.0 - 0.5 K/uL    Baso # 0.05 0.00 - 0.20 K/uL    nRBC 0 0 /100 WBC    Gran% 69.8 38.0 - 73.0 %    Lymph% 20.7 18.0 - 48.0 %    Mono% 8.0 4.0 - 15.0 %    Eosinophil% 0.7 0.0 - 8.0 %    Basophil% 0.5 0.0 - 1.9 %    Differential Method Automated    Lipid panel   Result Value Ref Range    Cholesterol 172 120 - 199 mg/dL    Triglycerides 144 30 - 150 mg/dL    HDL 51 40 - 75 mg/dL    LDL Cholesterol 92.2 63.0 - 159.0 mg/dL    Hdl/Cholesterol Ratio 29.7 20.0 - 50.0 %    Total Cholesterol/HDL Ratio 3.4 2.0 - 5.0    Non-HDL Cholesterol 121 mg/dL   COVID-19 (SARS CoV-2) IgG Antibody   Result Value Ref Range    COVID-19 (SARS CoV-2) IgG Ab Negative Negative       Assessment:       1. Moderate persistent asthma with acute exacerbation    2. Type 2 diabetes mellitus with hyperosmolarity without coma, without long-term current use of insulin    3. Hypothyroidism, unspecified type    4. Essential hypertension    5. Mixed hyperlipidemia    6. Trigger finger of left thumb        Plan:     Noah was seen today for follow-up.  The Advair Diskus inhaler will be added for maintenance therapy.    Diagnoses and all orders for this visit:    Moderate persistent asthma with acute exacerbation  -     Complete PFT with bronchodilator; Future  -     PULSE OXIMETRY WITH REST - PULM; Future    Type 2 diabetes mellitus with hyperosmolarity without coma, without  long-term current use of insulin    Hypothyroidism, unspecified type    Essential hypertension    Mixed hyperlipidemia    Trigger finger of left thumb  -     Ambulatory referral/consult to Hand Surgery; Future    Other orders  -     ALPRAZolam (XANAX) 0.25 MG tablet; Take 1 tablet (0.25 mg total) by mouth nightly as needed for Insomnia or Anxiety.  -     theophylline 400 mg Tb24; Take 1 tablet (400 mg total) by mouth once daily.  -     theophylline 400 mg Tb24; Take 1 tablet (400 mg total) by mouth once daily.  -     fluticasone-salmeterol diskus inhaler 250-50 mcg; Inhale 1 puff into the lungs 2 (two) times daily.

## 2020-05-19 ENCOUNTER — TELEPHONE (OUTPATIENT)
Dept: INTERNAL MEDICINE | Facility: CLINIC | Age: 70
End: 2020-05-19

## 2020-05-19 DIAGNOSIS — R30.0 DYSURIA: Primary | ICD-10-CM

## 2020-05-19 NOTE — TELEPHONE ENCOUNTER
----- Message from Debra Lopez sent at 5/19/2020  8:36 AM CDT -----  Regarding: Reorder urine tests  Please reorder urine tests. Patient did not drop off urine specimen.

## 2020-05-21 ENCOUNTER — TELEPHONE (OUTPATIENT)
Dept: INTERNAL MEDICINE | Facility: CLINIC | Age: 70
End: 2020-05-21

## 2020-05-21 NOTE — PROGRESS NOTES
Called patient to schedule COVID test prior to her PFT.  Patient said she had COVID test done on 5-19-20 and she didn't want to have it done again.  I told her the policy was to have COVID test done 48 hours prior to PFT. Patient said she wasn't going to do it.  Spoke to Kary at Dr. Mcbride's office to let them know.  PFT's cancelled at this time.

## 2020-05-21 NOTE — TELEPHONE ENCOUNTER
pft dept called to state patient refused to repeat covid19 testing before pft testing.    It has to be done w/n 48 hours of test.  They are cancelling the appt for now till patient is willing to do the testing again.

## 2020-05-22 ENCOUNTER — TELEPHONE (OUTPATIENT)
Dept: INTERNAL MEDICINE | Facility: CLINIC | Age: 70
End: 2020-05-22

## 2020-05-22 DIAGNOSIS — I10 ESSENTIAL HYPERTENSION: ICD-10-CM

## 2020-05-22 DIAGNOSIS — E11.00 TYPE 2 DIABETES MELLITUS WITH HYPEROSMOLARITY WITHOUT COMA, WITHOUT LONG-TERM CURRENT USE OF INSULIN: Primary | ICD-10-CM

## 2020-05-22 DIAGNOSIS — R30.0 DYSURIA: ICD-10-CM

## 2020-05-22 NOTE — TELEPHONE ENCOUNTER
Pt was confused about the antibody test and the screening. Explained to pt she needs a screening to confirm she does not have it now. Pt aware and expresses understanding. Will call the pulmonologist to reschedule her pft test. Then will let us know so we can schedule covid-19 screening 48 hours prior.

## 2020-05-22 NOTE — TELEPHONE ENCOUNTER
----- Message from May Lynn sent at 5/22/2020  2:17 PM CDT -----  Contact: self    Patient called in regards to speaking with a nurse on why she has to be tested again for the covid-19 virus 48 before her pulmonary dane she would like someone to explain to her why when she just took the test I tried she would like to speak with a nurse patient upset please advise

## 2020-05-22 NOTE — TELEPHONE ENCOUNTER
----- Message from Edilma Bass MA sent at 5/22/2020  4:39 PM CDT -----  Contact: self       ----- Message -----  From: Stephanie Almanza  Sent: 5/22/2020   4:33 PM CDT  To: [facility] Staff    Calling to get test results.  Name of test (lab, xray, etc.):   labs  Date of test:  5/18  Ordering provider: Henry Mcbride  Where was the test performed:  [place]  Would the patient rather a call back or a response via [facility]?:  Call back  Comments:

## 2020-05-22 NOTE — TELEPHONE ENCOUNTER
Blood test results should showed a blood glucose of 116 and hemoglobin A1c of 6.3%-both values indicate good control of her type 2 diabetes mellitus.  Patient's cholesterol values are within acceptable range.  Kidney and liver function tests were normal.

## 2020-05-23 NOTE — TELEPHONE ENCOUNTER
Spoke with pt re: lab results  Mailed lab results out to pt.  Sent coordinator information re: rescheduling PFT for pt.

## 2020-05-25 NOTE — TELEPHONE ENCOUNTER
Urine test is re entered and scheduled already.    I left her a message to fol up with pulmonary to reschedule pft and pulse ox with rest.

## 2020-05-25 NOTE — TELEPHONE ENCOUNTER
----- Message from Jose Gilliam sent at 5/21/2020  3:50 PM CDT -----  Contact: Patient [phone]  Patient would like to get medical advice.     Comments: Patient calling stating was informed by Pulmonary that the office can not see patient unless, has 48 hour Covid19 testing, done, last testing was done on 5/18/20, call to discuss with patient next steps.     Please call an advise  Thank you

## 2020-06-01 ENCOUNTER — TELEPHONE (OUTPATIENT)
Dept: INTERNAL MEDICINE | Facility: CLINIC | Age: 70
End: 2020-06-01

## 2020-06-01 NOTE — TELEPHONE ENCOUNTER
"EBONY    I told her covid19 antibody test was normal.    Does not want to go thru pft test since they require covid19 testing 2 days prior.  The pft test will cost her $50 and she can't afford it right now.    She is very upset with care she got at hospital . Is upset they put copd on her record and has never had that.  Test cost $50 and she rambled on about how unhappy she was with [facility]. [facility] and only [facility] us because loves seeing you.  She is "pissed off at [facility] and they only want her money" .  Says she is very angry .      Call ended on a good note.  I told her to put off testing for now since breathing is fine.  I assured her rest of labs are stable.   Told her to call us when she needs us.        "

## 2020-06-01 NOTE — TELEPHONE ENCOUNTER
----- Message from Rajwinder Maurer sent at 6/1/2020 12:05 PM CDT -----  Contact: Self  Please contact patient regarding antibody test. She states she has not received this yet.

## 2020-06-02 ENCOUNTER — TELEPHONE (OUTPATIENT)
Dept: INTERNAL MEDICINE | Facility: CLINIC | Age: 70
End: 2020-06-02

## 2020-06-02 PROBLEM — J44.1 COPD EXACERBATION: Status: RESOLVED | Noted: 2020-02-29 | Resolved: 2020-06-02

## 2020-06-02 NOTE — TELEPHONE ENCOUNTER
I spoke with the patient this evening.  We did remove the diagnosis of COPD from her problem list.  The patient has bronchial asthma and did experience an exacerbation of her asthma.  I suspect the COPD was a working diagnosis initially.  She does not have COPD.  At this time the patient wishes to defer pulmonary function testing.  She is doing well with the Advair Diskus inhaler; she has not required her Proventil inhaler for rescue since starting the Advair.

## 2020-06-02 NOTE — TELEPHONE ENCOUNTER
COPD diagnosis has been removed from the problem list.  Patient has a history of asthma.  (The COPD working diagnosis was apparently added during her hospitalization.  It has been removed).    The patient's COVID -19 antibody test was negative.  This would tend to rule out that she had been infected with the COVID-19 virus in recent months.

## 2020-06-02 NOTE — TELEPHONE ENCOUNTER
----- Message from Renetta Azevedo sent at 6/2/2020  2:49 PM CDT -----  Contact: Patient [phone]  Good Afternoon  Patient would  Like her COVID-19 Results from May 18,2020. Patient also is upset she was treated incorrectly by Hospital and said she would like to talk to only Dr Mcbride    Thank you    (see message in chart from yesterday. I spent over 30 min on phone  She wants copd off her problem list.  thanks aguilar)

## 2020-06-02 NOTE — TELEPHONE ENCOUNTER
----- Message from Renetta Azevedo sent at 6/2/2020  2:34 PM CDT -----  Contact: Patient   Good Afternoon  Patient is upset she has COPD on her chart and she want it removed. Patient said she want talk to Dr Mcbride and or Nurse    Thank you

## 2020-06-02 NOTE — TELEPHONE ENCOUNTER
I spent over a half hour on phone with her yesterday.  (documented in other telecall )  She is calling again.  She is upset copd put on her record at hospital.  Wants it removed.    Any way you can remove this from her problem list?     Thanks vickey

## 2020-07-20 ENCOUNTER — TELEPHONE (OUTPATIENT)
Dept: CARDIOLOGY | Facility: CLINIC | Age: 70
End: 2020-07-20

## 2020-07-20 DIAGNOSIS — E03.4 HYPOTHYROIDISM DUE TO ACQUIRED ATROPHY OF THYROID: ICD-10-CM

## 2020-07-20 DIAGNOSIS — E11.00 TYPE 2 DIABETES MELLITUS WITH HYPEROSMOLARITY WITHOUT COMA, WITHOUT LONG-TERM CURRENT USE OF INSULIN: ICD-10-CM

## 2020-07-20 DIAGNOSIS — I10 ESSENTIAL HYPERTENSION: ICD-10-CM

## 2020-07-20 DIAGNOSIS — E78.2 MIXED HYPERLIPIDEMIA: Primary | ICD-10-CM

## 2020-09-12 ENCOUNTER — NURSE TRIAGE (OUTPATIENT)
Dept: ADMINISTRATIVE | Facility: CLINIC | Age: 70
End: 2020-09-12

## 2020-09-12 RX ORDER — METFORMIN HYDROCHLORIDE 500 MG/1
TABLET ORAL
Qty: 30 TABLET | Refills: 0 | Status: SHIPPED | OUTPATIENT
Start: 2020-09-12 | End: 2020-10-20 | Stop reason: SDUPTHER

## 2020-09-12 RX ORDER — ATORVASTATIN CALCIUM 20 MG/1
20 TABLET, FILM COATED ORAL DAILY
Qty: 30 TABLET | Refills: 0 | Status: SHIPPED | OUTPATIENT
Start: 2020-09-12 | End: 2020-10-20 | Stop reason: SDUPTHER

## 2020-09-12 NOTE — TELEPHONE ENCOUNTER
"  Reason for Disposition   [1] Caller has URGENT medication question about med that PCP or specialist prescribed AND [2] triager unable to answer question    Additional Information   Negative: Drug overdose and triager unable to answer question   Negative: Caller requesting information unrelated to medicine   Negative: Caller requesting a prescription for Strep throat and has a positive culture result   Negative: Rash while taking a medication or within 3 days of stopping it   Negative: Immunization reaction suspected   Negative: [1] Asthma and [2] having symptoms of asthma (cough, wheezing, etc.)   Negative: [1] Influenza symptoms AND [2] anti-viral med prescription request, such as Tamiflu   Negative: [1] Symptom of illness (e.g., headache, abdominal pain, earache, vomiting) AND [2] more than mild   Negative: MORE THAN A DOUBLE DOSE of a prescription or over-the-counter (OTC) drug   Negative: [1] DOUBLE DOSE (an extra dose or lesser amount) of over-the-counter (OTC) drug AND [2] any symptoms (e.g., dizziness, nausea, pain, sleepiness)   Negative: [1] DOUBLE DOSE (an extra dose or lesser amount) of prescription drug AND [2] any symptoms (e.g., dizziness, nausea, pain, sleepiness)   Negative: Took another person's prescription drug   Negative: [1] Pharmacy calling with prescription questions AND [2] triager unable to answer question   Negative: [1] Prescription not at pharmacy AND [2] was prescribed by PCP recently   Negative: [1] Request for URGENT new prescription or refill of "essential" medication (i.e., likelihood of harm to patient if not taken) AND [2] triager unable to fill per unit policy   Negative: Diabetes drug error or overdose (e.g., took wrong type of insulin or took extra dose)   Negative: [1] DOUBLE DOSE (an extra dose or lesser amount) of prescription drug AND [2] NO symptoms (Exception: a double dose of antibiotics)    Protocols used: [facility]  Pt called re " meds. needs atorvastatin and metformin. Spoke with dr marta canales for 30 day supply of each. LM on pharm VM at 1228pm.   Pharm: [facility]

## 2020-09-16 ENCOUNTER — OFFICE VISIT (OUTPATIENT)
Dept: INTERNAL MEDICINE | Facility: CLINIC | Age: 70
End: 2020-09-16
Payer: COMMERCIAL

## 2020-09-16 ENCOUNTER — PATIENT OUTREACH (OUTPATIENT)
Dept: ADMINISTRATIVE | Facility: HOSPITAL | Age: 70
End: 2020-09-16

## 2020-09-16 DIAGNOSIS — I10 ESSENTIAL HYPERTENSION: Primary | Chronic | ICD-10-CM

## 2020-09-16 DIAGNOSIS — E11.00 TYPE 2 DIABETES MELLITUS WITH HYPEROSMOLARITY WITHOUT COMA, WITHOUT LONG-TERM CURRENT USE OF INSULIN: Chronic | ICD-10-CM

## 2020-09-16 DIAGNOSIS — J45.41 MODERATE PERSISTENT ASTHMA WITH ACUTE EXACERBATION: ICD-10-CM

## 2020-09-16 PROCEDURE — 99214 OFFICE O/P EST MOD 30 MIN: CPT | Mod: 95,,, | Performed by: NURSE PRACTITIONER

## 2020-09-16 PROCEDURE — 99214 PR OFFICE/OUTPT VISIT, EST, LEVL IV, 30-39 MIN: ICD-10-PCS | Mod: 95,,, | Performed by: NURSE PRACTITIONER

## 2020-09-16 NOTE — PROGRESS NOTES
Established Patient - Audio Only Telehealth Visit     The patient location is: [place].   The chief complaint leading to consultation is: 4 months f/u  Visit type: Virtual visit with audio only (telephone)  Total time spent with patient: 20       The reason for the audio only service rather than synchronous audio and video virtual visit was related to technical difficulties or patient preference/necessity.     Each patient to whom I provide medical services by telemedicine is:  (1) informed of the relationship between the physician and patient and the respective role of any other health care provider with respect to management of the patient; and (2) notified that they may decline to receive medical services by telemedicine and may withdraw from such care at any time. Patient verbally consented to receive this service via voice-only telephone call.       HPI:   This is a 70-year-old female patient of Dr. cronin's who is new to me and presents for a follow-up.  Patient reports feeling well with no complaints, denies shortness of breath or chest pain.  Patient reports she does not need any medication refills.  Patient Tylenol meds well.    Mammogram-scheduled  Labs-done in May       Assessment and plan:      Continue follow-up with cardiologist Dr. Manzano on 10/20/2020  Mammogram scheduled for 10/20/2020  Pt instructed to contact us for any concerns or needs.     Pt reports medical conditions all stable at this time, pt would like to f/u with Dr. Cronin.                             This service was not originating from a related E/M service provided within the previous 7 days nor will  to an E/M service or procedure within the next 24 hours or my soonest available appointment.  Prevailing standard of care was able to be met in this audio-only visit.

## 2020-09-16 NOTE — PROGRESS NOTES
Chart review completed   Spoke with pt and informed her of over due HM topics. Pt states that she wishes to call back and schedule her eye exam due to transportation. Pt scheduled her mammogram with me and states that she will discuss her other HM topics on next PCP audio appt next month.

## 2020-09-17 ENCOUNTER — TELEPHONE (OUTPATIENT)
Dept: INTERNAL MEDICINE | Facility: CLINIC | Age: 70
End: 2020-09-17

## 2020-09-17 NOTE — TELEPHONE ENCOUNTER
spk to pt informed her to continue her regular follow ups with . Pt aware and expresses understanding.

## 2020-09-17 NOTE — TELEPHONE ENCOUNTER
----- Message from Hailey Hart sent at 9/17/2020  8:13 AM CDT -----  Contact: self [phone]  Pt states she would like to know does she need to her pcp in 4 months like she has always done through out the years. Pt states she know she wont be able to see dr cronin until December. Please call and advise

## 2020-10-06 ENCOUNTER — TELEPHONE (OUTPATIENT)
Dept: INTERNAL MEDICINE | Facility: CLINIC | Age: 70
End: 2020-10-06

## 2020-10-06 ENCOUNTER — TELEPHONE (OUTPATIENT)
Dept: CARDIOLOGY | Facility: CLINIC | Age: 70
End: 2020-10-06

## 2020-10-06 NOTE — TELEPHONE ENCOUNTER
----- Message from Ghislaine Roa sent at 10/6/2020 12:06 PM CDT -----  Requesting an RX refill or new RX.  Is this a refill or new RX:  refill  RX name and strength: metFORMIN (GLUCOPHAGE) 500 MG tablet  Directions (copy/paste from chart):  TAKE 1 TABLET BY MOUTH DAILY BEFORE DINNER  Is this a 30 day or 90 day RX:  30  Local pharmacy or mail order pharmacy:    Pharmacy name and phone # (copy/paste from chart):   [id number] - [place], [place] - [address] [phone] (Phone)  [phone] (Fax)        Requesting an RX refill or new RX.  Is this a refill or new RX:  refill  RX name and strength: atorvastatin (LIPITOR) 20 MG tablet  Directions (copy/paste from chart):  TAKE 1 TABLET BY MOUTH EVERY DAY  Is this a 30 day or 90 day RX:  30  Local pharmacy or mail order pharmacy:    Pharmacy name and phone # (copy/paste from chart):   [id number] - [place], [place] - [address] [phone] (Phone)  [phone] (Fax)      Comments:

## 2020-10-06 NOTE — TELEPHONE ENCOUNTER
----- Message from Roshni Manzano MD sent at 10/6/2020  9:13 AM CDT -----  Please mail patient the blood work results and inform the patient that we will discuss it in detail during the upcoming visit. No changes from the last one.

## 2020-10-06 NOTE — TELEPHONE ENCOUNTER
Dr cronin ok'd both these meds last month and gave years refills.    I told her and she was yelling and upset because pharmacy won't fill.  I told her i'd call in refills again and follow up with pharmacy tomorrow.

## 2020-10-19 ENCOUNTER — PATIENT OUTREACH (OUTPATIENT)
Dept: ADMINISTRATIVE | Facility: OTHER | Age: 70
End: 2020-10-19

## 2020-10-19 NOTE — PROGRESS NOTES
Care Everywhere: updated  Immunization: updated  Health Maintenance: updated  Media Review: review for outside eye exam and colon cancer report   Legacy Review:   Order placed:   Upcoming appts:mammogram 10/20

## 2020-10-20 ENCOUNTER — OFFICE VISIT (OUTPATIENT)
Dept: CARDIOLOGY | Facility: CLINIC | Age: 70
End: 2020-10-20
Payer: COMMERCIAL

## 2020-10-20 ENCOUNTER — HOSPITAL ENCOUNTER (OUTPATIENT)
Dept: RADIOLOGY | Facility: HOSPITAL | Age: 70
Discharge: HOME | End: 2020-10-20
Attending: INTERNAL MEDICINE
Payer: COMMERCIAL

## 2020-10-20 VITALS
HEIGHT: 62 IN | SYSTOLIC BLOOD PRESSURE: 142 MMHG | HEART RATE: 70 BPM | DIASTOLIC BLOOD PRESSURE: 61 MMHG | BODY MASS INDEX: 34.98 KG/M2 | WEIGHT: 190.06 LBS

## 2020-10-20 DIAGNOSIS — E11.00 TYPE 2 DIABETES MELLITUS WITH HYPEROSMOLARITY WITHOUT COMA, WITHOUT LONG-TERM CURRENT USE OF INSULIN: Chronic | ICD-10-CM

## 2020-10-20 DIAGNOSIS — J45.41 MODERATE PERSISTENT ASTHMA WITH ACUTE EXACERBATION: ICD-10-CM

## 2020-10-20 DIAGNOSIS — I25.10 CORONARY ARTERY DISEASE INVOLVING NATIVE CORONARY ARTERY OF NATIVE HEART WITHOUT ANGINA PECTORIS: Primary | Chronic | ICD-10-CM

## 2020-10-20 DIAGNOSIS — I10 ESSENTIAL HYPERTENSION: Chronic | ICD-10-CM

## 2020-10-20 DIAGNOSIS — E78.2 MIXED HYPERLIPIDEMIA: ICD-10-CM

## 2020-10-20 DIAGNOSIS — Z12.39 BREAST CANCER SCREENING: ICD-10-CM

## 2020-10-20 DIAGNOSIS — I50.30 HEART FAILURE WITH PRESERVED EJECTION FRACTION, UNSPECIFIED HF CHRONICITY: ICD-10-CM

## 2020-10-20 DIAGNOSIS — E03.4 HYPOTHYROIDISM DUE TO ACQUIRED ATROPHY OF THYROID: ICD-10-CM

## 2020-10-20 PROCEDURE — 99999 PR PBB SHADOW E&M-EST. PATIENT-LVL IV: CPT | Mod: PBBFAC,,, | Performed by: INTERNAL MEDICINE

## 2020-10-20 PROCEDURE — 3008F BODY MASS INDEX DOCD: CPT | Mod: CPTII,S$GLB,, | Performed by: INTERNAL MEDICINE

## 2020-10-20 PROCEDURE — 99213 PR OFFICE/OUTPT VISIT, EST, LEVL III, 20-29 MIN: ICD-10-PCS | Mod: S$GLB,,, | Performed by: INTERNAL MEDICINE

## 2020-10-20 PROCEDURE — 1126F AMNT PAIN NOTED NONE PRSNT: CPT | Mod: S$GLB,,, | Performed by: INTERNAL MEDICINE

## 2020-10-20 PROCEDURE — 77067 SCR MAMMO BI INCL CAD: CPT | Mod: TC,PO

## 2020-10-20 PROCEDURE — 99213 OFFICE O/P EST LOW 20 MIN: CPT | Mod: S$GLB,,, | Performed by: INTERNAL MEDICINE

## 2020-10-20 PROCEDURE — 3078F DIAST BP <80 MM HG: CPT | Mod: CPTII,S$GLB,, | Performed by: INTERNAL MEDICINE

## 2020-10-20 PROCEDURE — 3044F PR MOST RECENT HEMOGLOBIN A1C LEVEL <7.0%: ICD-10-PCS | Mod: CPTII,S$GLB,, | Performed by: INTERNAL MEDICINE

## 2020-10-20 PROCEDURE — 1126F PR PAIN SEVERITY QUANTIFIED, NO PAIN PRESENT: ICD-10-PCS | Mod: S$GLB,,, | Performed by: INTERNAL MEDICINE

## 2020-10-20 PROCEDURE — 1159F PR MEDICATION LIST DOCUMENTED IN MEDICAL RECORD: ICD-10-PCS | Mod: S$GLB,,, | Performed by: INTERNAL MEDICINE

## 2020-10-20 PROCEDURE — 3044F HG A1C LEVEL LT 7.0%: CPT | Mod: CPTII,S$GLB,, | Performed by: INTERNAL MEDICINE

## 2020-10-20 PROCEDURE — 3077F SYST BP >= 140 MM HG: CPT | Mod: CPTII,S$GLB,, | Performed by: INTERNAL MEDICINE

## 2020-10-20 PROCEDURE — 1101F PT FALLS ASSESS-DOCD LE1/YR: CPT | Mod: CPTII,S$GLB,, | Performed by: INTERNAL MEDICINE

## 2020-10-20 PROCEDURE — 77063 MAMMO DIGITAL SCREENING BILAT WITH TOMO: ICD-10-PCS | Mod: 26,,, | Performed by: RADIOLOGY

## 2020-10-20 PROCEDURE — 77063 BREAST TOMOSYNTHESIS BI: CPT | Mod: 26,,, | Performed by: RADIOLOGY

## 2020-10-20 PROCEDURE — 77067 MAMMO DIGITAL SCREENING BILAT WITH TOMO: ICD-10-PCS | Mod: 26,,, | Performed by: RADIOLOGY

## 2020-10-20 PROCEDURE — 1101F PR PT FALLS ASSESS DOC 0-1 FALLS W/OUT INJ PAST YR: ICD-10-PCS | Mod: CPTII,S$GLB,, | Performed by: INTERNAL MEDICINE

## 2020-10-20 PROCEDURE — 3077F PR MOST RECENT SYSTOLIC BLOOD PRESSURE >= 140 MM HG: ICD-10-PCS | Mod: CPTII,S$GLB,, | Performed by: INTERNAL MEDICINE

## 2020-10-20 PROCEDURE — 1159F MED LIST DOCD IN RCRD: CPT | Mod: S$GLB,,, | Performed by: INTERNAL MEDICINE

## 2020-10-20 PROCEDURE — 99999 PR PBB SHADOW E&M-EST. PATIENT-LVL IV: ICD-10-PCS | Mod: PBBFAC,,, | Performed by: INTERNAL MEDICINE

## 2020-10-20 PROCEDURE — 3078F PR MOST RECENT DIASTOLIC BLOOD PRESSURE < 80 MM HG: ICD-10-PCS | Mod: CPTII,S$GLB,, | Performed by: INTERNAL MEDICINE

## 2020-10-20 PROCEDURE — 77067 SCR MAMMO BI INCL CAD: CPT | Mod: 26,,, | Performed by: RADIOLOGY

## 2020-10-20 PROCEDURE — 3008F PR BODY MASS INDEX (BMI) DOCUMENTED: ICD-10-PCS | Mod: CPTII,S$GLB,, | Performed by: INTERNAL MEDICINE

## 2020-10-20 NOTE — PROGRESS NOTES
"Subjective:   Patient ID:  Noah Bailey is a 70 y.o. female who presents for follow-up of Coronary Artery Disease      HPI: Recent history: taking medications as instructed, no medication side effects noted, no TIA's, no chest pain on exertion, no dyspnea on exertion and no swelling of ankles. Patient's symptoms have been unchanged. No medication side effects.    In January seen in ER with COPD exacerbation.  Since then no symptoms.    Blood work is fine.      Lab Results   Component Value Date     10/05/2020    K 4.5 10/05/2020     10/05/2020    CO2 24 10/05/2020    BUN 9 10/05/2020    CREATININE 0.66 10/05/2020     (H) 10/05/2020    HGBA1C 6.4 (H) 10/05/2020    MG 2.1 03/02/2020    MG 2.1 03/02/2020    AST 27 10/05/2020    ALT 27 10/05/2020    ALBUMIN 4.9 10/05/2020    PROT 8.6 (H) 10/05/2020    BILITOT 0.8 10/05/2020    WBC 10.23 05/18/2020    HGB 14.5 05/18/2020    HCT 46.4 05/18/2020    MCV 92 05/18/2020     (H) 05/18/2020    TSH 2.770 10/05/2020    CHOL 167 10/05/2020    HDL 50 10/05/2020    LDLCALC 91.4 10/05/2020    TRIG 128 10/05/2020           Review of Systems   Constitution: Negative.   HENT: Negative.    Eyes: Negative.    Cardiovascular: Negative.  Negative for chest pain, claudication, dyspnea on exertion, irregular heartbeat, leg swelling, near-syncope, palpitations and syncope.   Respiratory: Positive for shortness of breath. Negative for cough, snoring and wheezing.    Endocrine: Negative.  Negative for cold intolerance, heat intolerance, polydipsia, polyphagia and polyuria.   Skin: Negative.    Musculoskeletal: Positive for back pain.   Gastrointestinal: Positive for heartburn.   Genitourinary: Negative.    Neurological: Negative.    Psychiatric/Behavioral: The patient is nervous/anxious.        Objective:   Physical Exam   Constitutional: She is oriented to person, place, and time. She appears well-developed and well-nourished.   BP (!) 142/61   Pulse 70   Ht 5' 2" " (1.575 m)   Wt 86.2 kg (190 lb 0.6 oz)   BMI 34.76 kg/m²      HENT:   Head: Normocephalic.   Eyes: Pupils are equal, round, and reactive to light.   Neck: Normal range of motion. Neck supple. Carotid bruit is not present. No thyromegaly present.   Cardiovascular: Normal rate, regular rhythm, normal heart sounds and intact distal pulses. Exam reveals no gallop and no friction rub.   No murmur heard.  Pulses:       Carotid pulses are 2+ on the right side and 2+ on the left side.       Radial pulses are 2+ on the right side and 2+ on the left side.        Femoral pulses are 2+ on the right side and 2+ on the left side.       Popliteal pulses are 2+ on the right side and 2+ on the left side.        Dorsalis pedis pulses are 2+ on the right side and 2+ on the left side.        Posterior tibial pulses are 2+ on the right side and 2+ on the left side.   Pulmonary/Chest: Effort normal and breath sounds normal. No respiratory distress. She has no wheezes. She has no rales. She exhibits no tenderness.   Abdominal: Soft. Bowel sounds are normal.   Musculoskeletal: Normal range of motion.         General: No edema.   Neurological: She is alert and oriented to person, place, and time.   Skin: Skin is warm and dry.   Psychiatric: She has a normal mood and affect.   Nursing note and vitals reviewed.        Assessment and Plan:     Problem List Items Addressed This Visit        Cardiology Problems    Essential hypertension (Chronic)    Coronary artery disease involving native coronary artery of native heart without angina pectoris - Primary (Chronic)    Hyperlipidemia    (HFpEF) heart failure with preserved ejection fraction       Other    Type 2 diabetes mellitus (Chronic)    Bronchial asthma    Hypothyroidism due to acquired atrophy of thyroid          Patient's Medications   New Prescriptions    No medications on file   Previous Medications    ALBUTEROL (PROVENTIL/VENTOLIN HFA) 90 MCG/ACTUATION INHALER    Inhale 1-2 puffs into  the lungs every 6 (six) hours as needed for Wheezing. Rescue    ALPRAZOLAM (XANAX) 0.25 MG TABLET    Take 1 tablet (0.25 mg total) by mouth nightly as needed for Insomnia or Anxiety.    AMLODIPINE-OLMESARTAN (KARISSA) 10-40 MG PER TABLET    TAKE 1 TABLET BY MOUTH ONCE DAILY FOR BLOOD PRESSURE. REPELACE WITH BENAZEPRIL/AMLODIPINE (LOTREL)    ASPIRIN (ASPIR-LOW) 81 MG EC TABLET    Take 1 tablet (81 mg total) by mouth once daily.    ATORVASTATIN (LIPITOR) 20 MG TABLET    TAKE 1 TABLET EVERY DAY    BLOOD-GLUCOSE METER (FREESTYLE LITE METER) KIT    Use as instructed    FLUTICASONE-SALMETEROL DISKUS INHALER 250-50 MCG    Inhale 1 puff into the lungs 2 (two) times daily.    LANCETS (MICROLET LANCET) MISC    Test blood sugar once daily.    LEVOTHYROXINE (SYNTHROID) 50 MCG TABLET    TAKE 1 TABLET BY MOUTH EVERY DAY IN THE MORNING    METFORMIN (GLUCOPHAGE) 500 MG TABLET    TAKE 1 TABLET EVERY DAY BEFORE DINNER    METOPROLOL SUCCINATE (TOPROL-XL) 50 MG 24 HR TABLET    Take 1 tablet (50 mg total) by mouth once daily. For heart and blood presure    NITROGLYCERIN (NITROSTAT) 0.4 MG SL TABLET    Place 1 tablet (0.4 mg total) under the tongue every 5 (five) minutes as needed for Chest pain ((Shakira RUST)).    PRECISION XTRA TEST STRP    USE AS DIRECTED DAILY    THEOPHYLLINE 400 MG TB24    Take 1 tablet (400 mg total) by mouth once daily.   Modified Medications    No medications on file   Discontinued Medications    AMOXICILLIN-CLAVULANATE 875-125MG (AUGMENTIN) 875-125 MG PER TABLET    Take 1 tablet by mouth every 12 (twelve) hours.    ATORVASTATIN (LIPITOR) 20 MG TABLET    Take 1 tablet (20 mg total) by mouth once daily.    METFORMIN (GLUCOPHAGE) 500 MG TABLET    TAKE 1 TABLET BY MOUTH DAILY before dinner    THEOPHYLLINE 400 MG TB24    Take 1 tablet (400 mg total) by mouth once daily.     Continue on the present regimen.  3 years since the coronary stent intervention.  Offered repeat stress echo. Patient declined.    Follow up in about  1 year (around 10/20/2021). or as needed.

## 2020-11-05 ENCOUNTER — TELEPHONE (OUTPATIENT)
Dept: CARDIOLOGY | Facility: CLINIC | Age: 70
End: 2020-11-05

## 2020-11-05 NOTE — TELEPHONE ENCOUNTER
Pt is having teeth extracted w/r. Daryn Levine DDS and needs clearance to stop ASA 5 days before the extractions. Please advise.

## 2020-12-14 ENCOUNTER — TELEPHONE (OUTPATIENT)
Dept: INTERNAL MEDICINE | Facility: CLINIC | Age: 70
End: 2020-12-14

## 2020-12-14 NOTE — TELEPHONE ENCOUNTER
----- Message from Andreea Levine sent at 12/14/2020  3:37 PM CST -----  Regarding: [phone]  Requesting an RX refill or new RX.    Is this a refill or new RX: Refill    RX name and strength: Advair inhaler    Pharmacy name and phone # (copy/paste from chart):  [facility]/pharmacy #[id number] - [place] - [address] [phone] (Phone)  [phone] (Fax)      Comments: Noah is requesting a callback regarding her medication.  She states that she has been calling since Thursday and the pharmacy has sent 3 messages over to the doctor for him to refill the medication.

## 2020-12-14 NOTE — TELEPHONE ENCOUNTER
"" erx was sent one time yesterday, Sunday, at 12:22pm.  Our policy is it takes 24-48 hours for dr to respond.  "      She gets upset over everything.    I do not have time to call her back today.  If she calls again, let her know the above.  "

## 2021-04-12 ENCOUNTER — PATIENT OUTREACH (OUTPATIENT)
Dept: ADMINISTRATIVE | Facility: HOSPITAL | Age: 71
End: 2021-04-12

## 2021-04-12 DIAGNOSIS — E11.01 TYPE 2 DIABETES MELLITUS WITH HYPEROSMOLAR COMA, WITHOUT LONG-TERM CURRENT USE OF INSULIN: Primary | ICD-10-CM

## 2021-04-28 ENCOUNTER — PATIENT OUTREACH (OUTPATIENT)
Dept: ADMINISTRATIVE | Facility: HOSPITAL | Age: 71
End: 2021-04-28

## 2021-05-12 DIAGNOSIS — E11.9 TYPE 2 DIABETES MELLITUS WITHOUT COMPLICATION: ICD-10-CM

## 2021-05-13 ENCOUNTER — OFFICE VISIT (OUTPATIENT)
Dept: OPTOMETRY | Facility: CLINIC | Age: 71
End: 2021-05-13
Payer: COMMERCIAL

## 2021-05-13 ENCOUNTER — PATIENT OUTREACH (OUTPATIENT)
Dept: ADMINISTRATIVE | Facility: OTHER | Age: 71
End: 2021-05-13

## 2021-05-13 ENCOUNTER — OFFICE VISIT (OUTPATIENT)
Dept: INTERNAL MEDICINE | Facility: CLINIC | Age: 71
End: 2021-05-13
Payer: COMMERCIAL

## 2021-05-13 VITALS
SYSTOLIC BLOOD PRESSURE: 130 MMHG | DIASTOLIC BLOOD PRESSURE: 70 MMHG | RESPIRATION RATE: 16 BRPM | WEIGHT: 192.25 LBS | BODY MASS INDEX: 35.38 KG/M2 | OXYGEN SATURATION: 96 % | HEART RATE: 78 BPM | HEIGHT: 62 IN | TEMPERATURE: 97 F

## 2021-05-13 DIAGNOSIS — E11.9 TYPE 2 DIABETES MELLITUS WITHOUT COMPLICATION, WITHOUT LONG-TERM CURRENT USE OF INSULIN: Primary | ICD-10-CM

## 2021-05-13 DIAGNOSIS — H52.4 PRESBYOPIA: ICD-10-CM

## 2021-05-13 DIAGNOSIS — J45.40 MODERATE PERSISTENT ASTHMA WITHOUT COMPLICATION: ICD-10-CM

## 2021-05-13 DIAGNOSIS — F41.9 ANXIETY: ICD-10-CM

## 2021-05-13 DIAGNOSIS — K63.5 POLYP OF COLON, UNSPECIFIED PART OF COLON, UNSPECIFIED TYPE: ICD-10-CM

## 2021-05-13 DIAGNOSIS — E11.9 TYPE 2 DIABETES MELLITUS WITHOUT RETINOPATHY: Primary | ICD-10-CM

## 2021-05-13 DIAGNOSIS — Z13.5 GLAUCOMA SCREENING: ICD-10-CM

## 2021-05-13 DIAGNOSIS — E78.2 MIXED HYPERLIPIDEMIA: ICD-10-CM

## 2021-05-13 DIAGNOSIS — I50.30 HEART FAILURE WITH PRESERVED EJECTION FRACTION, UNSPECIFIED HF CHRONICITY: ICD-10-CM

## 2021-05-13 DIAGNOSIS — E03.4 HYPOTHYROIDISM DUE TO ACQUIRED ATROPHY OF THYROID: ICD-10-CM

## 2021-05-13 DIAGNOSIS — I10 ESSENTIAL HYPERTENSION: ICD-10-CM

## 2021-05-13 DIAGNOSIS — Z12.11 COLON CANCER SCREENING: ICD-10-CM

## 2021-05-13 DIAGNOSIS — H25.13 NUCLEAR SCLEROSIS, BILATERAL: ICD-10-CM

## 2021-05-13 PROCEDURE — 1159F PR MEDICATION LIST DOCUMENTED IN MEDICAL RECORD: ICD-10-PCS | Mod: S$GLB,,, | Performed by: INTERNAL MEDICINE

## 2021-05-13 PROCEDURE — 99214 OFFICE O/P EST MOD 30 MIN: CPT | Mod: S$GLB,,, | Performed by: INTERNAL MEDICINE

## 2021-05-13 PROCEDURE — 3044F PR MOST RECENT HEMOGLOBIN A1C LEVEL <7.0%: ICD-10-PCS | Mod: CPTII,S$GLB,, | Performed by: OPTOMETRIST

## 2021-05-13 PROCEDURE — 3075F SYST BP GE 130 - 139MM HG: CPT | Mod: CPTII,S$GLB,, | Performed by: INTERNAL MEDICINE

## 2021-05-13 PROCEDURE — 99999 PR PBB SHADOW E&M-EST. PATIENT-LVL V: ICD-10-PCS | Mod: PBBFAC,,, | Performed by: INTERNAL MEDICINE

## 2021-05-13 PROCEDURE — 99999 PR PBB SHADOW E&M-EST. PATIENT-LVL III: ICD-10-PCS | Mod: PBBFAC,,, | Performed by: OPTOMETRIST

## 2021-05-13 PROCEDURE — 3075F PR MOST RECENT SYSTOLIC BLOOD PRESS GE 130-139MM HG: ICD-10-PCS | Mod: CPTII,S$GLB,, | Performed by: INTERNAL MEDICINE

## 2021-05-13 PROCEDURE — 99214 PR OFFICE/OUTPT VISIT, EST, LEVL IV, 30-39 MIN: ICD-10-PCS | Mod: S$GLB,,, | Performed by: INTERNAL MEDICINE

## 2021-05-13 PROCEDURE — 2023F PR DILATED RETINAL EXAM W/O EVID OF RETINOPATHY: ICD-10-PCS | Mod: S$GLB,,, | Performed by: OPTOMETRIST

## 2021-05-13 PROCEDURE — 3044F HG A1C LEVEL LT 7.0%: CPT | Mod: CPTII,S$GLB,, | Performed by: INTERNAL MEDICINE

## 2021-05-13 PROCEDURE — 1101F PR PT FALLS ASSESS DOC 0-1 FALLS W/OUT INJ PAST YR: ICD-10-PCS | Mod: CPTII,S$GLB,, | Performed by: INTERNAL MEDICINE

## 2021-05-13 PROCEDURE — 3078F DIAST BP <80 MM HG: CPT | Mod: CPTII,S$GLB,, | Performed by: INTERNAL MEDICINE

## 2021-05-13 PROCEDURE — 92004 COMPRE OPH EXAM NEW PT 1/>: CPT | Mod: S$GLB,,, | Performed by: OPTOMETRIST

## 2021-05-13 PROCEDURE — 3288F PR FALLS RISK ASSESSMENT DOCUMENTED: ICD-10-PCS | Mod: CPTII,S$GLB,, | Performed by: OPTOMETRIST

## 2021-05-13 PROCEDURE — 3288F FALL RISK ASSESSMENT DOCD: CPT | Mod: CPTII,S$GLB,, | Performed by: OPTOMETRIST

## 2021-05-13 PROCEDURE — 92004 PR EYE EXAM, NEW PATIENT,COMPREHESV: ICD-10-PCS | Mod: S$GLB,,, | Performed by: OPTOMETRIST

## 2021-05-13 PROCEDURE — 3288F PR FALLS RISK ASSESSMENT DOCUMENTED: ICD-10-PCS | Mod: CPTII,S$GLB,, | Performed by: INTERNAL MEDICINE

## 2021-05-13 PROCEDURE — 99999 PR PBB SHADOW E&M-EST. PATIENT-LVL III: CPT | Mod: PBBFAC,,, | Performed by: OPTOMETRIST

## 2021-05-13 PROCEDURE — 1101F PT FALLS ASSESS-DOCD LE1/YR: CPT | Mod: CPTII,S$GLB,, | Performed by: INTERNAL MEDICINE

## 2021-05-13 PROCEDURE — 3288F FALL RISK ASSESSMENT DOCD: CPT | Mod: CPTII,S$GLB,, | Performed by: INTERNAL MEDICINE

## 2021-05-13 PROCEDURE — 2023F DILAT RTA XM W/O RTNOPTHY: CPT | Mod: S$GLB,,, | Performed by: OPTOMETRIST

## 2021-05-13 PROCEDURE — 1159F MED LIST DOCD IN RCRD: CPT | Mod: S$GLB,,, | Performed by: INTERNAL MEDICINE

## 2021-05-13 PROCEDURE — 92015 PR REFRACTION: ICD-10-PCS | Mod: S$GLB,,, | Performed by: OPTOMETRIST

## 2021-05-13 PROCEDURE — 1126F AMNT PAIN NOTED NONE PRSNT: CPT | Mod: S$GLB,,, | Performed by: OPTOMETRIST

## 2021-05-13 PROCEDURE — 3044F PR MOST RECENT HEMOGLOBIN A1C LEVEL <7.0%: ICD-10-PCS | Mod: CPTII,S$GLB,, | Performed by: INTERNAL MEDICINE

## 2021-05-13 PROCEDURE — 1126F PR PAIN SEVERITY QUANTIFIED, NO PAIN PRESENT: ICD-10-PCS | Mod: S$GLB,,, | Performed by: OPTOMETRIST

## 2021-05-13 PROCEDURE — 3008F PR BODY MASS INDEX (BMI) DOCUMENTED: ICD-10-PCS | Mod: CPTII,S$GLB,, | Performed by: INTERNAL MEDICINE

## 2021-05-13 PROCEDURE — 1126F PR PAIN SEVERITY QUANTIFIED, NO PAIN PRESENT: ICD-10-PCS | Mod: S$GLB,,, | Performed by: INTERNAL MEDICINE

## 2021-05-13 PROCEDURE — 3078F PR MOST RECENT DIASTOLIC BLOOD PRESSURE < 80 MM HG: ICD-10-PCS | Mod: CPTII,S$GLB,, | Performed by: INTERNAL MEDICINE

## 2021-05-13 PROCEDURE — 3008F BODY MASS INDEX DOCD: CPT | Mod: CPTII,S$GLB,, | Performed by: INTERNAL MEDICINE

## 2021-05-13 PROCEDURE — 1101F PT FALLS ASSESS-DOCD LE1/YR: CPT | Mod: CPTII,S$GLB,, | Performed by: OPTOMETRIST

## 2021-05-13 PROCEDURE — 1101F PR PT FALLS ASSESS DOC 0-1 FALLS W/OUT INJ PAST YR: ICD-10-PCS | Mod: CPTII,S$GLB,, | Performed by: OPTOMETRIST

## 2021-05-13 PROCEDURE — 1126F AMNT PAIN NOTED NONE PRSNT: CPT | Mod: S$GLB,,, | Performed by: INTERNAL MEDICINE

## 2021-05-13 PROCEDURE — 99999 PR PBB SHADOW E&M-EST. PATIENT-LVL V: CPT | Mod: PBBFAC,,, | Performed by: INTERNAL MEDICINE

## 2021-05-13 PROCEDURE — 3044F HG A1C LEVEL LT 7.0%: CPT | Mod: CPTII,S$GLB,, | Performed by: OPTOMETRIST

## 2021-05-13 PROCEDURE — 92015 DETERMINE REFRACTIVE STATE: CPT | Mod: S$GLB,,, | Performed by: OPTOMETRIST

## 2021-05-13 RX ORDER — ALPRAZOLAM 0.25 MG/1
0.25 TABLET ORAL NIGHTLY PRN
Qty: 30 TABLET | Refills: 2 | Status: SHIPPED | OUTPATIENT
Start: 2021-05-13 | End: 2021-05-13 | Stop reason: SDUPTHER

## 2021-05-13 RX ORDER — THEOPHYLLINE 400 MG/1
1 TABLET, EXTENDED RELEASE ORAL DAILY
Qty: 30 TABLET | Refills: 11 | Status: SHIPPED | OUTPATIENT
Start: 2021-05-13 | End: 2021-05-27

## 2021-05-13 RX ORDER — FLUTICASONE PROPIONATE AND SALMETEROL 250; 50 UG/1; UG/1
POWDER RESPIRATORY (INHALATION)
Qty: 60 EACH | Refills: 6 | Status: SHIPPED | OUTPATIENT
Start: 2021-05-13 | End: 2021-11-22 | Stop reason: SDUPTHER

## 2021-05-13 RX ORDER — ALPRAZOLAM 0.25 MG/1
0.25 TABLET ORAL NIGHTLY PRN
Qty: 30 TABLET | Refills: 2 | Status: SHIPPED | OUTPATIENT
Start: 2021-05-13 | End: 2021-09-30 | Stop reason: SDUPTHER

## 2021-05-28 ENCOUNTER — TELEPHONE (OUTPATIENT)
Dept: INTERNAL MEDICINE | Facility: CLINIC | Age: 71
End: 2021-05-28

## 2021-05-28 RX ORDER — THEOPHYLLINE 450 MG/1
450 TABLET, EXTENDED RELEASE ORAL DAILY
Qty: 30 TABLET | Refills: 11 | Status: SHIPPED | OUTPATIENT
Start: 2021-05-28 | End: 2021-05-28

## 2021-07-18 ENCOUNTER — PATIENT OUTREACH (OUTPATIENT)
Dept: ADMINISTRATIVE | Facility: OTHER | Age: 71
End: 2021-07-18

## 2021-07-19 ENCOUNTER — OFFICE VISIT (OUTPATIENT)
Dept: OPHTHALMOLOGY | Facility: CLINIC | Age: 71
End: 2021-07-19
Payer: COMMERCIAL

## 2021-07-19 DIAGNOSIS — H26.9 CORTICAL CATARACT OF BOTH EYES: ICD-10-CM

## 2021-07-19 DIAGNOSIS — H43.811 VITREOUS DETACHMENT OF RIGHT EYE: ICD-10-CM

## 2021-07-19 DIAGNOSIS — H25.13 NUCLEAR SCLEROSIS OF BOTH EYES: Primary | ICD-10-CM

## 2021-07-19 DIAGNOSIS — I10 ESSENTIAL HYPERTENSION: ICD-10-CM

## 2021-07-19 DIAGNOSIS — H52.7 REFRACTIVE ERROR: ICD-10-CM

## 2021-07-19 DIAGNOSIS — E11.9 DM TYPE 2 WITHOUT RETINOPATHY: ICD-10-CM

## 2021-07-19 PROCEDURE — 1101F PR PT FALLS ASSESS DOC 0-1 FALLS W/OUT INJ PAST YR: ICD-10-PCS | Mod: CPTII,S$GLB,, | Performed by: OPHTHALMOLOGY

## 2021-07-19 PROCEDURE — 3288F FALL RISK ASSESSMENT DOCD: CPT | Mod: CPTII,S$GLB,, | Performed by: OPHTHALMOLOGY

## 2021-07-19 PROCEDURE — 3288F PR FALLS RISK ASSESSMENT DOCUMENTED: ICD-10-PCS | Mod: CPTII,S$GLB,, | Performed by: OPHTHALMOLOGY

## 2021-07-19 PROCEDURE — 1126F PR PAIN SEVERITY QUANTIFIED, NO PAIN PRESENT: ICD-10-PCS | Mod: CPTII,S$GLB,, | Performed by: OPHTHALMOLOGY

## 2021-07-19 PROCEDURE — 99999 PR PBB SHADOW E&M-EST. PATIENT-LVL II: CPT | Mod: PBBFAC,,, | Performed by: OPHTHALMOLOGY

## 2021-07-19 PROCEDURE — 1101F PT FALLS ASSESS-DOCD LE1/YR: CPT | Mod: CPTII,S$GLB,, | Performed by: OPHTHALMOLOGY

## 2021-07-19 PROCEDURE — 92136 OPHTHALMIC BIOMETRY: CPT | Mod: LT,S$GLB,, | Performed by: OPHTHALMOLOGY

## 2021-07-19 PROCEDURE — 99999 PR PBB SHADOW E&M-EST. PATIENT-LVL II: ICD-10-PCS | Mod: PBBFAC,,, | Performed by: OPHTHALMOLOGY

## 2021-07-19 PROCEDURE — 3044F HG A1C LEVEL LT 7.0%: CPT | Mod: CPTII,S$GLB,, | Performed by: OPHTHALMOLOGY

## 2021-07-19 PROCEDURE — 2023F PR DILATED RETINAL EXAM W/O EVID OF RETINOPATHY: ICD-10-PCS | Mod: CPTII,S$GLB,, | Performed by: OPHTHALMOLOGY

## 2021-07-19 PROCEDURE — 2023F DILAT RTA XM W/O RTNOPTHY: CPT | Mod: CPTII,S$GLB,, | Performed by: OPHTHALMOLOGY

## 2021-07-19 PROCEDURE — 92004 COMPRE OPH EXAM NEW PT 1/>: CPT | Mod: S$GLB,,, | Performed by: OPHTHALMOLOGY

## 2021-07-19 PROCEDURE — 92136 BIOMETRY: ICD-10-PCS | Mod: LT,S$GLB,, | Performed by: OPHTHALMOLOGY

## 2021-07-19 PROCEDURE — 92004 PR EYE EXAM, NEW PATIENT,COMPREHESV: ICD-10-PCS | Mod: S$GLB,,, | Performed by: OPHTHALMOLOGY

## 2021-07-19 PROCEDURE — 1126F AMNT PAIN NOTED NONE PRSNT: CPT | Mod: CPTII,S$GLB,, | Performed by: OPHTHALMOLOGY

## 2021-07-19 PROCEDURE — 3044F PR MOST RECENT HEMOGLOBIN A1C LEVEL <7.0%: ICD-10-PCS | Mod: CPTII,S$GLB,, | Performed by: OPHTHALMOLOGY

## 2021-07-19 RX ORDER — NEPAFENAC 3 MG/ML
1 SUSPENSION/ DROPS OPHTHALMIC DAILY
Qty: 3 ML | Refills: 1 | Status: SHIPPED | OUTPATIENT
Start: 2021-10-16 | End: 2021-07-21

## 2021-07-19 RX ORDER — DUREZOL 0.5 MG/ML
1 EMULSION OPHTHALMIC 4 TIMES DAILY
Qty: 5 ML | Refills: 1 | Status: SHIPPED | OUTPATIENT
Start: 2021-10-19 | End: 2021-07-21

## 2021-07-19 RX ORDER — OFLOXACIN 3 MG/ML
1 SOLUTION/ DROPS OPHTHALMIC 4 TIMES DAILY
Qty: 5 ML | Refills: 1 | Status: SHIPPED | OUTPATIENT
Start: 2021-10-16 | End: 2021-10-26

## 2021-07-21 ENCOUNTER — TELEPHONE (OUTPATIENT)
Dept: OPHTHALMOLOGY | Facility: CLINIC | Age: 71
End: 2021-07-21

## 2021-07-21 RX ORDER — KETOROLAC TROMETHAMINE 5 MG/ML
1 SOLUTION OPHTHALMIC 4 TIMES DAILY
Qty: 5 ML | Refills: 1 | Status: SHIPPED | OUTPATIENT
Start: 2021-10-16 | End: 2021-11-15

## 2021-07-21 RX ORDER — PREDNISOLONE ACETATE 10 MG/ML
1 SUSPENSION/ DROPS OPHTHALMIC 4 TIMES DAILY
Qty: 5 ML | Refills: 1 | Status: SHIPPED | OUTPATIENT
Start: 2021-10-19 | End: 2021-11-18

## 2021-09-20 ENCOUNTER — IMMUNIZATION (OUTPATIENT)
Dept: PHARMACY | Facility: CLINIC | Age: 71
End: 2021-09-20
Payer: COMMERCIAL

## 2021-09-20 DIAGNOSIS — Z23 NEED FOR VACCINATION: Primary | ICD-10-CM

## 2021-09-30 DIAGNOSIS — I21.4 NSTEMI (NON-ST ELEVATED MYOCARDIAL INFARCTION): ICD-10-CM

## 2021-09-30 DIAGNOSIS — I25.10 CORONARY ARTERY DISEASE INVOLVING NATIVE CORONARY ARTERY OF NATIVE HEART WITHOUT ANGINA PECTORIS: Chronic | ICD-10-CM

## 2021-09-30 RX ORDER — NITROGLYCERIN 0.4 MG/1
0.4 TABLET SUBLINGUAL EVERY 5 MIN PRN
Qty: 25 TABLET | Refills: 11 | Status: SHIPPED | OUTPATIENT
Start: 2021-09-30 | End: 2022-07-06 | Stop reason: SDUPTHER

## 2021-09-30 RX ORDER — ASPIRIN 81 MG/1
81 TABLET ORAL DAILY
Qty: 90 TABLET | Refills: 3 | Status: SHIPPED | OUTPATIENT
Start: 2021-09-30 | End: 2021-11-22 | Stop reason: SDUPTHER

## 2021-09-30 RX ORDER — METFORMIN HYDROCHLORIDE 500 MG/1
TABLET ORAL
Qty: 90 TABLET | Refills: 3 | Status: SHIPPED | OUTPATIENT
Start: 2021-09-30 | End: 2022-10-04 | Stop reason: SDUPTHER

## 2021-09-30 RX ORDER — ALPRAZOLAM 0.25 MG/1
0.25 TABLET ORAL NIGHTLY PRN
Qty: 30 TABLET | Refills: 2 | Status: SHIPPED | OUTPATIENT
Start: 2021-09-30 | End: 2022-01-14 | Stop reason: SDUPTHER

## 2021-09-30 RX ORDER — THEOPHYLLINE 400 MG/1
1 TABLET, EXTENDED RELEASE ORAL DAILY
Qty: 90 TABLET | Refills: 3 | Status: SHIPPED | OUTPATIENT
Start: 2021-09-30 | End: 2022-06-07

## 2021-09-30 RX ORDER — LEVOTHYROXINE SODIUM 50 UG/1
50 TABLET ORAL DAILY
Qty: 90 TABLET | Refills: 3 | Status: SHIPPED | OUTPATIENT
Start: 2021-09-30 | End: 2022-10-04 | Stop reason: SDUPTHER

## 2021-09-30 RX ORDER — METOPROLOL SUCCINATE 50 MG/1
50 TABLET, EXTENDED RELEASE ORAL DAILY
Qty: 90 TABLET | Refills: 3 | Status: SHIPPED | OUTPATIENT
Start: 2021-09-30 | End: 2022-10-04 | Stop reason: SDUPTHER

## 2021-09-30 RX ORDER — ATORVASTATIN CALCIUM 20 MG/1
20 TABLET, FILM COATED ORAL DAILY
Qty: 90 TABLET | Refills: 3 | Status: SHIPPED | OUTPATIENT
Start: 2021-09-30 | End: 2022-10-04 | Stop reason: SDUPTHER

## 2021-10-12 ENCOUNTER — TELEPHONE (OUTPATIENT)
Dept: OPHTHALMOLOGY | Facility: CLINIC | Age: 71
End: 2021-10-12

## 2021-11-22 DIAGNOSIS — I25.10 CORONARY ARTERY DISEASE INVOLVING NATIVE CORONARY ARTERY OF NATIVE HEART WITHOUT ANGINA PECTORIS: Chronic | ICD-10-CM

## 2021-11-22 DIAGNOSIS — I21.4 NSTEMI (NON-ST ELEVATED MYOCARDIAL INFARCTION): ICD-10-CM

## 2021-11-23 ENCOUNTER — TELEPHONE (OUTPATIENT)
Dept: OPHTHALMOLOGY | Facility: CLINIC | Age: 71
End: 2021-11-23
Payer: COMMERCIAL

## 2021-11-24 ENCOUNTER — TELEPHONE (OUTPATIENT)
Dept: OPHTHALMOLOGY | Facility: CLINIC | Age: 71
End: 2021-11-24
Payer: COMMERCIAL

## 2021-11-24 RX ORDER — AMLODIPINE AND OLMESARTAN MEDOXOMIL 10; 40 MG/1; MG/1
TABLET ORAL
Qty: 90 TABLET | Refills: 3 | Status: SHIPPED | OUTPATIENT
Start: 2021-11-24 | End: 2022-10-04 | Stop reason: SDUPTHER

## 2021-11-24 RX ORDER — FLUTICASONE PROPIONATE AND SALMETEROL 250; 50 UG/1; UG/1
POWDER RESPIRATORY (INHALATION)
Qty: 60 EACH | Refills: 6 | Status: SHIPPED | OUTPATIENT
Start: 2021-11-24 | End: 2022-02-22

## 2021-11-24 RX ORDER — ASPIRIN 81 MG/1
81 TABLET ORAL DAILY
Qty: 90 TABLET | Refills: 3 | Status: SHIPPED | OUTPATIENT
Start: 2021-11-24 | End: 2021-12-07

## 2021-11-26 ENCOUNTER — TELEPHONE (OUTPATIENT)
Dept: OPHTHALMOLOGY | Facility: CLINIC | Age: 71
End: 2021-11-26
Payer: COMMERCIAL

## 2021-11-30 ENCOUNTER — TELEPHONE (OUTPATIENT)
Dept: OPHTHALMOLOGY | Facility: CLINIC | Age: 71
End: 2021-11-30
Payer: COMMERCIAL

## 2021-11-30 DIAGNOSIS — H25.12 NS (NUCLEAR SCLEROSIS), LEFT: Primary | ICD-10-CM

## 2021-12-02 ENCOUNTER — TELEPHONE (OUTPATIENT)
Dept: OPHTHALMOLOGY | Facility: CLINIC | Age: 71
End: 2021-12-02
Payer: COMMERCIAL

## 2021-12-07 ENCOUNTER — ANESTHESIA (OUTPATIENT)
Dept: SURGERY | Facility: OTHER | Age: 71
End: 2021-12-07
Payer: COMMERCIAL

## 2021-12-07 ENCOUNTER — HOSPITAL ENCOUNTER (OUTPATIENT)
Facility: OTHER | Age: 71
Discharge: HOME | End: 2021-12-07
Attending: OPHTHALMOLOGY | Admitting: OPHTHALMOLOGY
Payer: COMMERCIAL

## 2021-12-07 ENCOUNTER — ANESTHESIA EVENT (OUTPATIENT)
Dept: SURGERY | Facility: OTHER | Age: 71
End: 2021-12-07
Payer: COMMERCIAL

## 2021-12-07 VITALS
OXYGEN SATURATION: 96 % | BODY MASS INDEX: 31.01 KG/M2 | DIASTOLIC BLOOD PRESSURE: 81 MMHG | RESPIRATION RATE: 18 BRPM | WEIGHT: 175 LBS | HEIGHT: 63 IN | TEMPERATURE: 98 F | HEART RATE: 74 BPM | SYSTOLIC BLOOD PRESSURE: 131 MMHG

## 2021-12-07 DIAGNOSIS — I25.10 CORONARY ARTERY DISEASE INVOLVING NATIVE CORONARY ARTERY OF NATIVE HEART WITHOUT ANGINA PECTORIS: Chronic | ICD-10-CM

## 2021-12-07 DIAGNOSIS — H25.12 NUCLEAR SCLEROTIC CATARACT OF LEFT EYE: ICD-10-CM

## 2021-12-07 DIAGNOSIS — I21.4 NSTEMI (NON-ST ELEVATED MYOCARDIAL INFARCTION): ICD-10-CM

## 2021-12-07 LAB — POCT GLUCOSE: 116 MG/DL (ref 70–110)

## 2021-12-07 PROCEDURE — 36000707: Performed by: OPHTHALMOLOGY

## 2021-12-07 PROCEDURE — 66984 XCAPSL CTRC RMVL W/O ECP: CPT | Mod: LT,,, | Performed by: OPHTHALMOLOGY

## 2021-12-07 PROCEDURE — V2632 POST CHMBR INTRAOCULAR LENS: HCPCS | Performed by: OPHTHALMOLOGY

## 2021-12-07 PROCEDURE — 37000008 HC ANESTHESIA 1ST 15 MINUTES: Performed by: OPHTHALMOLOGY

## 2021-12-07 PROCEDURE — 63600175 PHARM REV CODE 636 W HCPCS: Performed by: OPHTHALMOLOGY

## 2021-12-07 PROCEDURE — 36000706: Performed by: OPHTHALMOLOGY

## 2021-12-07 PROCEDURE — 25000003 PHARM REV CODE 250: Performed by: OPHTHALMOLOGY

## 2021-12-07 PROCEDURE — 71000015 HC POSTOP RECOV 1ST HR: Performed by: OPHTHALMOLOGY

## 2021-12-07 PROCEDURE — 63600175 PHARM REV CODE 636 W HCPCS: Performed by: NURSE ANESTHETIST, CERTIFIED REGISTERED

## 2021-12-07 PROCEDURE — 37000009 HC ANESTHESIA EA ADD 15 MINS: Performed by: OPHTHALMOLOGY

## 2021-12-07 PROCEDURE — 66984 PR REMOVAL, CATARACT, W/INSRT INTRAOC LENS, W/O ENDO CYCLO: ICD-10-PCS | Mod: LT,,, | Performed by: OPHTHALMOLOGY

## 2021-12-07 DEVICE — IMPLANTABLE DEVICE: Type: IMPLANTABLE DEVICE | Site: EYE | Status: FUNCTIONAL

## 2021-12-07 RX ORDER — PREDNISOLONE ACETATE 10 MG/ML
SUSPENSION/ DROPS OPHTHALMIC
Status: DISCONTINUED | OUTPATIENT
Start: 2021-12-07 | End: 2021-12-07 | Stop reason: HOSPADM

## 2021-12-07 RX ORDER — CYCLOPENTOLATE HYDROCHLORIDE 10 MG/ML
1 SOLUTION/ DROPS OPHTHALMIC
Status: DISCONTINUED | OUTPATIENT
Start: 2021-12-07 | End: 2021-12-21

## 2021-12-07 RX ORDER — MIDAZOLAM HYDROCHLORIDE 1 MG/ML
INJECTION INTRAMUSCULAR; INTRAVENOUS
Status: DISCONTINUED | OUTPATIENT
Start: 2021-12-07 | End: 2021-12-07

## 2021-12-07 RX ORDER — OFLOXACIN 3 MG/ML
1 SOLUTION/ DROPS OPHTHALMIC
Status: DISCONTINUED | OUTPATIENT
Start: 2021-12-07 | End: 2021-12-21

## 2021-12-07 RX ORDER — ASPIRIN 81 MG/1
TABLET ORAL
Qty: 90 TABLET | Refills: 3 | Status: SHIPPED | OUTPATIENT
Start: 2021-12-07 | End: 2022-11-16

## 2021-12-07 RX ORDER — HYDROCODONE BITARTRATE AND ACETAMINOPHEN 5; 325 MG/1; MG/1
1 TABLET ORAL EVERY 4 HOURS PRN
Status: DISCONTINUED | OUTPATIENT
Start: 2021-12-07 | End: 2021-12-07 | Stop reason: HOSPADM

## 2021-12-07 RX ORDER — TETRACAINE HYDROCHLORIDE 5 MG/ML
SOLUTION OPHTHALMIC
Status: DISCONTINUED | OUTPATIENT
Start: 2021-12-07 | End: 2021-12-07 | Stop reason: HOSPADM

## 2021-12-07 RX ORDER — PHENYLEPHRINE HYDROCHLORIDE 25 MG/ML
1 SOLUTION/ DROPS OPHTHALMIC
Status: COMPLETED | OUTPATIENT
Start: 2021-12-07 | End: 2021-12-07

## 2021-12-07 RX ORDER — FENTANYL CITRATE 50 UG/ML
INJECTION, SOLUTION INTRAMUSCULAR; INTRAVENOUS
Status: DISCONTINUED | OUTPATIENT
Start: 2021-12-07 | End: 2021-12-07

## 2021-12-07 RX ORDER — ACETAMINOPHEN 325 MG/1
650 TABLET ORAL EVERY 4 HOURS PRN
Status: DISCONTINUED | OUTPATIENT
Start: 2021-12-07 | End: 2021-12-07 | Stop reason: HOSPADM

## 2021-12-07 RX ORDER — EPINEPHRINE 1 MG/ML
INJECTION, SOLUTION INTRACARDIAC; INTRAMUSCULAR; INTRAVENOUS; SUBCUTANEOUS
Status: DISCONTINUED | OUTPATIENT
Start: 2021-12-07 | End: 2021-12-07 | Stop reason: HOSPADM

## 2021-12-07 RX ORDER — TROPICAMIDE 10 MG/ML
1 SOLUTION/ DROPS OPHTHALMIC
Status: COMPLETED | OUTPATIENT
Start: 2021-12-07 | End: 2021-12-07

## 2021-12-07 RX ORDER — LIDOCAINE HYDROCHLORIDE 40 MG/ML
INJECTION, SOLUTION RETROBULBAR
Status: DISCONTINUED | OUTPATIENT
Start: 2021-12-07 | End: 2021-12-07 | Stop reason: HOSPADM

## 2021-12-07 RX ORDER — SODIUM CHLORIDE 0.9 % (FLUSH) 0.9 %
10 SYRINGE (ML) INJECTION
Status: ACTIVE | OUTPATIENT
Start: 2021-12-07

## 2021-12-07 RX ORDER — TETRACAINE HYDROCHLORIDE 5 MG/ML
1 SOLUTION OPHTHALMIC
Status: COMPLETED | OUTPATIENT
Start: 2021-12-07 | End: 2021-12-07

## 2021-12-07 RX ADMIN — OFLOXACIN 1 DROP: 3 SOLUTION OPHTHALMIC at 12:12

## 2021-12-07 RX ADMIN — PHENYLEPHRINE HYDROCHLORIDE 1 DROP: 25 SOLUTION/ DROPS OPHTHALMIC at 12:12

## 2021-12-07 RX ADMIN — TETRACAINE HYDROCHLORIDE 1 DROP: 5 SOLUTION OPHTHALMIC at 12:12

## 2021-12-07 RX ADMIN — MIDAZOLAM HYDROCHLORIDE 2 MG: 1 INJECTION, SOLUTION INTRAMUSCULAR; INTRAVENOUS at 01:12

## 2021-12-07 RX ADMIN — TROPICAMIDE 1 DROP: 10 SOLUTION/ DROPS OPHTHALMIC at 12:12

## 2021-12-07 RX ADMIN — CYCLOPENTOLATE HYDROCHLORIDE 1 DROP: 10 SOLUTION/ DROPS OPHTHALMIC at 12:12

## 2021-12-07 RX ADMIN — FENTANYL CITRATE 100 MCG: 50 INJECTION, SOLUTION INTRAMUSCULAR; INTRAVENOUS at 01:12

## 2021-12-08 ENCOUNTER — OFFICE VISIT (OUTPATIENT)
Dept: OPHTHALMOLOGY | Facility: CLINIC | Age: 71
End: 2021-12-08
Payer: COMMERCIAL

## 2021-12-08 ENCOUNTER — TELEPHONE (OUTPATIENT)
Dept: OPHTHALMOLOGY | Facility: CLINIC | Age: 71
End: 2021-12-08
Payer: COMMERCIAL

## 2021-12-08 VITALS — DIASTOLIC BLOOD PRESSURE: 73 MMHG | HEART RATE: 75 BPM | SYSTOLIC BLOOD PRESSURE: 136 MMHG

## 2021-12-08 DIAGNOSIS — H25.11 NS (NUCLEAR SCLEROSIS), RIGHT: Primary | ICD-10-CM

## 2021-12-08 DIAGNOSIS — Z98.890 POST-OPERATIVE STATE: Primary | ICD-10-CM

## 2021-12-08 PROCEDURE — 99024 PR POST-OP FOLLOW-UP VISIT: ICD-10-PCS | Mod: S$GLB,,, | Performed by: OPHTHALMOLOGY

## 2021-12-08 PROCEDURE — 99999 PR PBB SHADOW E&M-EST. PATIENT-LVL III: ICD-10-PCS | Mod: PBBFAC,,, | Performed by: OPHTHALMOLOGY

## 2021-12-08 PROCEDURE — 4010F ACE/ARB THERAPY RXD/TAKEN: CPT | Mod: CPTII,S$GLB,, | Performed by: OPHTHALMOLOGY

## 2021-12-08 PROCEDURE — 4010F PR ACE/ARB THEARPY RXD/TAKEN: ICD-10-PCS | Mod: CPTII,S$GLB,, | Performed by: OPHTHALMOLOGY

## 2021-12-08 PROCEDURE — 99024 POSTOP FOLLOW-UP VISIT: CPT | Mod: S$GLB,,, | Performed by: OPHTHALMOLOGY

## 2021-12-08 PROCEDURE — 99999 PR PBB SHADOW E&M-EST. PATIENT-LVL III: CPT | Mod: PBBFAC,,, | Performed by: OPHTHALMOLOGY

## 2021-12-15 ENCOUNTER — OFFICE VISIT (OUTPATIENT)
Dept: OPHTHALMOLOGY | Facility: CLINIC | Age: 71
End: 2021-12-15
Payer: COMMERCIAL

## 2021-12-15 DIAGNOSIS — Z98.890 POST-OPERATIVE STATE: Primary | ICD-10-CM

## 2021-12-15 DIAGNOSIS — H25.11 NS (NUCLEAR SCLEROSIS), RIGHT: ICD-10-CM

## 2021-12-15 PROCEDURE — 92136 PR OPHTHAL BIOMETRY,INTRAOC LENS POW CALC: ICD-10-PCS | Mod: RT,S$GLB,, | Performed by: OPHTHALMOLOGY

## 2021-12-15 PROCEDURE — 4010F ACE/ARB THERAPY RXD/TAKEN: CPT | Mod: CPTII,S$GLB,, | Performed by: OPHTHALMOLOGY

## 2021-12-15 PROCEDURE — 99024 POSTOP FOLLOW-UP VISIT: CPT | Mod: S$GLB,,, | Performed by: OPHTHALMOLOGY

## 2021-12-15 PROCEDURE — 99024 PR POST-OP FOLLOW-UP VISIT: ICD-10-PCS | Mod: S$GLB,,, | Performed by: OPHTHALMOLOGY

## 2021-12-15 PROCEDURE — 99999 PR PBB SHADOW E&M-EST. PATIENT-LVL III: CPT | Mod: PBBFAC,,, | Performed by: OPHTHALMOLOGY

## 2021-12-15 PROCEDURE — 4010F PR ACE/ARB THEARPY RXD/TAKEN: ICD-10-PCS | Mod: CPTII,S$GLB,, | Performed by: OPHTHALMOLOGY

## 2021-12-15 PROCEDURE — 92136 OPHTHALMIC BIOMETRY: CPT | Mod: RT,S$GLB,, | Performed by: OPHTHALMOLOGY

## 2021-12-15 PROCEDURE — 99999 PR PBB SHADOW E&M-EST. PATIENT-LVL III: ICD-10-PCS | Mod: PBBFAC,,, | Performed by: OPHTHALMOLOGY

## 2021-12-17 ENCOUNTER — TELEPHONE (OUTPATIENT)
Dept: OPHTHALMOLOGY | Facility: CLINIC | Age: 71
End: 2021-12-17
Payer: COMMERCIAL

## 2021-12-21 ENCOUNTER — HOSPITAL ENCOUNTER (OUTPATIENT)
Facility: OTHER | Age: 71
Discharge: HOME | End: 2021-12-21
Attending: OPHTHALMOLOGY | Admitting: OPHTHALMOLOGY
Payer: COMMERCIAL

## 2021-12-21 ENCOUNTER — ANESTHESIA EVENT (OUTPATIENT)
Dept: SURGERY | Facility: OTHER | Age: 71
End: 2021-12-21
Payer: COMMERCIAL

## 2021-12-21 ENCOUNTER — ANESTHESIA (OUTPATIENT)
Dept: SURGERY | Facility: OTHER | Age: 71
End: 2021-12-21
Payer: COMMERCIAL

## 2021-12-21 VITALS
TEMPERATURE: 97 F | WEIGHT: 175 LBS | HEIGHT: 63 IN | BODY MASS INDEX: 31.01 KG/M2 | HEART RATE: 70 BPM | RESPIRATION RATE: 16 BRPM | SYSTOLIC BLOOD PRESSURE: 132 MMHG | DIASTOLIC BLOOD PRESSURE: 62 MMHG | OXYGEN SATURATION: 96 %

## 2021-12-21 DIAGNOSIS — H25.12 NUCLEAR SCLEROTIC CATARACT OF LEFT EYE: ICD-10-CM

## 2021-12-21 DIAGNOSIS — H25.11 NUCLEAR SENILE CATARACT OF RIGHT EYE: Primary | ICD-10-CM

## 2021-12-21 PROCEDURE — 25000003 PHARM REV CODE 250: Performed by: OPHTHALMOLOGY

## 2021-12-21 PROCEDURE — 37000009 HC ANESTHESIA EA ADD 15 MINS: Performed by: OPHTHALMOLOGY

## 2021-12-21 PROCEDURE — 66984 XCAPSL CTRC RMVL W/O ECP: CPT | Mod: 79,RT,, | Performed by: OPHTHALMOLOGY

## 2021-12-21 PROCEDURE — 71000015 HC POSTOP RECOV 1ST HR: Performed by: OPHTHALMOLOGY

## 2021-12-21 PROCEDURE — V2632 POST CHMBR INTRAOCULAR LENS: HCPCS | Performed by: OPHTHALMOLOGY

## 2021-12-21 PROCEDURE — 63600175 PHARM REV CODE 636 W HCPCS: Performed by: NURSE ANESTHETIST, CERTIFIED REGISTERED

## 2021-12-21 PROCEDURE — 37000008 HC ANESTHESIA 1ST 15 MINUTES: Performed by: OPHTHALMOLOGY

## 2021-12-21 PROCEDURE — 36000706: Performed by: OPHTHALMOLOGY

## 2021-12-21 PROCEDURE — 36000707: Performed by: OPHTHALMOLOGY

## 2021-12-21 PROCEDURE — 66984 PR REMOVAL, CATARACT, W/INSRT INTRAOC LENS, W/O ENDO CYCLO: ICD-10-PCS | Mod: 79,RT,, | Performed by: OPHTHALMOLOGY

## 2021-12-21 RX ORDER — SODIUM CHLORIDE 0.9 % (FLUSH) 0.9 %
10 SYRINGE (ML) INJECTION
Status: DISCONTINUED | OUTPATIENT
Start: 2021-12-21 | End: 2021-12-21 | Stop reason: HOSPADM

## 2021-12-21 RX ORDER — CYCLOPENTOLATE HYDROCHLORIDE 10 MG/ML
1 SOLUTION/ DROPS OPHTHALMIC
Status: DISCONTINUED | OUTPATIENT
Start: 2021-12-21 | End: 2021-12-21 | Stop reason: HOSPADM

## 2021-12-21 RX ORDER — ACETAMINOPHEN 325 MG/1
650 TABLET ORAL EVERY 4 HOURS PRN
Status: DISCONTINUED | OUTPATIENT
Start: 2021-12-21 | End: 2021-12-21 | Stop reason: HOSPADM

## 2021-12-21 RX ORDER — TETRACAINE HYDROCHLORIDE 5 MG/ML
1 SOLUTION OPHTHALMIC
Status: COMPLETED | OUTPATIENT
Start: 2021-12-21 | End: 2021-12-21

## 2021-12-21 RX ORDER — HYDROCODONE BITARTRATE AND ACETAMINOPHEN 5; 325 MG/1; MG/1
1 TABLET ORAL EVERY 4 HOURS PRN
Status: DISCONTINUED | OUTPATIENT
Start: 2021-12-21 | End: 2021-12-21 | Stop reason: HOSPADM

## 2021-12-21 RX ORDER — FENTANYL CITRATE 50 UG/ML
INJECTION, SOLUTION INTRAMUSCULAR; INTRAVENOUS
Status: DISCONTINUED | OUTPATIENT
Start: 2021-12-21 | End: 2021-12-21

## 2021-12-21 RX ORDER — TROPICAMIDE 10 MG/ML
1 SOLUTION/ DROPS OPHTHALMIC
Status: COMPLETED | OUTPATIENT
Start: 2021-12-21 | End: 2021-12-21

## 2021-12-21 RX ORDER — MIDAZOLAM HYDROCHLORIDE 1 MG/ML
INJECTION INTRAMUSCULAR; INTRAVENOUS
Status: DISCONTINUED | OUTPATIENT
Start: 2021-12-21 | End: 2021-12-21

## 2021-12-21 RX ORDER — OFLOXACIN 3 MG/ML
1 SOLUTION/ DROPS OPHTHALMIC
Status: DISCONTINUED | OUTPATIENT
Start: 2021-12-21 | End: 2021-12-21 | Stop reason: HOSPADM

## 2021-12-21 RX ORDER — PHENYLEPHRINE HYDROCHLORIDE 25 MG/ML
1 SOLUTION/ DROPS OPHTHALMIC
Status: COMPLETED | OUTPATIENT
Start: 2021-12-21 | End: 2021-12-21

## 2021-12-21 RX ADMIN — OFLOXACIN 1 DROP: 3 SOLUTION OPHTHALMIC at 12:12

## 2021-12-21 RX ADMIN — TETRACAINE HYDROCHLORIDE 1 DROP: 5 SOLUTION OPHTHALMIC at 12:12

## 2021-12-21 RX ADMIN — PHENYLEPHRINE HYDROCHLORIDE 1 DROP: 25 SOLUTION/ DROPS OPHTHALMIC at 12:12

## 2021-12-21 RX ADMIN — TROPICAMIDE 1 DROP: 10 SOLUTION/ DROPS OPHTHALMIC at 12:12

## 2021-12-21 RX ADMIN — CYCLOPENTOLATE HYDROCHLORIDE 1 DROP: 10 SOLUTION/ DROPS OPHTHALMIC at 12:12

## 2021-12-21 RX ADMIN — MIDAZOLAM HYDROCHLORIDE 4 MG: 1 INJECTION, SOLUTION INTRAMUSCULAR; INTRAVENOUS at 04:12

## 2021-12-21 RX ADMIN — FENTANYL CITRATE 100 MCG: 50 INJECTION, SOLUTION INTRAMUSCULAR; INTRAVENOUS at 04:12

## 2021-12-22 ENCOUNTER — OFFICE VISIT (OUTPATIENT)
Dept: OPHTHALMOLOGY | Facility: CLINIC | Age: 71
End: 2021-12-22
Payer: COMMERCIAL

## 2021-12-22 DIAGNOSIS — Z98.890 POST-OPERATIVE STATE: Primary | ICD-10-CM

## 2021-12-22 LAB — POCT GLUCOSE: 130 MG/DL (ref 70–110)

## 2021-12-22 PROCEDURE — 1160F RVW MEDS BY RX/DR IN RCRD: CPT | Mod: CPTII,S$GLB,, | Performed by: OPHTHALMOLOGY

## 2021-12-22 PROCEDURE — 99024 POSTOP FOLLOW-UP VISIT: CPT | Mod: S$GLB,,, | Performed by: OPHTHALMOLOGY

## 2021-12-22 PROCEDURE — 1126F PR PAIN SEVERITY QUANTIFIED, NO PAIN PRESENT: ICD-10-PCS | Mod: CPTII,S$GLB,, | Performed by: OPHTHALMOLOGY

## 2021-12-22 PROCEDURE — 3288F PR FALLS RISK ASSESSMENT DOCUMENTED: ICD-10-PCS | Mod: CPTII,S$GLB,, | Performed by: OPHTHALMOLOGY

## 2021-12-22 PROCEDURE — 1160F PR REVIEW ALL MEDS BY PRESCRIBER/CLIN PHARMACIST DOCUMENTED: ICD-10-PCS | Mod: CPTII,S$GLB,, | Performed by: OPHTHALMOLOGY

## 2021-12-22 PROCEDURE — 4010F ACE/ARB THERAPY RXD/TAKEN: CPT | Mod: CPTII,S$GLB,, | Performed by: OPHTHALMOLOGY

## 2021-12-22 PROCEDURE — 1126F AMNT PAIN NOTED NONE PRSNT: CPT | Mod: CPTII,S$GLB,, | Performed by: OPHTHALMOLOGY

## 2021-12-22 PROCEDURE — 4010F PR ACE/ARB THEARPY RXD/TAKEN: ICD-10-PCS | Mod: CPTII,S$GLB,, | Performed by: OPHTHALMOLOGY

## 2021-12-22 PROCEDURE — 1101F PT FALLS ASSESS-DOCD LE1/YR: CPT | Mod: CPTII,S$GLB,, | Performed by: OPHTHALMOLOGY

## 2021-12-22 PROCEDURE — 3288F FALL RISK ASSESSMENT DOCD: CPT | Mod: CPTII,S$GLB,, | Performed by: OPHTHALMOLOGY

## 2021-12-22 PROCEDURE — 99024 PR POST-OP FOLLOW-UP VISIT: ICD-10-PCS | Mod: S$GLB,,, | Performed by: OPHTHALMOLOGY

## 2021-12-22 PROCEDURE — 1101F PR PT FALLS ASSESS DOC 0-1 FALLS W/OUT INJ PAST YR: ICD-10-PCS | Mod: CPTII,S$GLB,, | Performed by: OPHTHALMOLOGY

## 2021-12-22 PROCEDURE — 99999 PR PBB SHADOW E&M-EST. PATIENT-LVL III: CPT | Mod: PBBFAC,,, | Performed by: OPHTHALMOLOGY

## 2021-12-22 PROCEDURE — 1159F PR MEDICATION LIST DOCUMENTED IN MEDICAL RECORD: ICD-10-PCS | Mod: CPTII,S$GLB,, | Performed by: OPHTHALMOLOGY

## 2021-12-22 PROCEDURE — 3044F HG A1C LEVEL LT 7.0%: CPT | Mod: CPTII,S$GLB,, | Performed by: OPHTHALMOLOGY

## 2021-12-22 PROCEDURE — 3044F PR MOST RECENT HEMOGLOBIN A1C LEVEL <7.0%: ICD-10-PCS | Mod: CPTII,S$GLB,, | Performed by: OPHTHALMOLOGY

## 2021-12-22 PROCEDURE — 99999 PR PBB SHADOW E&M-EST. PATIENT-LVL III: ICD-10-PCS | Mod: PBBFAC,,, | Performed by: OPHTHALMOLOGY

## 2021-12-22 PROCEDURE — 1159F MED LIST DOCD IN RCRD: CPT | Mod: CPTII,S$GLB,, | Performed by: OPHTHALMOLOGY

## 2021-12-28 ENCOUNTER — OFFICE VISIT (OUTPATIENT)
Dept: OPTOMETRY | Facility: CLINIC | Age: 71
End: 2021-12-28
Payer: COMMERCIAL

## 2021-12-28 DIAGNOSIS — Z98.41 STATUS POST RIGHT CATARACT EXTRACTION: Primary | ICD-10-CM

## 2021-12-28 PROCEDURE — 3288F PR FALLS RISK ASSESSMENT DOCUMENTED: ICD-10-PCS | Mod: CPTII,S$GLB,, | Performed by: OPTOMETRIST

## 2021-12-28 PROCEDURE — 1159F PR MEDICATION LIST DOCUMENTED IN MEDICAL RECORD: ICD-10-PCS | Mod: CPTII,S$GLB,, | Performed by: OPTOMETRIST

## 2021-12-28 PROCEDURE — 99024 PR POST-OP FOLLOW-UP VISIT: ICD-10-PCS | Mod: S$GLB,,, | Performed by: OPTOMETRIST

## 2021-12-28 PROCEDURE — 4010F PR ACE/ARB THEARPY RXD/TAKEN: ICD-10-PCS | Mod: CPTII,S$GLB,, | Performed by: OPTOMETRIST

## 2021-12-28 PROCEDURE — 1101F PR PT FALLS ASSESS DOC 0-1 FALLS W/OUT INJ PAST YR: ICD-10-PCS | Mod: CPTII,S$GLB,, | Performed by: OPTOMETRIST

## 2021-12-28 PROCEDURE — 3044F PR MOST RECENT HEMOGLOBIN A1C LEVEL <7.0%: ICD-10-PCS | Mod: CPTII,S$GLB,, | Performed by: OPTOMETRIST

## 2021-12-28 PROCEDURE — 1126F AMNT PAIN NOTED NONE PRSNT: CPT | Mod: CPTII,S$GLB,, | Performed by: OPTOMETRIST

## 2021-12-28 PROCEDURE — 1126F PR PAIN SEVERITY QUANTIFIED, NO PAIN PRESENT: ICD-10-PCS | Mod: CPTII,S$GLB,, | Performed by: OPTOMETRIST

## 2021-12-28 PROCEDURE — 99999 PR PBB SHADOW E&M-EST. PATIENT-LVL III: CPT | Mod: PBBFAC,,, | Performed by: OPTOMETRIST

## 2021-12-28 PROCEDURE — 1101F PT FALLS ASSESS-DOCD LE1/YR: CPT | Mod: CPTII,S$GLB,, | Performed by: OPTOMETRIST

## 2021-12-28 PROCEDURE — 3288F FALL RISK ASSESSMENT DOCD: CPT | Mod: CPTII,S$GLB,, | Performed by: OPTOMETRIST

## 2021-12-28 PROCEDURE — 99999 PR PBB SHADOW E&M-EST. PATIENT-LVL III: ICD-10-PCS | Mod: PBBFAC,,, | Performed by: OPTOMETRIST

## 2021-12-28 PROCEDURE — 3044F HG A1C LEVEL LT 7.0%: CPT | Mod: CPTII,S$GLB,, | Performed by: OPTOMETRIST

## 2021-12-28 PROCEDURE — 1159F MED LIST DOCD IN RCRD: CPT | Mod: CPTII,S$GLB,, | Performed by: OPTOMETRIST

## 2021-12-28 PROCEDURE — 4010F ACE/ARB THERAPY RXD/TAKEN: CPT | Mod: CPTII,S$GLB,, | Performed by: OPTOMETRIST

## 2021-12-28 PROCEDURE — 99024 POSTOP FOLLOW-UP VISIT: CPT | Mod: S$GLB,,, | Performed by: OPTOMETRIST

## 2021-12-28 RX ORDER — KETOROLAC TROMETHAMINE 5 MG/ML
SOLUTION OPHTHALMIC
COMMUNITY
Start: 2021-12-15 | End: 2022-05-27

## 2021-12-28 RX ORDER — PREDNISOLONE ACETATE 10 MG/ML
SUSPENSION/ DROPS OPHTHALMIC
COMMUNITY
Start: 2021-12-15 | End: 2022-05-27

## 2021-12-28 RX ORDER — OFLOXACIN 3 MG/ML
SOLUTION/ DROPS OPHTHALMIC
COMMUNITY
Start: 2021-12-15 | End: 2021-12-28 | Stop reason: ALTCHOICE

## 2022-01-14 ENCOUNTER — LAB VISIT (OUTPATIENT)
Dept: LAB | Facility: HOSPITAL | Age: 72
End: 2022-01-14
Attending: INTERNAL MEDICINE
Payer: COMMERCIAL

## 2022-01-14 ENCOUNTER — OFFICE VISIT (OUTPATIENT)
Dept: INTERNAL MEDICINE | Facility: CLINIC | Age: 72
End: 2022-01-14
Payer: COMMERCIAL

## 2022-01-14 VITALS
HEART RATE: 76 BPM | HEIGHT: 62 IN | WEIGHT: 191 LBS | RESPIRATION RATE: 16 BRPM | SYSTOLIC BLOOD PRESSURE: 138 MMHG | DIASTOLIC BLOOD PRESSURE: 64 MMHG | BODY MASS INDEX: 35.15 KG/M2 | TEMPERATURE: 98 F

## 2022-01-14 DIAGNOSIS — E78.2 MIXED HYPERLIPIDEMIA: ICD-10-CM

## 2022-01-14 DIAGNOSIS — I10 ESSENTIAL HYPERTENSION: ICD-10-CM

## 2022-01-14 DIAGNOSIS — E11.9 TYPE 2 DIABETES MELLITUS WITHOUT COMPLICATION, WITHOUT LONG-TERM CURRENT USE OF INSULIN: ICD-10-CM

## 2022-01-14 DIAGNOSIS — E03.4 HYPOTHYROIDISM DUE TO ACQUIRED ATROPHY OF THYROID: ICD-10-CM

## 2022-01-14 DIAGNOSIS — E11.9 TYPE 2 DIABETES MELLITUS WITHOUT COMPLICATION, WITHOUT LONG-TERM CURRENT USE OF INSULIN: Primary | ICD-10-CM

## 2022-01-14 DIAGNOSIS — K63.5 POLYP OF COLON, UNSPECIFIED PART OF COLON, UNSPECIFIED TYPE: ICD-10-CM

## 2022-01-14 DIAGNOSIS — I50.30 HEART FAILURE WITH PRESERVED EJECTION FRACTION, UNSPECIFIED HF CHRONICITY: ICD-10-CM

## 2022-01-14 DIAGNOSIS — K42.9 UMBILICAL HERNIA WITHOUT OBSTRUCTION AND WITHOUT GANGRENE: ICD-10-CM

## 2022-01-14 DIAGNOSIS — J45.40 MODERATE PERSISTENT ASTHMA WITHOUT COMPLICATION: ICD-10-CM

## 2022-01-14 LAB
ALBUMIN SERPL BCP-MCNC: 4.1 G/DL (ref 3.5–5.2)
ALP SERPL-CCNC: 100 U/L (ref 55–135)
ALT SERPL W/O P-5'-P-CCNC: 16 U/L (ref 10–44)
ANION GAP SERPL CALC-SCNC: 14 MMOL/L (ref 8–16)
AST SERPL-CCNC: 16 U/L (ref 10–40)
BASOPHILS # BLD AUTO: 0.05 K/UL (ref 0–0.2)
BASOPHILS NFR BLD: 0.4 % (ref 0–1.9)
BILIRUB SERPL-MCNC: 0.5 MG/DL (ref 0.1–1)
BUN SERPL-MCNC: 9 MG/DL (ref 8–23)
CALCIUM SERPL-MCNC: 10 MG/DL (ref 8.7–10.5)
CHLORIDE SERPL-SCNC: 104 MMOL/L (ref 95–110)
CHOLEST SERPL-MCNC: 160 MG/DL (ref 120–199)
CHOLEST/HDLC SERPL: 3.7 {RATIO} (ref 2–5)
CO2 SERPL-SCNC: 19 MMOL/L (ref 23–29)
CREAT SERPL-MCNC: 0.8 MG/DL (ref 0.5–1.4)
DIFFERENTIAL METHOD: ABNORMAL
EOSINOPHIL # BLD AUTO: 0.1 K/UL (ref 0–0.5)
EOSINOPHIL NFR BLD: 0.6 % (ref 0–8)
ERYTHROCYTE [DISTWIDTH] IN BLOOD BY AUTOMATED COUNT: 13.2 % (ref 11.5–14.5)
EST. GFR  (AFRICAN AMERICAN): >60 ML/MIN/1.73 M^2
EST. GFR  (NON AFRICAN AMERICAN): >60 ML/MIN/1.73 M^2
ESTIMATED AVG GLUCOSE: 146 MG/DL (ref 68–131)
GLUCOSE SERPL-MCNC: 130 MG/DL (ref 70–110)
HBA1C MFR BLD: 6.7 % (ref 4–5.6)
HCT VFR BLD AUTO: 42.4 % (ref 37–48.5)
HDLC SERPL-MCNC: 43 MG/DL (ref 40–75)
HDLC SERPL: 26.9 % (ref 20–50)
HGB BLD-MCNC: 13.9 G/DL (ref 12–16)
IMM GRANULOCYTES # BLD AUTO: 0.05 K/UL (ref 0–0.04)
IMM GRANULOCYTES NFR BLD AUTO: 0.4 % (ref 0–0.5)
LDLC SERPL CALC-MCNC: 86.6 MG/DL (ref 63–159)
LYMPHOCYTES # BLD AUTO: 2.5 K/UL (ref 1–4.8)
LYMPHOCYTES NFR BLD: 21.5 % (ref 18–48)
MCH RBC QN AUTO: 28.9 PG (ref 27–31)
MCHC RBC AUTO-ENTMCNC: 32.8 G/DL (ref 32–36)
MCV RBC AUTO: 88 FL (ref 82–98)
MONOCYTES # BLD AUTO: 0.8 K/UL (ref 0.3–1)
MONOCYTES NFR BLD: 6.6 % (ref 4–15)
NEUTROPHILS # BLD AUTO: 8.1 K/UL (ref 1.8–7.7)
NEUTROPHILS NFR BLD: 70.5 % (ref 38–73)
NONHDLC SERPL-MCNC: 117 MG/DL
NRBC BLD-RTO: 0 /100 WBC
PLATELET # BLD AUTO: 371 K/UL (ref 150–450)
PMV BLD AUTO: 10.8 FL (ref 9.2–12.9)
POTASSIUM SERPL-SCNC: 3.5 MMOL/L (ref 3.5–5.1)
PROT SERPL-MCNC: 7.8 G/DL (ref 6–8.4)
RBC # BLD AUTO: 4.81 M/UL (ref 4–5.4)
SODIUM SERPL-SCNC: 137 MMOL/L (ref 136–145)
TRIGL SERPL-MCNC: 152 MG/DL (ref 30–150)
TSH SERPL DL<=0.005 MIU/L-ACNC: 2.85 UIU/ML (ref 0.4–4)
WBC # BLD AUTO: 11.56 K/UL (ref 3.9–12.7)

## 2022-01-14 PROCEDURE — 3072F LOW RISK FOR RETINOPATHY: CPT | Mod: CPTII,S$GLB,, | Performed by: INTERNAL MEDICINE

## 2022-01-14 PROCEDURE — 85025 COMPLETE CBC W/AUTO DIFF WBC: CPT | Performed by: INTERNAL MEDICINE

## 2022-01-14 PROCEDURE — 90471 FLU VACCINE - QUADRIVALENT - ADJUVANTED: ICD-10-PCS | Mod: S$GLB,,, | Performed by: INTERNAL MEDICINE

## 2022-01-14 PROCEDURE — 3078F PR MOST RECENT DIASTOLIC BLOOD PRESSURE < 80 MM HG: ICD-10-PCS | Mod: CPTII,S$GLB,, | Performed by: INTERNAL MEDICINE

## 2022-01-14 PROCEDURE — 3066F PR DOCUMENTATION OF TREATMENT FOR NEPHROPATHY: ICD-10-PCS | Mod: CPTII,S$GLB,, | Performed by: INTERNAL MEDICINE

## 2022-01-14 PROCEDURE — 1126F PR PAIN SEVERITY QUANTIFIED, NO PAIN PRESENT: ICD-10-PCS | Mod: CPTII,S$GLB,, | Performed by: INTERNAL MEDICINE

## 2022-01-14 PROCEDURE — 3008F BODY MASS INDEX DOCD: CPT | Mod: CPTII,S$GLB,, | Performed by: INTERNAL MEDICINE

## 2022-01-14 PROCEDURE — 1126F AMNT PAIN NOTED NONE PRSNT: CPT | Mod: CPTII,S$GLB,, | Performed by: INTERNAL MEDICINE

## 2022-01-14 PROCEDURE — 84443 ASSAY THYROID STIM HORMONE: CPT | Performed by: INTERNAL MEDICINE

## 2022-01-14 PROCEDURE — 3288F PR FALLS RISK ASSESSMENT DOCUMENTED: ICD-10-PCS | Mod: CPTII,S$GLB,, | Performed by: INTERNAL MEDICINE

## 2022-01-14 PROCEDURE — 3008F PR BODY MASS INDEX (BMI) DOCUMENTED: ICD-10-PCS | Mod: CPTII,S$GLB,, | Performed by: INTERNAL MEDICINE

## 2022-01-14 PROCEDURE — 3078F DIAST BP <80 MM HG: CPT | Mod: CPTII,S$GLB,, | Performed by: INTERNAL MEDICINE

## 2022-01-14 PROCEDURE — 90471 IMMUNIZATION ADMIN: CPT | Mod: S$GLB,,, | Performed by: INTERNAL MEDICINE

## 2022-01-14 PROCEDURE — 3060F PR POS MICROALBUMINURIA RESULT DOCUMENTED/REVIEW: ICD-10-PCS | Mod: CPTII,S$GLB,, | Performed by: INTERNAL MEDICINE

## 2022-01-14 PROCEDURE — 3288F FALL RISK ASSESSMENT DOCD: CPT | Mod: CPTII,S$GLB,, | Performed by: INTERNAL MEDICINE

## 2022-01-14 PROCEDURE — 90694 FLU VACCINE - QUADRIVALENT - ADJUVANTED: ICD-10-PCS | Mod: S$GLB,,, | Performed by: INTERNAL MEDICINE

## 2022-01-14 PROCEDURE — 3075F SYST BP GE 130 - 139MM HG: CPT | Mod: CPTII,S$GLB,, | Performed by: INTERNAL MEDICINE

## 2022-01-14 PROCEDURE — 1101F PT FALLS ASSESS-DOCD LE1/YR: CPT | Mod: CPTII,S$GLB,, | Performed by: INTERNAL MEDICINE

## 2022-01-14 PROCEDURE — 99999 PR PBB SHADOW E&M-EST. PATIENT-LVL IV: CPT | Mod: PBBFAC,,, | Performed by: INTERNAL MEDICINE

## 2022-01-14 PROCEDURE — 3066F NEPHROPATHY DOC TX: CPT | Mod: CPTII,S$GLB,, | Performed by: INTERNAL MEDICINE

## 2022-01-14 PROCEDURE — 3072F PR LOW RISK FOR RETINOPATHY: ICD-10-PCS | Mod: CPTII,S$GLB,, | Performed by: INTERNAL MEDICINE

## 2022-01-14 PROCEDURE — 83036 HEMOGLOBIN GLYCOSYLATED A1C: CPT | Performed by: INTERNAL MEDICINE

## 2022-01-14 PROCEDURE — 99214 PR OFFICE/OUTPT VISIT, EST, LEVL IV, 30-39 MIN: ICD-10-PCS | Mod: 25,S$GLB,, | Performed by: INTERNAL MEDICINE

## 2022-01-14 PROCEDURE — 99214 OFFICE O/P EST MOD 30 MIN: CPT | Mod: 25,S$GLB,, | Performed by: INTERNAL MEDICINE

## 2022-01-14 PROCEDURE — 99999 PR PBB SHADOW E&M-EST. PATIENT-LVL IV: ICD-10-PCS | Mod: PBBFAC,,, | Performed by: INTERNAL MEDICINE

## 2022-01-14 PROCEDURE — 3044F PR MOST RECENT HEMOGLOBIN A1C LEVEL <7.0%: ICD-10-PCS | Mod: CPTII,S$GLB,, | Performed by: INTERNAL MEDICINE

## 2022-01-14 PROCEDURE — 1101F PR PT FALLS ASSESS DOC 0-1 FALLS W/OUT INJ PAST YR: ICD-10-PCS | Mod: CPTII,S$GLB,, | Performed by: INTERNAL MEDICINE

## 2022-01-14 PROCEDURE — 36415 COLL VENOUS BLD VENIPUNCTURE: CPT | Mod: PO | Performed by: INTERNAL MEDICINE

## 2022-01-14 PROCEDURE — 3075F PR MOST RECENT SYSTOLIC BLOOD PRESS GE 130-139MM HG: ICD-10-PCS | Mod: CPTII,S$GLB,, | Performed by: INTERNAL MEDICINE

## 2022-01-14 PROCEDURE — 90694 VACC AIIV4 NO PRSRV 0.5ML IM: CPT | Mod: S$GLB,,, | Performed by: INTERNAL MEDICINE

## 2022-01-14 PROCEDURE — 80053 COMPREHEN METABOLIC PANEL: CPT | Performed by: INTERNAL MEDICINE

## 2022-01-14 PROCEDURE — 3060F POS MICROALBUMINURIA REV: CPT | Mod: CPTII,S$GLB,, | Performed by: INTERNAL MEDICINE

## 2022-01-14 PROCEDURE — 3044F HG A1C LEVEL LT 7.0%: CPT | Mod: CPTII,S$GLB,, | Performed by: INTERNAL MEDICINE

## 2022-01-14 PROCEDURE — 80061 LIPID PANEL: CPT | Performed by: INTERNAL MEDICINE

## 2022-01-14 RX ORDER — ALBUTEROL SULFATE 90 UG/1
1-2 AEROSOL, METERED RESPIRATORY (INHALATION) EVERY 4 HOURS PRN
Qty: 18 G | Refills: 4 | Status: SHIPPED | OUTPATIENT
Start: 2022-01-14 | End: 2022-01-14 | Stop reason: SDUPTHER

## 2022-01-14 RX ORDER — ALPRAZOLAM 0.25 MG/1
0.25 TABLET ORAL NIGHTLY PRN
Qty: 30 TABLET | Refills: 2 | Status: SHIPPED | OUTPATIENT
Start: 2022-01-14 | End: 2022-10-04 | Stop reason: SDUPTHER

## 2022-01-14 RX ORDER — ALBUTEROL SULFATE 90 UG/1
1-2 AEROSOL, METERED RESPIRATORY (INHALATION) EVERY 4 HOURS PRN
Qty: 18 G | Refills: 4 | Status: SHIPPED | OUTPATIENT
Start: 2022-01-14

## 2022-01-14 NOTE — PROGRESS NOTES
Subjective:       Patient ID: Noah Bailey is a 71 y.o. female.    Chief Complaint: Diabetes    HPI   The patient presents for follow-up of medical conditions which include type 2 diabetes mellitus, hypertension, CHF, asthma.  The patient also has a personal history of colon polyps.  She will be due for next colonoscopy in 12/2022.    She is status post bilateral cataract surgeries.  Good results are noted.  She has been noting more dyspnea on exertion with prolonged walking.  She noticed this difference following a COVID 19 infection.  No PND or orthopnea is described.  No ankle edema is noted.  She has not experienced any exertional chest pain.    The patient does not monitor blood sugar levels.  No hypoglycemic symptoms have been noted.    Review of Systems   Constitutional: Negative for activity change, appetite change and unexpected weight change.   Eyes: Negative for visual disturbance.   Respiratory: Positive for shortness of breath and wheezing.    Cardiovascular: Negative for chest pain, palpitations and leg swelling.   Gastrointestinal: Negative for abdominal pain, blood in stool and diarrhea.   Genitourinary: Negative for dysuria, frequency, hematuria and urgency.   Neurological: Negative for weakness, numbness and headaches.   Psychiatric/Behavioral: Negative for sleep disturbance.            Physical Exam  Vitals and nursing note reviewed.   Constitutional:       General: She is not in acute distress.     Appearance: She is well-developed and well-nourished.   HENT:      Head: Normocephalic and atraumatic.   Eyes:      General: No scleral icterus.     Extraocular Movements: EOM normal.      Conjunctiva/sclera: Conjunctivae normal.      Pupils: Pupils are equal, round, and reactive to light.   Neck:      Thyroid: No thyromegaly.      Vascular: No JVD.   Cardiovascular:      Rate and Rhythm: Normal rate and regular rhythm.      Pulses: Intact distal pulses.      Heart sounds: Normal heart sounds. No  murmur heard.  No friction rub. No gallop.    Pulmonary:      Effort: Pulmonary effort is normal. No respiratory distress.      Breath sounds: Normal breath sounds. No wheezing or rales.   Abdominal:      General: Bowel sounds are normal.      Palpations: Abdomen is soft. There is no mass.      Tenderness: There is no abdominal tenderness.   Musculoskeletal:         General: No tenderness or edema. Normal range of motion.      Cervical back: Normal range of motion and neck supple.   Lymphadenopathy:      Cervical: No cervical adenopathy.   Skin:     General: Skin is warm and dry.      Findings: No rash.      Comments: No foot lesions are present.   Neurological:      Mental Status: She is alert and oriented to person, place, and time.      Cranial Nerves: No cranial nerve deficit.      Comments: Sensory exam is intact in both feet on monofilament  testing.   Psychiatric:         Mood and Affect: Mood and affect normal.         Behavior: Behavior normal.       Protective Sensation (w/ 10 gram monofilament):  Right: Intact  Left: Intact    Visual Inspection:  Normal -  Bilateral    Pedal Pulses:   Right: Present  Left: Present    Posterior tibialis:   Right:Present  Left: Present        Lab Visit on 01/14/2022   Component Date Value Ref Range Status    Sodium 01/14/2022 137  136 - 145 mmol/L Final    Potassium 01/14/2022 3.5  3.5 - 5.1 mmol/L Final    Chloride 01/14/2022 104  95 - 110 mmol/L Final    CO2 01/14/2022 19 (A) 23 - 29 mmol/L Final    Glucose 01/14/2022 130 (A) 70 - 110 mg/dL Final    BUN 01/14/2022 9  8 - 23 mg/dL Final    Creatinine 01/14/2022 0.8  0.5 - 1.4 mg/dL Final    Calcium 01/14/2022 10.0  8.7 - 10.5 mg/dL Final    Total Protein 01/14/2022 7.8  6.0 - 8.4 g/dL Final    Albumin 01/14/2022 4.1  3.5 - 5.2 g/dL Final    Total Bilirubin 01/14/2022 0.5  0.1 - 1.0 mg/dL Final    Comment: For infants and newborns, interpretation of results should be based  on gestational age, weight and in  agreement with clinical  observations.    Premature Infant recommended reference ranges:  Up to 24 hours.............<8.0 mg/dL  Up to 48 hours............<12.0 mg/dL  3-5 days..................<15.0 mg/dL  6-29 days.................<15.0 mg/dL      Alkaline Phosphatase 01/14/2022 100  55 - 135 U/L Final    AST 01/14/2022 16  10 - 40 U/L Final    ALT 01/14/2022 16  10 - 44 U/L Final    Anion Gap 01/14/2022 14  8 - 16 mmol/L Final    eGFR if African American 01/14/2022 >60.0  >60 mL/min/1.73 m^2 Final    eGFR if non African American 01/14/2022 >60.0  >60 mL/min/1.73 m^2 Final    Comment: Calculation used to obtain the estimated glomerular filtration  rate (eGFR) is the CKD-EPI equation.       Cholesterol 01/14/2022 160  120 - 199 mg/dL Final    Comment: The National Cholesterol Education Program (NCEP) has set the  following guidelines (reference ranges) for Cholesterol:  Optimal.....................<200 mg/dL  Borderline High.............200-239 mg/dL  High........................> or = 240 mg/dL      Triglycerides 01/14/2022 152 (A) 30 - 150 mg/dL Final    Comment: The National Cholesterol Education Program (NCEP) has set the  following guidelines (reference values) for triglycerides:  Normal......................<150 mg/dL  Borderline High.............150-199 mg/dL  High........................200-499 mg/dL      HDL 01/14/2022 43  40 - 75 mg/dL Final    Comment: The National Cholesterol Education Program (NCEP) has set the  following guidelines (reference values) for HDL Cholesterol:  Low...............<40 mg/dL  Optimal...........>60 mg/dL      LDL Cholesterol 01/14/2022 86.6  63.0 - 159.0 mg/dL Final    Comment: The National Cholesterol Education Program (NCEP) has set the  following guidelines (reference values) for LDL Cholesterol:  Optimal.......................<130 mg/dL  Borderline High...............130-159 mg/dL  High..........................160-189 mg/dL  Very High.....................>190  mg/dL      HDL/Cholesterol Ratio 01/14/2022 26.9  20.0 - 50.0 % Final    Total Cholesterol/HDL Ratio 01/14/2022 3.7  2.0 - 5.0 Final    Non-HDL Cholesterol 01/14/2022 117  mg/dL Final    Comment: Risk category and Non-HDL cholesterol goals:  Coronary heart disease (CHD)or equivalent (10-year risk of CHD >20%):  Non-HDL cholesterol goal     <130 mg/dL  Two or more CHD risk factors and 10-year risk of CHD <= 20%:  Non-HDL cholesterol goal     <160 mg/dL  0 to 1 CHD risk factor:  Non-HDL cholesterol goal     <190 mg/dL      WBC 01/14/2022 11.56  3.90 - 12.70 K/uL Final    RBC 01/14/2022 4.81  4.00 - 5.40 M/uL Final    Hemoglobin 01/14/2022 13.9  12.0 - 16.0 g/dL Final    Hematocrit 01/14/2022 42.4  37.0 - 48.5 % Final    MCV 01/14/2022 88  82 - 98 fL Final    MCH 01/14/2022 28.9  27.0 - 31.0 pg Final    MCHC 01/14/2022 32.8  32.0 - 36.0 g/dL Final    RDW 01/14/2022 13.2  11.5 - 14.5 % Final    Platelets 01/14/2022 371  150 - 450 K/uL Final    MPV 01/14/2022 10.8  9.2 - 12.9 fL Final    Immature Granulocytes 01/14/2022 0.4  0.0 - 0.5 % Final    Gran # (ANC) 01/14/2022 8.1 (A) 1.8 - 7.7 K/uL Final    Immature Grans (Abs) 01/14/2022 0.05 (A) 0.00 - 0.04 K/uL Final    Comment: Mild elevation in immature granulocytes is non specific and   can be seen in a variety of conditions including stress response,   acute inflammation, trauma and pregnancy. Correlation with other   laboratory and clinical findings is essential.      Lymph # 01/14/2022 2.5  1.0 - 4.8 K/uL Final    Mono # 01/14/2022 0.8  0.3 - 1.0 K/uL Final    Eos # 01/14/2022 0.1  0.0 - 0.5 K/uL Final    Baso # 01/14/2022 0.05  0.00 - 0.20 K/uL Final    nRBC 01/14/2022 0  0 /100 WBC Final    Gran % 01/14/2022 70.5  38.0 - 73.0 % Final    Lymph % 01/14/2022 21.5  18.0 - 48.0 % Final    Mono % 01/14/2022 6.6  4.0 - 15.0 % Final    Eosinophil % 01/14/2022 0.6  0.0 - 8.0 % Final    Basophil % 01/14/2022 0.4  0.0 - 1.9 % Final    Differential  Method 01/14/2022 Automated   Final    Hemoglobin A1C 01/14/2022 6.7 (A) 4.0 - 5.6 % Final    Comment: ADA Screening Guidelines:  5.7-6.4%  Consistent with prediabetes  >or=6.5%  Consistent with diabetes    High levels of fetal hemoglobin interfere with the HbA1C  assay. Heterozygous hemoglobin variants (HbS, HgC, etc)do  not significantly interfere with this assay.   However, presence of multiple variants may affect accuracy.      Estimated Avg Glucose 01/14/2022 146 (A) 68 - 131 mg/dL Final    TSH 01/14/2022 2.853  0.400 - 4.000 uIU/mL Final   Lab Visit on 01/14/2022   Component Date Value Ref Range Status    Specimen UA 01/14/2022 Urine, Unspecified   Final    Color, UA 01/14/2022 Yellow  Yellow, Straw, Sienna Final    Appearance, UA 01/14/2022 Clear  Clear Final    pH, UA 01/14/2022 5.0  5.0 - 8.0 Final    Specific Gravity, UA 01/14/2022 1.020  1.005 - 1.030 Final    Protein, UA 01/14/2022 Negative  Negative Final    Comment: Recommend a 24 hour urine protein or a urine   protein/creatinine ratio if globulin induced proteinuria is  clinically suspected.      Glucose, UA 01/14/2022 Negative  Negative Final    Ketones, UA 01/14/2022 Negative  Negative Final    Bilirubin (UA) 01/14/2022 Negative  Negative Final    Occult Blood UA 01/14/2022 Negative  Negative Final    Nitrite, UA 01/14/2022 Negative  Negative Final    Leukocytes, UA 01/14/2022 Negative  Negative Final    Microalbumin, Urine 01/14/2022 34.0  ug/mL Final    Creatinine, Urine 01/14/2022 102.0  15.0 - 325.0 mg/dL Final    Microalb/Creat Ratio 01/14/2022 33.3 (A) 0.0 - 30.0 ug/mg Final   Admission on 12/21/2021, Discharged on 12/21/2021   Component Date Value Ref Range Status    POCT Glucose 12/21/2021 130 (A) 70 - 110 mg/dL Final   Admission on 12/07/2021, Discharged on 12/07/2021   Component Date Value Ref Range Status    POCT Glucose 12/07/2021 116 (A) 70 - 110 mg/dL Final       Assessment & Plan:      Noah was seen today for  diabetes.  Influenza vaccine will be administered today.  Lab studies will be obtained today.    The patient declines bone density study and declines having a screening mammogram at this time.  The patient will follow-up in 5/22.    Diagnoses and all orders for this visit:    Type 2 diabetes mellitus without complication, without long-term current use of insulin  -     Hemoglobin A1C; Future  -     Comprehensive Metabolic Panel; Future  -     Lipid Panel; Future  -     Microalbumin/Creatinine Ratio, Urine; Future  -     Urinalysis; Future  -     CBC Auto Differential; Future  -     TSH; Future    Mixed hyperlipidemia  -     Hemoglobin A1C; Future  -     Comprehensive Metabolic Panel; Future  -     Lipid Panel; Future  -     Microalbumin/Creatinine Ratio, Urine; Future  -     Urinalysis; Future  -     CBC Auto Differential; Future  -     TSH; Future    Hypothyroidism due to acquired atrophy of thyroid  -     Hemoglobin A1C; Future  -     Comprehensive Metabolic Panel; Future  -     Lipid Panel; Future  -     Microalbumin/Creatinine Ratio, Urine; Future  -     Urinalysis; Future  -     CBC Auto Differential; Future  -     TSH; Future    Moderate persistent asthma without complication    Umbilical hernia without obstruction and without gangrene    Polyp of colon, unspecified part of colon, unspecified type    Essential hypertension  -     Hemoglobin A1C; Future  -     Comprehensive Metabolic Panel; Future  -     Lipid Panel; Future  -     Microalbumin/Creatinine Ratio, Urine; Future  -     Urinalysis; Future  -     CBC Auto Differential; Future  -     TSH; Future    Other orders  -     Discontinue: albuterol (PROVENTIL/VENTOLIN HFA) 90 mcg/actuation inhaler; Inhale 1-2 puffs into the lungs every 4 (four) hours as needed for Wheezing or Shortness of Breath. Rescue  -     ALPRAZolam (XANAX) 0.25 MG tablet; Take 1 tablet (0.25 mg total) by mouth nightly as needed.  -     albuterol (PROVENTIL/VENTOLIN HFA) 90 mcg/actuation  inhaler; Inhale 1-2 puffs into the lungs every 4 (four) hours as needed for Wheezing or Shortness of Breath. Rescue  -     Influenza - Quadrivalent (Adjuvanted)         Follow up in about 4 months (around 5/14/2022).     Henry Mcbride MD

## 2022-01-26 ENCOUNTER — OFFICE VISIT (OUTPATIENT)
Dept: OPHTHALMOLOGY | Facility: CLINIC | Age: 72
End: 2022-01-26
Payer: COMMERCIAL

## 2022-01-26 DIAGNOSIS — Z98.890 POST-OPERATIVE STATE: Primary | ICD-10-CM

## 2022-01-26 DIAGNOSIS — H52.7 REFRACTIVE ERROR: ICD-10-CM

## 2022-01-26 PROCEDURE — 1159F MED LIST DOCD IN RCRD: CPT | Mod: CPTII,S$GLB,, | Performed by: OPHTHALMOLOGY

## 2022-01-26 PROCEDURE — 1160F RVW MEDS BY RX/DR IN RCRD: CPT | Mod: CPTII,S$GLB,, | Performed by: OPHTHALMOLOGY

## 2022-01-26 PROCEDURE — 3060F PR POS MICROALBUMINURIA RESULT DOCUMENTED/REVIEW: ICD-10-PCS | Mod: CPTII,S$GLB,, | Performed by: OPHTHALMOLOGY

## 2022-01-26 PROCEDURE — 3044F PR MOST RECENT HEMOGLOBIN A1C LEVEL <7.0%: ICD-10-PCS | Mod: CPTII,S$GLB,, | Performed by: OPHTHALMOLOGY

## 2022-01-26 PROCEDURE — 99024 POSTOP FOLLOW-UP VISIT: CPT | Mod: S$GLB,,, | Performed by: OPHTHALMOLOGY

## 2022-01-26 PROCEDURE — 3066F NEPHROPATHY DOC TX: CPT | Mod: CPTII,S$GLB,, | Performed by: OPHTHALMOLOGY

## 2022-01-26 PROCEDURE — 1159F PR MEDICATION LIST DOCUMENTED IN MEDICAL RECORD: ICD-10-PCS | Mod: CPTII,S$GLB,, | Performed by: OPHTHALMOLOGY

## 2022-01-26 PROCEDURE — 99999 PR PBB SHADOW E&M-EST. PATIENT-LVL III: ICD-10-PCS | Mod: PBBFAC,,, | Performed by: OPHTHALMOLOGY

## 2022-01-26 PROCEDURE — 1101F PT FALLS ASSESS-DOCD LE1/YR: CPT | Mod: CPTII,S$GLB,, | Performed by: OPHTHALMOLOGY

## 2022-01-26 PROCEDURE — 1160F PR REVIEW ALL MEDS BY PRESCRIBER/CLIN PHARMACIST DOCUMENTED: ICD-10-PCS | Mod: CPTII,S$GLB,, | Performed by: OPHTHALMOLOGY

## 2022-01-26 PROCEDURE — 1126F PR PAIN SEVERITY QUANTIFIED, NO PAIN PRESENT: ICD-10-PCS | Mod: CPTII,S$GLB,, | Performed by: OPHTHALMOLOGY

## 2022-01-26 PROCEDURE — 3288F FALL RISK ASSESSMENT DOCD: CPT | Mod: CPTII,S$GLB,, | Performed by: OPHTHALMOLOGY

## 2022-01-26 PROCEDURE — 3044F HG A1C LEVEL LT 7.0%: CPT | Mod: CPTII,S$GLB,, | Performed by: OPHTHALMOLOGY

## 2022-01-26 PROCEDURE — 1126F AMNT PAIN NOTED NONE PRSNT: CPT | Mod: CPTII,S$GLB,, | Performed by: OPHTHALMOLOGY

## 2022-01-26 PROCEDURE — 3288F PR FALLS RISK ASSESSMENT DOCUMENTED: ICD-10-PCS | Mod: CPTII,S$GLB,, | Performed by: OPHTHALMOLOGY

## 2022-01-26 PROCEDURE — 99999 PR PBB SHADOW E&M-EST. PATIENT-LVL III: CPT | Mod: PBBFAC,,, | Performed by: OPHTHALMOLOGY

## 2022-01-26 PROCEDURE — 3060F POS MICROALBUMINURIA REV: CPT | Mod: CPTII,S$GLB,, | Performed by: OPHTHALMOLOGY

## 2022-01-26 PROCEDURE — 3066F PR DOCUMENTATION OF TREATMENT FOR NEPHROPATHY: ICD-10-PCS | Mod: CPTII,S$GLB,, | Performed by: OPHTHALMOLOGY

## 2022-01-26 PROCEDURE — 1101F PR PT FALLS ASSESS DOC 0-1 FALLS W/OUT INJ PAST YR: ICD-10-PCS | Mod: CPTII,S$GLB,, | Performed by: OPHTHALMOLOGY

## 2022-01-26 PROCEDURE — 99024 PR POST-OP FOLLOW-UP VISIT: ICD-10-PCS | Mod: S$GLB,,, | Performed by: OPHTHALMOLOGY

## 2022-01-26 NOTE — PROGRESS NOTES
Subjective:       Patient ID: Noah Bailey is a 71 y.o. female.    Chief Complaint: Post-op Evaluation    HPI     S/p Phaco w/IOL OD- 12/21/21   S/p Phaco w/IOL OS 12/07/21     70 y/o female is here for 3 weeks post-op of both eyes. Pt reports vision   is doing well. Pt has floaters, bilateral. Pt wears OTC +2.50.     Eyemeds  No gtts    Last edited by Phill Hernadez on 1/26/2022  1:41 PM. (History)             Assessment:       1. Post-operative state    2. Refractive error        Plan:       S/p CE OU- Doing well.  RE-Doing well with readers.      RTC Dr Jay in 6 mos.

## 2022-02-20 NOTE — TELEPHONE ENCOUNTER
No new care gaps identified.  Powered by [employer]. Reference number: [id number].   2/20/2022 2:21:39 PM CST

## 2022-02-22 RX ORDER — FLUTICASONE PROPIONATE AND SALMETEROL 250; 50 UG/1; UG/1
POWDER RESPIRATORY (INHALATION)
Qty: 180 EACH | Refills: 3 | Status: SHIPPED | OUTPATIENT
Start: 2022-02-22 | End: 2023-04-06 | Stop reason: SDUPTHER

## 2022-02-22 NOTE — TELEPHONE ENCOUNTER
Refill Routing Note   Medication(s) are not appropriate for processing by [facility] for the following reason(s):      - Drug-Disease Interaction (fluticasone-salmeterol 250-50 mcg/dose and Coronary artery disease involving native coronary artery of native heart without angina pectoris)    [facility] action(s):  Defer Medication-related problems identified: Drug-disease interaction        --->Care Gap information included in message below if applicable.   Medication reconciliation completed: No   Automatic Epic Generated Protocol Data:        Requested Prescriptions   Pending Prescriptions Disp Refills    fluticasone-salmeterol diskus inhaler 250-50 mcg [Pharmacy Med Name: ADVAIR 250-50 DISKUS] 180 each 3     Sig: INHALE 1 PUFF BY MOUTH TWICE A DAY       Pulmonology:  Combination Products Passed - 2/20/2022  2:21 PM        Passed - Patient is at least 18 years old        Passed - Patient has applicable pulmonary diagnosis on their problem list        Passed - Last Heart Rate in normal range within 360 days     Pulse Readings from Last 1 Encounters:   01/14/22 76              Passed - Valid encounter within last 15 months     Recent Visits  Date Type Provider Dept   01/14/22 Office Visit Henry Mcbride MD [facility] Internal Medicine   05/13/21 Office Visit Henry Mcbride MD [facility]   05/18/20 Office Visit Henry Mcbride MD [facility] Internal Medicine   Showing recent visits within past 720 days and meeting all other requirements  Future Appointments  No visits were found meeting these conditions.  Showing future appointments within next 150 days and meeting all other requirements      Future Appointments              In 2 months LAB, [facility] - Lab, [place]    In 3 months MD Rizwan Neri [facility] - Internal Medicine, [place]                      Appointments  past 12m or future 3m with PCP    Date Provider   Last Visit   1/14/2022 Henry Mcbride MD   Next Visit   5/27/2022  Henry Mcbride MD   ED visits in past 90 days: 0        Note composed:12:51 PM 02/22/2022

## 2022-03-03 ENCOUNTER — TELEPHONE (OUTPATIENT)
Dept: INTERNAL MEDICINE | Facility: CLINIC | Age: 72
End: 2022-03-03
Payer: COMMERCIAL

## 2022-03-03 NOTE — TELEPHONE ENCOUNTER
amlodipine-olmesartan (KARISSA) 10-40 mg per tablet 90 tablet 3 11/24/2021  No   Sig: TAKE 1 TABLET BY MOUTH ONCE DAILY FOR BLOOD PRESSURE. REPELACE WITH BENAZEPRIL/AMLODIPINE (LOTREL)   Sent to pharmacy as: amlodipine-olmesartan (KARISSA) 10-40 mg per tablet   Class: Normal   Order: [id number]   Date/Time Signed: 11/24/2021 00:20       E-Prescribing Status: Receipt confirmed by pharmacy (11/24/2021 12:20 AM CST)         Pharmacy    [id number] - [place], [place] - [address] [facility], [address]          Since patient is 71 years old,  I left ms at [facility] to call [facility] to get rx above transferred to them.

## 2022-03-03 NOTE — TELEPHONE ENCOUNTER
----- Message from Juan Charles sent at 3/3/2022  9:37 AM CST -----  Contact: Pt @[phone]  Requesting an RX refill or new RX.  Is this a refill or new RX: refill  RX name and strength (copy/paste from chart):  amlodipine-olmesartan (KARISSA) 10-40 mg per tablet  Is this a 30 day or 90 day RX: 90  Pharmacy name and phone # (copy/paste from chart):    [facility]/pharmacy #[id number] - [place] - [address] [facility]  [address] [facility]  [place] [address]  Phone: [phone] Fax: [phone]    Patient would like a call back when RX is sent to Pharmacy.    The doctors have asked that we provide their patients with the following 2 reminders -- prescription refills can take up to 72 hours, and a friendly reminder that in the future you can use your [facility] account to request refills: yes

## 2022-04-05 ENCOUNTER — PATIENT OUTREACH (OUTPATIENT)
Dept: ADMINISTRATIVE | Facility: OTHER | Age: 72
End: 2022-04-05
Payer: COMMERCIAL

## 2022-04-05 NOTE — PROGRESS NOTES
Care Everywhere: updated  Immunization: updated  Health Maintenance: updated  Media Review: review for outside colon cancer report   Legacy Review:   DIS:  Order placed:   Upcoming appts:  EFAX:  Task Tickets:  Referrals:

## 2022-05-27 ENCOUNTER — HOSPITAL ENCOUNTER (OUTPATIENT)
Dept: RADIOLOGY | Facility: HOSPITAL | Age: 72
Discharge: HOME | End: 2022-05-27
Attending: INTERNAL MEDICINE
Payer: COMMERCIAL

## 2022-05-27 ENCOUNTER — OFFICE VISIT (OUTPATIENT)
Dept: INTERNAL MEDICINE | Facility: CLINIC | Age: 72
End: 2022-05-27
Payer: COMMERCIAL

## 2022-05-27 VITALS
SYSTOLIC BLOOD PRESSURE: 126 MMHG | WEIGHT: 189.63 LBS | HEIGHT: 62 IN | RESPIRATION RATE: 16 BRPM | DIASTOLIC BLOOD PRESSURE: 60 MMHG | TEMPERATURE: 98 F | BODY MASS INDEX: 34.89 KG/M2 | HEART RATE: 72 BPM

## 2022-05-27 DIAGNOSIS — M54.50 RIGHT-SIDED LOW BACK PAIN WITHOUT SCIATICA, UNSPECIFIED CHRONICITY: ICD-10-CM

## 2022-05-27 DIAGNOSIS — E11.9 TYPE 2 DIABETES MELLITUS WITHOUT COMPLICATION, WITHOUT LONG-TERM CURRENT USE OF INSULIN: Primary | ICD-10-CM

## 2022-05-27 DIAGNOSIS — I10 ESSENTIAL HYPERTENSION: ICD-10-CM

## 2022-05-27 DIAGNOSIS — E03.4 HYPOTHYROIDISM DUE TO ACQUIRED ATROPHY OF THYROID: ICD-10-CM

## 2022-05-27 DIAGNOSIS — M25.562 LEFT KNEE PAIN, UNSPECIFIED CHRONICITY: ICD-10-CM

## 2022-05-27 DIAGNOSIS — J45.40 MODERATE PERSISTENT ASTHMA WITHOUT COMPLICATION: ICD-10-CM

## 2022-05-27 DIAGNOSIS — E78.2 MIXED HYPERLIPIDEMIA: ICD-10-CM

## 2022-05-27 DIAGNOSIS — R10.9 RIGHT FLANK PAIN: ICD-10-CM

## 2022-05-27 PROCEDURE — 3074F SYST BP LT 130 MM HG: CPT | Mod: CPTII,S$GLB,, | Performed by: INTERNAL MEDICINE

## 2022-05-27 PROCEDURE — 3044F HG A1C LEVEL LT 7.0%: CPT | Mod: CPTII,S$GLB,, | Performed by: INTERNAL MEDICINE

## 2022-05-27 PROCEDURE — 72100 XR LUMBAR SPINE AP AND LATERAL: ICD-10-PCS | Mod: 26,,, | Performed by: RADIOLOGY

## 2022-05-27 PROCEDURE — 73560 XR KNEE ORTHO LEFT: ICD-10-PCS | Mod: 26,RT,, | Performed by: RADIOLOGY

## 2022-05-27 PROCEDURE — 4010F PR ACE/ARB THEARPY RXD/TAKEN: ICD-10-PCS | Mod: CPTII,S$GLB,, | Performed by: INTERNAL MEDICINE

## 2022-05-27 PROCEDURE — 3078F PR MOST RECENT DIASTOLIC BLOOD PRESSURE < 80 MM HG: ICD-10-PCS | Mod: CPTII,S$GLB,, | Performed by: INTERNAL MEDICINE

## 2022-05-27 PROCEDURE — 3060F PR POS MICROALBUMINURIA RESULT DOCUMENTED/REVIEW: ICD-10-PCS | Mod: CPTII,S$GLB,, | Performed by: INTERNAL MEDICINE

## 2022-05-27 PROCEDURE — 99999 PR PBB SHADOW E&M-EST. PATIENT-LVL V: CPT | Mod: PBBFAC,,, | Performed by: INTERNAL MEDICINE

## 2022-05-27 PROCEDURE — 72100 X-RAY EXAM L-S SPINE 2/3 VWS: CPT | Mod: TC,PO

## 2022-05-27 PROCEDURE — 3044F PR MOST RECENT HEMOGLOBIN A1C LEVEL <7.0%: ICD-10-PCS | Mod: CPTII,S$GLB,, | Performed by: INTERNAL MEDICINE

## 2022-05-27 PROCEDURE — 3072F LOW RISK FOR RETINOPATHY: CPT | Mod: CPTII,S$GLB,, | Performed by: INTERNAL MEDICINE

## 2022-05-27 PROCEDURE — 1159F PR MEDICATION LIST DOCUMENTED IN MEDICAL RECORD: ICD-10-PCS | Mod: CPTII,S$GLB,, | Performed by: INTERNAL MEDICINE

## 2022-05-27 PROCEDURE — 72100 X-RAY EXAM L-S SPINE 2/3 VWS: CPT | Mod: 26,,, | Performed by: RADIOLOGY

## 2022-05-27 PROCEDURE — 1159F MED LIST DOCD IN RCRD: CPT | Mod: CPTII,S$GLB,, | Performed by: INTERNAL MEDICINE

## 2022-05-27 PROCEDURE — 1160F RVW MEDS BY RX/DR IN RCRD: CPT | Mod: CPTII,S$GLB,, | Performed by: INTERNAL MEDICINE

## 2022-05-27 PROCEDURE — 3066F PR DOCUMENTATION OF TREATMENT FOR NEPHROPATHY: ICD-10-PCS | Mod: CPTII,S$GLB,, | Performed by: INTERNAL MEDICINE

## 2022-05-27 PROCEDURE — 3008F BODY MASS INDEX DOCD: CPT | Mod: CPTII,S$GLB,, | Performed by: INTERNAL MEDICINE

## 2022-05-27 PROCEDURE — 3066F NEPHROPATHY DOC TX: CPT | Mod: CPTII,S$GLB,, | Performed by: INTERNAL MEDICINE

## 2022-05-27 PROCEDURE — 1101F PR PT FALLS ASSESS DOC 0-1 FALLS W/OUT INJ PAST YR: ICD-10-PCS | Mod: CPTII,S$GLB,, | Performed by: INTERNAL MEDICINE

## 2022-05-27 PROCEDURE — 73562 X-RAY EXAM OF KNEE 3: CPT | Mod: 26,LT,, | Performed by: RADIOLOGY

## 2022-05-27 PROCEDURE — 1160F PR REVIEW ALL MEDS BY PRESCRIBER/CLIN PHARMACIST DOCUMENTED: ICD-10-PCS | Mod: CPTII,S$GLB,, | Performed by: INTERNAL MEDICINE

## 2022-05-27 PROCEDURE — 3288F FALL RISK ASSESSMENT DOCD: CPT | Mod: CPTII,S$GLB,, | Performed by: INTERNAL MEDICINE

## 2022-05-27 PROCEDURE — 3060F POS MICROALBUMINURIA REV: CPT | Mod: CPTII,S$GLB,, | Performed by: INTERNAL MEDICINE

## 2022-05-27 PROCEDURE — 3072F PR LOW RISK FOR RETINOPATHY: ICD-10-PCS | Mod: CPTII,S$GLB,, | Performed by: INTERNAL MEDICINE

## 2022-05-27 PROCEDURE — 99214 PR OFFICE/OUTPT VISIT, EST, LEVL IV, 30-39 MIN: ICD-10-PCS | Mod: S$GLB,,, | Performed by: INTERNAL MEDICINE

## 2022-05-27 PROCEDURE — 73562 XR KNEE ORTHO LEFT: ICD-10-PCS | Mod: 26,LT,, | Performed by: RADIOLOGY

## 2022-05-27 PROCEDURE — 1101F PT FALLS ASSESS-DOCD LE1/YR: CPT | Mod: CPTII,S$GLB,, | Performed by: INTERNAL MEDICINE

## 2022-05-27 PROCEDURE — 99214 OFFICE O/P EST MOD 30 MIN: CPT | Mod: S$GLB,,, | Performed by: INTERNAL MEDICINE

## 2022-05-27 PROCEDURE — 1126F PR PAIN SEVERITY QUANTIFIED, NO PAIN PRESENT: ICD-10-PCS | Mod: CPTII,S$GLB,, | Performed by: INTERNAL MEDICINE

## 2022-05-27 PROCEDURE — 73560 X-RAY EXAM OF KNEE 1 OR 2: CPT | Mod: 26,RT,, | Performed by: RADIOLOGY

## 2022-05-27 PROCEDURE — 3074F PR MOST RECENT SYSTOLIC BLOOD PRESSURE < 130 MM HG: ICD-10-PCS | Mod: CPTII,S$GLB,, | Performed by: INTERNAL MEDICINE

## 2022-05-27 PROCEDURE — 99999 PR PBB SHADOW E&M-EST. PATIENT-LVL V: ICD-10-PCS | Mod: PBBFAC,,, | Performed by: INTERNAL MEDICINE

## 2022-05-27 PROCEDURE — 1126F AMNT PAIN NOTED NONE PRSNT: CPT | Mod: CPTII,S$GLB,, | Performed by: INTERNAL MEDICINE

## 2022-05-27 PROCEDURE — 73560 X-RAY EXAM OF KNEE 1 OR 2: CPT | Mod: TC,PO,RT

## 2022-05-27 PROCEDURE — 4010F ACE/ARB THERAPY RXD/TAKEN: CPT | Mod: CPTII,S$GLB,, | Performed by: INTERNAL MEDICINE

## 2022-05-27 PROCEDURE — 3288F PR FALLS RISK ASSESSMENT DOCUMENTED: ICD-10-PCS | Mod: CPTII,S$GLB,, | Performed by: INTERNAL MEDICINE

## 2022-05-27 PROCEDURE — 3078F DIAST BP <80 MM HG: CPT | Mod: CPTII,S$GLB,, | Performed by: INTERNAL MEDICINE

## 2022-05-27 PROCEDURE — 3008F PR BODY MASS INDEX (BMI) DOCUMENTED: ICD-10-PCS | Mod: CPTII,S$GLB,, | Performed by: INTERNAL MEDICINE

## 2022-05-27 RX ORDER — MUPIROCIN 20 MG/G
OINTMENT TOPICAL 3 TIMES DAILY
Qty: 15 G | Refills: 0 | Status: SHIPPED | OUTPATIENT
Start: 2022-05-27

## 2022-05-27 NOTE — PROGRESS NOTES
No fracture or bone destruction is noted in the lumbar spine.  Degenerative disc changes are present.    Please notify the patient.

## 2022-05-27 NOTE — PROGRESS NOTES
Early degenerative arthritis changes are noted in the left knee.  Joint swelling is noted.    Please notify the patient.

## 2022-05-29 NOTE — PROGRESS NOTES
Subjective:       Patient ID: Noah Bailey is a 71 y.o. female.    Chief Complaint: Diabetes (See labs to revu.) and left knee pain (Off and on.  Has fracture in that leg. )    HPI   The patient presents for follow-up of medical conditions which include type 2 diabetes mellitus, hypertension, asthma, CHF.  The portion also has chronic intermittent lower back pain along with symptoms of left knee pain, and right flank pain.  The patient denies having any dysuria.  Right flank and lower back pain symptoms have been intermittent over the past 2 months.  No gross hematuria is noted.    The patient has more joint pains involving the left knee, fingers, and lower back.    Review of Systems   Constitutional: Negative for activity change, appetite change and unexpected weight change.   Eyes: Negative for visual disturbance.   Respiratory: Negative for shortness of breath.    Cardiovascular: Negative for chest pain, palpitations and leg swelling.   Gastrointestinal: Negative for abdominal pain, blood in stool and diarrhea.   Genitourinary: Positive for flank pain. Negative for dysuria, frequency, hematuria and urgency.   Musculoskeletal: Positive for arthralgias and back pain. Negative for gait problem.   Neurological: Negative for weakness, numbness and headaches.   Psychiatric/Behavioral: Negative for sleep disturbance.            Physical Exam  Vitals and nursing note reviewed.   Constitutional:       General: She is not in acute distress.     Appearance: She is well-developed.      Comments: The patient has lost 2 lb since 01/14/2022.    HENT:      Head: Normocephalic and atraumatic.      Mouth/Throat:      Comments: Crusted skin lesion is noted below the left lower lip.  Eyes:      General: No scleral icterus.     Conjunctiva/sclera: Conjunctivae normal.      Pupils: Pupils are equal, round, and reactive to light.   Neck:      Thyroid: No thyromegaly.      Vascular: No JVD.   Cardiovascular:      Rate and Rhythm:  Normal rate and regular rhythm.      Heart sounds: Normal heart sounds. No murmur heard.    No friction rub. No gallop.   Pulmonary:      Effort: Pulmonary effort is normal. No respiratory distress.      Breath sounds: Normal breath sounds. No wheezing or rales.   Abdominal:      General: Bowel sounds are normal.      Palpations: Abdomen is soft. There is no mass.      Tenderness: There is no abdominal tenderness. There is no right CVA tenderness or left CVA tenderness.      Hernia: A hernia (Tender umbilical hernia is present.) is present.   Musculoskeletal:         General: No tenderness.      Cervical back: Normal range of motion and neck supple.      Comments: Left knee:  hypertrophic joint changes are present.  A small effusion is present.  Tenderness is present on full flexion.    Back:  Negative straight leg raising test bilaterally.  No lumbar paraspinous muscle spasm is present.  No SI joint tenderness is noted.  Hip range of motion is intact.   Lymphadenopathy:      Cervical: No cervical adenopathy.   Skin:     General: Skin is warm and dry.      Findings: No rash.      Comments: No foot lesions are present.   Neurological:      Mental Status: She is alert and oriented to person, place, and time.      Cranial Nerves: No cranial nerve deficit.      Comments: Sensory exam is intact in both feet on monofilament and vibration testing.   Psychiatric:         Behavior: Behavior normal.           Lab Visit on 05/20/2022   Component Date Value Ref Range Status    Hemoglobin A1C 05/20/2022 6.3 (A) 4.0 - 5.6 % Final    Comment: ADA Screening Guidelines:  5.7-6.4%  Consistent with prediabetes  >or=6.5%  Consistent with diabetes    High levels of fetal hemoglobin interfere with the HbA1C  assay. Heterozygous hemoglobin variants (HbS, HgC, etc)do  not significantly interfere with this assay.   However, presence of multiple variants may affect accuracy.      Estimated Avg Glucose 05/20/2022 134 (A) 68 - 131 mg/dL  Final    Sodium 05/20/2022 139  136 - 145 mmol/L Final    Potassium 05/20/2022 4.3  3.5 - 5.1 mmol/L Final    Chloride 05/20/2022 101  95 - 110 mmol/L Final    CO2 05/20/2022 26  23 - 29 mmol/L Final    Glucose 05/20/2022 112 (A) 70 - 110 mg/dL Final    BUN 05/20/2022 13  7 - 17 mg/dL Final    Creatinine 05/20/2022 0.61  0.50 - 1.40 mg/dL Final    Calcium 05/20/2022 9.3  8.7 - 10.5 mg/dL Final    Total Protein 05/20/2022 8.2  6.0 - 8.4 g/dL Final    Albumin 05/20/2022 4.7  3.5 - 5.2 g/dL Final    Total Bilirubin 05/20/2022 0.3  0.1 - 1.0 mg/dL Final    Comment: For infants and newborns, interpretation of results should be based  on gestational age, weight and in agreement with clinical  observations.    Premature Infant recommended reference ranges:  Up to 24 hours.............<8.0 mg/dL  Up to 48 hours............<12.0 mg/dL  3-5 days..................<15.0 mg/dL  6-29 days.................<15.0 mg/dL      Alkaline Phosphatase 05/20/2022 120  38 - 126 U/L Final    AST 05/20/2022 27  15 - 46 U/L Final    ALT 05/20/2022 21  10 - 44 U/L Final    Anion Gap 05/20/2022 12  8 - 16 mmol/L Final    eGFR if African American 05/20/2022 >60.0  >60 mL/min/1.73 m^2 Final    eGFR if non African American 05/20/2022 >60.0  >60 mL/min/1.73 m^2 Final    Comment: Calculation used to obtain the estimated glomerular filtration  rate (eGFR) is the CKD-EPI equation.       Cholesterol 05/20/2022 156  120 - 199 mg/dL Final    Comment: The National Cholesterol Education Program (NCEP) has set the  following guidelines (reference ranges) for Cholesterol:  Optimal.....................<200 mg/dL  Borderline High.............200-239 mg/dL  High........................> or = 240 mg/dL      Triglycerides 05/20/2022 105  30 - 150 mg/dL Final    Comment: The National Cholesterol Education Program (NCEP) has set the  following guidelines (reference values) for triglycerides:  Normal......................<150 mg/dL  Borderline  High.............150-199 mg/dL  High........................200-499 mg/dL      HDL 05/20/2022 51  40 - 75 mg/dL Final    Comment: The National Cholesterol Education Program (NCEP) has set the  following guidelines (reference values) for HDL Cholesterol:  Low...............<40 mg/dL  Optimal...........>60 mg/dL      LDL Cholesterol 05/20/2022 84.0  63.0 - 159.0 mg/dL Final    Comment: The National Cholesterol Education Program (NCEP) has set the  following guidelines (reference values) for LDL Cholesterol:  Optimal.......................<130 mg/dL  Borderline High...............130-159 mg/dL  High..........................160-189 mg/dL  Very High.....................>190 mg/dL      HDL/Cholesterol Ratio 05/20/2022 32.7  20.0 - 50.0 % Final    Total Cholesterol/HDL Ratio 05/20/2022 3.1  2.0 - 5.0 Final    Non-HDL Cholesterol 05/20/2022 105  mg/dL Final    Comment: Risk category and Non-HDL cholesterol goals:  Coronary heart disease (CHD)or equivalent (10-year risk of CHD >20%):  Non-HDL cholesterol goal     <130 mg/dL  Two or more CHD risk factors and 10-year risk of CHD <= 20%:  Non-HDL cholesterol goal     <160 mg/dL  0 to 1 CHD risk factor:  Non-HDL cholesterol goal     <190 mg/dL      WBC 05/20/2022 10.79  3.90 - 12.70 K/uL Final    RBC 05/20/2022 5.00  4.00 - 5.40 M/uL Final    Hemoglobin 05/20/2022 14.0  12.0 - 16.0 g/dL Final    Hematocrit 05/20/2022 43.6  37.0 - 48.5 % Final    MCV 05/20/2022 87  82 - 98 fL Final    MCH 05/20/2022 28.0  27.0 - 31.0 pg Final    MCHC 05/20/2022 32.1  32.0 - 36.0 g/dL Final    RDW 05/20/2022 13.9  11.5 - 14.5 % Final    Platelets 05/20/2022 318  150 - 450 K/uL Final    MPV 05/20/2022 11.4  9.2 - 12.9 fL Final    Immature Granulocytes 05/20/2022 0.5  0.0 - 0.5 % Final    Gran # (ANC) 05/20/2022 6.7  1.8 - 7.7 K/uL Final    Immature Grans (Abs) 05/20/2022 0.05 (A) 0.00 - 0.04 K/uL Final    Comment: Mild elevation in immature granulocytes is non specific and   can  be seen in a variety of conditions including stress response,   acute inflammation, trauma and pregnancy. Correlation with other   laboratory and clinical findings is essential.      Lymph # 05/20/2022 2.9  1.0 - 4.8 K/uL Final    Mono # 05/20/2022 0.9  0.3 - 1.0 K/uL Final    Eos # 05/20/2022 0.1  0.0 - 0.5 K/uL Final    Baso # 05/20/2022 0.04  0.00 - 0.20 K/uL Final    nRBC 05/20/2022 0  0 /100 WBC Final    Gran % 05/20/2022 62.2  38.0 - 73.0 % Final    Lymph % 05/20/2022 26.9  18.0 - 48.0 % Final    Mono % 05/20/2022 8.7  4.0 - 15.0 % Final    Eosinophil % 05/20/2022 1.3  0.0 - 8.0 % Final    Basophil % 05/20/2022 0.4  0.0 - 1.9 % Final    Differential Method 05/20/2022 Automated   Final       Assessment & Plan:      Noah was seen today for follow-up.  Urine studies will be ordered.    A renal ultrasound will be obtained.    X-rays of the lumbar spine and left knee will be obtained today.    Bactroban will be ordered for the perioral skin lesion.    Fasting blood tests will be obtained in 4 months.    Diagnoses and all orders for this visit:    Type 2 diabetes mellitus without complication, without long-term current use of insulin  -     Comprehensive Metabolic Panel; Future  -     Hemoglobin A1C; Future    Mixed hyperlipidemia  -     Comprehensive Metabolic Panel; Future  -     Hemoglobin A1C; Future    Moderate persistent asthma without complication  -     Comprehensive Metabolic Panel; Future  -     Hemoglobin A1C; Future    Hypothyroidism due to acquired atrophy of thyroid  -     Comprehensive Metabolic Panel; Future  -     Hemoglobin A1C; Future    Essential hypertension    Right-sided low back pain without sciatica, unspecified chronicity  -     X-Ray Lumbar Spine Ap And Lateral; Future    Left knee pain, unspecified chronicity  -     X-ray Knee Ortho Left; Future    Right flank pain  -     US Retroperitoneal Complete (Kidney and; Future  -     Urinalysis; Future  -     Urine culture;  Future    Other orders  -     mupirocin (BACTROBAN) 2 % ointment; Apply topically 3 (three) times daily.         Follow up in about 4 months (around 9/27/2022).     Henry Mcbride MD

## 2022-06-07 RX ORDER — THEOPHYLLINE 400 MG/1
TABLET, EXTENDED RELEASE ORAL
Qty: 30 TABLET | Refills: 7 | Status: SHIPPED | OUTPATIENT
Start: 2022-06-07 | End: 2023-02-15 | Stop reason: SDUPTHER

## 2022-06-20 ENCOUNTER — TELEPHONE (OUTPATIENT)
Dept: INTERNAL MEDICINE | Facility: CLINIC | Age: 72
End: 2022-06-20
Payer: COMMERCIAL

## 2022-06-20 NOTE — TELEPHONE ENCOUNTER
Patient called and wants copy of labs mailed.    Please advise what I can tell her on results. Just Continue diabetic, low carb diet?  i'll do a results letter.  Thanks aguilar

## 2022-06-20 NOTE — TELEPHONE ENCOUNTER
----- Message from Cleopatra Sanchez LPN sent at 6/17/2022  2:47 PM CDT -----  Contact: Self/[phone]  PLease mail out to pt on Monday  ----- Message -----  From: Mica Fowler  Sent: 6/17/2022   2:32 PM CDT  To: Reed Figueroa A Staff    Pt said that she is calling In regards to needing to get a copy of her labs that were done 5/20 mailed to her at the address we have on file. Please advise

## 2022-06-21 NOTE — TELEPHONE ENCOUNTER
Blood test results from 05/20/2022 were reviewed with the patient during her office visit.  OK  to send copy of blood test results from 05/20/2022.

## 2022-07-06 ENCOUNTER — OFFICE VISIT (OUTPATIENT)
Dept: CARDIOLOGY | Facility: CLINIC | Age: 72
End: 2022-07-06
Payer: COMMERCIAL

## 2022-07-06 VITALS
HEART RATE: 88 BPM | BODY MASS INDEX: 34.95 KG/M2 | HEIGHT: 62 IN | DIASTOLIC BLOOD PRESSURE: 66 MMHG | SYSTOLIC BLOOD PRESSURE: 116 MMHG | WEIGHT: 189.94 LBS

## 2022-07-06 DIAGNOSIS — I50.32 CHRONIC HEART FAILURE WITH PRESERVED EJECTION FRACTION: ICD-10-CM

## 2022-07-06 DIAGNOSIS — I10 ESSENTIAL HYPERTENSION: Chronic | ICD-10-CM

## 2022-07-06 DIAGNOSIS — I25.10 CORONARY ARTERY DISEASE INVOLVING NATIVE CORONARY ARTERY OF NATIVE HEART WITHOUT ANGINA PECTORIS: Chronic | ICD-10-CM

## 2022-07-06 DIAGNOSIS — Z12.31 OTHER SCREENING MAMMOGRAM: ICD-10-CM

## 2022-07-06 DIAGNOSIS — I25.10 CORONARY ARTERY DISEASE INVOLVING NATIVE CORONARY ARTERY OF NATIVE HEART WITHOUT ANGINA PECTORIS: Primary | Chronic | ICD-10-CM

## 2022-07-06 DIAGNOSIS — E78.2 MIXED HYPERLIPIDEMIA: ICD-10-CM

## 2022-07-06 PROCEDURE — 3060F POS MICROALBUMINURIA REV: CPT | Mod: CPTII,S$GLB,, | Performed by: INTERNAL MEDICINE

## 2022-07-06 PROCEDURE — 3066F NEPHROPATHY DOC TX: CPT | Mod: CPTII,S$GLB,, | Performed by: INTERNAL MEDICINE

## 2022-07-06 PROCEDURE — 3288F PR FALLS RISK ASSESSMENT DOCUMENTED: ICD-10-PCS | Mod: CPTII,S$GLB,, | Performed by: INTERNAL MEDICINE

## 2022-07-06 PROCEDURE — 3008F PR BODY MASS INDEX (BMI) DOCUMENTED: ICD-10-PCS | Mod: CPTII,S$GLB,, | Performed by: INTERNAL MEDICINE

## 2022-07-06 PROCEDURE — 1126F PR PAIN SEVERITY QUANTIFIED, NO PAIN PRESENT: ICD-10-PCS | Mod: CPTII,S$GLB,, | Performed by: INTERNAL MEDICINE

## 2022-07-06 PROCEDURE — 99999 PR PBB SHADOW E&M-EST. PATIENT-LVL IV: ICD-10-PCS | Mod: PBBFAC,,, | Performed by: INTERNAL MEDICINE

## 2022-07-06 PROCEDURE — 1101F PT FALLS ASSESS-DOCD LE1/YR: CPT | Mod: CPTII,S$GLB,, | Performed by: INTERNAL MEDICINE

## 2022-07-06 PROCEDURE — 3072F LOW RISK FOR RETINOPATHY: CPT | Mod: CPTII,S$GLB,, | Performed by: INTERNAL MEDICINE

## 2022-07-06 PROCEDURE — 3044F HG A1C LEVEL LT 7.0%: CPT | Mod: CPTII,S$GLB,, | Performed by: INTERNAL MEDICINE

## 2022-07-06 PROCEDURE — 99214 OFFICE O/P EST MOD 30 MIN: CPT | Mod: S$GLB,,, | Performed by: INTERNAL MEDICINE

## 2022-07-06 PROCEDURE — 4010F ACE/ARB THERAPY RXD/TAKEN: CPT | Mod: CPTII,S$GLB,, | Performed by: INTERNAL MEDICINE

## 2022-07-06 PROCEDURE — 1126F AMNT PAIN NOTED NONE PRSNT: CPT | Mod: CPTII,S$GLB,, | Performed by: INTERNAL MEDICINE

## 2022-07-06 PROCEDURE — 1101F PR PT FALLS ASSESS DOC 0-1 FALLS W/OUT INJ PAST YR: ICD-10-PCS | Mod: CPTII,S$GLB,, | Performed by: INTERNAL MEDICINE

## 2022-07-06 PROCEDURE — 3288F FALL RISK ASSESSMENT DOCD: CPT | Mod: CPTII,S$GLB,, | Performed by: INTERNAL MEDICINE

## 2022-07-06 PROCEDURE — 3074F SYST BP LT 130 MM HG: CPT | Mod: CPTII,S$GLB,, | Performed by: INTERNAL MEDICINE

## 2022-07-06 PROCEDURE — 1159F PR MEDICATION LIST DOCUMENTED IN MEDICAL RECORD: ICD-10-PCS | Mod: CPTII,S$GLB,, | Performed by: INTERNAL MEDICINE

## 2022-07-06 PROCEDURE — 3066F PR DOCUMENTATION OF TREATMENT FOR NEPHROPATHY: ICD-10-PCS | Mod: CPTII,S$GLB,, | Performed by: INTERNAL MEDICINE

## 2022-07-06 PROCEDURE — 1160F RVW MEDS BY RX/DR IN RCRD: CPT | Mod: CPTII,S$GLB,, | Performed by: INTERNAL MEDICINE

## 2022-07-06 PROCEDURE — 99214 PR OFFICE/OUTPT VISIT, EST, LEVL IV, 30-39 MIN: ICD-10-PCS | Mod: S$GLB,,, | Performed by: INTERNAL MEDICINE

## 2022-07-06 PROCEDURE — 3008F BODY MASS INDEX DOCD: CPT | Mod: CPTII,S$GLB,, | Performed by: INTERNAL MEDICINE

## 2022-07-06 PROCEDURE — 3072F PR LOW RISK FOR RETINOPATHY: ICD-10-PCS | Mod: CPTII,S$GLB,, | Performed by: INTERNAL MEDICINE

## 2022-07-06 PROCEDURE — 99999 PR PBB SHADOW E&M-EST. PATIENT-LVL IV: CPT | Mod: PBBFAC,,, | Performed by: INTERNAL MEDICINE

## 2022-07-06 PROCEDURE — 1159F MED LIST DOCD IN RCRD: CPT | Mod: CPTII,S$GLB,, | Performed by: INTERNAL MEDICINE

## 2022-07-06 PROCEDURE — 3060F PR POS MICROALBUMINURIA RESULT DOCUMENTED/REVIEW: ICD-10-PCS | Mod: CPTII,S$GLB,, | Performed by: INTERNAL MEDICINE

## 2022-07-06 PROCEDURE — 3078F PR MOST RECENT DIASTOLIC BLOOD PRESSURE < 80 MM HG: ICD-10-PCS | Mod: CPTII,S$GLB,, | Performed by: INTERNAL MEDICINE

## 2022-07-06 PROCEDURE — 3078F DIAST BP <80 MM HG: CPT | Mod: CPTII,S$GLB,, | Performed by: INTERNAL MEDICINE

## 2022-07-06 PROCEDURE — 1160F PR REVIEW ALL MEDS BY PRESCRIBER/CLIN PHARMACIST DOCUMENTED: ICD-10-PCS | Mod: CPTII,S$GLB,, | Performed by: INTERNAL MEDICINE

## 2022-07-06 PROCEDURE — 4010F PR ACE/ARB THEARPY RXD/TAKEN: ICD-10-PCS | Mod: CPTII,S$GLB,, | Performed by: INTERNAL MEDICINE

## 2022-07-06 PROCEDURE — 3044F PR MOST RECENT HEMOGLOBIN A1C LEVEL <7.0%: ICD-10-PCS | Mod: CPTII,S$GLB,, | Performed by: INTERNAL MEDICINE

## 2022-07-06 PROCEDURE — 3074F PR MOST RECENT SYSTOLIC BLOOD PRESSURE < 130 MM HG: ICD-10-PCS | Mod: CPTII,S$GLB,, | Performed by: INTERNAL MEDICINE

## 2022-07-06 RX ORDER — NITROGLYCERIN 0.4 MG/1
0.4 TABLET SUBLINGUAL EVERY 5 MIN PRN
Qty: 25 TABLET | Refills: 11 | Status: CANCELLED | OUTPATIENT
Start: 2022-07-06 | End: 2023-07-06

## 2022-07-06 RX ORDER — NITROGLYCERIN 0.4 MG/1
0.4 TABLET SUBLINGUAL EVERY 5 MIN PRN
Qty: 25 TABLET | Refills: 11 | Status: SHIPPED | OUTPATIENT
Start: 2022-07-06 | End: 2023-07-06

## 2022-07-06 NOTE — PROGRESS NOTES
"Subjective:   Patient ID:  Noah Bailey is a 71 y.o. female who presents for follow-up of Follow-up      HPI: Patient last seen in October 2020. She has no symptoms to report. Overall doing well. Patient is sedentary. She is unable to lose weight.  She is tolerating medications well. Blood work is fine.    Lab Results   Component Value Date     05/20/2022    K 4.3 05/20/2022     05/20/2022    CO2 26 05/20/2022    BUN 13 05/20/2022    CREATININE 0.61 05/20/2022     (H) 05/20/2022    HGBA1C 6.3 (H) 05/20/2022    MG 2.1 03/02/2020    MG 2.1 03/02/2020    AST 27 05/20/2022    ALT 21 05/20/2022    ALBUMIN 4.7 05/20/2022    PROT 8.2 05/20/2022    BILITOT 0.3 05/20/2022    WBC 10.79 05/20/2022    HGB 14.0 05/20/2022    HCT 43.6 05/20/2022    MCV 87 05/20/2022     05/20/2022    TSH 2.853 01/14/2022    CHOL 156 05/20/2022    HDL 51 05/20/2022    LDLCALC 84.0 05/20/2022    TRIG 105 05/20/2022       No results found in the last 24 hours.     Review of Systems   Constitutional: Negative.   HENT: Negative.    Eyes: Negative.    Cardiovascular: Positive for dyspnea on exertion. Negative for chest pain, claudication, irregular heartbeat, leg swelling, near-syncope, palpitations and syncope.   Respiratory: Positive for wheezing. Negative for cough, shortness of breath and snoring.    Endocrine: Negative.  Negative for cold intolerance, heat intolerance, polydipsia, polyphagia and polyuria.   Skin: Negative.    Musculoskeletal: Negative.    Gastrointestinal: Negative.    Genitourinary: Negative.    Neurological: Positive for excessive daytime sleepiness.   Psychiatric/Behavioral: Positive for depression. The patient is nervous/anxious.        Objective:   Physical Exam  Vitals and nursing note reviewed.   Constitutional:       Appearance: She is well-developed. She is obese.      Comments: /66   Pulse 88   Ht 5' 2" (1.575 m)   Wt 86.1 kg (189 lb 14.8 oz)   BMI 34.74 kg/m²      HENT:      Head: " Normocephalic.   Eyes:      Pupils: Pupils are equal, round, and reactive to light.   Neck:      Thyroid: No thyromegaly.      Vascular: No carotid bruit.   Cardiovascular:      Rate and Rhythm: Normal rate and regular rhythm.      Pulses: Intact distal pulses.           Carotid pulses are 2+ on the right side and 2+ on the left side.       Radial pulses are 2+ on the right side and 2+ on the left side.        Femoral pulses are 2+ on the right side and 2+ on the left side.       Popliteal pulses are 2+ on the right side and 2+ on the left side.        Dorsalis pedis pulses are 2+ on the right side and 2+ on the left side.        Posterior tibial pulses are 2+ on the right side and 2+ on the left side.      Heart sounds: Normal heart sounds. No murmur heard.    No friction rub. No gallop.   Pulmonary:      Effort: Pulmonary effort is normal. No respiratory distress.      Breath sounds: Normal breath sounds. No wheezing or rales.   Chest:      Chest wall: No tenderness.   Abdominal:      General: Bowel sounds are normal.      Palpations: Abdomen is soft.   Musculoskeletal:         General: Normal range of motion.      Cervical back: Normal range of motion and neck supple.   Skin:     General: Skin is warm and dry.   Neurological:      Mental Status: She is alert and oriented to person, place, and time.           Assessment and Plan:     Problem List Items Addressed This Visit        Cardiology Problems    Essential hypertension (Chronic)    Coronary artery disease involving native coronary artery of native heart without angina pectoris - Primary (Chronic)    Hyperlipidemia    (HFpEF) heart failure with preserved ejection fraction          Patient's Medications   New Prescriptions    No medications on file   Previous Medications    ALBUTEROL (PROVENTIL/VENTOLIN HFA) 90 MCG/ACTUATION INHALER    Inhale 1-2 puffs into the lungs every 4 (four) hours as needed for Wheezing or Shortness of Breath. Rescue    ALPRAZOLAM  (XANAX) 0.25 MG TABLET    Take 1 tablet (0.25 mg total) by mouth nightly as needed.    AMLODIPINE-OLMESARTAN (KARISSA) 10-40 MG PER TABLET    TAKE 1 TABLET BY MOUTH ONCE DAILY FOR BLOOD PRESSURE. REPELACE WITH BENAZEPRIL/AMLODIPINE (LOTREL)    ASPIRIN (ECOTRIN) 81 MG EC TABLET    TAKE 1 TABLET BY MOUTH EVERY DAY    ATORVASTATIN (LIPITOR) 20 MG TABLET    Take 1 tablet (20 mg total) by mouth once daily.    BLOOD-GLUCOSE METER (FREESTYLE LITE METER) KIT    Use as instructed    FLUTICASONE-SALMETEROL DISKUS INHALER 250-50 MCG    INHALE 1 PUFF BY MOUTH TWICE A DAY    LANCETS (MICROLET LANCET) MISC    Test blood sugar once daily.    LEVOTHYROXINE (SYNTHROID) 50 MCG TABLET    Take 1 tablet (50 mcg total) by mouth once daily.    METFORMIN (GLUCOPHAGE) 500 MG TABLET    1 tablet by mouth with dinner    METOPROLOL SUCCINATE (TOPROL-XL) 50 MG 24 HR TABLET    Take 1 tablet (50 mg total) by mouth once daily. For heart and blood presure    MUPIROCIN (BACTROBAN) 2 % OINTMENT    Apply topically 3 (three) times daily.    NITROGLYCERIN (NITROSTAT) 0.4 MG SL TABLET    Place 1 tablet (0.4 mg total) under the tongue every 5 (five) minutes as needed for Chest pain ((Notify MD)).    PRECISION XTRA TEST STRP    USE AS DIRECTED DAILY    THEOPHYLLINE 400 MG TB24    TAKE 1 TABLET BY MOUTH EVERY DAY   Modified Medications    No medications on file   Discontinued Medications    No medications on file   Patient declined digital program and she declines any functional test at this time.  Would consider SGLT2 inhibitor. Defer to Dr. Mcbride.    Follow up in about 1 year (around 7/6/2023). or prn.

## 2022-08-31 DIAGNOSIS — Z78.0 MENOPAUSE: ICD-10-CM

## 2022-09-02 ENCOUNTER — PATIENT OUTREACH (OUTPATIENT)
Dept: ADMINISTRATIVE | Facility: HOSPITAL | Age: 72
End: 2022-09-02
Payer: COMMERCIAL

## 2022-09-14 DIAGNOSIS — Z78.0 MENOPAUSE: ICD-10-CM

## 2022-10-04 ENCOUNTER — OFFICE VISIT (OUTPATIENT)
Dept: INTERNAL MEDICINE | Facility: CLINIC | Age: 72
End: 2022-10-04
Payer: COMMERCIAL

## 2022-10-04 ENCOUNTER — LAB VISIT (OUTPATIENT)
Dept: LAB | Facility: HOSPITAL | Age: 72
End: 2022-10-04
Attending: INTERNAL MEDICINE
Payer: COMMERCIAL

## 2022-10-04 ENCOUNTER — TELEPHONE (OUTPATIENT)
Dept: INTERNAL MEDICINE | Facility: CLINIC | Age: 72
End: 2022-10-04
Payer: COMMERCIAL

## 2022-10-04 VITALS
BODY MASS INDEX: 32.58 KG/M2 | DIASTOLIC BLOOD PRESSURE: 70 MMHG | WEIGHT: 183.88 LBS | HEIGHT: 63 IN | OXYGEN SATURATION: 95 % | HEART RATE: 93 BPM | SYSTOLIC BLOOD PRESSURE: 90 MMHG | TEMPERATURE: 97 F

## 2022-10-04 DIAGNOSIS — R82.90 ABNORMAL URINALYSIS: ICD-10-CM

## 2022-10-04 DIAGNOSIS — I10 ESSENTIAL HYPERTENSION: Primary | ICD-10-CM

## 2022-10-04 DIAGNOSIS — I50.32 CHRONIC HEART FAILURE WITH PRESERVED EJECTION FRACTION: ICD-10-CM

## 2022-10-04 DIAGNOSIS — E11.9 TYPE 2 DIABETES MELLITUS WITHOUT COMPLICATION, WITHOUT LONG-TERM CURRENT USE OF INSULIN: Primary | ICD-10-CM

## 2022-10-04 DIAGNOSIS — E11.9 TYPE 2 DIABETES MELLITUS WITHOUT COMPLICATION, WITHOUT LONG-TERM CURRENT USE OF INSULIN: ICD-10-CM

## 2022-10-04 DIAGNOSIS — R10.31 RIGHT LOWER QUADRANT PAIN: ICD-10-CM

## 2022-10-04 DIAGNOSIS — I10 ESSENTIAL HYPERTENSION: Chronic | ICD-10-CM

## 2022-10-04 DIAGNOSIS — R10.31 RIGHT LOWER QUADRANT ABDOMINAL PAIN: ICD-10-CM

## 2022-10-04 DIAGNOSIS — J45.40 MODERATE PERSISTENT ASTHMA WITHOUT COMPLICATION: ICD-10-CM

## 2022-10-04 DIAGNOSIS — I25.10 CORONARY ARTERY DISEASE INVOLVING NATIVE CORONARY ARTERY OF NATIVE HEART WITHOUT ANGINA PECTORIS: Chronic | ICD-10-CM

## 2022-10-04 DIAGNOSIS — E78.2 MIXED HYPERLIPIDEMIA: ICD-10-CM

## 2022-10-04 DIAGNOSIS — E03.4 HYPOTHYROIDISM DUE TO ACQUIRED ATROPHY OF THYROID: ICD-10-CM

## 2022-10-04 PROCEDURE — 3061F PR NEG MICROALBUMINURIA RESULT DOCUMENTED/REVIEW: ICD-10-PCS | Mod: CPTII,S$GLB,, | Performed by: INTERNAL MEDICINE

## 2022-10-04 PROCEDURE — 90471 IMMUNIZATION ADMIN: CPT | Mod: S$GLB,,, | Performed by: INTERNAL MEDICINE

## 2022-10-04 PROCEDURE — 1159F PR MEDICATION LIST DOCUMENTED IN MEDICAL RECORD: ICD-10-PCS | Mod: CPTII,S$GLB,, | Performed by: INTERNAL MEDICINE

## 2022-10-04 PROCEDURE — 90471 FLU VACCINE - QUADRIVALENT - ADJUVANTED: ICD-10-PCS | Mod: S$GLB,,, | Performed by: INTERNAL MEDICINE

## 2022-10-04 PROCEDURE — 1159F MED LIST DOCD IN RCRD: CPT | Mod: CPTII,S$GLB,, | Performed by: INTERNAL MEDICINE

## 2022-10-04 PROCEDURE — 3072F PR LOW RISK FOR RETINOPATHY: ICD-10-PCS | Mod: CPTII,S$GLB,, | Performed by: INTERNAL MEDICINE

## 2022-10-04 PROCEDURE — 3066F NEPHROPATHY DOC TX: CPT | Mod: CPTII,S$GLB,, | Performed by: INTERNAL MEDICINE

## 2022-10-04 PROCEDURE — 99999 PR PBB SHADOW E&M-EST. PATIENT-LVL V: ICD-10-PCS | Mod: PBBFAC,,, | Performed by: INTERNAL MEDICINE

## 2022-10-04 PROCEDURE — 3074F SYST BP LT 130 MM HG: CPT | Mod: CPTII,S$GLB,, | Performed by: INTERNAL MEDICINE

## 2022-10-04 PROCEDURE — 3078F PR MOST RECENT DIASTOLIC BLOOD PRESSURE < 80 MM HG: ICD-10-PCS | Mod: CPTII,S$GLB,, | Performed by: INTERNAL MEDICINE

## 2022-10-04 PROCEDURE — 1126F PR PAIN SEVERITY QUANTIFIED, NO PAIN PRESENT: ICD-10-PCS | Mod: CPTII,S$GLB,, | Performed by: INTERNAL MEDICINE

## 2022-10-04 PROCEDURE — 3061F NEG MICROALBUMINURIA REV: CPT | Mod: CPTII,S$GLB,, | Performed by: INTERNAL MEDICINE

## 2022-10-04 PROCEDURE — 1126F AMNT PAIN NOTED NONE PRSNT: CPT | Mod: CPTII,S$GLB,, | Performed by: INTERNAL MEDICINE

## 2022-10-04 PROCEDURE — 1160F PR REVIEW ALL MEDS BY PRESCRIBER/CLIN PHARMACIST DOCUMENTED: ICD-10-PCS | Mod: CPTII,S$GLB,, | Performed by: INTERNAL MEDICINE

## 2022-10-04 PROCEDURE — 1160F RVW MEDS BY RX/DR IN RCRD: CPT | Mod: CPTII,S$GLB,, | Performed by: INTERNAL MEDICINE

## 2022-10-04 PROCEDURE — 90694 VACC AIIV4 NO PRSRV 0.5ML IM: CPT | Mod: S$GLB,,, | Performed by: INTERNAL MEDICINE

## 2022-10-04 PROCEDURE — 3008F PR BODY MASS INDEX (BMI) DOCUMENTED: ICD-10-PCS | Mod: CPTII,S$GLB,, | Performed by: INTERNAL MEDICINE

## 2022-10-04 PROCEDURE — 3078F DIAST BP <80 MM HG: CPT | Mod: CPTII,S$GLB,, | Performed by: INTERNAL MEDICINE

## 2022-10-04 PROCEDURE — 3288F FALL RISK ASSESSMENT DOCD: CPT | Mod: CPTII,S$GLB,, | Performed by: INTERNAL MEDICINE

## 2022-10-04 PROCEDURE — 3066F PR DOCUMENTATION OF TREATMENT FOR NEPHROPATHY: ICD-10-PCS | Mod: CPTII,S$GLB,, | Performed by: INTERNAL MEDICINE

## 2022-10-04 PROCEDURE — 3288F PR FALLS RISK ASSESSMENT DOCUMENTED: ICD-10-PCS | Mod: CPTII,S$GLB,, | Performed by: INTERNAL MEDICINE

## 2022-10-04 PROCEDURE — 87086 URINE CULTURE/COLONY COUNT: CPT | Performed by: INTERNAL MEDICINE

## 2022-10-04 PROCEDURE — 1101F PT FALLS ASSESS-DOCD LE1/YR: CPT | Mod: CPTII,S$GLB,, | Performed by: INTERNAL MEDICINE

## 2022-10-04 PROCEDURE — 99214 OFFICE O/P EST MOD 30 MIN: CPT | Mod: 25,S$GLB,, | Performed by: INTERNAL MEDICINE

## 2022-10-04 PROCEDURE — 1101F PR PT FALLS ASSESS DOC 0-1 FALLS W/OUT INJ PAST YR: ICD-10-PCS | Mod: CPTII,S$GLB,, | Performed by: INTERNAL MEDICINE

## 2022-10-04 PROCEDURE — 3072F LOW RISK FOR RETINOPATHY: CPT | Mod: CPTII,S$GLB,, | Performed by: INTERNAL MEDICINE

## 2022-10-04 PROCEDURE — 90694 FLU VACCINE - QUADRIVALENT - ADJUVANTED: ICD-10-PCS | Mod: S$GLB,,, | Performed by: INTERNAL MEDICINE

## 2022-10-04 PROCEDURE — 3044F HG A1C LEVEL LT 7.0%: CPT | Mod: CPTII,S$GLB,, | Performed by: INTERNAL MEDICINE

## 2022-10-04 PROCEDURE — 3008F BODY MASS INDEX DOCD: CPT | Mod: CPTII,S$GLB,, | Performed by: INTERNAL MEDICINE

## 2022-10-04 PROCEDURE — 3044F PR MOST RECENT HEMOGLOBIN A1C LEVEL <7.0%: ICD-10-PCS | Mod: CPTII,S$GLB,, | Performed by: INTERNAL MEDICINE

## 2022-10-04 PROCEDURE — 4010F PR ACE/ARB THEARPY RXD/TAKEN: ICD-10-PCS | Mod: CPTII,S$GLB,, | Performed by: INTERNAL MEDICINE

## 2022-10-04 PROCEDURE — 99214 PR OFFICE/OUTPT VISIT, EST, LEVL IV, 30-39 MIN: ICD-10-PCS | Mod: 25,S$GLB,, | Performed by: INTERNAL MEDICINE

## 2022-10-04 PROCEDURE — 4010F ACE/ARB THERAPY RXD/TAKEN: CPT | Mod: CPTII,S$GLB,, | Performed by: INTERNAL MEDICINE

## 2022-10-04 PROCEDURE — 3074F PR MOST RECENT SYSTOLIC BLOOD PRESSURE < 130 MM HG: ICD-10-PCS | Mod: CPTII,S$GLB,, | Performed by: INTERNAL MEDICINE

## 2022-10-04 PROCEDURE — 99999 PR PBB SHADOW E&M-EST. PATIENT-LVL V: CPT | Mod: PBBFAC,,, | Performed by: INTERNAL MEDICINE

## 2022-10-04 RX ORDER — ATORVASTATIN CALCIUM 20 MG/1
20 TABLET, FILM COATED ORAL DAILY
Qty: 90 TABLET | Refills: 3 | Status: SHIPPED | OUTPATIENT
Start: 2022-10-04 | End: 2023-11-08 | Stop reason: SDUPTHER

## 2022-10-04 RX ORDER — LEVOTHYROXINE SODIUM 50 UG/1
50 TABLET ORAL DAILY
Qty: 90 TABLET | Refills: 3 | Status: SHIPPED | OUTPATIENT
Start: 2022-10-04 | End: 2023-11-08 | Stop reason: SDUPTHER

## 2022-10-04 RX ORDER — METFORMIN HYDROCHLORIDE 500 MG/1
TABLET ORAL
Qty: 90 TABLET | Refills: 3 | Status: SHIPPED | OUTPATIENT
Start: 2022-10-04 | End: 2023-11-08 | Stop reason: SDUPTHER

## 2022-10-04 RX ORDER — METOPROLOL SUCCINATE 50 MG/1
50 TABLET, EXTENDED RELEASE ORAL DAILY
Qty: 90 TABLET | Refills: 3 | Status: SHIPPED | OUTPATIENT
Start: 2022-10-04 | End: 2023-11-08 | Stop reason: SDUPTHER

## 2022-10-04 RX ORDER — ALPRAZOLAM 0.25 MG/1
0.25 TABLET ORAL NIGHTLY PRN
Qty: 30 TABLET | Refills: 2 | Status: SHIPPED | OUTPATIENT
Start: 2022-10-04 | End: 2023-04-06 | Stop reason: SDUPTHER

## 2022-10-04 RX ORDER — AMLODIPINE AND OLMESARTAN MEDOXOMIL 10; 40 MG/1; MG/1
TABLET ORAL
Qty: 90 TABLET | Refills: 3 | Status: SHIPPED | OUTPATIENT
Start: 2022-10-04 | End: 2023-11-08 | Stop reason: SDUPTHER

## 2022-10-04 NOTE — TELEPHONE ENCOUNTER
Left msg I will mail copy of labs &  Also mailing 6 mos visit with labs prior to her.    Call back to get dates and change now if need.   does get booked up.

## 2022-10-04 NOTE — TELEPHONE ENCOUNTER
----- Message from Margo Mccrary sent at 10/4/2022 12:43 PM CDT -----  Pt would Dr Mcbride to send copies of her current labs.      Thanks

## 2022-10-05 LAB
BACTERIA UR CULT: NORMAL
BACTERIA UR CULT: NORMAL

## 2022-10-06 NOTE — PROGRESS NOTES
Subjective:       Patient ID: Noah Bailey is a 72 y.o. female.    Chief Complaint: Follow-up    HPI  The patient presents for follow-up of medical conditions.  Active medical conditions include type 2 diabetes mellitus, hypertension, hyperlipidemia, asthma.  The patient also has chronic right lower back pain with right lumbar radiculopathy.  The patient does not monitor blood sugar levels.  No hypoglycemia is noted.  Blood pressure levels are not being monitored.  She is tolerating her medication well without side effects.    He has noted a large mass on the left elbow.  The swelling is painless.    He intermittent right lumbar pain radiates into the right hand and right thigh.  Numbness is also noted in the right thigh.  Symptoms are diminished by sitting.  Symptoms are increased with prolonged standing.    The patient has had intermittent pain in the right lower quadrant of the abdomen and pelvis.  Of note is a family history of ovarian cancer in her mother  The patient states she has had intermittent pain in the right lower quadrant for the past several months.  No bowel changes are noted.  No dysuria is noted.    Review of Systems   Constitutional:  Negative for activity change, appetite change, chills, fever and unexpected weight change.   Eyes:  Negative for visual disturbance.   Respiratory:  Negative for shortness of breath.    Cardiovascular:  Negative for chest pain, palpitations and leg swelling.   Gastrointestinal:  Positive for abdominal pain. Negative for blood in stool and diarrhea.   Genitourinary:  Positive for pelvic pain. Negative for dysuria, frequency, hematuria, urgency and vaginal bleeding.   Musculoskeletal:  Positive for arthralgias, back pain and joint swelling.   Neurological:  Positive for numbness. Negative for weakness and headaches.   Psychiatric/Behavioral:  Negative for sleep disturbance.           Physical Exam  Vitals and nursing note reviewed.   Constitutional:       General:  She is not in acute distress.     Appearance: Normal appearance. She is well-developed.   HENT:      Head: Normocephalic and atraumatic.   Eyes:      General: No scleral icterus.     Extraocular Movements: Extraocular movements intact.      Conjunctiva/sclera: Conjunctivae normal.   Neck:      Thyroid: No thyromegaly.      Vascular: No JVD.   Cardiovascular:      Rate and Rhythm: Normal rate and regular rhythm.      Heart sounds: Normal heart sounds. No murmur heard.    No friction rub. No gallop.   Pulmonary:      Effort: Pulmonary effort is normal. No respiratory distress.      Breath sounds: Normal breath sounds. No wheezing or rales.   Abdominal:      General: Bowel sounds are normal. There is no distension.      Palpations: Abdomen is soft. There is no mass.      Tenderness: There is abdominal tenderness. There is no right CVA tenderness, left CVA tenderness, guarding or rebound.   Musculoskeletal:         General: No tenderness. Normal range of motion.      Cervical back: Normal range of motion and neck supple.   Lymphadenopathy:      Cervical: No cervical adenopathy.   Skin:     General: Skin is warm and dry.      Findings: No rash.      Comments: No foot lesions are present.   Neurological:      Mental Status: She is alert and oriented to person, place, and time.      Cranial Nerves: No cranial nerve deficit.      Comments: Sensory exam is intact in both feet on monofilament testing.   Psychiatric:         Mood and Affect: Mood normal.         Behavior: Behavior normal.         Protective Sensation (w/ 10 gram monofilament):  Right: Intact  Left: Intact    Visual Inspection:  Normal -  Bilateral    Pedal Pulses:   Right: Present  Left: Present    Posterior tibialis:   Right:Present  Left: Present    Lab Visit on 10/04/2022   Component Date Value Ref Range Status    Urine Culture, Routine 10/04/2022 Multiple organisms isolated. None in predominance.  Repeat if   Final    Urine Culture, Routine 10/04/2022  clinically necessary.   Final   Lab Visit on 10/03/2022   Component Date Value Ref Range Status    Microalbumin, Urine 10/03/2022 42.0  ug/mL Final    Creatinine, Urine 10/03/2022 169.0  15.0 - 325.0 mg/dL Final    Microalb/Creat Ratio 10/03/2022 24.9  0.0 - 30.0 ug/mg Final    Specimen UA 10/03/2022 Urine, Clean Catch   Final    Color, UA 10/03/2022 Yellow  Yellow, Straw, Sienna Final    Appearance, UA 10/03/2022 Clear  Clear Final    pH, UA 10/03/2022 6.0  5.0 - 8.0 Final    Specific Gravity, UA 10/03/2022 1.025  1.005 - 1.030 Final    Protein, UA 10/03/2022 Negative  Negative Final    Comment: Recommend a 24 hour urine protein or a urine   protein/creatinine ratio if globulin induced proteinuria is  clinically suspected.      Glucose, UA 10/03/2022 Negative  Negative Final    Ketones, UA 10/03/2022 Negative  Negative Final    Bilirubin (UA) 10/03/2022 1+ (A)  Negative Final    Comment: Positive urine bilirubin is not confirmed. Correlate with   serum bilirubin and clinical presentation.      Occult Blood UA 10/03/2022 Negative  Negative Final    Nitrite, UA 10/03/2022 Negative  Negative Final    Urobilinogen, UA 10/03/2022 Negative  <2.0 EU/dL Final    Leukocytes, UA 10/03/2022 2+ (A)  Negative Final    RBC, UA 10/03/2022 2  0 - 4 /hpf Final    WBC, UA 10/03/2022 30 (H)  0 - 5 /hpf Final    Bacteria 10/03/2022 Few (A)  None-Occ /hpf Final    Microscopic Comment 10/03/2022 SEE COMMENT   Final    Comment: Other formed elements not mentioned in the report are not   present in the microscopic examination.      Lab Visit on 10/03/2022   Component Date Value Ref Range Status    Sodium 10/03/2022 143  136 - 145 mmol/L Final    Potassium 10/03/2022 4.3  3.5 - 5.1 mmol/L Final    Chloride 10/03/2022 107  95 - 110 mmol/L Final    CO2 10/03/2022 20 (L)  23 - 29 mmol/L Final    Glucose 10/03/2022 103  70 - 110 mg/dL Final    BUN 10/03/2022 12  7 - 17 mg/dL Final    Creatinine 10/03/2022 0.75  0.50 - 1.40 mg/dL Final     Calcium 10/03/2022 9.8  8.7 - 10.5 mg/dL Final    Total Protein 10/03/2022 8.5 (H)  6.0 - 8.4 g/dL Final    Albumin 10/03/2022 4.8  3.5 - 5.2 g/dL Final    Total Bilirubin 10/03/2022 0.8  0.1 - 1.0 mg/dL Final    Comment: For infants and newborns, interpretation of results should be based  on gestational age, weight and in agreement with clinical  observations.    Premature Infant recommended reference ranges:  Up to 24 hours.............<8.0 mg/dL  Up to 48 hours............<12.0 mg/dL  3-5 days..................<15.0 mg/dL  6-29 days.................<15.0 mg/dL      Alkaline Phosphatase 10/03/2022 105  38 - 126 U/L Final    AST 10/03/2022 27  15 - 46 U/L Final    ALT 10/03/2022 20  10 - 44 U/L Final    Anion Gap 10/03/2022 16  8 - 16 mmol/L Final    eGFR 10/03/2022 >60.0  >60 mL/min/1.73 m^2 Final    Hemoglobin A1C 10/03/2022 6.1 (H)  4.0 - 5.6 % Final    Comment: ADA Screening Guidelines:  5.7-6.4%  Consistent with prediabetes  >or=6.5%  Consistent with diabetes    High levels of fetal hemoglobin interfere with the HbA1C  assay. Heterozygous hemoglobin variants (HbS, HgC, etc)do  not significantly interfere with this assay.   However, presence of multiple variants may affect accuracy.      Estimated Avg Glucose 10/03/2022 128  68 - 131 mg/dL Final       Assessment & Plan:      Noah was seen today for follow-up.  The influenza vaccine will be administered today.  Current medications will be continued.  Imaging studies will be obtained for assessment of abdominal/pelvic pain.    Diagnoses and all orders for this visit:    Type 2 diabetes mellitus without complication, without long-term current use of insulin    Essential hypertension  -     Urine culture; Future    Right lower quadrant pain  -     CT Abdomen Pelvis  Without Contrast; Future    Mixed hyperlipidemia    Moderate persistent asthma without complication    Coronary artery disease involving native coronary artery of native heart without angina  pectoris    Chronic heart failure with preserved ejection fraction    Abnormal urinalysis  -     Urine culture; Future    Right lower quadrant abdominal pain  -     Cancel: US Pelvis Complete Non OB; Future    Other orders  -     metoprolol succinate (TOPROL-XL) 50 MG 24 hr tablet; Take 1 tablet (50 mg total) by mouth once daily. For heart and blood presure  -     metFORMIN (GLUCOPHAGE) 500 MG tablet; 1 tablet by mouth with dinner  -     levothyroxine (SYNTHROID) 50 MCG tablet; Take 1 tablet (50 mcg total) by mouth once daily.  -     atorvastatin (LIPITOR) 20 MG tablet; Take 1 tablet (20 mg total) by mouth once daily.  -     amlodipine-olmesartan (KARISSA) 10-40 mg per tablet; TAKE 1 TABLET BY MOUTH ONCE DAILY FOR BLOOD PRESSURE. REPELACE WITH BENAZEPRIL/AMLODIPINE (LOTREL)  -     ALPRAZolam (XANAX) 0.25 MG tablet; Take 1 tablet (0.25 mg total) by mouth nightly as needed.  -     Influenza - Quadrivalent (Adjuvanted)       Follow up in about 4 months (around 2/4/2023).     Henry Mcbride MD

## 2022-10-13 ENCOUNTER — TELEPHONE (OUTPATIENT)
Dept: INTERNAL MEDICINE | Facility: CLINIC | Age: 72
End: 2022-10-13
Payer: COMMERCIAL

## 2022-10-13 NOTE — TELEPHONE ENCOUNTER
----- Message from Renetta Azevedo sent at 10/13/2022 11:52 AM CDT -----  Regarding: Message only for the Dr Henry Carlos  Contact: [phone] @ Patient  2TESTRESULTS    Type: Test Results    What test was performed?CT Abdomen Pelvis Without Contrast [[id number]]    Who ordered the test?Dr CARLOS    When and where were the test performed? [facility] [place] 10/05/2022    Would you like response via YinYangMaphart: call     Comments:         2TESTRESULTS    Type: Test Results    What test was performed?US Pelvis Complete Non OB [[id number]]    Who ordered the test?Dr Carlos    When and where were the test performed? [facility] [place] 10/05/2022    Would you like response via YinYangMaphart: call    Comments:

## 2022-10-13 NOTE — TELEPHONE ENCOUNTER
----- Message from Henry Mcbride MD sent at 10/5/2022  9:20 PM CDT -----  The pelvic ultrasound was negative.

## 2022-10-13 NOTE — TELEPHONE ENCOUNTER
Told her all results and dr's comments.  She says she always abdominal pain and tenderness and this is nothing new, she doesn't want any surgery.    I told her to continue on low fat diet. Call if needs us.     She says is walking and getting around much better since she saw us.     I also mailed her results in letter form confirming our conversation.

## 2022-10-13 NOTE — TELEPHONE ENCOUNTER
----- Message from Henry Mcbride MD sent at 10/5/2022  9:26 PM CDT -----  The CT scan of the abdomen and pelvis was positive for gallstones.  The hernia involving the umbilical area was noted.  Diverticulosis was present but there was no evidence of diverticulitis.  No other significant abnormalities were noted.

## 2022-10-13 NOTE — TELEPHONE ENCOUNTER
----- Message from Henry Mcbride MD sent at 10/5/2022  9:18 PM CDT -----  The urine culture was negative for infection.

## 2023-02-15 RX ORDER — THEOPHYLLINE 400 MG/1
1 TABLET, EXTENDED RELEASE ORAL DAILY
Qty: 90 TABLET | Refills: 1 | Status: SHIPPED | OUTPATIENT
Start: 2023-02-15 | End: 2023-08-10

## 2023-02-15 NOTE — TELEPHONE ENCOUNTER
Refill Routing Note   Medication(s) are not appropriate for processing by [facility] for the following reason(s):       Medication outside of protocol    [facility] action(s):  Route         Appointments  past 12m or future 3m with PCP    Date Provider   Last Visit   10/4/2022 Henry Mcbride MD   Next Visit   4/6/2023 Henry Mcbride MD   ED visits in past 90 days: 0        Note composed:2:42 PM 02/15/2023

## 2023-02-15 NOTE — TELEPHONE ENCOUNTER
----- Message from Alexa Cesar sent at 2/15/2023  2:09 PM CST -----  Contact: [phone]  Requesting an RX refill or new RX.  Is this a refill or new RX: refill  RX name and strength :  theophylline 400 mg Tb24 90 tablet   Is this a 30 day or 90 day RX: 90  Pharmacy name and phone # :  [facility]/pharmacy #[id number] - [place] - [address] [facility]   Phone:  [phone]  Fax:  [phone]    Please fill for 90 day and patient would like to  tomorrow.

## 2023-03-23 ENCOUNTER — PATIENT OUTREACH (OUTPATIENT)
Dept: ADMINISTRATIVE | Facility: HOSPITAL | Age: 73
End: 2023-03-23
Payer: COMMERCIAL

## 2023-03-23 NOTE — PROGRESS NOTES
Health Maintenance Due   Topic Date Due    Shingles Vaccine (1 of 2) Never done    Colorectal Cancer Screening  11/02/2017    COVID-19 Vaccine (3 - Booster for Moderna series) 11/15/2021    Eye Exam  07/19/2022     Chart reviewed.   Immunizations: Reconciled  Orders placed: N/A  Upcoming appts to satisfy LUIGI topics: Labs 3/30/2023  Outreached to patient scheduled Mammogram and Dexa Scan 4/6/2023.

## 2023-04-06 ENCOUNTER — OFFICE VISIT (OUTPATIENT)
Dept: INTERNAL MEDICINE | Facility: CLINIC | Age: 73
End: 2023-04-06
Payer: COMMERCIAL

## 2023-04-06 VITALS
TEMPERATURE: 98 F | HEIGHT: 63 IN | BODY MASS INDEX: 32.81 KG/M2 | OXYGEN SATURATION: 96 % | SYSTOLIC BLOOD PRESSURE: 120 MMHG | RESPIRATION RATE: 20 BRPM | WEIGHT: 185.19 LBS | DIASTOLIC BLOOD PRESSURE: 70 MMHG | HEART RATE: 93 BPM

## 2023-04-06 DIAGNOSIS — M54.50 RIGHT-SIDED LOW BACK PAIN WITHOUT SCIATICA, UNSPECIFIED CHRONICITY: ICD-10-CM

## 2023-04-06 DIAGNOSIS — E78.2 MIXED HYPERLIPIDEMIA: ICD-10-CM

## 2023-04-06 DIAGNOSIS — I10 ESSENTIAL HYPERTENSION: Chronic | ICD-10-CM

## 2023-04-06 DIAGNOSIS — R21 RASH: ICD-10-CM

## 2023-04-06 DIAGNOSIS — E11.9 TYPE 2 DIABETES MELLITUS WITHOUT COMPLICATION, WITHOUT LONG-TERM CURRENT USE OF INSULIN: Primary | ICD-10-CM

## 2023-04-06 DIAGNOSIS — J31.0 CHRONIC RHINITIS: Chronic | ICD-10-CM

## 2023-04-06 DIAGNOSIS — J45.40 MODERATE PERSISTENT ASTHMA WITHOUT COMPLICATION: ICD-10-CM

## 2023-04-06 PROCEDURE — 3008F PR BODY MASS INDEX (BMI) DOCUMENTED: ICD-10-PCS | Mod: CPTII,S$GLB,, | Performed by: INTERNAL MEDICINE

## 2023-04-06 PROCEDURE — 1159F MED LIST DOCD IN RCRD: CPT | Mod: CPTII,S$GLB,, | Performed by: INTERNAL MEDICINE

## 2023-04-06 PROCEDURE — 1159F PR MEDICATION LIST DOCUMENTED IN MEDICAL RECORD: ICD-10-PCS | Mod: CPTII,S$GLB,, | Performed by: INTERNAL MEDICINE

## 2023-04-06 PROCEDURE — 1160F PR REVIEW ALL MEDS BY PRESCRIBER/CLIN PHARMACIST DOCUMENTED: ICD-10-PCS | Mod: CPTII,S$GLB,, | Performed by: INTERNAL MEDICINE

## 2023-04-06 PROCEDURE — 3008F BODY MASS INDEX DOCD: CPT | Mod: CPTII,S$GLB,, | Performed by: INTERNAL MEDICINE

## 2023-04-06 PROCEDURE — 3288F PR FALLS RISK ASSESSMENT DOCUMENTED: ICD-10-PCS | Mod: CPTII,S$GLB,, | Performed by: INTERNAL MEDICINE

## 2023-04-06 PROCEDURE — 4010F ACE/ARB THERAPY RXD/TAKEN: CPT | Mod: CPTII,S$GLB,, | Performed by: INTERNAL MEDICINE

## 2023-04-06 PROCEDURE — 3078F DIAST BP <80 MM HG: CPT | Mod: CPTII,S$GLB,, | Performed by: INTERNAL MEDICINE

## 2023-04-06 PROCEDURE — 99214 PR OFFICE/OUTPT VISIT, EST, LEVL IV, 30-39 MIN: ICD-10-PCS | Mod: S$GLB,,, | Performed by: INTERNAL MEDICINE

## 2023-04-06 PROCEDURE — 3074F PR MOST RECENT SYSTOLIC BLOOD PRESSURE < 130 MM HG: ICD-10-PCS | Mod: CPTII,S$GLB,, | Performed by: INTERNAL MEDICINE

## 2023-04-06 PROCEDURE — 99214 OFFICE O/P EST MOD 30 MIN: CPT | Mod: S$GLB,,, | Performed by: INTERNAL MEDICINE

## 2023-04-06 PROCEDURE — 1101F PR PT FALLS ASSESS DOC 0-1 FALLS W/OUT INJ PAST YR: ICD-10-PCS | Mod: CPTII,S$GLB,, | Performed by: INTERNAL MEDICINE

## 2023-04-06 PROCEDURE — 3288F FALL RISK ASSESSMENT DOCD: CPT | Mod: CPTII,S$GLB,, | Performed by: INTERNAL MEDICINE

## 2023-04-06 PROCEDURE — 4010F PR ACE/ARB THEARPY RXD/TAKEN: ICD-10-PCS | Mod: CPTII,S$GLB,, | Performed by: INTERNAL MEDICINE

## 2023-04-06 PROCEDURE — 1126F PR PAIN SEVERITY QUANTIFIED, NO PAIN PRESENT: ICD-10-PCS | Mod: CPTII,S$GLB,, | Performed by: INTERNAL MEDICINE

## 2023-04-06 PROCEDURE — 99999 PR PBB SHADOW E&M-EST. PATIENT-LVL IV: ICD-10-PCS | Mod: PBBFAC,,, | Performed by: INTERNAL MEDICINE

## 2023-04-06 PROCEDURE — 99999 PR PBB SHADOW E&M-EST. PATIENT-LVL IV: CPT | Mod: PBBFAC,,, | Performed by: INTERNAL MEDICINE

## 2023-04-06 PROCEDURE — 1101F PT FALLS ASSESS-DOCD LE1/YR: CPT | Mod: CPTII,S$GLB,, | Performed by: INTERNAL MEDICINE

## 2023-04-06 PROCEDURE — 3074F SYST BP LT 130 MM HG: CPT | Mod: CPTII,S$GLB,, | Performed by: INTERNAL MEDICINE

## 2023-04-06 PROCEDURE — 1126F AMNT PAIN NOTED NONE PRSNT: CPT | Mod: CPTII,S$GLB,, | Performed by: INTERNAL MEDICINE

## 2023-04-06 PROCEDURE — 3078F PR MOST RECENT DIASTOLIC BLOOD PRESSURE < 80 MM HG: ICD-10-PCS | Mod: CPTII,S$GLB,, | Performed by: INTERNAL MEDICINE

## 2023-04-06 PROCEDURE — 3044F PR MOST RECENT HEMOGLOBIN A1C LEVEL <7.0%: ICD-10-PCS | Mod: CPTII,S$GLB,, | Performed by: INTERNAL MEDICINE

## 2023-04-06 PROCEDURE — 3044F HG A1C LEVEL LT 7.0%: CPT | Mod: CPTII,S$GLB,, | Performed by: INTERNAL MEDICINE

## 2023-04-06 PROCEDURE — 1160F RVW MEDS BY RX/DR IN RCRD: CPT | Mod: CPTII,S$GLB,, | Performed by: INTERNAL MEDICINE

## 2023-04-06 RX ORDER — ALPRAZOLAM 0.25 MG/1
0.25 TABLET ORAL NIGHTLY PRN
Qty: 30 TABLET | Refills: 2 | Status: SHIPPED | OUTPATIENT
Start: 2023-04-06 | End: 2023-10-12 | Stop reason: SDUPTHER

## 2023-04-06 RX ORDER — FLUTICASONE PROPIONATE AND SALMETEROL 250; 50 UG/1; UG/1
POWDER RESPIRATORY (INHALATION)
Qty: 180 EACH | Refills: 3 | Status: SHIPPED | OUTPATIENT
Start: 2023-04-06

## 2023-04-06 RX ORDER — AZELASTINE HYDROCHLORIDE 0.5 MG/ML
1 SOLUTION/ DROPS OPHTHALMIC 2 TIMES DAILY
COMMUNITY
Start: 2023-03-30

## 2023-04-14 ENCOUNTER — TELEPHONE (OUTPATIENT)
Dept: OPHTHALMOLOGY | Facility: CLINIC | Age: 73
End: 2023-04-14
Payer: COMMERCIAL

## 2023-04-14 NOTE — TELEPHONE ENCOUNTER
----- Message from Aimee Toro sent at 4/14/2023  2:33 PM CDT -----  Regarding: URGENT  Consult/Advisory    Name Of Caller:Noah      Contact Preference:[phone]    Nature of call: Pt called regarding her left eye she stated she was giving something for allergies she said that it is not getting any better. She is experiencing minor pain and itching she mentioned she feels like something is in her eye.  She is very upset... Please call pt to further assist.

## 2023-04-24 NOTE — PROGRESS NOTES
Subjective:       Patient ID: Noah Bailey is a 72 y.o. female.    Chief Complaint: Follow-up    HPI  The patient presents for follow-up of medical conditions which include type 2 diabetes mellitus, hypertension, hyperlipidemia, asthma chronic lower back pain.  The patient remains do well.  Her asthma has been stable.  She does not monitor blood sugar levels nor does she monitor blood pressures regularly but she is tolerating her medication well without side effects.    She did experience left eye redness and was seen at an urgent care clinic on 03/23/2023.  Prescription eyedrops were ordered at that time which she started on 03/30/2023.  She states the left eye is feeling better.  She has noted a flare of acne like lesions on face.  There is flaking of the skin and mild itching in addition to the redness.  She is not using any new soap or cosmetics on her face.    The patient states she has intermittent lower back pain symptoms.  The pain is nonradiating.  She has not experienced any significant limitations in exercise tolerance due to her back.    Review of Systems   Constitutional:  Negative for activity change, appetite change and unexpected weight change.   Eyes:  Positive for redness. Negative for photophobia, discharge and visual disturbance.   Respiratory:  Negative for shortness of breath.    Cardiovascular:  Negative for chest pain, palpitations and leg swelling.   Gastrointestinal:  Negative for abdominal pain, blood in stool and diarrhea.   Genitourinary:  Negative for dysuria, frequency, hematuria and urgency.   Musculoskeletal:  Positive for back pain.   Integumentary:  Positive for rash (recent flare of facial acne-like rash.).   Neurological:  Negative for weakness, numbness and headaches.   Psychiatric/Behavioral:  Negative for sleep disturbance.           Physical Exam  Vitals and nursing note reviewed.   Constitutional:       General: She is not in acute distress.     Appearance: Normal  appearance. She is well-developed.   HENT:      Head: Normocephalic and atraumatic.   Eyes:      General: No scleral icterus.        Left eye: No discharge.      Extraocular Movements: Extraocular movements intact.      Conjunctiva/sclera: Conjunctivae normal.   Neck:      Thyroid: No thyromegaly.      Vascular: No JVD.   Cardiovascular:      Rate and Rhythm: Normal rate and regular rhythm.      Heart sounds: Normal heart sounds. No murmur heard.    No friction rub. No gallop.   Pulmonary:      Effort: Pulmonary effort is normal. No respiratory distress.      Breath sounds: Normal breath sounds. No wheezing or rales.   Abdominal:      General: Bowel sounds are normal.      Palpations: Abdomen is soft. There is no mass.      Tenderness: There is no abdominal tenderness.   Musculoskeletal:         General: No tenderness. Normal range of motion.      Cervical back: Normal range of motion and neck supple.      Comments: Back:  Negative straight leg raising test bilaterally.  No lumbar paraspinous muscle spasm.  No vertebral percussion tenderness.   Lymphadenopathy:      Cervical: No cervical adenopathy.   Skin:     General: Skin is warm and dry.      Findings: Rash present.      Comments: Erythematous papular lesions are present on the malar and nasal areas.  There is sparing of the trunk and extremities.   Neurological:      Mental Status: She is alert and oriented to person, place, and time.      Cranial Nerves: No cranial nerve deficit.   Psychiatric:         Mood and Affect: Mood normal.         Behavior: Behavior normal.       Protective Sensation (w/ 10 gram monofilament):  Right: Intact  Left: Intact    Visual Inspection:  Normal -  Bilateral    Pedal Pulses:   Right: Present  Left: Present    Posterior Tibialis Pulses:   Right:Present  Left: Present      Lab Visit on 03/30/2023   Component Date Value Ref Range Status    Sodium 03/30/2023 140  136 - 145 mmol/L Final    Potassium 03/30/2023 3.8  3.5 - 5.1 mmol/L  Final    Chloride 03/30/2023 104  95 - 110 mmol/L Final    CO2 03/30/2023 25  23 - 29 mmol/L Final    Glucose 03/30/2023 104  70 - 110 mg/dL Final    BUN 03/30/2023 13  7 - 17 mg/dL Final    Creatinine 03/30/2023 0.63  0.50 - 1.40 mg/dL Final    Calcium 03/30/2023 9.7  8.7 - 10.5 mg/dL Final    Total Protein 03/30/2023 8.3  6.0 - 8.4 g/dL Final    Albumin 03/30/2023 4.7  3.5 - 5.2 g/dL Final    Total Bilirubin 03/30/2023 0.6  0.1 - 1.0 mg/dL Final    Comment: For infants and newborns, interpretation of results should be based  on gestational age, weight and in agreement with clinical  observations.    Premature Infant recommended reference ranges:  Up to 24 hours.............<8.0 mg/dL  Up to 48 hours............<12.0 mg/dL  3-5 days..................<15.0 mg/dL  6-29 days.................<15.0 mg/dL      Alkaline Phosphatase 03/30/2023 106  38 - 126 U/L Final    AST 03/30/2023 24  15 - 46 U/L Final    ALT 03/30/2023 20  10 - 44 U/L Final    Anion Gap 03/30/2023 11  8 - 16 mmol/L Final    eGFR 03/30/2023 >60.0  >60 mL/min/1.73 m^2 Final    Cholesterol 03/30/2023 159  120 - 199 mg/dL Final    Comment: The National Cholesterol Education Program (NCEP) has set the  following guidelines (reference ranges) for Cholesterol:  Optimal.....................<200 mg/dL  Borderline High.............200-239 mg/dL  High........................> or = 240 mg/dL      Triglycerides 03/30/2023 135  30 - 150 mg/dL Final    Comment: The National Cholesterol Education Program (NCEP) has set the  following guidelines (reference values) for triglycerides:  Normal......................<150 mg/dL  Borderline High.............150-199 mg/dL  High........................200-499 mg/dL      HDL 03/30/2023 47  40 - 75 mg/dL Final    Comment: The National Cholesterol Education Program (NCEP) has set the  following guidelines (reference values) for HDL Cholesterol:  Low...............<40 mg/dL  Optimal...........>60 mg/dL      LDL Cholesterol  03/30/2023 85.0  63.0 - 159.0 mg/dL Final    Comment: The National Cholesterol Education Program (NCEP) has set the  following guidelines (reference values) for LDL Cholesterol:  Optimal.......................<130 mg/dL  Borderline High...............130-159 mg/dL  High..........................160-189 mg/dL  Very High.....................>190 mg/dL      HDL/Cholesterol Ratio 03/30/2023 29.6  20.0 - 50.0 % Final    Total Cholesterol/HDL Ratio 03/30/2023 3.4  2.0 - 5.0 Final    Non-HDL Cholesterol 03/30/2023 112  mg/dL Final    Comment: Risk category and Non-HDL cholesterol goals:  Coronary heart disease (CHD)or equivalent (10-year risk of CHD >20%):  Non-HDL cholesterol goal     <130 mg/dL  Two or more CHD risk factors and 10-year risk of CHD <= 20%:  Non-HDL cholesterol goal     <160 mg/dL  0 to 1 CHD risk factor:  Non-HDL cholesterol goal     <190 mg/dL      WBC 03/30/2023 10.74  3.90 - 12.70 K/uL Final    RBC 03/30/2023 4.96  4.00 - 5.40 M/uL Final    Hemoglobin 03/30/2023 14.0  12.0 - 16.0 g/dL Final    Hematocrit 03/30/2023 42.3  37.0 - 48.5 % Final    MCV 03/30/2023 85  82 - 98 fL Final    MCH 03/30/2023 28.2  27.0 - 31.0 pg Final    MCHC 03/30/2023 33.1  32.0 - 36.0 g/dL Final    RDW 03/30/2023 13.6  11.5 - 14.5 % Final    Platelets 03/30/2023 375  150 - 450 K/uL Final    MPV 03/30/2023 10.6  9.2 - 12.9 fL Final    Immature Granulocytes 03/30/2023 0.3  0.0 - 0.5 % Final    Gran # (ANC) 03/30/2023 7.0  1.8 - 7.7 K/uL Final    Immature Grans (Abs) 03/30/2023 0.03  0.00 - 0.04 K/uL Final    Comment: Mild elevation in immature granulocytes is non specific and   can be seen in a variety of conditions including stress response,   acute inflammation, trauma and pregnancy. Correlation with other   laboratory and clinical findings is essential.      Lymph # 03/30/2023 2.7  1.0 - 4.8 K/uL Final    Mono # 03/30/2023 0.8  0.3 - 1.0 K/uL Final    Eos # 03/30/2023 0.1  0.0 - 0.5 K/uL Final    Baso # 03/30/2023 0.04  0.00 -  0.20 K/uL Final    nRBC 03/30/2023 0  0 /100 WBC Final    Gran % 03/30/2023 65.2  38.0 - 73.0 % Final    Lymph % 03/30/2023 25.2  18.0 - 48.0 % Final    Mono % 03/30/2023 7.6  4.0 - 15.0 % Final    Eosinophil % 03/30/2023 1.3  0.0 - 8.0 % Final    Basophil % 03/30/2023 0.4  0.0 - 1.9 % Final    Differential Method 03/30/2023 Automated   Final    Hemoglobin A1C 03/30/2023 6.3 (H)  4.0 - 5.6 % Final    Comment: ADA Screening Guidelines:  5.7-6.4%  Consistent with prediabetes  >or=6.5%  Consistent with diabetes    High levels of fetal hemoglobin interfere with the HbA1C  assay. Heterozygous hemoglobin variants (HbS, HgC, etc)do  not significantly interfere with this assay.   However, presence of multiple variants may affect accuracy.      Estimated Avg Glucose 03/30/2023 134 (H)  68 - 131 mg/dL Final    TSH 03/30/2023 3.160  0.400 - 4.000 uIU/mL Final    Comment: Warning:  Heterophilic antibodies in serum or plasma of   certain individuals are known to cause interference with   immunoassays. These antibodies may be present in blood samples   from individuals regularly exposed to animal or who have been   treated with animal products.     Patients taking high doses of supplemental biotin may have  negatively biased results.          Assessment & Plan:      Noah was seen today for follow-up.  Dermatology consultation will be obtained for evaluation of the facial rash.    Current medications will be renewed for treatment asthma.  Current therapy will be continued for management of diabetes, hypertension, and hyperlipidemia.    Fasting blood tests will be obtained in 6 months.    Diagnoses and all orders for this visit:    Type 2 diabetes mellitus without complication, without long-term current use of insulin    Essential hypertension  -     Comprehensive Metabolic Panel; Future  -     Hemoglobin A1C; Future    Mixed hyperlipidemia  -     Comprehensive Metabolic Panel; Future  -     Hemoglobin A1C; Future    Moderate  persistent asthma without complication    Chronic rhinitis    Rash  -     Ambulatory referral/consult to Dermatology; Future    Right-sided low back pain without sciatica, unspecified chronicity    Other orders  -     ALPRAZolam (XANAX) 0.25 MG tablet; Take 1 tablet (0.25 mg total) by mouth nightly as needed for Anxiety or Insomnia.  -     fluticasone-salmeterol diskus inhaler 250-50 mcg; INHALE 1 PUFF BY MOUTH TWICE A DAY         Follow up in about 6 months (around 10/6/2023).     Henry Mcbride MD

## 2023-05-09 ENCOUNTER — PATIENT OUTREACH (OUTPATIENT)
Dept: ADMINISTRATIVE | Facility: HOSPITAL | Age: 73
End: 2023-05-09
Payer: COMMERCIAL

## 2023-07-09 ENCOUNTER — HOSPITAL ENCOUNTER (OUTPATIENT)
Facility: HOSPITAL | Age: 73
Discharge: HOME | End: 2023-07-10
Attending: EMERGENCY MEDICINE | Admitting: INTERNAL MEDICINE
Payer: COMMERCIAL

## 2023-07-09 DIAGNOSIS — R07.89 CHEST DISCOMFORT: ICD-10-CM

## 2023-07-09 DIAGNOSIS — K80.50 BILIARY COLIC: ICD-10-CM

## 2023-07-09 DIAGNOSIS — R07.9 CHEST PAIN: Primary | ICD-10-CM

## 2023-07-09 DIAGNOSIS — K80.20 CALCULUS OF GALLBLADDER WITHOUT CHOLECYSTITIS WITHOUT OBSTRUCTION: ICD-10-CM

## 2023-07-09 PROBLEM — E87.5 HYPERKALEMIA: Status: ACTIVE | Noted: 2023-07-09

## 2023-07-09 PROBLEM — E03.9 HYPOTHYROIDISM: Status: ACTIVE | Noted: 2023-07-09

## 2023-07-09 PROBLEM — R10.31 RIGHT LOWER QUADRANT ABDOMINAL PAIN: Status: ACTIVE | Noted: 2023-07-09

## 2023-07-09 LAB
ALBUMIN SERPL BCP-MCNC: 4.3 G/DL (ref 3.5–5.2)
ALP SERPL-CCNC: 136 U/L (ref 55–135)
ALT SERPL W/O P-5'-P-CCNC: 18 U/L (ref 10–44)
ANION GAP SERPL CALC-SCNC: 12 MMOL/L (ref 8–16)
AST SERPL-CCNC: 18 U/L (ref 10–40)
BASOPHILS # BLD AUTO: 0.06 K/UL (ref 0–0.2)
BASOPHILS NFR BLD: 0.4 % (ref 0–1.9)
BILIRUB SERPL-MCNC: 0.3 MG/DL (ref 0.1–1)
BILIRUB UR QL STRIP: NEGATIVE
BNP SERPL-MCNC: <10 PG/ML (ref 0–99)
BUN SERPL-MCNC: 15 MG/DL (ref 8–23)
CALCIUM SERPL-MCNC: 10.8 MG/DL (ref 8.7–10.5)
CHLORIDE SERPL-SCNC: 104 MMOL/L (ref 95–110)
CLARITY UR REFRACT.AUTO: CLEAR
CO2 SERPL-SCNC: 21 MMOL/L (ref 23–29)
COLOR UR AUTO: COLORLESS
CREAT SERPL-MCNC: 0.9 MG/DL (ref 0.5–1.4)
DIFFERENTIAL METHOD: ABNORMAL
EOSINOPHIL # BLD AUTO: 0.1 K/UL (ref 0–0.5)
EOSINOPHIL NFR BLD: 0.5 % (ref 0–8)
ERYTHROCYTE [DISTWIDTH] IN BLOOD BY AUTOMATED COUNT: 14.1 % (ref 11.5–14.5)
EST. GFR  (NO RACE VARIABLE): >60 ML/MIN/1.73 M^2
GLUCOSE SERPL-MCNC: 115 MG/DL (ref 70–110)
GLUCOSE UR QL STRIP: NEGATIVE
HCT VFR BLD AUTO: 43.2 % (ref 37–48.5)
HCV AB SERPL QL IA: NORMAL
HGB BLD-MCNC: 14.1 G/DL (ref 12–16)
HGB UR QL STRIP: NEGATIVE
HIV 1+2 AB+HIV1 P24 AG SERPL QL IA: NORMAL
IMM GRANULOCYTES # BLD AUTO: 0.04 K/UL (ref 0–0.04)
IMM GRANULOCYTES NFR BLD AUTO: 0.3 % (ref 0–0.5)
KETONES UR QL STRIP: NEGATIVE
LEUKOCYTE ESTERASE UR QL STRIP: NEGATIVE
LYMPHOCYTES # BLD AUTO: 2 K/UL (ref 1–4.8)
LYMPHOCYTES NFR BLD: 13.1 % (ref 18–48)
MCH RBC QN AUTO: 28.5 PG (ref 27–31)
MCHC RBC AUTO-ENTMCNC: 32.6 G/DL (ref 32–36)
MCV RBC AUTO: 87 FL (ref 82–98)
MONOCYTES # BLD AUTO: 1 K/UL (ref 0.3–1)
MONOCYTES NFR BLD: 6.6 % (ref 4–15)
NEUTROPHILS # BLD AUTO: 11.9 K/UL (ref 1.8–7.7)
NEUTROPHILS NFR BLD: 79.1 % (ref 38–73)
NITRITE UR QL STRIP: NEGATIVE
NRBC BLD-RTO: 0 /100 WBC
PH UR STRIP: 7 [PH] (ref 5–8)
PLATELET # BLD AUTO: 369 K/UL (ref 150–450)
PMV BLD AUTO: 10.3 FL (ref 9.2–12.9)
POC CARDIAC TROPONIN I: 0.02 NG/ML (ref 0–0.08)
POTASSIUM SERPL-SCNC: 5.8 MMOL/L (ref 3.5–5.1)
PROT SERPL-MCNC: 8.6 G/DL (ref 6–8.4)
PROT UR QL STRIP: NEGATIVE
RBC # BLD AUTO: 4.94 M/UL (ref 4–5.4)
SAMPLE: NORMAL
SODIUM SERPL-SCNC: 137 MMOL/L (ref 136–145)
SP GR UR STRIP: >1.03 (ref 1–1.03)
TROPONIN I SERPL DL<=0.01 NG/ML-MCNC: 0.01 NG/ML (ref 0–0.03)
TROPONIN I SERPL DL<=0.01 NG/ML-MCNC: <0.006 NG/ML (ref 0–0.03)
TROPONIN I SERPL DL<=0.01 NG/ML-MCNC: <0.006 NG/ML (ref 0–0.03)
URN SPEC COLLECT METH UR: ABNORMAL
WBC # BLD AUTO: 15.07 K/UL (ref 3.9–12.7)

## 2023-07-09 PROCEDURE — 84484 ASSAY OF TROPONIN QUANT: CPT | Mod: 91 | Performed by: EMERGENCY MEDICINE

## 2023-07-09 PROCEDURE — 99223 PR INITIAL HOSPITAL CARE,LEVL III: ICD-10-PCS | Mod: ,,, | Performed by: NURSE PRACTITIONER

## 2023-07-09 PROCEDURE — 93010 ELECTROCARDIOGRAM REPORT: CPT | Mod: 76,,, | Performed by: INTERNAL MEDICINE

## 2023-07-09 PROCEDURE — 96361 HYDRATE IV INFUSION ADD-ON: CPT

## 2023-07-09 PROCEDURE — 82962 GLUCOSE BLOOD TEST: CPT

## 2023-07-09 PROCEDURE — 25500020 PHARM REV CODE 255: Performed by: EMERGENCY MEDICINE

## 2023-07-09 PROCEDURE — 83880 ASSAY OF NATRIURETIC PEPTIDE: CPT | Performed by: EMERGENCY MEDICINE

## 2023-07-09 PROCEDURE — 93010 EKG 12-LEAD: ICD-10-PCS | Mod: 76,,, | Performed by: INTERNAL MEDICINE

## 2023-07-09 PROCEDURE — 93005 ELECTROCARDIOGRAM TRACING: CPT

## 2023-07-09 PROCEDURE — 84484 ASSAY OF TROPONIN QUANT: CPT

## 2023-07-09 PROCEDURE — G0378 HOSPITAL OBSERVATION PER HR: HCPCS

## 2023-07-09 PROCEDURE — 63600175 PHARM REV CODE 636 W HCPCS: Performed by: EMERGENCY MEDICINE

## 2023-07-09 PROCEDURE — 86803 HEPATITIS C AB TEST: CPT | Performed by: PHYSICIAN ASSISTANT

## 2023-07-09 PROCEDURE — 94761 N-INVAS EAR/PLS OXIMETRY MLT: CPT

## 2023-07-09 PROCEDURE — 85025 COMPLETE CBC W/AUTO DIFF WBC: CPT | Performed by: EMERGENCY MEDICINE

## 2023-07-09 PROCEDURE — 99223 1ST HOSP IP/OBS HIGH 75: CPT | Mod: ,,, | Performed by: NURSE PRACTITIONER

## 2023-07-09 PROCEDURE — 87389 HIV-1 AG W/HIV-1&-2 AB AG IA: CPT | Performed by: PHYSICIAN ASSISTANT

## 2023-07-09 PROCEDURE — 99285 EMERGENCY DEPT VISIT HI MDM: CPT | Mod: 25

## 2023-07-09 PROCEDURE — 80053 COMPREHEN METABOLIC PANEL: CPT | Performed by: EMERGENCY MEDICINE

## 2023-07-09 PROCEDURE — 81003 URINALYSIS AUTO W/O SCOPE: CPT | Performed by: EMERGENCY MEDICINE

## 2023-07-09 PROCEDURE — 96360 HYDRATION IV INFUSION INIT: CPT | Mod: 59

## 2023-07-09 RX ORDER — ACETAMINOPHEN 325 MG/1
650 TABLET ORAL EVERY 4 HOURS PRN
Status: DISCONTINUED | OUTPATIENT
Start: 2023-07-10 | End: 2023-07-10 | Stop reason: HOSPADM

## 2023-07-09 RX ORDER — MAG HYDROX/ALUMINUM HYD/SIMETH 200-200-20
30 SUSPENSION, ORAL (FINAL DOSE FORM) ORAL ONCE
Status: COMPLETED | OUTPATIENT
Start: 2023-07-10 | End: 2023-07-09

## 2023-07-09 RX ORDER — ASPIRIN 81 MG/1
81 TABLET ORAL DAILY
Status: DISCONTINUED | OUTPATIENT
Start: 2023-07-10 | End: 2023-07-10

## 2023-07-09 RX ORDER — LEVOTHYROXINE SODIUM 50 UG/1
50 TABLET ORAL DAILY
Status: DISCONTINUED | OUTPATIENT
Start: 2023-07-10 | End: 2023-07-10 | Stop reason: HOSPADM

## 2023-07-09 RX ORDER — SODIUM CHLORIDE 0.9 % (FLUSH) 0.9 %
10 SYRINGE (ML) INJECTION EVERY 12 HOURS PRN
Status: DISCONTINUED | OUTPATIENT
Start: 2023-07-10 | End: 2023-07-10 | Stop reason: HOSPADM

## 2023-07-09 RX ORDER — METOPROLOL SUCCINATE 50 MG/1
50 TABLET, EXTENDED RELEASE ORAL DAILY
Status: DISCONTINUED | OUTPATIENT
Start: 2023-07-10 | End: 2023-07-10 | Stop reason: HOSPADM

## 2023-07-09 RX ORDER — GLUCAGON 1 MG
1 KIT INJECTION
Status: DISCONTINUED | OUTPATIENT
Start: 2023-07-10 | End: 2023-07-10 | Stop reason: HOSPADM

## 2023-07-09 RX ORDER — ONDANSETRON 2 MG/ML
4 INJECTION INTRAMUSCULAR; INTRAVENOUS EVERY 8 HOURS PRN
Status: DISCONTINUED | OUTPATIENT
Start: 2023-07-10 | End: 2023-07-10 | Stop reason: HOSPADM

## 2023-07-09 RX ORDER — IBUPROFEN 200 MG
16 TABLET ORAL
Status: DISCONTINUED | OUTPATIENT
Start: 2023-07-10 | End: 2023-07-10 | Stop reason: HOSPADM

## 2023-07-09 RX ORDER — IBUPROFEN 200 MG
24 TABLET ORAL
Status: DISCONTINUED | OUTPATIENT
Start: 2023-07-10 | End: 2023-07-10 | Stop reason: HOSPADM

## 2023-07-09 RX ORDER — MAG HYDROX/ALUMINUM HYD/SIMETH 200-200-20
30 SUSPENSION, ORAL (FINAL DOSE FORM) ORAL 4 TIMES DAILY PRN
Status: DISCONTINUED | OUTPATIENT
Start: 2023-07-10 | End: 2023-07-10

## 2023-07-09 RX ORDER — ALPRAZOLAM 0.25 MG/1
0.25 TABLET ORAL NIGHTLY PRN
Status: DISCONTINUED | OUTPATIENT
Start: 2023-07-10 | End: 2023-07-10 | Stop reason: HOSPADM

## 2023-07-09 RX ORDER — ATORVASTATIN CALCIUM 20 MG/1
20 TABLET, FILM COATED ORAL DAILY
Status: DISCONTINUED | OUTPATIENT
Start: 2023-07-10 | End: 2023-07-10

## 2023-07-09 RX ORDER — ASPIRIN 325 MG
325 TABLET ORAL ONCE
Status: COMPLETED | OUTPATIENT
Start: 2023-07-10 | End: 2023-07-09

## 2023-07-09 RX ORDER — ALBUTEROL SULFATE 90 UG/1
1 AEROSOL, METERED RESPIRATORY (INHALATION) EVERY 4 HOURS PRN
Status: DISCONTINUED | OUTPATIENT
Start: 2023-07-10 | End: 2023-07-10 | Stop reason: HOSPADM

## 2023-07-09 RX ORDER — FLUTICASONE FUROATE AND VILANTEROL 100; 25 UG/1; UG/1
1 POWDER RESPIRATORY (INHALATION) DAILY
Status: DISCONTINUED | OUTPATIENT
Start: 2023-07-10 | End: 2023-07-10 | Stop reason: HOSPADM

## 2023-07-09 RX ORDER — INSULIN ASPART 100 [IU]/ML
0-5 INJECTION, SOLUTION INTRAVENOUS; SUBCUTANEOUS
Status: DISCONTINUED | OUTPATIENT
Start: 2023-07-10 | End: 2023-07-10 | Stop reason: HOSPADM

## 2023-07-09 RX ORDER — NALOXONE HCL 0.4 MG/ML
0.02 VIAL (ML) INJECTION
Status: DISCONTINUED | OUTPATIENT
Start: 2023-07-10 | End: 2023-07-10 | Stop reason: HOSPADM

## 2023-07-09 RX ADMIN — SODIUM CHLORIDE, POTASSIUM CHLORIDE, SODIUM LACTATE AND CALCIUM CHLORIDE 1000 ML: 600; 310; 30; 20 INJECTION, SOLUTION INTRAVENOUS at 08:07

## 2023-07-09 RX ADMIN — ALUMINUM HYDROXIDE, MAGNESIUM HYDROXIDE, AND SIMETHICONE 30 ML: 200; 200; 20 SUSPENSION ORAL at 11:07

## 2023-07-09 RX ADMIN — IOHEXOL 75 ML: 350 INJECTION, SOLUTION INTRAVENOUS at 07:07

## 2023-07-09 RX ADMIN — ASPIRIN 325 MG: 325 TABLET ORAL at 11:07

## 2023-07-09 NOTE — ED TRIAGE NOTES
"Noah Bailey, a 72 y.o. female presents to the ED w/ complaint of pain under left arm "up in the arm pit and extends to my elbow".  +stent in 2017    Triage note:  Chief Complaint   Patient presents with    Chest Pain     + stent , and pain under L arm     Review of patient's allergies indicates:   Allergen Reactions    Sulfa (sulfonamide antibiotics) Rash     Past Medical History:   Diagnosis Date    ALLERGIC RHINITIS     Asthma     Cataract     Colon polyps     Diabetes mellitus     Hyperlipidemia     Hypertension     Hypothyroidism     Umbilical hernia        "

## 2023-07-09 NOTE — ED PROVIDER NOTES
Encounter Date: 7/9/2023       History     Chief Complaint   Patient presents with    Chest Pain     + stent , and pain under L arm     72-year-old woman with comorbidities of diabetes, hypertension, asthma, allergic rhinitis, hyperlipidemia, anxiety, as well as CAD with stents presents to the emergency department for evaluation of mid epigastric/lower chest discomfort which she describes as intermittent for the past 4-5 days associated with eating, but also consistent with symptoms preceding her diagnosis of NSTEMI requiring PTCI in 2017.  She also endorses ongoing left axillary soreness without history of drainage or trauma.  She describes mild constipation symptoms with a last bowel movement earlier today described as black in the setting of Pepto-Bismol therapy without associated recurrent urge to stool or previous history of GI bleeding.  Other than postprandial discomfort, she is unable to identify any exacerbating or alleviating factors, and denies any recent history of exercise.    Review of patient's allergies indicates:   Allergen Reactions    Sulfa (sulfonamide antibiotics) Rash     Past Medical History:   Diagnosis Date    ALLERGIC RHINITIS     Asthma     Cataract     Colon polyps     Diabetes mellitus     Hyperlipidemia     Hypertension     Hypothyroidism     Umbilical hernia      Past Surgical History:   Procedure Laterality Date    CARDIAC SURGERY      STENTS    CATARACT EXTRACTION W/  INTRAOCULAR LENS IMPLANT Left 12/7/2021    Procedure: EXTRACTION, CATARACT, WITH IOL INSERTION;  Surgeon: Galileo Murphy MD;  Location: [facility] OR;  Service: Ophthalmology;  Laterality: Left;    CATARACT EXTRACTION W/  INTRAOCULAR LENS IMPLANT Right 12/21/2021    Procedure: EXTRACTION, CATARACT, WITH IOL INSERTION;  Surgeon: Galileo Murphy MD;  Location: [facility] OR;  Service: Ophthalmology;  Laterality: Right;    COLONOSCOPY       Family History   Problem Relation Age of Onset    Cancer Mother 49         ovarian    Heart disease Mother 40        M.I.    Hyperlipidemia Mother     Ovarian cancer Mother     Cataracts Mother     Heart disease Father     Glaucoma Paternal Grandfather     Heart failure Sister     Hyperlipidemia Maternal Grandmother     Amblyopia Neg Hx     Blindness Neg Hx     Diabetes Neg Hx     Hypertension Neg Hx     Macular degeneration Neg Hx     Retinal detachment Neg Hx     Strabismus Neg Hx     Stroke Neg Hx     Thyroid disease Neg Hx     Heart attack Neg Hx      Social History     Tobacco Use    Smoking status: Former     Packs/day: 1.00     Years: 20.00     Pack years: 20.00     Types: Cigarettes     Quit date: 1994     Years since quittin.5    Smokeless tobacco: Never   Substance Use Topics    Alcohol use: Yes     Comment: occasional use    Drug use: No     Review of Systems    Physical Exam     Initial Vitals [23 1715]   BP Pulse Resp Temp SpO2   (!) 157/86 100 20 97.8 °F (36.6 °C) 96 %      MAP       --         Physical Exam    Vitals reviewed.  Constitutional:   72-year-old  woman, no acute distress noted   HENT:   Head: Normocephalic and atraumatic.   Mouth/Throat: Oropharynx is clear and moist.   Eyes: EOM are normal. Pupils are equal, round, and reactive to light.   Neck: No tracheal deviation present.   Cardiovascular:  Normal rate, regular rhythm and intact distal pulses.           1/6 MAMTA noted loudest at right proximal sternal border   Pulmonary/Chest: Breath sounds normal. No stridor. No respiratory distress. She has no wheezes.   Abdominal:   Obese, soft, central/ventral abdominal hernia noted and reducible; there is exquisite right lower quadrant tenderness to palpation without mass or rebound   Musculoskeletal:         General: No edema. Normal range of motion.      Comments: There is mild-to-moderate palpation tenderness of the left axilla without palpable mass or notable skin change     Neurological: She is alert and oriented to person, place, and time.    Skin: Skin is warm and dry.   Psychiatric: Her behavior is normal. Thought content normal.       ED Course   Procedures  Labs Reviewed   CBC W/ AUTO DIFFERENTIAL - Abnormal; Notable for the following components:       Result Value    WBC 15.07 (*)     Gran # (ANC) 11.9 (*)     Gran % 79.1 (*)     Lymph % 13.1 (*)     All other components within normal limits    Narrative:     Release to patient->Immediate   COMPREHENSIVE METABOLIC PANEL - Abnormal; Notable for the following components:    Potassium 5.8 (*)     CO2 21 (*)     Glucose 115 (*)     Calcium 10.8 (*)     Total Protein 8.6 (*)     Alkaline Phosphatase 136 (*)     All other components within normal limits    Narrative:     Release to patient->Immediate   URINALYSIS, REFLEX TO URINE CULTURE - Abnormal; Notable for the following components:    Color, UA Colorless (*)     Specific Gravity, UA >1.030 (*)     All other components within normal limits    Narrative:     Specimen Source->Urine   CBC W/ AUTO DIFFERENTIAL - Abnormal; Notable for the following components:    Gran # (ANC) 8.2 (*)     Immature Grans (Abs) 0.05 (*)     Lymph % 17.9 (*)     All other components within normal limits   HIV 1 / 2 ANTIBODY    Narrative:     Release to patient->Immediate   HEPATITIS C ANTIBODY    Narrative:     Release to patient->Immediate   TROPONIN I    Narrative:     Release to patient->Immediate   TROPONIN I   B-TYPE NATRIURETIC PEPTIDE    Narrative:     Release to patient->Immediate   TROPONIN ISTAT   TROPONIN I    Narrative:     ADD ON BMP PER YOLI  [id number] @ 23:29   BASIC METABOLIC PANEL   MAGNESIUM   COMPREHENSIVE METABOLIC PANEL   THEOPHYLLINE LEVEL   TROPONIN I   POCT GLUCOSE, HAND-HELD DEVICE   POCT GLUCOSE   POCT GLUCOSE     EKG Readings: (Independently Interpreted)   Initial Reading: No STEMI. Rhythm: Normal Sinus Rhythm. Heart Rate: 86. Ectopy: No Ectopy. Axis: Normal.   Nonspecific ST segment flattening noted to septal and lateral leads   ECG Results               EKG 12-lead (Final result)  Result time 07/10/23 11:07:57      Final result by Interface, Lab In [facility] (07/10/23 11:07:57)                   Narrative:    Test Reason : R07.89,    Vent. Rate : 086 BPM     Atrial Rate : 086 BPM     P-R Int : 156 ms          QRS Dur : 068 ms      QT Int : 346 ms       P-R-T Axes : 043 010 -13 degrees     QTc Int : 414 ms    Normal sinus rhythm  Low voltage QRS  Low anterior forces  Diffuse Nonspecific ST and T wave abnormality  Abnormal ECG  When compared with ECG of 09-JUL-2023 17:18,  No significant change was found  Confirmed by JOCELYN TOVAR MD ([id number]) on 7/10/2023 11:07:49 AM    Referred By: [email or web address]   SELF           Confirmed By:JOCELYN TOVAR MD                                     EKG 12-lead (Edited Result - FINAL)  Result time 07/10/23 11:06:52      Edited Result - FINAL by Interface, Lab In [facility] (07/10/23 11:06:52)                   Narrative:    Test Reason : R07.9,    Vent. Rate : 088 BPM     Atrial Rate : 088 BPM     P-R Int : 166 ms          QRS Dur : 066 ms      QT Int : 360 ms       P-R-T Axes : 050 020 015 degrees     QTc Int : 435 ms    Normal sinus rhythm  Low voltage QRS  Low anterior forces  Nonspecific ST and T wave abnormality  Abnormal ECG  When compared with ECG of 29-FEB-2020 11:10,  Nonspecific T wave abnormality now evident in Anterior-lateral leads  Reconfirmed by JOCELYN TOVAR MD ([id number]) on 7/10/2023 11:06:44 AM    Referred By: [email or web address]   SELF           Confirmed By:JOCELYN TOVAR MD                                  Imaging Results              US Abdomen Limited (Final result)  Result time 07/09/23 21:45:17      Final result by Roberto Fountain MD (07/09/23 21:45:17)                   Impression:      Contracted gallbladder.    Numerous gallstones.  No additional sonographic evidence of acute cholecystitis.    Electronically signed by resident: Radha Diggs  Date:    07/09/2023  Time:    21:25    Electronically signed  by: Roberto Fountain  Date:    07/09/2023  Time:    21:45               Narrative:    EXAMINATION:  US ABDOMEN LIMITED    CLINICAL HISTORY:  cholelithiasis wtih abdominal tenderness;.    TECHNIQUE:  Limited ultrasound of the right upper quadrant of the abdomen including pancreas, gallbladder, common bile duct was performed.    COMPARISON:  CT abdomen pelvis 07/09/2023 abdominal ultrasound 11/11/2003    FINDINGS:  Gallbladder: Contracted with echogenic material in its lumen likely reflecting stones.  The gallbladder wall is not thickened.  There is no wall hyperemia or pericholecystic fluid.  Sonographic Everett sign reportedly negative.    Biliary system: The common duct is not dilated, measuring 3 mm.  No intrahepatic ductal dilatation.    Pancreas: The visualized portions of pancreas appear slightly hyperechoic which can reflect fatty changes.    Miscellaneous: No right-sided hydronephrosis.  No upper abdominal ascites.  No sonographic abnormality of the partially visualized hepatic parenchyma.                                       CT Abdomen Pelvis With Contrast (Final result)  Result time 07/09/23 19:32:12      Final result by Kane Zhang MD (07/09/23 19:32:12)                   Impression:      1. Findings suggesting hepatic steatosis, correlation with LFTs recommended.  2. Cholelithiasis without secondary findings to suggest acute cholecystitis.  3. Colonic diverticulosis without diverticulitis.  4. Please see above for several additional findings.      Electronically signed by: Kane Zhang MD  Date:    07/09/2023  Time:    19:32               Narrative:    EXAMINATION:  CT ABDOMEN PELVIS WITH CONTRAST    CLINICAL HISTORY:  RLQ abdominal pain (Age >= 14y);    TECHNIQUE:  Low dose axial images, sagittal and coronal reformations were obtained from the lung bases to the pubic symphysis following the IV administration of 75 mL of Omnipaque 350 .  Oral contrast was not given.    COMPARISON:  CT  10/05/2022    FINDINGS:  Images of the lower thorax are remarkable for bilateral dependent atelectasis/scarring.    The liver is mildly hypoattenuating, may reflect steatosis, correlation with LFTs recommended.  The spleen, and adrenal glands are unremarkable.  There is atrophic change of the pancreas without pancreatic ductal dilation.  There is cholelithiasis without secondary findings to suggest acute cholecystitis.  The stomach is relatively decompressed, no gastric wall thickening.  The portal vein, splenic vein, SMV, celiac axis and SMA all are patent.  No significant abdominal lymphadenopathy.    The kidneys enhance symmetrically without hydronephrosis or nephrolithiasis.  The bilateral ureters are unremarkable without calculi seen.  The urinary bladder is unremarkable.  The uterus and adnexa are unremarkable.    There is high attenuating material within the large bowel.  There are a few scattered colonic diverticula without inflammation.  The terminal ileum is unremarkable.  The appendix is unremarkable.  The small bowel is grossly unremarkable.  No focal organized pelvic fluid collection.  There are a few scattered shotty periaortic and paracaval lymph nodes.  There is atherosclerotic calcification of the aorta and its branches.  There is an anterior abdominal wall fat containing hernia without inflammation.    There is osteopenia.  There are degenerative changes of the bilateral sacroiliac joints, pubic symphysis, femoroacetabular joints and spine.  There is Schmorl's node involving the superior endplate of T12.  No significant inguinal lymphadenopathy.                                       X-Ray Chest AP Portable (Final result)  Result time 07/09/23 18:38:59      Final result by Kane Zhang MD (07/09/23 18:38:59)                   Impression:      1. Interstitial findings may reflect edema noting accentuation by habitus.  No large focal consolidation.      Electronically signed by: Kane Zhang  MD  Date:    07/09/2023  Time:    18:38               Narrative:    EXAMINATION:  XR CHEST AP PORTABLE    CLINICAL HISTORY:  Chest Pain;    TECHNIQUE:  Single frontal view of the chest was performed.    COMPARISON:  02/29/2020    FINDINGS:  Please note, patient's chin obscures views of the medial lung apices.  The cardiomediastinal silhouette is not enlarged, magnified by technique noting calcification..  There is no pleural effusion.  The trachea is midline.  The lungs are symmetrically expanded bilaterally with coarse interstitial attenuation bilaterally particularly projected over the bilateral lower lung zones..  There is bilateral basilar subsegmental atelectasis.  No large focal consolidation.  There is no pneumothorax.  The osseous structures are remarkable for degenerative changes..                                       Medications   iohexoL (OMNIPAQUE 350) injection 100 mL (75 mLs Intravenous Given 7/9/23 1910)   lactated ringers bolus 1,000 mL (0 mLs Intravenous Stopped 7/9/23 2300)   aluminum-magnesium hydroxide-simethicone 200-200-20 mg/5 mL suspension 30 mL (30 mLs Oral Given 7/9/23 2356)   aspirin tablet 325 mg (325 mg Oral Given 7/9/23 2356)     Medical Decision Making:   History:   Old Medical Records: I decided to obtain old medical records.  Old Records Summarized: records from clinic visits.  Differential Diagnosis:   ACS, lethal arrhythmia, colitis, appendicitis, GERD  Clinical Tests:   Lab Tests: Ordered and Reviewed  Radiological Study: Ordered and Reviewed  Medical Tests: Reviewed and Ordered          Attending Attestation:             Attending ED Notes:   Initial serum troponin WNL and EKG without evidence of acute injury. CT abdomen obtained due to leukocytosis and RLQ tenderness does not reveal evidence of acute infectious process. Due to conclusion of shift, final disposition and management transferred to Dr. Downey. Please see her accompanying documentation for further details.                  Clinical Impression:   Final diagnoses:  [R07.89] Chest discomfort  [R07.9] Chest pain (Primary)        ED Disposition Condition    Observation Stable                Jon Brandon MD  07/10/23 2000

## 2023-07-10 VITALS
DIASTOLIC BLOOD PRESSURE: 88 MMHG | HEART RATE: 77 BPM | OXYGEN SATURATION: 95 % | WEIGHT: 170 LBS | TEMPERATURE: 98 F | SYSTOLIC BLOOD PRESSURE: 126 MMHG | HEIGHT: 63 IN | RESPIRATION RATE: 18 BRPM | BODY MASS INDEX: 30.12 KG/M2

## 2023-07-10 PROBLEM — R07.9 CHEST PAIN: Status: ACTIVE | Noted: 2023-07-09

## 2023-07-10 PROBLEM — R07.9 CHEST PAIN: Status: RESOLVED | Noted: 2020-02-29 | Resolved: 2023-07-10

## 2023-07-10 LAB
ALBUMIN SERPL BCP-MCNC: 3.7 G/DL (ref 3.5–5.2)
ALP SERPL-CCNC: 113 U/L (ref 55–135)
ALT SERPL W/O P-5'-P-CCNC: 16 U/L (ref 10–44)
ANION GAP SERPL CALC-SCNC: 10 MMOL/L (ref 8–16)
ASCENDING AORTA: 3.1 CM
AST SERPL-CCNC: 16 U/L (ref 10–40)
AV INDEX (PROSTH): 0.76
AV MEAN GRADIENT: 7 MMHG
AV PEAK GRADIENT: 13 MMHG
AV VALVE AREA: 2.47 CM2
AV VELOCITY RATIO: 0.66
BASOPHILS # BLD AUTO: 0.05 K/UL (ref 0–0.2)
BASOPHILS NFR BLD: 0.4 % (ref 0–1.9)
BILIRUB SERPL-MCNC: 0.3 MG/DL (ref 0.1–1)
BSA FOR ECHO PROCEDURE: 1.85 M2
BUN SERPL-MCNC: 15 MG/DL (ref 8–23)
CALCIUM SERPL-MCNC: 9.7 MG/DL (ref 8.7–10.5)
CHLORIDE SERPL-SCNC: 105 MMOL/L (ref 95–110)
CO2 SERPL-SCNC: 24 MMOL/L (ref 23–29)
CREAT SERPL-MCNC: 0.8 MG/DL (ref 0.5–1.4)
CV ECHO LV RWT: 0.47 CM
DIFFERENTIAL METHOD: ABNORMAL
DOP CALC AO PEAK VEL: 1.8 M/S
DOP CALC AO VTI: 41.47 CM
DOP CALC LVOT AREA: 3.3 CM2
DOP CALC LVOT DIAMETER: 2.04 CM
DOP CALC LVOT PEAK VEL: 1.19 M/S
DOP CALC LVOT STROKE VOLUME: 102.48 CM3
DOP CALCLVOT PEAK VEL VTI: 31.37 CM
E WAVE DECELERATION TIME: 220.85 MSEC
E/A RATIO: 0.82
E/E' RATIO: 9.65 M/S
ECHO LV POSTERIOR WALL: 0.9 CM (ref 0.6–1.1)
EJECTION FRACTION: 60 %
EOSINOPHIL # BLD AUTO: 0.1 K/UL (ref 0–0.5)
EOSINOPHIL NFR BLD: 0.7 % (ref 0–8)
ERYTHROCYTE [DISTWIDTH] IN BLOOD BY AUTOMATED COUNT: 14.1 % (ref 11.5–14.5)
EST. GFR  (NO RACE VARIABLE): >60 ML/MIN/1.73 M^2
FRACTIONAL SHORTENING: 40 % (ref 28–44)
GLUCOSE SERPL-MCNC: 100 MG/DL (ref 70–110)
GLUCOSE SERPL-MCNC: 103 MG/DL (ref 70–110)
HCT VFR BLD AUTO: 39.6 % (ref 37–48.5)
HGB BLD-MCNC: 13 G/DL (ref 12–16)
IMM GRANULOCYTES # BLD AUTO: 0.05 K/UL (ref 0–0.04)
IMM GRANULOCYTES NFR BLD AUTO: 0.4 % (ref 0–0.5)
INTERVENTRICULAR SEPTUM: 0.84 CM (ref 0.6–1.1)
IVRT: 105.61 MSEC
LA MAJOR: 5.13 CM
LA MINOR: 4.97 CM
LA WIDTH: 3.45 CM
LEFT ATRIUM SIZE: 2.88 CM
LEFT ATRIUM VOLUME INDEX MOD: 22.9 ML/M2
LEFT ATRIUM VOLUME INDEX: 23.7 ML/M2
LEFT ATRIUM VOLUME MOD: 41.23 CM3
LEFT ATRIUM VOLUME: 42.64 CM3
LEFT INTERNAL DIMENSION IN SYSTOLE: 2.33 CM (ref 2.1–4)
LEFT VENTRICLE DIASTOLIC VOLUME INDEX: 35.91 ML/M2
LEFT VENTRICLE DIASTOLIC VOLUME: 64.64 ML
LEFT VENTRICLE MASS INDEX: 55 G/M2
LEFT VENTRICLE SYSTOLIC VOLUME INDEX: 10.4 ML/M2
LEFT VENTRICLE SYSTOLIC VOLUME: 18.79 ML
LEFT VENTRICULAR INTERNAL DIMENSION IN DIASTOLE: 3.87 CM (ref 3.5–6)
LEFT VENTRICULAR MASS: 99.27 G
LV LATERAL E/E' RATIO: 10.25 M/S
LV SEPTAL E/E' RATIO: 9.11 M/S
LYMPHOCYTES # BLD AUTO: 2.1 K/UL (ref 1–4.8)
LYMPHOCYTES NFR BLD: 17.9 % (ref 18–48)
MAGNESIUM SERPL-MCNC: 2 MG/DL (ref 1.6–2.6)
MCH RBC QN AUTO: 28.1 PG (ref 27–31)
MCHC RBC AUTO-ENTMCNC: 32.8 G/DL (ref 32–36)
MCV RBC AUTO: 86 FL (ref 82–98)
MONOCYTES # BLD AUTO: 1 K/UL (ref 0.3–1)
MONOCYTES NFR BLD: 9 % (ref 4–15)
MV PEAK A VEL: 1 M/S
MV PEAK E VEL: 0.82 M/S
MV STENOSIS PRESSURE HALF TIME: 64.05 MS
MV VALVE AREA P 1/2 METHOD: 3.43 CM2
NEUTROPHILS # BLD AUTO: 8.2 K/UL (ref 1.8–7.7)
NEUTROPHILS NFR BLD: 71.6 % (ref 38–73)
NRBC BLD-RTO: 0 /100 WBC
PLATELET # BLD AUTO: 308 K/UL (ref 150–450)
PMV BLD AUTO: 10.4 FL (ref 9.2–12.9)
POCT GLUCOSE: 103 MG/DL (ref 70–110)
POCT GLUCOSE: 98 MG/DL (ref 70–110)
POCT GLUCOSE: 99 MG/DL (ref 70–110)
POTASSIUM SERPL-SCNC: 4 MMOL/L (ref 3.5–5.1)
PROT SERPL-MCNC: 7.4 G/DL (ref 6–8.4)
RA MAJOR: 4.6 CM
RA PRESSURE: 3 MMHG
RA WIDTH: 3.38 CM
RBC # BLD AUTO: 4.62 M/UL (ref 4–5.4)
RIGHT VENTRICULAR END-DIASTOLIC DIMENSION: 2.51 CM
SINUS: 2.95 CM
SODIUM SERPL-SCNC: 139 MMOL/L (ref 136–145)
STJ: 2.58 CM
TDI LATERAL: 0.08 M/S
TDI SEPTAL: 0.09 M/S
TDI: 0.09 M/S
THEOPHYLLINE SERPL-MCNC: 10 UG/ML (ref 8–20)
TRICUSPID ANNULAR PLANE SYSTOLIC EXCURSION: 2.27 CM
TROPONIN I SERPL DL<=0.01 NG/ML-MCNC: 0.01 NG/ML (ref 0–0.03)
WBC # BLD AUTO: 11.52 K/UL (ref 3.9–12.7)

## 2023-07-10 PROCEDURE — G0378 HOSPITAL OBSERVATION PER HR: HCPCS

## 2023-07-10 PROCEDURE — 82962 GLUCOSE BLOOD TEST: CPT

## 2023-07-10 PROCEDURE — 83735 ASSAY OF MAGNESIUM: CPT | Performed by: NURSE PRACTITIONER

## 2023-07-10 PROCEDURE — 25000242 PHARM REV CODE 250 ALT 637 W/ HCPCS: Performed by: NURSE PRACTITIONER

## 2023-07-10 PROCEDURE — 84484 ASSAY OF TROPONIN QUANT: CPT | Performed by: NURSE PRACTITIONER

## 2023-07-10 PROCEDURE — 80053 COMPREHEN METABOLIC PANEL: CPT | Performed by: NURSE PRACTITIONER

## 2023-07-10 PROCEDURE — 80198 ASSAY OF THEOPHYLLINE: CPT | Performed by: NURSE PRACTITIONER

## 2023-07-10 PROCEDURE — 94640 AIRWAY INHALATION TREATMENT: CPT

## 2023-07-10 PROCEDURE — 99239 HOSP IP/OBS DSCHRG MGMT >30: CPT | Mod: ,,, | Performed by: PHYSICIAN ASSISTANT

## 2023-07-10 PROCEDURE — 85025 COMPLETE CBC W/AUTO DIFF WBC: CPT | Performed by: NURSE PRACTITIONER

## 2023-07-10 PROCEDURE — 99239 PR HOSPITAL DISCHARGE DAY,>30 MIN: ICD-10-PCS | Mod: ,,, | Performed by: PHYSICIAN ASSISTANT

## 2023-07-10 PROCEDURE — 25000003 PHARM REV CODE 250: Performed by: NURSE PRACTITIONER

## 2023-07-10 RX ORDER — BISMUTH SUBSALICYLATE 525 MG/30ML
LIQUID ORAL 2 TIMES DAILY PRN
COMMUNITY

## 2023-07-10 RX ORDER — ATORVASTATIN CALCIUM 20 MG/1
20 TABLET, FILM COATED ORAL NIGHTLY
Status: DISCONTINUED | OUTPATIENT
Start: 2023-07-10 | End: 2023-07-10 | Stop reason: HOSPADM

## 2023-07-10 RX ORDER — SUCRALFATE 1 G/1
1 TABLET ORAL
Status: DISCONTINUED | OUTPATIENT
Start: 2023-07-10 | End: 2023-07-10 | Stop reason: HOSPADM

## 2023-07-10 RX ORDER — DIPHENHYDRAMINE HCL 25 MG
50 CAPSULE ORAL 2 TIMES DAILY PRN
COMMUNITY

## 2023-07-10 RX ORDER — SIMETHICONE 80 MG
2 TABLET,CHEWABLE ORAL 3 TIMES DAILY PRN
Status: DISCONTINUED | OUTPATIENT
Start: 2023-07-10 | End: 2023-07-10 | Stop reason: HOSPADM

## 2023-07-10 RX ORDER — URSODIOL 300 MG/1
300 CAPSULE ORAL 2 TIMES DAILY
Qty: 28 CAPSULE | Refills: 0 | Status: SHIPPED | OUTPATIENT
Start: 2023-07-10 | End: 2023-07-24

## 2023-07-10 RX ORDER — ASPIRIN 325 MG
325-650 TABLET, DELAYED RELEASE (ENTERIC COATED) ORAL DAILY PRN
COMMUNITY

## 2023-07-10 RX ORDER — ENOXAPARIN SODIUM 100 MG/ML
40 INJECTION SUBCUTANEOUS EVERY 24 HOURS
Status: DISCONTINUED | OUTPATIENT
Start: 2023-07-10 | End: 2023-07-10 | Stop reason: HOSPADM

## 2023-07-10 RX ORDER — ACETAMINOPHEN 500 MG
1500 TABLET ORAL 2 TIMES DAILY PRN
COMMUNITY

## 2023-07-10 RX ORDER — ASPIRIN 81 MG/1
81 TABLET ORAL NIGHTLY
Status: DISCONTINUED | OUTPATIENT
Start: 2023-07-10 | End: 2023-07-10 | Stop reason: HOSPADM

## 2023-07-10 RX ORDER — DOXYCYCLINE HYCLATE 20 MG
20 TABLET ORAL 2 TIMES DAILY
COMMUNITY

## 2023-07-10 RX ORDER — KETOROLAC TROMETHAMINE 30 MG/ML
15 INJECTION, SOLUTION INTRAMUSCULAR; INTRAVENOUS EVERY 6 HOURS PRN
Status: DISCONTINUED | OUTPATIENT
Start: 2023-07-10 | End: 2023-07-10

## 2023-07-10 RX ORDER — AMLODIPINE BESYLATE 10 MG/1
10 TABLET ORAL DAILY
Status: DISCONTINUED | OUTPATIENT
Start: 2023-07-10 | End: 2023-07-10 | Stop reason: HOSPADM

## 2023-07-10 RX ADMIN — SUCRALFATE 1 G: 1 TABLET ORAL at 11:07

## 2023-07-10 RX ADMIN — THEOPHYLLINE ANHYDROUS 400 MG: 100 CAPSULE, EXTENDED RELEASE ORAL at 09:07

## 2023-07-10 RX ADMIN — METOPROLOL SUCCINATE 50 MG: 50 TABLET, EXTENDED RELEASE ORAL at 08:07

## 2023-07-10 RX ADMIN — ATORVASTATIN CALCIUM 20 MG: 20 TABLET, FILM COATED ORAL at 03:07

## 2023-07-10 RX ADMIN — SUCRALFATE 1 G: 1 TABLET ORAL at 06:07

## 2023-07-10 RX ADMIN — FLUTICASONE FUROATE AND VILANTEROL TRIFENATATE 1 PUFF: 100; 25 POWDER RESPIRATORY (INHALATION) at 08:07

## 2023-07-10 RX ADMIN — LEVOTHYROXINE SODIUM 50 MCG: 50 TABLET ORAL at 06:07

## 2023-07-10 RX ADMIN — AMLODIPINE BESYLATE 10 MG: 10 TABLET ORAL at 08:07

## 2023-07-10 NOTE — ASSESSMENT & PLAN NOTE
Patient is chronically on statin.will continue for now. Monitor clinically. Last LDL was   Lab Results   Component Value Date    LDLCALC 85.0 03/30/2023

## 2023-07-10 NOTE — ED NOTES
Pt awake and alert, sitting up in bed doing crossword puzzles. No acute distress noted. RR even and unlabored. No current complaints voiced. Side rails up x2. Call light within reach.

## 2023-07-10 NOTE — ASSESSMENT & PLAN NOTE
Body mass index is 30.11 kg/m². Obesity complicates all aspects of disease management from diagnostic modalities to treatment.

## 2023-07-10 NOTE — ASSESSMENT & PLAN NOTE
-Chronic, controlled.  -Continue metoprolol and amlodipine. Hold ARB in the setting of hyperkalemia.  -Monitor BP closely.

## 2023-07-10 NOTE — ASSESSMENT & PLAN NOTE
Patient is identified as having Diastolic (HFpEF) heart failure that is Chronic. CHF is currently controlled. Latest ECHO performed and demonstrates-    1 - Normal left ventricular systolic function (EF 60-65%).     2 - Normal right ventricular systolic function .     3 - Indeterminate LV diastolic function.     4 - The estimated PA systolic pressure is 32 mmHg.     5 - Low central venous pressure.     . Continue Beta Blocker ACE/ARB and monitor clinical status closely. Monitor on telemetry. Patient is off CHF pathway. Monitor strict Is&Os and daily weights. Continue to stress to patient importance of self efficacy and  on diet for CHF. Last BNP reviewed- and noted below   Recent Labs   Lab 07/09/23  [id number]   BNP <10

## 2023-07-10 NOTE — H&P
[facility] - Emergency Dept  [facility] Medicine  History & Physical    Patient Name: Noah Bailey  MRN: [id number]  Patient Class: OP- Observation  Admission Date: 7/9/2023  Attending Physician: Neal Belcher MD   Primary Care Provider: Henry Mcbride MD         Patient information was obtained from patient, past medical records, and ER records.     Subjective:     Principal Problem:Chest pain    Chief Complaint:   Chief Complaint   Patient presents with    Chest Pain     + stent , and pain under L arm        HPI: Noah Bailey is a 72 y.o. HLD, HTN, DM2, asthma, and hypothyroidism who presents to the ED with chief complaint of chest/epigastric pressure. The patient describes the pressure as intermittent x4-5 days associated with eating. Episodes resolve spontaneously after several minutes. Reports she feels as if she needs to belch but is unable to do so. Denies associated SOB, N/V, or diaphoresis. She reports she took a few doses of Pepto bismol but ran out. Unclear whether it provided any relief. The patient reports she was concerned due to having epigastric pain before that she attributed to reflux, and ended up having an NSTEMI and getting a stent placed. She also endorses ongoing left axillary soreness without history of drainage or trauma.  She describes mild constipation symptoms with a last bowel movement earlier today described as black in the setting of Pepto-Bismol therapy without associated recurrent urge to stool or previous history of GI bleeding.  Other than postprandial discomfort, she is unable to identify any exacerbating or alleviating factors. She endorses chronic MICHELE that is unchanged and also notes intermittent back pain attributed to her scoliosis. The patient denies fever, chills, cough, lightheadedness, or dizziness.    In the ED, pt mildly tachycardic otherwise vitals stable, afebrile. WBC 15.07k. K+ 5.8. Bicarb 21. Glucose 115. Calcium 10.8. Total protein 8.6. Alk phos 136. BNP <10.  Troponin 0.008. EKG shows SR, 86 bpm, non specific T wave abnormality but no ST elevation or depression. UA non infectious. CXR with no pleural effusion. The lungs are symmetrically expanded bilaterally with coarse interstitial attenuation bilaterally particularly projected over the bilateral lower lung zones. There is bilateral basilar subsegmental atelectasis. No large focal consolidation. CT AP with findings suggesting hepatic steatosis, correlation with LFTs recommended. Cholelithiasis without secondary findings to suggest acute cholecystitis. Colonic diverticulosis without diverticulitis. Abd US reveals contracted gallbladder. Numerous gallstones. No additional sonographic evidence of acute cholecystitis. The patient received an LR bolus.    Past Medical History:   Diagnosis Date    ALLERGIC RHINITIS     Asthma     Cataract     Colon polyps     Diabetes mellitus     Hyperlipidemia     Hypertension     Hypothyroidism     Umbilical hernia        Past Surgical History:   Procedure Laterality Date    CARDIAC SURGERY      STENTS    CATARACT EXTRACTION W/  INTRAOCULAR LENS IMPLANT Left 12/7/2021    Procedure: EXTRACTION, CATARACT, WITH IOL INSERTION;  Surgeon: Galileo Murphy MD;  Location: [facility] OR;  Service: Ophthalmology;  Laterality: Left;    CATARACT EXTRACTION W/  INTRAOCULAR LENS IMPLANT Right 12/21/2021    Procedure: EXTRACTION, CATARACT, WITH IOL INSERTION;  Surgeon: Galileo Murphy MD;  Location: [facility];  Service: Ophthalmology;  Laterality: Right;    COLONOSCOPY         Review of patient's allergies indicates:   Allergen Reactions    Sulfa (sulfonamide antibiotics) Rash       Current Facility-Administered Medications on File Prior to Encounter   Medication    sodium chloride 0.9% flush 10 mL     Current Outpatient Medications on File Prior to Encounter   Medication Sig    albuterol (PROVENTIL/VENTOLIN HFA) 90 mcg/actuation inhaler Inhale 1-2 puffs into the lungs every 4 (four) hours as needed  for Wheezing or Shortness of Breath. Rescue    ALPRAZolam (XANAX) 0.25 MG tablet Take 1 tablet (0.25 mg total) by mouth nightly as needed for Anxiety or Insomnia.    amlodipine-olmesartan (KARISSA) 10-40 mg per tablet TAKE 1 TABLET BY MOUTH ONCE DAILY FOR BLOOD PRESSURE. REPELACE WITH BENAZEPRIL/AMLODIPINE (LOTREL)    aspirin (ECOTRIN) 81 MG EC tablet TAKE 1 TABLET BY MOUTH EVERY DAY    atorvastatin (LIPITOR) 20 MG tablet Take 1 tablet (20 mg total) by mouth once daily.    azelastine (OPTIVAR) 0.05 % ophthalmic solution Place 1 drop into both eyes 2 (two) times daily.    blood-glucose meter (FREESTYLE LITE METER) kit Use as instructed    fluticasone-salmeterol diskus inhaler 250-50 mcg INHALE 1 PUFF BY MOUTH TWICE A DAY    lancets (MICROLET LANCET) Misc Test blood sugar once daily.    levothyroxine (SYNTHROID) 50 MCG tablet Take 1 tablet (50 mcg total) by mouth once daily.    metFORMIN (GLUCOPHAGE) 500 MG tablet 1 tablet by mouth with dinner    metoprolol succinate (TOPROL-XL) 50 MG 24 hr tablet Take 1 tablet (50 mg total) by mouth once daily. For heart and blood presure    mupirocin (BACTROBAN) 2 % ointment Apply topically 3 (three) times daily.    nitroGLYCERIN (NITROSTAT) 0.4 MG SL tablet Place 1 tablet (0.4 mg total) under the tongue every 5 (five) minutes as needed for Chest pain ((Shakira RUST)).    PRECISION XTRA TEST Strp USE AS DIRECTED DAILY    theophylline 400 mg Tb24 Take 1 tablet (400 mg total) by mouth once daily.     Family History       Problem Relation (Age of Onset)    Cancer Mother (49)    Cataracts Mother    Glaucoma Paternal Grandfather    Heart disease Mother (40), Father    Heart failure Sister    Hyperlipidemia Mother, Maternal Grandmother    Ovarian cancer Mother          Tobacco Use    Smoking status: Former     Packs/day: 1.00     Years: 20.00     Pack years: 20.00     Types: Cigarettes     Quit date: 1994     Years since quittin.5    Smokeless tobacco: Never   Substance and Sexual  Activity    Alcohol use: Yes     Comment: occasional use    Drug use: No    Sexual activity: Not Currently     Review of Systems   Constitutional:  Negative for appetite change, chills, diaphoresis, fatigue and fever.   HENT:  Negative for congestion, rhinorrhea and sore throat.    Eyes:  Negative for photophobia and visual disturbance.   Respiratory:  Negative for cough, shortness of breath and wheezing.         +MICHELE, chronic   Cardiovascular:  Positive for chest pain. Negative for palpitations and leg swelling.   Gastrointestinal:  Positive for abdominal pain. Negative for abdominal distention, diarrhea, nausea and vomiting.   Genitourinary:  Negative for dysuria, frequency and hematuria.   Musculoskeletal:  Positive for back pain (intermittent). Negative for gait problem and myalgias.   Skin:  Negative for color change, pallor, rash and wound.   Neurological:  Negative for dizziness, syncope, weakness, light-headedness and headaches.   Psychiatric/Behavioral:  Negative for agitation, confusion and hallucinations.    Objective:     Vital Signs (Most Recent):  Temp: 98.7 °F (37.1 °C) (07/09/23 1903)  Pulse: 73 (07/09/23 2231)  Resp: 18 (07/09/23 2231)  BP: 123/70 (07/09/23 1903)  SpO2: 96 % (07/09/23 2231) Vital Signs (24h Range):  Temp:  [97.8 °F (36.6 °C)-98.7 °F (37.1 °C)] 98.7 °F (37.1 °C)  Pulse:  [] 73  Resp:  [18-20] 18  SpO2:  [96 %-98 %] 96 %  BP: (123-157)/(70-86) 123/70     Weight: 77.1 kg (170 lb)  Body mass index is 30.11 kg/m².     Physical Exam  Vitals and nursing note reviewed.   Constitutional:       General: She is not in acute distress.     Appearance: She is obese. She is not toxic-appearing or diaphoretic.   HENT:      Head: Normocephalic and atraumatic.      Nose: Nose normal.      Mouth/Throat:      Mouth: Mucous membranes are moist.   Eyes:      Pupils: Pupils are equal, round, and reactive to light.   Cardiovascular:      Rate and Rhythm: Normal rate and regular rhythm.      Pulses:  Normal pulses.   Pulmonary:      Effort: Pulmonary effort is normal. No respiratory distress.      Breath sounds: No wheezing, rhonchi or rales.   Chest:      Chest wall: Tenderness (Mild TTP to L axilla) present.   Abdominal:      General: Bowel sounds are normal. There is no distension.      Palpations: Abdomen is soft.      Tenderness: There is abdominal tenderness (mild) in the right lower quadrant. There is no guarding.      Hernia: A hernia is present. Hernia is present in the ventral area.   Musculoskeletal:         General: No swelling, tenderness or deformity. Normal range of motion.      Cervical back: Normal range of motion.   Skin:     General: Skin is warm and dry.      Capillary Refill: Capillary refill takes less than 2 seconds.   Neurological:      General: No focal deficit present.      Mental Status: She is alert and oriented to person, place, and time.      Sensory: No sensory deficit.      Motor: No weakness.   Psychiatric:         Mood and Affect: Mood normal.         Behavior: Behavior normal.            CRANIAL NERVES     CN III, IV, VI   Pupils are equal, round, and reactive to light.     Significant Labs: All pertinent labs within the past 24 hours have been reviewed.  CBC:   Recent Labs   Lab 07/09/23  1829   WBC 15.07*   HGB 14.1   HCT 43.2        CMP:   Recent Labs   Lab 07/09/23  1829      K 5.8*      CO2 21*   *   BUN 15   CREATININE 0.9   CALCIUM 10.8*   PROT 8.6*   ALBUMIN 4.3   BILITOT 0.3   ALKPHOS 136*   AST 18   ALT 18   ANIONGAP 12     Cardiac Markers:   Recent Labs   Lab 07/09/23  1829   BNP <10     Troponin:   Recent Labs   Lab 07/09/23 1829   TROPONINI <0.006  0.008       Significant Imaging: I have reviewed all pertinent imaging results/findings within the past 24 hours.    Imaging Results              US Abdomen Limited (Final result)  Result time 07/09/23 21:45:17      Final result by Roberto Fountain MD (07/09/23 21:45:17)                    Impression:      Contracted gallbladder.    Numerous gallstones.  No additional sonographic evidence of acute cholecystitis.    Electronically signed by resident: Radha Diggs  Date:    07/09/2023  Time:    21:25    Electronically signed by: Roberto Fountain  Date:    07/09/2023  Time:    21:45               Narrative:    EXAMINATION:  US ABDOMEN LIMITED    CLINICAL HISTORY:  cholelithiasis wtih abdominal tenderness;.    TECHNIQUE:  Limited ultrasound of the right upper quadrant of the abdomen including pancreas, gallbladder, common bile duct was performed.    COMPARISON:  CT abdomen pelvis 07/09/2023 abdominal ultrasound 11/11/2003    FINDINGS:  Gallbladder: Contracted with echogenic material in its lumen likely reflecting stones.  The gallbladder wall is not thickened.  There is no wall hyperemia or pericholecystic fluid.  Sonographic Everett sign reportedly negative.    Biliary system: The common duct is not dilated, measuring 3 mm.  No intrahepatic ductal dilatation.    Pancreas: The visualized portions of pancreas appear slightly hyperechoic which can reflect fatty changes.    Miscellaneous: No right-sided hydronephrosis.  No upper abdominal ascites.  No sonographic abnormality of the partially visualized hepatic parenchyma.                                       CT Abdomen Pelvis With Contrast (Final result)  Result time 07/09/23 19:32:12      Final result by Kane Zhang MD (07/09/23 19:32:12)                   Impression:      1. Findings suggesting hepatic steatosis, correlation with LFTs recommended.  2. Cholelithiasis without secondary findings to suggest acute cholecystitis.  3. Colonic diverticulosis without diverticulitis.  4. Please see above for several additional findings.      Electronically signed by: Kane Zhang MD  Date:    07/09/2023  Time:    19:32               Narrative:    EXAMINATION:  CT ABDOMEN PELVIS WITH CONTRAST    CLINICAL HISTORY:  RLQ abdominal pain (Age >=  14y);    TECHNIQUE:  Low dose axial images, sagittal and coronal reformations were obtained from the lung bases to the pubic symphysis following the IV administration of 75 mL of Omnipaque 350 .  Oral contrast was not given.    COMPARISON:  CT 10/05/2022    FINDINGS:  Images of the lower thorax are remarkable for bilateral dependent atelectasis/scarring.    The liver is mildly hypoattenuating, may reflect steatosis, correlation with LFTs recommended.  The spleen, and adrenal glands are unremarkable.  There is atrophic change of the pancreas without pancreatic ductal dilation.  There is cholelithiasis without secondary findings to suggest acute cholecystitis.  The stomach is relatively decompressed, no gastric wall thickening.  The portal vein, splenic vein, SMV, celiac axis and SMA all are patent.  No significant abdominal lymphadenopathy.    The kidneys enhance symmetrically without hydronephrosis or nephrolithiasis.  The bilateral ureters are unremarkable without calculi seen.  The urinary bladder is unremarkable.  The uterus and adnexa are unremarkable.    There is high attenuating material within the large bowel.  There are a few scattered colonic diverticula without inflammation.  The terminal ileum is unremarkable.  The appendix is unremarkable.  The small bowel is grossly unremarkable.  No focal organized pelvic fluid collection.  There are a few scattered shotty periaortic and paracaval lymph nodes.  There is atherosclerotic calcification of the aorta and its branches.  There is an anterior abdominal wall fat containing hernia without inflammation.    There is osteopenia.  There are degenerative changes of the bilateral sacroiliac joints, pubic symphysis, femoroacetabular joints and spine.  There is Schmorl's node involving the superior endplate of T12.  No significant inguinal lymphadenopathy.                                       X-Ray Chest AP Portable (Final result)  Result time 07/09/23 18:38:59       Final result by Kane Zhang MD (07/09/23 18:38:59)                   Impression:      1. Interstitial findings may reflect edema noting accentuation by habitus.  No large focal consolidation.      Electronically signed by: Kane Zhang MD  Date:    07/09/2023  Time:    18:38               Narrative:    EXAMINATION:  XR CHEST AP PORTABLE    CLINICAL HISTORY:  Chest Pain;    TECHNIQUE:  Single frontal view of the chest was performed.    COMPARISON:  02/29/2020    FINDINGS:  Please note, patient's chin obscures views of the medial lung apices.  The cardiomediastinal silhouette is not enlarged, magnified by technique noting calcification..  There is no pleural effusion.  The trachea is midline.  The lungs are symmetrically expanded bilaterally with coarse interstitial attenuation bilaterally particularly projected over the bilateral lower lung zones..  There is bilateral basilar subsegmental atelectasis.  No large focal consolidation.  There is no pneumothorax.  The osseous structures are remarkable for degenerative changes..                                      Assessment/Plan:     * Chest pain  -Intermittent pressure to chest/epigastric region x4-5 days, associated with eating. Episodes resolve spontaneously after several minutes. Reports she feels as if she needs to belch but is unable to do so. Denies associated SOB, N/V, or diaphoresis. She reports she took a few doses of Pepto bismol but ran out. Unclear whether it provided any relief. The patient reports she was concerned due to having epigastric pain before that she attributed to reflux, and ended up having an NSTEMI and getting a stent placed. Concern for GI component of pain. Given maalox in the ED. Will add scheduled carafate.  - EKG without ST-segment elevation or depression, HR 86.  - Initial troponin 0.008, continue to trend.  - ASA administered in the ED.  - Continue ASA, statin, BB  - Cardiac monitoring.  - PRN EKG for chest pain  - ECHO  "pending.  - Previous cardiac testing with  [facility] 7/13/17  --Single vessel coronary artery disease.   --Normal LVEF.   --Successful PTA. S/p stent to Mid LCX  ECHO 7/16/18   1 - Normal left ventricular systolic function (EF 60-65%).     2 - Normal right ventricular systolic function .     3 - Indeterminate LV diastolic function.     4 - The estimated PA systolic pressure is 32 mmHg.     5 - Low central venous pressure.     Right lower quadrant abdominal pain  -Afebrile, WBC 15k.  -Abd exam benign with mild tenderness noted to RLQ. Patient reports being sensitive in that area "for a long time."  -CT AP with findings suggesting hepatic steatosis, correlation with LFTs recommended. Cholelithiasis without secondary findings to suggest acute cholecystitis. Colonic diverticulosis without diverticulitis.  -Abd US reveals contracted gallbladder. Numerous gallstones. No additional sonographic evidence of acute cholecystitis.  -Unclear source of pain on imaging.   -PRN tylenol.    Hyperkalemia  -K+5.8 on admit, received IVF bolus.  -Repeat pending.  -Cardiac monitoring.  -Continue to monitor on daily labs.    BMI 30.0-30.9,adult  Body mass index is 30.11 kg/m². Obesity complicates all aspects of disease management from diagnostic modalities to treatment.     Hypothyroidism  Patient has chronic hypothyroidism. TFTs reviewed-   Lab Results   Component Value Date    TSH 3.160 03/30/2023   . Will continue chronic levothyroxine and adjust for and clinical changes.    Coronary artery disease involving native coronary artery of native heart without angina pectoris  Patient with known CAD s/p stent placement, which is controlled Will continue ASA and Statin and monitor for S/Sx of angina/ACS. Continue to monitor on telemetry.     (HFpEF) heart failure with preserved ejection fraction  Patient is identified as having Diastolic (HFpEF) heart failure that is Chronic. CHF is currently controlled. Latest ECHO performed and demonstrates-    1 " - Normal left ventricular systolic function (EF 60-65%).     2 - Normal right ventricular systolic function .     3 - Indeterminate LV diastolic function.     4 - The estimated PA systolic pressure is 32 mmHg.     5 - Low central venous pressure.     . Continue Beta Blocker ACE/ARB and monitor clinical status closely. Monitor on telemetry. Patient is off CHF pathway. Monitor strict Is&Os and daily weights. Continue to stress to patient importance of self efficacy and  on diet for CHF. Last BNP reviewed- and noted below   Recent Labs   Lab 07/09/23 1829   BNP <10       Hyperlipidemia   Patient is chronically on statin.will continue for now. Monitor clinically. Last LDL was   Lab Results   Component Value Date    LDLCALC 85.0 03/30/2023       Essential hypertension  -Chronic, controlled.  -Continue metoprolol and amlodipine. Hold ARB in the setting of hyperkalemia.  -Monitor BP closely.    Bronchial asthma  -No s/s acute exacerbation.  -Continue daily inhaler and theophylline.   -PRN albuterol inhaler.      VTE Risk Mitigation (From admission, onward)           Ordered     enoxaparin injection 40 mg  Every 24 hours         07/10/23 0145     IP VTE HIGH RISK PATIENT  Once         07/09/23 2303     Place sequential compression device  Until discontinued         07/09/23 2303                         On 07/09/2023, patient should be placed in hospital observation services under my care in collaboration with Piero Levine MD.      Bonny Mckeon NP  Department of Hospital Medicine  [facility] - Emergency Dept

## 2023-07-10 NOTE — ED NOTES
Patient identifiers have been checked and are correct.  Patient noted in a hospital gown.     LOC: The patient is awake, alert, and aware of environment. The patient is oriented x 3 and speaking appropriately.   APPEARANCE: No acute distress noted.   PSYCHOSOCIAL: Patient is calm and cooperative.   SKIN: The skin is warm, dry  RESPIRATORY: Airway is open and patent. Bilateral chest rise and fall. Respirations are spontaneous, even and unlabored. Normal effort and rate noted. No accessory muscle use noted.   CARDIAC: Patient has a normal rate and rhythm on continuous cardiac monitor.   NEUROLOGIC: Eyes open spontaneously. Speech clear. Tolerating saliva secretions well. Able to follow commands. Movement is purposeful. N  MUSCULOSKELETAL: No obvious deformities noted.     Side rails up x2. Call light within reach. Updated on POC, verbalizes understanding.

## 2023-07-10 NOTE — ASSESSMENT & PLAN NOTE
Patient has chronic hypothyroidism. TFTs reviewed-   Lab Results   Component Value Date    TSH 3.160 03/30/2023   . Will continue chronic levothyroxine and adjust for and clinical changes.

## 2023-07-10 NOTE — ASSESSMENT & PLAN NOTE
"-Afebrile, WBC 15k.  -Abd exam benign with mild tenderness noted to RLQ. Patient reports being sensitive in that area "for a long time."  -CT AP with findings suggesting hepatic steatosis, correlation with LFTs recommended. Cholelithiasis without secondary findings to suggest acute cholecystitis. Colonic diverticulosis without diverticulitis.  -Abd US reveals contracted gallbladder. Numerous gallstones. No additional sonographic evidence of acute cholecystitis.  -Unclear source of pain on imaging.   -PRN tylenol.  "

## 2023-07-10 NOTE — ED NOTES
LOC: The patient is awake, alert, and aware of environment. The patient is oriented x 3 and speaking appropriately.   APPEARANCE: No acute distress noted.   PSYCHOSOCIAL: Patient is calm and cooperative.   SKIN: The skin is warm, dry.  RESPIRATORY: Airway is open and patent. Bilateral chest rise and fall. Respirations are spontaneous, even and unlabored. Normal effort and rate noted. No accessory muscle use noted.   CARDIAC: Patient has a normal rate and rhythm on continuous cardiac monitor.   NEUROLOGIC: Eyes open spontaneously. Speech clear. Tolerating saliva secretions well. Able to follow commands, demonstrating ability to actively and appropriately communicate within context of current conversation. Moving all extremities. Movement is purposeful.   MUSCULOSKELETAL: No obvious deformities noted.     No current complaints voiced. Side rails up x2. Call light within reach. Updated on POC, verbalizes understanding.

## 2023-07-10 NOTE — ED PROVIDER NOTES
Signout Note    I received signout from the previous provider.     Chief complaint:  Chest Pain (+ stent , and pain under L arm)      Pertinent history &exam:  Noah Bailey is a 72 y.o. female with pertinent PMH of CAD with prior stent who presented to emergency department with complaint of chest and epigastric pain that feels similar to prior MI.  Initial EKG nonischemic and initial troponin negative.  She was found to have abdominal tenderness on the right side on exam.  A CT shows gallstones without evidence of cholecystitis.  She is chest pain-free.  She was signed out pending repeat troponin and ultrasound of the gallbladder for final disposition.    Vitals:    07/09/23 2231   BP:    Pulse: 73   Resp: 18   Temp:        Imaging Studies:    US Abdomen Limited   Final Result      Contracted gallbladder.      Numerous gallstones.  No additional sonographic evidence of acute cholecystitis.      Electronically signed by resident: Radha Diggs   Date:    07/09/2023   Time:    21:25      Electronically signed by: Roberto Fountain   Date:    07/09/2023   Time:    21:45      CT Abdomen Pelvis With Contrast   Final Result      1. Findings suggesting hepatic steatosis, correlation with LFTs recommended.   2. Cholelithiasis without secondary findings to suggest acute cholecystitis.   3. Colonic diverticulosis without diverticulitis.   4. Please see above for several additional findings.         Electronically signed by: Kane Zhang MD   Date:    07/09/2023   Time:    19:32      X-Ray Chest AP Portable   Final Result      1. Interstitial findings may reflect edema noting accentuation by habitus.  No large focal consolidation.         Electronically signed by: Kane Zhang MD   Date:    07/09/2023   Time:    18:38          Medications Given:  Medications   aluminum-magnesium hydroxide-simethicone 200-200-20 mg/5 mL suspension 30 mL (has no administration in time range)   aspirin tablet 325 mg (has no  administration in time range)   aspirin EC tablet 81 mg (has no administration in time range)   atorvastatin tablet 20 mg (has no administration in time range)   fluticasone furoate-vilanteroL 100-25 mcg/dose diskus inhaler 1 puff (has no administration in time range)   levothyroxine tablet 50 mcg (has no administration in time range)   metoprolol succinate (TOPROL-XL) 24 hr tablet 50 mg (has no administration in time range)   albuterol inhaler 1 puff (has no administration in time range)   ALPRAZolam tablet 0.25 mg (has no administration in time range)   sodium chloride 0.9% flush 10 mL (has no administration in time range)   naloxone 0.4 mg/mL injection 0.02 mg (has no administration in time range)   glucose chewable tablet 16 g (has no administration in time range)   glucose chewable tablet 24 g (has no administration in time range)   glucagon (human recombinant) injection 1 mg (has no administration in time range)   dextrose 10% bolus 125 mL 125 mL (has no administration in time range)   dextrose 10% bolus 250 mL 250 mL (has no administration in time range)   acetaminophen tablet 650 mg (has no administration in time range)   ondansetron injection 4 mg (has no administration in time range)   insulin aspart U-100 pen 0-5 Units (has no administration in time range)   aluminum-magnesium hydroxide-simethicone 200-200-20 mg/5 mL suspension 30 mL (has no administration in time range)   iohexoL (OMNIPAQUE 350) injection 100 mL (75 mLs Intravenous Given 7/9/23 1910)   lactated ringers bolus 1,000 mL (0 mLs Intravenous Stopped 7/9/23 2300)       Pending Items/ MDM:  72 y.o. female with above complaint.  Ultrasound demonstrating gallstones without evidence of cholecystitis.  I reassessed the patient, minimally tender in the right lower quadrant but no right upper quadrant tenderness.  She confirms that the pain that she had earlier today feels similar to her previous MI.  We discussed observation stay which she is  agreeable to.  Discussed with internal medicine.    Diagnostic Impression:    1. Chest discomfort    2. Chest pain         ED Disposition Condition    Observation Stable               Patient understands the plan and is in agreement, verbalized understanding, questions answered    Juany Downey MD  Emergency Medicine          Juany Downey MD  07/09/23 [id number]

## 2023-07-10 NOTE — PHARMACY MED REC
"Admission Medication History     The home medication history was taken by Amalia Evans.    You may go to "Admission" then "Reconcile Home Medications" tabs to review and/or act upon these items.     The home medication list has been updated by the Pharmacy department.   Please read ALL comments highlighted in yellow.   Please address this information as you see fit.    Feel free to contact us if you have any questions or require assistance.        Medications listed below were obtained from: Patient/family  Current Outpatient Medications on File Prior to Encounter   Medication Sig    acetaminophen (TYLENOL) 500 MG tablet Take 1,500 mg by mouth 2 (two) times daily as needed for Pain.    ALPRAZolam (XANAX) 0.25 MG tablet Take 1 tablet (0.25 mg total) by mouth nightly as needed for Anxiety or Insomnia.    amlodipine-olmesartan (KARISSA) 10-40 mg per tablet TAKE 1 TABLET BY MOUTH ONCE DAILY FOR BLOOD PRESSURE. REPELACE WITH BENAZEPRIL/AMLODIPINE (LOTREL)    aspirin (ECOTRIN) 325 MG EC tablet Take 325-650 mg by mouth daily as needed (pain/ chest pain).    aspirin (ECOTRIN) 81 MG EC tablet TAKE 1 TABLET BY MOUTH EVERY DAY    atorvastatin (LIPITOR) 20 MG tablet Take 1 tablet (20 mg total) by mouth once daily.    bismuth subsalicylate (PEPTO BISMOL) 262 mg/15 mL suspension Take by mouth 2 (two) times daily as needed for Indigestion.    diphenhydrAMINE (BENADRYL) 25 mg capsule Take 50 mg by mouth 2 (two) times daily as needed for Allergies.    doxycycline (PERIOSTAT) 20 MG tablet Take 20 mg by mouth 2 (two) times daily.    fluticasone-salmeterol diskus inhaler 250-50 mcg Inhale 1 puff into the lungs 2 (two) times daily as needed (shortness of breath).    levothyroxine (SYNTHROID) 50 MCG tablet Take 1 tablet (50 mcg total) by mouth once daily.    metFORMIN (GLUCOPHAGE) 500 MG tablet 1 tablet by mouth with dinner    metoprolol succinate (TOPROL-XL) 50 MG 24 hr tablet Take 1 tablet (50 mg total) by mouth once daily. For heart " and blood presure    theophylline 400 mg Tb24 Take 1/2 tablet by mouth every morning. Take additional dose in the afternoon as needed for asthma symptoms    albuterol (PROVENTIL/VENTOLIN HFA) 90 mcg/actuation inhaler Inhale 1-2 puffs into the lungs every 4 (four) hours as needed for Wheezing or Shortness of Breath. Rescue (Patient not taking: Reported on 7/10/2023)    azelastine (OPTIVAR) 0.05 % ophthalmic solution Place 1 drop into both eyes 2 (two) times daily.    blood-glucose meter (FREESTYLE LITE METER) kit Use as instructed    lancets (MICROLET LANCET) Misc Test blood sugar once daily.    mupirocin (BACTROBAN) 2 % ointment Apply topically 3 (three) times daily.    nitroGLYCERIN (NITROSTAT) 0.4 MG SL tablet Place 1 tablet (0.4 mg total) under the tongue every 5 (five) minutes as needed for Chest pain ((Notify MD)).    PRECISION XTRA TEST Strp USE AS DIRECTED DAILY           Amalia Evans  EXT [id number]                  .

## 2023-07-10 NOTE — ASSESSMENT & PLAN NOTE
-K+5.8 on admit, received IVF bolus.  -Repeat pending.  -Cardiac monitoring.  -Continue to monitor on daily labs.

## 2023-07-10 NOTE — HPI
Noah Bailey is a 72 y.o. HLD, HTN, DM2, asthma, and hypothyroidism who presents to the ED with chief complaint of chest/epigastric pressure. The patient describes the pressure as intermittent x4-5 days associated with eating. Episodes resolve spontaneously after several minutes. Reports she feels as if she needs to belch but is unable to do so. Denies associated SOB, N/V, or diaphoresis. She reports she took a few doses of Pepto bismol but ran out. Unclear whether it provided any relief. The patient reports she was concerned due to having epigastric pain before that she attributed to reflux, and ended up having an NSTEMI and getting a stent placed. She also endorses ongoing left axillary soreness without history of drainage or trauma.  She describes mild constipation symptoms with a last bowel movement earlier today described as black in the setting of Pepto-Bismol therapy without associated recurrent urge to stool or previous history of GI bleeding.  Other than postprandial discomfort, she is unable to identify any exacerbating or alleviating factors. She endorses chronic MICHELE that is unchanged and also notes intermittent back pain attributed to her scoliosis. The patient denies fever, chills, cough, lightheadedness, or dizziness.    In the ED, pt mildly tachycardic otherwise vitals stable, afebrile. WBC 15.07k. K+ 5.8. Bicarb 21. Glucose 115. Calcium 10.8. Total protein 8.6. Alk phos 136. BNP <10. Troponin 0.008. EKG shows SR, 86 bpm, non specific T wave abnormality but no ST elevation or depression. UA non infectious. CXR with no pleural effusion. The lungs are symmetrically expanded bilaterally with coarse interstitial attenuation bilaterally particularly projected over the bilateral lower lung zones. There is bilateral basilar subsegmental atelectasis. No large focal consolidation. CT AP with findings suggesting hepatic steatosis, correlation with LFTs recommended. Cholelithiasis without secondary findings  to suggest acute cholecystitis. Colonic diverticulosis without diverticulitis. Abd US reveals contracted gallbladder. Numerous gallstones. No additional sonographic evidence of acute cholecystitis. The patient received an LR bolus.

## 2023-07-10 NOTE — ED NOTES
Pt remains AAOx3. No acute distress noted. RR even and unlabored. No current complaints voiced.  present at bedside to bring home.

## 2023-07-10 NOTE — SUBJECTIVE & OBJECTIVE
Past Medical History:   Diagnosis Date    ALLERGIC RHINITIS     Asthma     Cataract     Colon polyps     Diabetes mellitus     Hyperlipidemia     Hypertension     Hypothyroidism     Umbilical hernia        Past Surgical History:   Procedure Laterality Date    CARDIAC SURGERY      STENTS    CATARACT EXTRACTION W/  INTRAOCULAR LENS IMPLANT Left 12/7/2021    Procedure: EXTRACTION, CATARACT, WITH IOL INSERTION;  Surgeon: Galileo Murphy MD;  Location: [facility];  Service: Ophthalmology;  Laterality: Left;    CATARACT EXTRACTION W/  INTRAOCULAR LENS IMPLANT Right 12/21/2021    Procedure: EXTRACTION, CATARACT, WITH IOL INSERTION;  Surgeon: Galileo Murphy MD;  Location: [facility];  Service: Ophthalmology;  Laterality: Right;    COLONOSCOPY         Review of patient's allergies indicates:   Allergen Reactions    Sulfa (sulfonamide antibiotics) Rash       Current Facility-Administered Medications on File Prior to Encounter   Medication    sodium chloride 0.9% flush 10 mL     Current Outpatient Medications on File Prior to Encounter   Medication Sig    albuterol (PROVENTIL/VENTOLIN HFA) 90 mcg/actuation inhaler Inhale 1-2 puffs into the lungs every 4 (four) hours as needed for Wheezing or Shortness of Breath. Rescue    ALPRAZolam (XANAX) 0.25 MG tablet Take 1 tablet (0.25 mg total) by mouth nightly as needed for Anxiety or Insomnia.    amlodipine-olmesartan (KARISSA) 10-40 mg per tablet TAKE 1 TABLET BY MOUTH ONCE DAILY FOR BLOOD PRESSURE. REPELACE WITH BENAZEPRIL/AMLODIPINE (LOTREL)    aspirin (ECOTRIN) 81 MG EC tablet TAKE 1 TABLET BY MOUTH EVERY DAY    atorvastatin (LIPITOR) 20 MG tablet Take 1 tablet (20 mg total) by mouth once daily.    azelastine (OPTIVAR) 0.05 % ophthalmic solution Place 1 drop into both eyes 2 (two) times daily.    blood-glucose meter (FREESTYLE LITE METER) kit Use as instructed    fluticasone-salmeterol diskus inhaler 250-50 mcg INHALE 1 PUFF BY MOUTH TWICE A DAY    lancets (MICROLET LANCET)  Misc Test blood sugar once daily.    levothyroxine (SYNTHROID) 50 MCG tablet Take 1 tablet (50 mcg total) by mouth once daily.    metFORMIN (GLUCOPHAGE) 500 MG tablet 1 tablet by mouth with dinner    metoprolol succinate (TOPROL-XL) 50 MG 24 hr tablet Take 1 tablet (50 mg total) by mouth once daily. For heart and blood presure    mupirocin (BACTROBAN) 2 % ointment Apply topically 3 (three) times daily.    nitroGLYCERIN (NITROSTAT) 0.4 MG SL tablet Place 1 tablet (0.4 mg total) under the tongue every 5 (five) minutes as needed for Chest pain ((Shakira RUST)).    PRECISION XTRA TEST Strp USE AS DIRECTED DAILY    theophylline 400 mg Tb24 Take 1 tablet (400 mg total) by mouth once daily.     Family History       Problem Relation (Age of Onset)    Cancer Mother (49)    Cataracts Mother    Glaucoma Paternal Grandfather    Heart disease Mother (40), Father    Heart failure Sister    Hyperlipidemia Mother, Maternal Grandmother    Ovarian cancer Mother          Tobacco Use    Smoking status: Former     Packs/day: 1.00     Years: 20.00     Pack years: 20.00     Types: Cigarettes     Quit date: 1994     Years since quittin.5    Smokeless tobacco: Never   Substance and Sexual Activity    Alcohol use: Yes     Comment: occasional use    Drug use: No    Sexual activity: Not Currently     Review of Systems   Constitutional:  Negative for appetite change, chills, diaphoresis, fatigue and fever.   HENT:  Negative for congestion, rhinorrhea and sore throat.    Eyes:  Negative for photophobia and visual disturbance.   Respiratory:  Negative for cough, shortness of breath and wheezing.         +MICHELE, chronic   Cardiovascular:  Positive for chest pain. Negative for palpitations and leg swelling.   Gastrointestinal:  Positive for abdominal pain. Negative for abdominal distention, diarrhea, nausea and vomiting.   Genitourinary:  Negative for dysuria, frequency and hematuria.   Musculoskeletal:  Positive for back pain (intermittent).  Negative for gait problem and myalgias.   Skin:  Negative for color change, pallor, rash and wound.   Neurological:  Negative for dizziness, syncope, weakness, light-headedness and headaches.   Psychiatric/Behavioral:  Negative for agitation, confusion and hallucinations.    Objective:     Vital Signs (Most Recent):  Temp: 98.7 °F (37.1 °C) (07/09/23 1903)  Pulse: 73 (07/09/23 2231)  Resp: 18 (07/09/23 2231)  BP: 123/70 (07/09/23 1903)  SpO2: 96 % (07/09/23 2231) Vital Signs (24h Range):  Temp:  [97.8 °F (36.6 °C)-98.7 °F (37.1 °C)] 98.7 °F (37.1 °C)  Pulse:  [] 73  Resp:  [18-20] 18  SpO2:  [96 %-98 %] 96 %  BP: (123-157)/(70-86) 123/70     Weight: 77.1 kg (170 lb)  Body mass index is 30.11 kg/m².     Physical Exam  Vitals and nursing note reviewed.   Constitutional:       General: She is not in acute distress.     Appearance: She is obese. She is not toxic-appearing or diaphoretic.   HENT:      Head: Normocephalic and atraumatic.      Nose: Nose normal.      Mouth/Throat:      Mouth: Mucous membranes are moist.   Eyes:      Pupils: Pupils are equal, round, and reactive to light.   Cardiovascular:      Rate and Rhythm: Normal rate and regular rhythm.      Pulses: Normal pulses.   Pulmonary:      Effort: Pulmonary effort is normal. No respiratory distress.      Breath sounds: No wheezing, rhonchi or rales.   Chest:      Chest wall: Tenderness (Mild TTP to L axilla) present.   Abdominal:      General: Bowel sounds are normal. There is no distension.      Palpations: Abdomen is soft.      Tenderness: There is abdominal tenderness (mild) in the right lower quadrant. There is no guarding.      Hernia: A hernia is present. Hernia is present in the ventral area.   Musculoskeletal:         General: No swelling, tenderness or deformity. Normal range of motion.      Cervical back: Normal range of motion.   Skin:     General: Skin is warm and dry.      Capillary Refill: Capillary refill takes less than 2 seconds.    Neurological:      General: No focal deficit present.      Mental Status: She is alert and oriented to person, place, and time.      Sensory: No sensory deficit.      Motor: No weakness.   Psychiatric:         Mood and Affect: Mood normal.         Behavior: Behavior normal.            CRANIAL NERVES     CN III, IV, VI   Pupils are equal, round, and reactive to light.     Significant Labs: All pertinent labs within the past 24 hours have been reviewed.  CBC:   Recent Labs   Lab 07/09/23 1829   WBC 15.07*   HGB 14.1   HCT 43.2        CMP:   Recent Labs   Lab 07/09/23 1829      K 5.8*      CO2 21*   *   BUN 15   CREATININE 0.9   CALCIUM 10.8*   PROT 8.6*   ALBUMIN 4.3   BILITOT 0.3   ALKPHOS 136*   AST 18   ALT 18   ANIONGAP 12     Cardiac Markers:   Recent Labs   Lab 07/09/23 1829   BNP <10     Troponin:   Recent Labs   Lab 07/09/23 1829   TROPONINI <0.006  0.008       Significant Imaging: I have reviewed all pertinent imaging results/findings within the past 24 hours.    Imaging Results              US Abdomen Limited (Final result)  Result time 07/09/23 21:45:17      Final result by Roberto Fountain MD (07/09/23 21:45:17)                   Impression:      Contracted gallbladder.    Numerous gallstones.  No additional sonographic evidence of acute cholecystitis.    Electronically signed by resident: Radha Diggs  Date:    07/09/2023  Time:    21:25    Electronically signed by: Roberto Fountain  Date:    07/09/2023  Time:    21:45               Narrative:    EXAMINATION:  US ABDOMEN LIMITED    CLINICAL HISTORY:  cholelithiasis wtih abdominal tenderness;.    TECHNIQUE:  Limited ultrasound of the right upper quadrant of the abdomen including pancreas, gallbladder, common bile duct was performed.    COMPARISON:  CT abdomen pelvis 07/09/2023 abdominal ultrasound 11/11/2003    FINDINGS:  Gallbladder: Contracted with echogenic material in its lumen likely reflecting stones.  The  gallbladder wall is not thickened.  There is no wall hyperemia or pericholecystic fluid.  Sonographic Everett sign reportedly negative.    Biliary system: The common duct is not dilated, measuring 3 mm.  No intrahepatic ductal dilatation.    Pancreas: The visualized portions of pancreas appear slightly hyperechoic which can reflect fatty changes.    Miscellaneous: No right-sided hydronephrosis.  No upper abdominal ascites.  No sonographic abnormality of the partially visualized hepatic parenchyma.                                       CT Abdomen Pelvis With Contrast (Final result)  Result time 07/09/23 19:32:12      Final result by Kane Zhang MD (07/09/23 19:32:12)                   Impression:      1. Findings suggesting hepatic steatosis, correlation with LFTs recommended.  2. Cholelithiasis without secondary findings to suggest acute cholecystitis.  3. Colonic diverticulosis without diverticulitis.  4. Please see above for several additional findings.      Electronically signed by: Kane Zhang MD  Date:    07/09/2023  Time:    19:32               Narrative:    EXAMINATION:  CT ABDOMEN PELVIS WITH CONTRAST    CLINICAL HISTORY:  RLQ abdominal pain (Age >= 14y);    TECHNIQUE:  Low dose axial images, sagittal and coronal reformations were obtained from the lung bases to the pubic symphysis following the IV administration of 75 mL of Omnipaque 350 .  Oral contrast was not given.    COMPARISON:  CT 10/05/2022    FINDINGS:  Images of the lower thorax are remarkable for bilateral dependent atelectasis/scarring.    The liver is mildly hypoattenuating, may reflect steatosis, correlation with LFTs recommended.  The spleen, and adrenal glands are unremarkable.  There is atrophic change of the pancreas without pancreatic ductal dilation.  There is cholelithiasis without secondary findings to suggest acute cholecystitis.  The stomach is relatively decompressed, no gastric wall thickening.  The portal vein, splenic  vein, SMV, celiac axis and SMA all are patent.  No significant abdominal lymphadenopathy.    The kidneys enhance symmetrically without hydronephrosis or nephrolithiasis.  The bilateral ureters are unremarkable without calculi seen.  The urinary bladder is unremarkable.  The uterus and adnexa are unremarkable.    There is high attenuating material within the large bowel.  There are a few scattered colonic diverticula without inflammation.  The terminal ileum is unremarkable.  The appendix is unremarkable.  The small bowel is grossly unremarkable.  No focal organized pelvic fluid collection.  There are a few scattered shotty periaortic and paracaval lymph nodes.  There is atherosclerotic calcification of the aorta and its branches.  There is an anterior abdominal wall fat containing hernia without inflammation.    There is osteopenia.  There are degenerative changes of the bilateral sacroiliac joints, pubic symphysis, femoroacetabular joints and spine.  There is Schmorl's node involving the superior endplate of T12.  No significant inguinal lymphadenopathy.                                       X-Ray Chest AP Portable (Final result)  Result time 07/09/23 18:38:59      Final result by Kane Zhang MD (07/09/23 18:38:59)                   Impression:      1. Interstitial findings may reflect edema noting accentuation by habitus.  No large focal consolidation.      Electronically signed by: Kane Zhang MD  Date:    07/09/2023  Time:    18:38               Narrative:    EXAMINATION:  XR CHEST AP PORTABLE    CLINICAL HISTORY:  Chest Pain;    TECHNIQUE:  Single frontal view of the chest was performed.    COMPARISON:  02/29/2020    FINDINGS:  Please note, patient's chin obscures views of the medial lung apices.  The cardiomediastinal silhouette is not enlarged, magnified by technique noting calcification..  There is no pleural effusion.  The trachea is midline.  The lungs are symmetrically expanded bilaterally with  coarse interstitial attenuation bilaterally particularly projected over the bilateral lower lung zones..  There is bilateral basilar subsegmental atelectasis.  No large focal consolidation.  There is no pneumothorax.  The osseous structures are remarkable for degenerative changes..

## 2023-07-10 NOTE — ASSESSMENT & PLAN NOTE
-Intermittent pressure to chest/epigastric region x4-5 days, associated with eating. Episodes resolve spontaneously after several minutes. Reports she feels as if she needs to belch but is unable to do so. Denies associated SOB, N/V, or diaphoresis. She reports she took a few doses of Pepto bismol but ran out. Unclear whether it provided any relief. The patient reports she was concerned due to having epigastric pain before that she attributed to reflux, and ended up having an NSTEMI and getting a stent placed. Concern for GI component of pain. Given maalox in the ED. Will add scheduled carafate.  - EKG without ST-segment elevation or depression, HR 86.  - Initial troponin 0.008, continue to trend.  - ASA administered in the ED.  - Continue ASA, statin, BB  - Cardiac monitoring.  - PRN EKG for chest pain  - ECHO pending.  - Previous cardiac testing with  [facility] 7/13/17  --Single vessel coronary artery disease.   --Normal LVEF.   --Successful PTA. S/p stent to Mid LCX  ECHO 7/16/18   1 - Normal left ventricular systolic function (EF 60-65%).     2 - Normal right ventricular systolic function .     3 - Indeterminate LV diastolic function.     4 - The estimated PA systolic pressure is 32 mmHg.     5 - Low central venous pressure.

## 2023-07-10 NOTE — ASSESSMENT & PLAN NOTE
Patient with known CAD s/p stent placement, which is controlled Will continue ASA and Statin and monitor for S/Sx of angina/ACS. Continue to monitor on telemetry.

## 2023-07-10 NOTE — HOSPITAL COURSE
"Ms. Zamora "Timbo Bailey was admitted for observation for chest pain. EKG negative and troponin WNL; Echo unremarkable. ACS ruled out. Contracted gallbladder with numerous gallstones visualized on US and CT. Biliary colic likely etiology. Leukocytosis (15.07) on admission; now WNL. No other concerning lab findings. No evidence of acute cholecystitis. Given IVF, sucralfate, and simethicone. Pain is resolved since admission. She does not wish to pursue surgical intervention at this time. Patient is stable and medically ready for discharge with instructions to follow up with PCP and given ambulatory referral to general surgery. Instructed to avoid fatty meals and other triggers to biliary colic. Pt educated on hospital course and plan, verbally agrees and understands. All questions answered.   "

## 2023-07-11 PROBLEM — K80.50 BILIARY COLIC: Status: ACTIVE | Noted: 2023-07-09

## 2023-07-11 NOTE — DISCHARGE SUMMARY
[facility] - Emergency Dept  Hospital Medicine  Discharge Summary      Patient Name: Noah Bailey  MRN: [id number]  ANDREW: [id number]  Patient Class: OP- Observation  Admission Date: 7/9/2023  Hospital Length of Stay: 0 days  Discharge Date and Time: 7/10/2023  1:37 PM  Attending Physician: Neal Belcher MD  Discharging Provider: Shayan Savage PA-C  Primary Care Provider: Henry Mcbride MD  Hospital Medicine Team: [facility] Shayan Savage PA-C  Primary Care Team: [facility]    HPI:   Noah Bailey is a 72 y.o. HLD, HTN, DM2, asthma, and hypothyroidism who presents to the ED with chief complaint of chest/epigastric pressure. The patient describes the pressure as intermittent x4-5 days associated with eating. Episodes resolve spontaneously after several minutes. Reports she feels as if she needs to belch but is unable to do so. Denies associated SOB, N/V, or diaphoresis. She reports she took a few doses of Pepto bismol but ran out. Unclear whether it provided any relief. The patient reports she was concerned due to having epigastric pain before that she attributed to reflux, and ended up having an NSTEMI and getting a stent placed. She also endorses ongoing left axillary soreness without history of drainage or trauma.  She describes mild constipation symptoms with a last bowel movement earlier today described as black in the setting of Pepto-Bismol therapy without associated recurrent urge to stool or previous history of GI bleeding.  Other than postprandial discomfort, she is unable to identify any exacerbating or alleviating factors. She endorses chronic MICHELE that is unchanged and also notes intermittent back pain attributed to her scoliosis. The patient denies fever, chills, cough, lightheadedness, or dizziness.    In the ED, pt mildly tachycardic otherwise vitals stable, afebrile. WBC 15.07k. K+ 5.8. Bicarb 21. Glucose 115. Calcium 10.8. Total protein 8.6. Alk phos 136. BNP <10. Troponin 0.008.  "EKG shows SR, 86 bpm, non specific T wave abnormality but no ST elevation or depression. UA non infectious. CXR with no pleural effusion. The lungs are symmetrically expanded bilaterally with coarse interstitial attenuation bilaterally particularly projected over the bilateral lower lung zones. There is bilateral basilar subsegmental atelectasis. No large focal consolidation. CT AP with findings suggesting hepatic steatosis, correlation with LFTs recommended. Cholelithiasis without secondary findings to suggest acute cholecystitis. Colonic diverticulosis without diverticulitis. Abd US reveals contracted gallbladder. Numerous gallstones. No additional sonographic evidence of acute cholecystitis. The patient received an LR bolus.      * No surgery found *      Hospital Course:   Ms. Noah Bailey (Ley) was admitted for observation for chest pain. EKG negative and troponin WNL; Echo unremarkable. ACS ruled out. Contracted gallbladder with numerous gallstones visualized on US and CT. Biliary colic likely etiology. Leukocytosis (15.07) on admission; now WNL. No other concerning lab findings. No evidence of acute cholecystitis. Given IVF, sucralfate, and simethicone. Pain is resolved since admission. She does not wish to pursue surgical intervention at this time. Patient is stable and medically ready for discharge with instructions to follow up with PCP and given ambulatory referral to general surgery. Instructed to avoid fatty meals and other triggers to biliary colic. Pt educated on hospital course and plan, verbally agrees and understands. All questions answered.        Goals of Care Treatment Preferences:  Code Status: Full Code      Consults:     GI  * Biliary colic  -Intermittent pressure to chest/epigastric region x4-5 days, associated with eating. Episodes resolve spontaneously after several minutes. Reports she feels as if she needs to belch but is unable to do so. Denies associated SOB, N/V, or diaphoresis. " She reports she took a few doses of Pepto bismol but ran out. Unclear whether it provided any relief. The patient reports she was concerned due to having epigastric pain before that she attributed to reflux, and ended up having an NSTEMI and getting a stent placed. Concern for GI component of pain. Given maalox in the ED. Will add scheduled carafate.  - EKG without ST-segment elevation or depression, HR 86.  - Initial troponin 0.008, continue to trend.  - ASA administered in the ED.  - Continue ASA, statin, BB  - Cardiac monitoring.  - PRN EKG for chest pain  - ECHO pending.  - Previous cardiac testing with  [facility] 7/13/17  --Single vessel coronary artery disease.   --Normal LVEF.   --Successful PTA. S/p stent to Mid LCX  ECHO 7/16/18   1 - Normal left ventricular systolic function (EF 60-65%).     2 - Normal right ventricular systolic function .     3 - Indeterminate LV diastolic function.     4 - The estimated PA systolic pressure is 32 mmHg.     5 - Low central venous pressure.       Final Active Diagnoses:    Diagnosis Date Noted POA    PRINCIPAL PROBLEM:  Biliary colic [K80.50] 07/09/2023 Yes    Hypothyroidism [E03.9] 07/09/2023 Yes    Hyperkalemia [E87.5] 07/09/2023 Yes    Right lower quadrant abdominal pain [R10.31] 07/09/2023 Yes    BMI 30.0-30.9,adult [Z68.30] 07/09/2023 Not Applicable    Coronary artery disease involving native coronary artery of native heart without angina pectoris [I25.10] 07/28/2017 Yes     Chronic    (HFpEF) heart failure with preserved ejection fraction [I50.30] 07/14/2017 Yes    Hyperlipidemia [E78.5]  Yes    Essential hypertension [I10]  Yes     Chronic    Bronchial asthma [J45.909]  Yes      Problems Resolved During this Admission:       Discharged Condition: good    Disposition: Home or Self Care    Follow Up:    Patient Instructions:      Ambulatory referral/consult to General Surgery   Standing Status: Future   Referral Priority: Urgent Referral Type: Consultation    Referral Reason: Specialty Services Required   Requested Specialty: General Surgery   Number of Visits Requested: 1     Notify your health care provider if you experience any of the following:  temperature >100.4     Notify your health care provider if you experience any of the following:  severe uncontrolled pain     Notify your health care provider if you experience any of the following:  persistent nausea and vomiting or diarrhea     Notify your health care provider if you experience any of the following:  persistent dizziness, light-headedness, or visual disturbances     Activity as tolerated       Significant Diagnostic Studies: Labs: All labs within the past 24 hours have been reviewed    Pending Diagnostic Studies:     None         Medications:  Reconciled Home Medications:      Medication List      START taking these medications    ursodioL 300 mg capsule  Commonly known as: ACTIGALL  Take 1 capsule (300 mg total) by mouth 2 (two) times daily. for 14 days        CHANGE how you take these medications    fluticasone-salmeterol 250-50 mcg/dose 250-50 mcg/dose diskus inhaler  Commonly known as: ADVAIR DISKUS  INHALE 1 PUFF BY MOUTH TWICE A DAY  What changed:   · how much to take  · how to take this  · when to take this  · reasons to take this  · additional instructions        CONTINUE taking these medications    acetaminophen 500 MG tablet  Commonly known as: TYLENOL  Take 1,500 mg by mouth 2 (two) times daily as needed for Pain.     albuterol 90 mcg/actuation inhaler  Commonly known as: PROVENTIL/VENTOLIN HFA  Inhale 1-2 puffs into the lungs every 4 (four) hours as needed for Wheezing or Shortness of Breath. Rescue     ALPRAZolam 0.25 MG tablet  Commonly known as: XANAX  Take 1 tablet (0.25 mg total) by mouth nightly as needed for Anxiety or Insomnia.     amlodipine-olmesartan 10-40 mg per tablet  Commonly known as: KARISSA  TAKE 1 TABLET BY MOUTH ONCE DAILY FOR BLOOD PRESSURE. REPELACE WITH  BENAZEPRIL/AMLODIPINE (LOTREL)     * aspirin 81 MG EC tablet  Commonly known as: ECOTRIN  TAKE 1 TABLET BY MOUTH EVERY DAY     * aspirin 325 MG EC tablet  Commonly known as: ECOTRIN  Take 325-650 mg by mouth daily as needed (pain/ chest pain).     atorvastatin 20 MG tablet  Commonly known as: LIPITOR  Take 1 tablet (20 mg total) by mouth once daily.     azelastine 0.05 % ophthalmic solution  Commonly known as: OPTIVAR  Place 1 drop into both eyes 2 (two) times daily.     bismuth subsalicylate 262 mg/15 mL suspension  Commonly known as: PEPTO BISMOL  Take by mouth 2 (two) times daily as needed for Indigestion.     blood-glucose meter kit  Commonly known as: FREESTYLE LITE METER  Use as instructed     diphenhydrAMINE 25 mg capsule  Commonly known as: BENADRYL  Take 50 mg by mouth 2 (two) times daily as needed for Allergies.     doxycycline 20 MG tablet  Commonly known as: PERIOSTAT  Take 20 mg by mouth 2 (two) times daily.     lancets Misc  Commonly known as: MICROLET LANCET  Test blood sugar once daily.     levothyroxine 50 MCG tablet  Commonly known as: SYNTHROID  Take 1 tablet (50 mcg total) by mouth once daily.     metFORMIN 500 MG tablet  Commonly known as: GLUCOPHAGE  1 tablet by mouth with dinner     metoprolol succinate 50 MG 24 hr tablet  Commonly known as: TOPROL-XL  Take 1 tablet (50 mg total) by mouth once daily. For heart and blood presure     mupirocin 2 % ointment  Commonly known as: BACTROBAN  Apply topically 3 (three) times daily.     nitroGLYCERIN 0.4 MG SL tablet  Commonly known as: NITROSTAT  Place 1 tablet (0.4 mg total) under the tongue every 5 (five) minutes as needed for Chest pain ((Shakira RUST)).     PRECISION XTRA TEST Strp  Generic drug: blood sugar diagnostic  USE AS DIRECTED DAILY     theophylline 400 mg Tb24  Take 1 tablet (400 mg total) by mouth once daily.         * This list has 2 medication(s) that are the same as other medications prescribed for you. Read the directions carefully, and  ask your doctor or other care provider to review them with you.                Indwelling Lines/Drains at time of discharge:   Lines/Drains/Airways     None                 Time spent on the discharge of patient: 36 minutes         Shayan Savage PA-C  Department of Hospital Medicine  [facility] - Emergency Dept

## 2023-07-11 NOTE — ASSESSMENT & PLAN NOTE
-Intermittent pressure to chest/epigastric region x4-5 days, associated with eating. Episodes resolve spontaneously after several minutes. Reports she feels as if she needs to belch but is unable to do so. Denies associated SOB, N/V, or diaphoresis. She reports she took a few doses of Pepto bismol but ran out. Unclear whether it provided any relief. The patient reports she was concerned due to having epigastric pain before that she attributed to reflux, and ended up having an NSTEMI and getting a stent placed. Concern for GI component of pain. Given maalox in the ED. Will add scheduled carafate.  - EKG without ST-segment elevation or depression, HR 86.  - Initial troponin 0.008, continue to trend.  - ASA administered in the ED.  - Continue ASA, statin, BB  - Cardiac monitoring.  - PRN EKG for chest pain  - ECHO pending.  - Previous cardiac testing with  [facility] 7/13/17  --Single vessel coronary artery disease.   --Normal LVEF.   --Successful PTA. S/p stent to Mid LCX  ECHO 7/16/18   1 - Normal left ventricular systolic function (EF 60-65%).     2 - Normal right ventricular systolic function .     3 - Indeterminate LV diastolic function.     4 - The estimated PA systolic pressure is 32 mmHg.     5 - Low central venous pressure.

## 2023-07-13 ENCOUNTER — TELEPHONE (OUTPATIENT)
Dept: SURGERY | Facility: CLINIC | Age: 73
End: 2023-07-13
Payer: COMMERCIAL

## 2023-07-14 ENCOUNTER — TELEPHONE (OUTPATIENT)
Dept: SURGERY | Facility: CLINIC | Age: 73
End: 2023-07-14
Payer: COMMERCIAL

## 2023-08-10 ENCOUNTER — PATIENT OUTREACH (OUTPATIENT)
Dept: ADMINISTRATIVE | Facility: HOSPITAL | Age: 73
End: 2023-08-10
Payer: COMMERCIAL

## 2023-08-10 DIAGNOSIS — I25.10 CORONARY ARTERY DISEASE INVOLVING NATIVE CORONARY ARTERY OF NATIVE HEART WITHOUT ANGINA PECTORIS: Chronic | ICD-10-CM

## 2023-08-10 DIAGNOSIS — I21.4 NSTEMI (NON-ST ELEVATED MYOCARDIAL INFARCTION): ICD-10-CM

## 2023-08-10 RX ORDER — THEOPHYLLINE 400 MG/1
1 TABLET, EXTENDED RELEASE ORAL
Qty: 90 TABLET | Refills: 1 | Status: SHIPPED | OUTPATIENT
Start: 2023-08-10 | End: 2024-01-03

## 2023-08-10 RX ORDER — ASPIRIN 81 MG/1
TABLET ORAL
Qty: 90 TABLET | Refills: 2 | Status: SHIPPED | OUTPATIENT
Start: 2023-08-10

## 2023-09-20 DIAGNOSIS — Z78.0 MENOPAUSE: ICD-10-CM

## 2023-09-28 ENCOUNTER — PATIENT OUTREACH (OUTPATIENT)
Dept: ADMINISTRATIVE | Facility: HOSPITAL | Age: 73
End: 2023-09-28
Payer: COMMERCIAL

## 2023-09-28 NOTE — PROGRESS NOTES
Health Maintenance Due   Topic Date Due    DEXA Scan  Never done    Shingles Vaccine (1 of 2) Never done    Colorectal Cancer Screening  11/02/2017    Mammogram  10/20/2021    COVID-19 Vaccine (3 - Moderna series) 11/15/2021    Eye Exam  07/19/2022    Influenza Vaccine (1) 09/01/2023    Diabetes Urine Screening  10/03/2023     Chart reviewed.   Immunizations: Reconciled  Orders placed: N/A  Upcoming appts to satisfy LUIGI topics: N/A

## 2023-10-12 ENCOUNTER — LAB VISIT (OUTPATIENT)
Dept: LAB | Facility: HOSPITAL | Age: 73
End: 2023-10-12
Attending: INTERNAL MEDICINE
Payer: COMMERCIAL

## 2023-10-12 ENCOUNTER — OFFICE VISIT (OUTPATIENT)
Dept: INTERNAL MEDICINE | Facility: CLINIC | Age: 73
End: 2023-10-12
Payer: COMMERCIAL

## 2023-10-12 VITALS
OXYGEN SATURATION: 98 % | HEART RATE: 92 BPM | HEIGHT: 63 IN | SYSTOLIC BLOOD PRESSURE: 126 MMHG | WEIGHT: 186.94 LBS | TEMPERATURE: 98 F | DIASTOLIC BLOOD PRESSURE: 70 MMHG | RESPIRATION RATE: 18 BRPM | BODY MASS INDEX: 33.12 KG/M2

## 2023-10-12 DIAGNOSIS — I10 ESSENTIAL HYPERTENSION: ICD-10-CM

## 2023-10-12 DIAGNOSIS — J31.0 CHRONIC RHINITIS: ICD-10-CM

## 2023-10-12 DIAGNOSIS — E78.2 MIXED HYPERLIPIDEMIA: ICD-10-CM

## 2023-10-12 DIAGNOSIS — J45.40 MODERATE PERSISTENT ASTHMA WITHOUT COMPLICATION: ICD-10-CM

## 2023-10-12 DIAGNOSIS — I25.10 CORONARY ARTERY DISEASE INVOLVING NATIVE CORONARY ARTERY OF NATIVE HEART WITHOUT ANGINA PECTORIS: ICD-10-CM

## 2023-10-12 DIAGNOSIS — E03.4 HYPOTHYROIDISM DUE TO ACQUIRED ATROPHY OF THYROID: ICD-10-CM

## 2023-10-12 DIAGNOSIS — E11.9 TYPE 2 DIABETES MELLITUS WITHOUT COMPLICATION, WITHOUT LONG-TERM CURRENT USE OF INSULIN: Primary | ICD-10-CM

## 2023-10-12 DIAGNOSIS — E11.9 TYPE 2 DIABETES MELLITUS WITHOUT COMPLICATION, WITHOUT LONG-TERM CURRENT USE OF INSULIN: ICD-10-CM

## 2023-10-12 DIAGNOSIS — Z12.31 ENCOUNTER FOR SCREENING MAMMOGRAM FOR BREAST CANCER: ICD-10-CM

## 2023-10-12 DIAGNOSIS — Z78.0 POSTMENOPAUSAL: ICD-10-CM

## 2023-10-12 LAB
ALBUMIN/CREAT UR: 10.4 UG/MG (ref 0–30)
CREAT UR-MCNC: 144 MG/DL (ref 15–325)
MICROALBUMIN UR DL<=1MG/L-MCNC: 15 UG/ML

## 2023-10-12 PROCEDURE — 90694 VACC AIIV4 NO PRSRV 0.5ML IM: CPT | Mod: S$GLB,,, | Performed by: INTERNAL MEDICINE

## 2023-10-12 PROCEDURE — 3061F NEG MICROALBUMINURIA REV: CPT | Mod: CPTII,S$GLB,, | Performed by: INTERNAL MEDICINE

## 2023-10-12 PROCEDURE — 4010F ACE/ARB THERAPY RXD/TAKEN: CPT | Mod: CPTII,S$GLB,, | Performed by: INTERNAL MEDICINE

## 2023-10-12 PROCEDURE — 3044F PR MOST RECENT HEMOGLOBIN A1C LEVEL <7.0%: ICD-10-PCS | Mod: CPTII,S$GLB,, | Performed by: INTERNAL MEDICINE

## 2023-10-12 PROCEDURE — 1101F PT FALLS ASSESS-DOCD LE1/YR: CPT | Mod: CPTII,S$GLB,, | Performed by: INTERNAL MEDICINE

## 2023-10-12 PROCEDURE — 1159F PR MEDICATION LIST DOCUMENTED IN MEDICAL RECORD: ICD-10-PCS | Mod: CPTII,S$GLB,, | Performed by: INTERNAL MEDICINE

## 2023-10-12 PROCEDURE — 3288F FALL RISK ASSESSMENT DOCD: CPT | Mod: CPTII,S$GLB,, | Performed by: INTERNAL MEDICINE

## 2023-10-12 PROCEDURE — 99214 PR OFFICE/OUTPT VISIT, EST, LEVL IV, 30-39 MIN: ICD-10-PCS | Mod: 25,S$GLB,, | Performed by: INTERNAL MEDICINE

## 2023-10-12 PROCEDURE — 3074F PR MOST RECENT SYSTOLIC BLOOD PRESSURE < 130 MM HG: ICD-10-PCS | Mod: CPTII,S$GLB,, | Performed by: INTERNAL MEDICINE

## 2023-10-12 PROCEDURE — 3066F NEPHROPATHY DOC TX: CPT | Mod: CPTII,S$GLB,, | Performed by: INTERNAL MEDICINE

## 2023-10-12 PROCEDURE — 82570 ASSAY OF URINE CREATININE: CPT | Performed by: INTERNAL MEDICINE

## 2023-10-12 PROCEDURE — 4010F PR ACE/ARB THEARPY RXD/TAKEN: ICD-10-PCS | Mod: CPTII,S$GLB,, | Performed by: INTERNAL MEDICINE

## 2023-10-12 PROCEDURE — 1126F PR PAIN SEVERITY QUANTIFIED, NO PAIN PRESENT: ICD-10-PCS | Mod: CPTII,S$GLB,, | Performed by: INTERNAL MEDICINE

## 2023-10-12 PROCEDURE — 1126F AMNT PAIN NOTED NONE PRSNT: CPT | Mod: CPTII,S$GLB,, | Performed by: INTERNAL MEDICINE

## 2023-10-12 PROCEDURE — 3061F PR NEG MICROALBUMINURIA RESULT DOCUMENTED/REVIEW: ICD-10-PCS | Mod: CPTII,S$GLB,, | Performed by: INTERNAL MEDICINE

## 2023-10-12 PROCEDURE — 3288F PR FALLS RISK ASSESSMENT DOCUMENTED: ICD-10-PCS | Mod: CPTII,S$GLB,, | Performed by: INTERNAL MEDICINE

## 2023-10-12 PROCEDURE — 3078F PR MOST RECENT DIASTOLIC BLOOD PRESSURE < 80 MM HG: ICD-10-PCS | Mod: CPTII,S$GLB,, | Performed by: INTERNAL MEDICINE

## 2023-10-12 PROCEDURE — 1159F MED LIST DOCD IN RCRD: CPT | Mod: CPTII,S$GLB,, | Performed by: INTERNAL MEDICINE

## 2023-10-12 PROCEDURE — 1160F PR REVIEW ALL MEDS BY PRESCRIBER/CLIN PHARMACIST DOCUMENTED: ICD-10-PCS | Mod: CPTII,S$GLB,, | Performed by: INTERNAL MEDICINE

## 2023-10-12 PROCEDURE — 3044F HG A1C LEVEL LT 7.0%: CPT | Mod: CPTII,S$GLB,, | Performed by: INTERNAL MEDICINE

## 2023-10-12 PROCEDURE — 90471 FLU VACCINE - QUADRIVALENT - ADJUVANTED: ICD-10-PCS | Mod: S$GLB,,, | Performed by: INTERNAL MEDICINE

## 2023-10-12 PROCEDURE — 99214 OFFICE O/P EST MOD 30 MIN: CPT | Mod: 25,S$GLB,, | Performed by: INTERNAL MEDICINE

## 2023-10-12 PROCEDURE — 3008F BODY MASS INDEX DOCD: CPT | Mod: CPTII,S$GLB,, | Performed by: INTERNAL MEDICINE

## 2023-10-12 PROCEDURE — 3008F PR BODY MASS INDEX (BMI) DOCUMENTED: ICD-10-PCS | Mod: CPTII,S$GLB,, | Performed by: INTERNAL MEDICINE

## 2023-10-12 PROCEDURE — 3078F DIAST BP <80 MM HG: CPT | Mod: CPTII,S$GLB,, | Performed by: INTERNAL MEDICINE

## 2023-10-12 PROCEDURE — 1101F PR PT FALLS ASSESS DOC 0-1 FALLS W/OUT INJ PAST YR: ICD-10-PCS | Mod: CPTII,S$GLB,, | Performed by: INTERNAL MEDICINE

## 2023-10-12 PROCEDURE — 3074F SYST BP LT 130 MM HG: CPT | Mod: CPTII,S$GLB,, | Performed by: INTERNAL MEDICINE

## 2023-10-12 PROCEDURE — 90471 IMMUNIZATION ADMIN: CPT | Mod: S$GLB,,, | Performed by: INTERNAL MEDICINE

## 2023-10-12 PROCEDURE — 90694 FLU VACCINE - QUADRIVALENT - ADJUVANTED: ICD-10-PCS | Mod: S$GLB,,, | Performed by: INTERNAL MEDICINE

## 2023-10-12 PROCEDURE — 1160F RVW MEDS BY RX/DR IN RCRD: CPT | Mod: CPTII,S$GLB,, | Performed by: INTERNAL MEDICINE

## 2023-10-12 PROCEDURE — 99999 PR PBB SHADOW E&M-EST. PATIENT-LVL V: ICD-10-PCS | Mod: PBBFAC,,, | Performed by: INTERNAL MEDICINE

## 2023-10-12 PROCEDURE — 3066F PR DOCUMENTATION OF TREATMENT FOR NEPHROPATHY: ICD-10-PCS | Mod: CPTII,S$GLB,, | Performed by: INTERNAL MEDICINE

## 2023-10-12 PROCEDURE — 99999 PR PBB SHADOW E&M-EST. PATIENT-LVL V: CPT | Mod: PBBFAC,,, | Performed by: INTERNAL MEDICINE

## 2023-10-12 RX ORDER — ALPRAZOLAM 0.25 MG/1
0.25 TABLET ORAL NIGHTLY PRN
Qty: 30 TABLET | Refills: 2 | Status: SHIPPED | OUTPATIENT
Start: 2023-10-12

## 2023-10-19 NOTE — PROGRESS NOTES
Subjective:       Patient ID: Noah Bailey is a 73 y.o. female.    Chief Complaint: Follow-up, Asthma, Diabetes, and Hypertension    HPI  The patient presents for follow-up of medical conditions which include hypertension, asthma, type 2 diabetes mellitus, hyperlipidemia.  The patient also has coronary artery disease, chronic lower back pain, and cholelithiasis.  The patient was recently diagnosed to have gallstones when she presented for evaluation of chest pain at the emergency room on 07/09/2023.  Workup was negative for acute coronary syndrome.  She did have right-sided abdominal tenderness.  Studies were positive for cholelithiasis.  Since then she has not experienced any abdominal pain.  She has not experienced any further episodes of chest discomfort.    Asthma has been stable on current therapy.    The patient denies experiencing any hypoglycemic episodes.  She has not been monitoring blood sugars recently.  She does not monitor blood pressures but she is tolerating her medication well without side effects.  She remains physically active but she does not have a formal exercise regimen.  No exertional chest pain or dyspnea is noted.    Review of Systems   Constitutional:  Negative for activity change, appetite change and unexpected weight change.   Eyes:  Negative for visual disturbance.   Respiratory:  Negative for shortness of breath.    Cardiovascular:  Positive for chest pain. Negative for palpitations and leg swelling.   Gastrointestinal:  Positive for abdominal pain. Negative for blood in stool and diarrhea.   Genitourinary:  Negative for dysuria, frequency, hematuria and urgency.   Neurological:  Negative for weakness, numbness and headaches.   Psychiatric/Behavioral:  Negative for sleep disturbance.             Physical Exam  Vitals and nursing note reviewed.   Constitutional:       General: She is not in acute distress.     Appearance: Normal appearance. She is well-developed.      Comments: The  patient's weight has remained stable since 04/06/2023.   HENT:      Head: Normocephalic and atraumatic.   Eyes:      General: No scleral icterus.     Extraocular Movements: Extraocular movements intact.      Conjunctiva/sclera: Conjunctivae normal.   Neck:      Thyroid: No thyromegaly.      Vascular: No JVD.   Cardiovascular:      Rate and Rhythm: Normal rate and regular rhythm.      Heart sounds: Normal heart sounds. No murmur heard.     No friction rub. No gallop.   Pulmonary:      Effort: Pulmonary effort is normal. No respiratory distress.      Breath sounds: Normal breath sounds. No wheezing or rales.   Abdominal:      General: Bowel sounds are normal.      Palpations: Abdomen is soft. There is no mass.      Tenderness: There is no abdominal tenderness. There is no right CVA tenderness or left CVA tenderness.   Musculoskeletal:         General: No tenderness. Normal range of motion.      Cervical back: Normal range of motion and neck supple.      Right lower leg: No edema.      Left lower leg: No edema.   Lymphadenopathy:      Cervical: No cervical adenopathy.   Skin:     General: Skin is warm and dry.      Findings: No rash.      Comments: No foot lesions are present.   Neurological:      Mental Status: She is alert and oriented to person, place, and time.      Cranial Nerves: No cranial nerve deficit.      Comments: Sensory exam is intact in both feet on monofilament and vibration testing.   Psychiatric:         Mood and Affect: Mood normal.         Behavior: Behavior normal.         Protective Sensation (w/ 10 gram monofilament):  Right: Intact  Left: Intact    Visual Inspection:  Normal -  Bilateral    Pedal Pulses:   Right: Present  Left: Present    Posterior Tibialis Pulses:   Right:Present  Left: Present    Lab Visit on 10/12/2023   Component Date Value Ref Range Status    Microalbumin, Urine 10/12/2023 15.0  ug/mL Final    Creatinine, Urine 10/12/2023 144.0  15.0 - 325.0 mg/dL Final    Microalb/Creat  Ratio 10/12/2023 10.4  0.0 - 30.0 ug/mg Final   Lab Visit on 10/05/2023   Component Date Value Ref Range Status    Sodium 10/05/2023 146 (H)  136 - 145 mmol/L Final    Potassium 10/05/2023 5.8 (H)  3.5 - 5.1 mmol/L Final    Chloride 10/05/2023 107  95 - 110 mmol/L Final    CO2 10/05/2023 22 (L)  23 - 29 mmol/L Final    Glucose 10/05/2023 121 (H)  70 - 110 mg/dL Final    BUN 10/05/2023 16  7 - 17 mg/dL Final    Creatinine 10/05/2023 0.78  0.50 - 1.40 mg/dL Final    Calcium 10/05/2023 10.2  8.7 - 10.5 mg/dL Final    Total Protein 10/05/2023 8.4  6.0 - 8.4 g/dL Final    Albumin 10/05/2023 4.8  3.5 - 5.2 g/dL Final    Total Bilirubin 10/05/2023 0.7  0.1 - 1.0 mg/dL Final    Comment: For infants and newborns, interpretation of results should be based  on gestational age, weight and in agreement with clinical  observations.    Premature Infant recommended reference ranges:  Up to 24 hours.............<8.0 mg/dL  Up to 48 hours............<12.0 mg/dL  3-5 days..................<15.0 mg/dL  6-29 days.................<15.0 mg/dL      Alkaline Phosphatase 10/05/2023 120  38 - 126 U/L Final    AST 10/05/2023 30  15 - 46 U/L Final    ALT 10/05/2023 31  10 - 44 U/L Final    Anion Gap 10/05/2023 17 (H)  8 - 16 mmol/L Final    eGFR 10/05/2023 >60.0  >60 mL/min/1.73 m^2 Final    Hemoglobin A1C 10/05/2023 6.2 (H)  4.0 - 5.6 % Final    Comment: ADA Screening Guidelines:  5.7-6.4%  Consistent with prediabetes  >or=6.5%  Consistent with diabetes    High levels of fetal hemoglobin interfere with the HbA1C  assay. Heterozygous hemoglobin variants (HbS, HgC, etc)do  not significantly interfere with this assay.   However, presence of multiple variants may affect accuracy.      Estimated Avg Glucose 10/05/2023 131  68 - 131 mg/dL Final       Assessment & Plan:      Noah was seen today for follow-up, asthma, diabetes and hypertension.  Influenza vaccine will be administered today.    Screening mammogram will be ordered.  Patient declines  colonoscopy screening at the present time.  Bone density study will be ordered    Current medications will be continued for treatment of hypertension diabetes asthma, and hyperlipidemia.  Laboratory studies will be repeated in 6 months.    Diagnoses and all orders for this visit:    Type 2 diabetes mellitus without complication, without long-term current use of insulin  -     Microalbumin/Creatinine Ratio, Urine; Future  -     Comprehensive Metabolic Panel; Future  -     Lipid Panel; Future  -     CBC Auto Differential; Future  -     Hemoglobin A1C; Future    Essential hypertension  -     Comprehensive Metabolic Panel; Future  -     Lipid Panel; Future  -     CBC Auto Differential; Future  -     Hemoglobin A1C; Future    Mixed hyperlipidemia  -     Comprehensive Metabolic Panel; Future  -     Lipid Panel; Future  -     CBC Auto Differential; Future  -     Hemoglobin A1C; Future    Moderate persistent asthma without complication    Hypothyroidism due to acquired atrophy of thyroid    Chronic rhinitis    Coronary artery disease involving native coronary artery of native heart without angina pectoris  -     Comprehensive Metabolic Panel; Future  -     Lipid Panel; Future  -     CBC Auto Differential; Future  -     Hemoglobin A1C; Future    Postmenopausal  -     DXA Bone Density Axial Skeleton 1 or more sites; Future    Encounter for screening mammogram for breast cancer  -     Mammo Digital Screening Bilat w/ Reynold; Future    Other orders  -     ALPRAZolam (XANAX) 0.25 MG tablet; Take 1 tablet (0.25 mg total) by mouth nightly as needed for Anxiety or Insomnia.  -     Influenza - Quadrivalent (Adjuvanted)         Follow up in about 6 months (around 4/12/2024).     Henry Mcbride MD

## 2023-11-08 RX ORDER — AMLODIPINE AND OLMESARTAN MEDOXOMIL 10; 40 MG/1; MG/1
TABLET ORAL
Qty: 90 TABLET | Refills: 3 | Status: SHIPPED | OUTPATIENT
Start: 2023-11-08

## 2023-11-08 RX ORDER — ATORVASTATIN CALCIUM 20 MG/1
20 TABLET, FILM COATED ORAL DAILY
Qty: 90 TABLET | Refills: 3 | Status: SHIPPED | OUTPATIENT
Start: 2023-11-08

## 2023-11-08 RX ORDER — LEVOTHYROXINE SODIUM 50 UG/1
50 TABLET ORAL DAILY
Qty: 90 TABLET | Refills: 3 | Status: SHIPPED | OUTPATIENT
Start: 2023-11-08

## 2023-11-08 RX ORDER — METOPROLOL SUCCINATE 50 MG/1
50 TABLET, EXTENDED RELEASE ORAL DAILY
Qty: 90 TABLET | Refills: 3 | Status: SHIPPED | OUTPATIENT
Start: 2023-11-08 | End: 2024-11-07

## 2023-11-08 RX ORDER — METFORMIN HYDROCHLORIDE 500 MG/1
TABLET ORAL
Qty: 90 TABLET | Refills: 3 | Status: SHIPPED | OUTPATIENT
Start: 2023-11-08

## 2023-11-08 NOTE — TELEPHONE ENCOUNTER
Lov 10/12/23  and needs all these refilled today.  Last filled 10/22  Has 1 pill left for today    We went over list of meds she needs.    She knows theophylline and advair have refills on them already.   She has her printed xanax rx given at appt.

## 2023-11-08 NOTE — TELEPHONE ENCOUNTER
No care due was identified.  Health [facility] Embedded Care Due Messages. Reference number: [id number].   11/08/2023 10:02:54 AM CST

## 2023-11-08 NOTE — TELEPHONE ENCOUNTER
----- Message from Liz Calderon sent at 11/8/2023  9:47 AM CST -----  Contact: P [phone]  Requesting an RX refill or new RX.    Patient is requesting a phone call from the staff regarding: Pt state is out of all her medication.       Pharmacy name and phone #   [id number] - [place] - [address] [facility]  [id number] [facility]  [place] [address]  Phone: [phone] Fax: [phone]

## 2023-12-07 ENCOUNTER — PATIENT OUTREACH (OUTPATIENT)
Dept: ADMINISTRATIVE | Facility: HOSPITAL | Age: 73
End: 2023-12-07
Payer: COMMERCIAL

## 2023-12-28 ENCOUNTER — PATIENT OUTREACH (OUTPATIENT)
Dept: ADMINISTRATIVE | Facility: HOSPITAL | Age: 73
End: 2023-12-28
Payer: COMMERCIAL

## 2024-01-02 NOTE — TELEPHONE ENCOUNTER
Refill Routing Note   Medication(s) are not appropriate for processing by [facility] for the following reason(s):        Outside of protocol:  THEOPHYLLINE ER     [facility] action(s):  Route               Appointments  past 12m or future 3m with PCP    Date Provider   Last Visit   10/12/2023 Henry Mcbride MD   Next Visit   4/11/2024 Henry Mcbride MD   ED visits in past 90 days: 0        Note composed:8:37 AM 01/02/2024

## 2024-01-03 RX ORDER — THEOPHYLLINE 400 MG/1
1 TABLET, EXTENDED RELEASE ORAL
Qty: 90 TABLET | Refills: 1 | Status: SHIPPED | OUTPATIENT
Start: 2024-01-03

## 2024-02-13 NOTE — TELEPHONE ENCOUNTER
Spoke with patient she was advise that her message was forward to Dr. Mcbride and that he is still in clinic.   patient complaining of chest pain, per patient this is due to taking colonoscopy prep and over exertion. Per patient he has hx of this pain and is requesting SL nitroglycerin for pain relief. Pt complaining of chest pain. Pt refusing protonix gtt Cantharidin Counseling: Calcipotriene Counseling:  I discussed with the patient the risks of calcipotriene including but not limited to erythema, scaling, itching, and irritation.

## 2024-03-28 ENCOUNTER — PATIENT OUTREACH (OUTPATIENT)
Dept: ADMINISTRATIVE | Facility: HOSPITAL | Age: 74
End: 2024-03-28
Payer: COMMERCIAL

## 2024-03-28 NOTE — PROGRESS NOTES
Population Health Chart Review & Patient Outreach Details      Additional [facility] Notes:               Updates Requested / Reviewed:      Care Everywhere, , and Immunizations Reconciliation Completed or Queried: [facility] Topics Overdue:      [facility] Score: 4     Colon Cancer Screening  Osteoporosis Screening  Eye Exam  Mammogram    Shingles/Zoster Vaccine  RSV Vaccine                  Health Maintenance Topic(s) Outreach Outcomes & Actions Taken:    Primary Care Appt - Outreach Outcomes & Actions Taken  : Primary Care Appt Scheduled    Lab(s) - Outreach Outcomes & Actions Taken  : Overdue Lab(s) Scheduled

## 2024-04-10 NOTE — PROGRESS NOTES
Subjective:       Patient ID: Noah Bailey is a 73 y.o. female.    Chief Complaint: Hypertension (6 mos wlabs), Hyperlipidemia, and Immunizations (Had 1st shingle vaccine last week.  Got fever, joint aches, nausea when first got home. Improved 24 hours later. /48 hours felt more like normal.  Used tylenol as need.  Ok to get 2nd vaccine in 2 mos??)    HPI  The patient presents for follow-up of type 2 diabetes mellitus.  The patient is not monitoring blood sugar levels at home.  The patient does not note any hypoglycemic episodes.  The patient has generally been compliant with diet therapy.  The patient is tolerating medication without side effects.  Vision has been stable.  No lower extremity paresthesias are noted.  She remains on metformin therapy.    HTN -  Patient is currently on amlodipine/olmesartan. She does not check her BP at home. Side effects of medications noted:none.    Asthma - the patient reports her asthma is generally been stable with current therapy.  She is using Advair Diskus inhaler as needed for flare ups.    Hypothyroidism - the patient remains on levothyroxine therapy.  She has not experienced any tremors or palpitations.    Hyperlipidemia - the patient remains on atorvastatin.  She denies experiencing muscle cramps or muscle weakness.    Postmenopausal - patient is not on estrogen replacement therapy.    The patient reports intolerance the shingles vaccine.  She experienced fever, body aches, arm pain, sinus congestion after taking the 1st injection.  She felt sick for over 24 hours.  She has not certain she wants to take the 2nd injection in the series.    The patient has chronic rhinitis.  She uses Benadryl on a daily basis for management.    She is seeing a dermatologist for rosacea.  She is currently on long-term doxycycline therapy for management.    Review of Systems   Constitutional:  Negative for activity change, appetite change and unexpected weight change.   HENT:  Positive for  postnasal drip and sinus pressure/congestion.    Eyes:  Negative for visual disturbance.   Respiratory:  Negative for shortness of breath.    Cardiovascular:  Negative for chest pain, palpitations and leg swelling.   Gastrointestinal:  Negative for abdominal pain, blood in stool and diarrhea.   Genitourinary:  Negative for dysuria, frequency, hematuria and urgency.   Integumentary:  Positive for rash.   Neurological:  Negative for weakness, numbness and headaches.   Psychiatric/Behavioral:  Negative for sleep disturbance.             Physical Exam  Vitals and nursing note reviewed.   Constitutional:       General: She is not in acute distress.     Appearance: Normal appearance. She is well-developed.      Comments: The patient has lost 6 lb since 10/12/2023.   HENT:      Head: Normocephalic and atraumatic.   Eyes:      General: No scleral icterus.     Extraocular Movements: Extraocular movements intact.      Conjunctiva/sclera: Conjunctivae normal.   Neck:      Thyroid: No thyromegaly.      Vascular: No JVD.   Cardiovascular:      Rate and Rhythm: Normal rate and regular rhythm.      Heart sounds: Normal heart sounds. No murmur heard.     No friction rub. No gallop.   Pulmonary:      Effort: Pulmonary effort is normal. No respiratory distress.      Breath sounds: Normal breath sounds. No wheezing or rales.   Abdominal:      General: Bowel sounds are normal.      Palpations: Abdomen is soft. There is no mass.      Tenderness: There is no abdominal tenderness.   Musculoskeletal:         General: No tenderness. Normal range of motion.      Cervical back: Normal range of motion and neck supple.   Lymphadenopathy:      Cervical: No cervical adenopathy.   Skin:     General: Skin is warm and dry.      Findings: No rash.      Comments: No foot lesions are present.   Neurological:      Mental Status: She is alert and oriented to person, place, and time.      Cranial Nerves: No cranial nerve deficit.      Comments: Sensory  exam is intact in both feet on monofilament testing.   Psychiatric:         Mood and Affect: Mood normal.         Behavior: Behavior normal.         Protective Sensation (w/ 10 gram monofilament):  Right: Intact  Left: Intact    Visual Inspection:  Normal -  Bilateral    Pedal Pulses:   Right: Present  Left: Present    Posterior Tibialis Pulses:   Right:Present  Left: Present    Lab Visit on 04/04/2024   Component Date Value Ref Range Status    Sodium 04/04/2024 141  136 - 145 mmol/L Final    Potassium 04/04/2024 4.1  3.5 - 5.1 mmol/L Final    Chloride 04/04/2024 109  95 - 110 mmol/L Final    CO2 04/04/2024 20 (L)  23 - 29 mmol/L Final    Glucose 04/04/2024 121 (H)  70 - 110 mg/dL Final    BUN 04/04/2024 8  8 - 23 mg/dL Final    Creatinine 04/04/2024 0.8  0.5 - 1.4 mg/dL Final    Calcium 04/04/2024 9.7  8.7 - 10.5 mg/dL Final    Total Protein 04/04/2024 7.5  6.0 - 8.4 g/dL Final    Albumin 04/04/2024 3.9  3.5 - 5.2 g/dL Final    Total Bilirubin 04/04/2024 0.7  0.1 - 1.0 mg/dL Final    Comment: For infants and newborns, interpretation of results should be based  on gestational age, weight and in agreement with clinical  observations.    Premature Infant recommended reference ranges:  Up to 24 hours.............<8.0 mg/dL  Up to 48 hours............<12.0 mg/dL  3-5 days..................<15.0 mg/dL  6-29 days.................<15.0 mg/dL      Alkaline Phosphatase 04/04/2024 105  55 - 135 U/L Final    AST 04/04/2024 15  10 - 40 U/L Final    ALT 04/04/2024 13  10 - 44 U/L Final    eGFR 04/04/2024 >60.0  >60 mL/min/1.73 m^2 Final    Anion Gap 04/04/2024 12  8 - 16 mmol/L Final    Cholesterol 04/04/2024 149  120 - 199 mg/dL Final    Comment: The National Cholesterol Education Program (NCEP) has set the  following guidelines (reference ranges) for Cholesterol:  Optimal.....................<200 mg/dL  Borderline High.............200-239 mg/dL  High........................> or = 240 mg/dL      Triglycerides 04/04/2024 132  30  - 150 mg/dL Final    Comment: The National Cholesterol Education Program (NCEP) has set the  following guidelines (reference values) for triglycerides:  Normal......................<150 mg/dL  Borderline High.............150-199 mg/dL  High........................200-499 mg/dL      HDL 04/04/2024 43  40 - 75 mg/dL Final    Comment: The National Cholesterol Education Program (NCEP) has set the  following guidelines (reference values) for HDL Cholesterol:  Low...............<40 mg/dL  Optimal...........>60 mg/dL      LDL Cholesterol 04/04/2024 79.6  63.0 - 159.0 mg/dL Final    Comment: The National Cholesterol Education Program (NCEP) has set the  following guidelines (reference values) for LDL Cholesterol:  Optimal.......................<130 mg/dL  Borderline High...............130-159 mg/dL  High..........................160-189 mg/dL  Very High.....................>190 mg/dL      HDL/Cholesterol Ratio 04/04/2024 28.9  20.0 - 50.0 % Final    Total Cholesterol/HDL Ratio 04/04/2024 3.5  2.0 - 5.0 Final    Non-HDL Cholesterol 04/04/2024 106  mg/dL Final    Comment: Risk category and Non-HDL cholesterol goals:  Coronary heart disease (CHD)or equivalent (10-year risk of CHD >20%):  Non-HDL cholesterol goal     <130 mg/dL  Two or more CHD risk factors and 10-year risk of CHD <= 20%:  Non-HDL cholesterol goal     <160 mg/dL  0 to 1 CHD risk factor:  Non-HDL cholesterol goal     <190 mg/dL      WBC 04/04/2024 10.51  3.90 - 12.70 K/uL Final    RBC 04/04/2024 4.68  4.00 - 5.40 M/uL Final    Hemoglobin 04/04/2024 13.3  12.0 - 16.0 g/dL Final    Hematocrit 04/04/2024 40.7  37.0 - 48.5 % Final    MCV 04/04/2024 87  82 - 98 fL Final    MCH 04/04/2024 28.4  27.0 - 31.0 pg Final    MCHC 04/04/2024 32.7  32.0 - 36.0 g/dL Final    RDW 04/04/2024 14.2  11.5 - 14.5 % Final    Platelets 04/04/2024 356  150 - 450 K/uL Final    MPV 04/04/2024 10.9  9.2 - 12.9 fL Final    Immature Granulocytes 04/04/2024 0.5  0.0 - 0.5 % Final    Gran #  "(ANC) 04/04/2024 6.3  1.8 - 7.7 K/uL Final    Immature Grans (Abs) 04/04/2024 0.05 (H)  0.00 - 0.04 K/uL Final    Comment: Mild elevation in immature granulocytes is non specific and   can be seen in a variety of conditions including stress response,   acute inflammation, trauma and pregnancy. Correlation with other   laboratory and clinical findings is essential.      Lymph # 04/04/2024 2.9  1.0 - 4.8 K/uL Final    Mono # 04/04/2024 1.1 (H)  0.3 - 1.0 K/uL Final    Eos # 04/04/2024 0.2  0.0 - 0.5 K/uL Final    Baso # 04/04/2024 0.06  0.00 - 0.20 K/uL Final    nRBC 04/04/2024 0  0 /100 WBC Final    Gran % 04/04/2024 59.5  38.0 - 73.0 % Final    Lymph % 04/04/2024 27.9  18.0 - 48.0 % Final    Mono % 04/04/2024 10.0  4.0 - 15.0 % Final    Eosinophil % 04/04/2024 1.5  0.0 - 8.0 % Final    Basophil % 04/04/2024 0.6  0.0 - 1.9 % Final    Differential Method 04/04/2024 Automated   Final    Hemoglobin A1C 04/04/2024 6.3 (H)  4.0 - 5.6 % Final    Comment: ADA Screening Guidelines:  5.7-6.4%  Consistent with prediabetes  >or=6.5%  Consistent with diabetes    High levels of fetal hemoglobin interfere with the HbA1C  assay. Heterozygous hemoglobin variants (HbS, HgC, etc)do  not significantly interfere with this assay.   However, presence of multiple variants may affect accuracy.      Estimated Avg Glucose 04/04/2024 134 (H)  68 - 131 mg/dL Final       Assessment & Plan:      Noah Willingham" was seen today for hypertension, hyperlipidemia and immunizations.  Screening colonoscopy will be ordered.  Current medications will be continued for management of hypertension, diabetes, and asthma.    Lab studies will be obtained in 6 months.    Diagnoses and all orders for this visit:    Type 2 diabetes mellitus without complication, without long-term current use of insulin  -     Comprehensive Metabolic Panel; Future  -     CBC Auto Differential; Future  -     TSH; Future  -     Hemoglobin A1C; Future  -     Microalbumin/Creatinine " Ratio, Urine; Future    Essential hypertension  -     Comprehensive Metabolic Panel; Future  -     CBC Auto Differential; Future  -     TSH; Future  -     Hemoglobin A1C; Future  -     Microalbumin/Creatinine Ratio, Urine; Future    Mixed hyperlipidemia  -     Comprehensive Metabolic Panel; Future  -     CBC Auto Differential; Future  -     TSH; Future  -     Hemoglobin A1C; Future  -     Microalbumin/Creatinine Ratio, Urine; Future    Moderate persistent asthma without complication  -     Comprehensive Metabolic Panel; Future  -     CBC Auto Differential; Future  -     TSH; Future  -     Hemoglobin A1C; Future  -     Microalbumin/Creatinine Ratio, Urine; Future    Hypothyroidism due to acquired atrophy of thyroid  -     Comprehensive Metabolic Panel; Future  -     CBC Auto Differential; Future  -     TSH; Future  -     Hemoglobin A1C; Future  -     Microalbumin/Creatinine Ratio, Urine; Future    Postmenopausal    Chronic rhinitis    Encounter for screening mammogram for breast cancer    Colon cancer screening  -     Ambulatory referral/consult to Endo Procedure ; Future         Follow up in about 6 months (around 10/11/2024).     Henry Mcbride MD

## 2024-04-11 ENCOUNTER — OFFICE VISIT (OUTPATIENT)
Dept: INTERNAL MEDICINE | Facility: CLINIC | Age: 74
End: 2024-04-11
Payer: COMMERCIAL

## 2024-04-11 VITALS
SYSTOLIC BLOOD PRESSURE: 118 MMHG | DIASTOLIC BLOOD PRESSURE: 68 MMHG | WEIGHT: 180.75 LBS | HEIGHT: 63 IN | TEMPERATURE: 98 F | BODY MASS INDEX: 32.03 KG/M2 | HEART RATE: 64 BPM | RESPIRATION RATE: 16 BRPM

## 2024-04-11 DIAGNOSIS — E78.2 MIXED HYPERLIPIDEMIA: ICD-10-CM

## 2024-04-11 DIAGNOSIS — I10 ESSENTIAL HYPERTENSION: ICD-10-CM

## 2024-04-11 DIAGNOSIS — Z12.11 COLON CANCER SCREENING: ICD-10-CM

## 2024-04-11 DIAGNOSIS — E11.9 TYPE 2 DIABETES MELLITUS WITHOUT COMPLICATION, WITHOUT LONG-TERM CURRENT USE OF INSULIN: Primary | ICD-10-CM

## 2024-04-11 DIAGNOSIS — E03.4 HYPOTHYROIDISM DUE TO ACQUIRED ATROPHY OF THYROID: ICD-10-CM

## 2024-04-11 DIAGNOSIS — J45.40 MODERATE PERSISTENT ASTHMA WITHOUT COMPLICATION: ICD-10-CM

## 2024-04-11 DIAGNOSIS — Z78.0 POSTMENOPAUSAL: ICD-10-CM

## 2024-04-11 DIAGNOSIS — Z12.31 ENCOUNTER FOR SCREENING MAMMOGRAM FOR BREAST CANCER: ICD-10-CM

## 2024-04-11 DIAGNOSIS — J31.0 CHRONIC RHINITIS: Chronic | ICD-10-CM

## 2024-04-11 PROBLEM — I50.30 (HFPEF) HEART FAILURE WITH PRESERVED EJECTION FRACTION: Status: RESOLVED | Noted: 2017-07-14 | Resolved: 2024-04-11

## 2024-04-11 PROCEDURE — 3078F DIAST BP <80 MM HG: CPT | Mod: CPTII,S$GLB,, | Performed by: INTERNAL MEDICINE

## 2024-04-11 PROCEDURE — 3074F SYST BP LT 130 MM HG: CPT | Mod: CPTII,S$GLB,, | Performed by: INTERNAL MEDICINE

## 2024-04-11 PROCEDURE — 1101F PT FALLS ASSESS-DOCD LE1/YR: CPT | Mod: CPTII,S$GLB,, | Performed by: INTERNAL MEDICINE

## 2024-04-11 PROCEDURE — 1160F RVW MEDS BY RX/DR IN RCRD: CPT | Mod: CPTII,S$GLB,, | Performed by: INTERNAL MEDICINE

## 2024-04-11 PROCEDURE — 1126F AMNT PAIN NOTED NONE PRSNT: CPT | Mod: CPTII,S$GLB,, | Performed by: INTERNAL MEDICINE

## 2024-04-11 PROCEDURE — 99999 PR PBB SHADOW E&M-EST. PATIENT-LVL V: CPT | Mod: PBBFAC,,, | Performed by: INTERNAL MEDICINE

## 2024-04-11 PROCEDURE — 3288F FALL RISK ASSESSMENT DOCD: CPT | Mod: CPTII,S$GLB,, | Performed by: INTERNAL MEDICINE

## 2024-04-11 PROCEDURE — 3044F HG A1C LEVEL LT 7.0%: CPT | Mod: CPTII,S$GLB,, | Performed by: INTERNAL MEDICINE

## 2024-04-11 PROCEDURE — 1159F MED LIST DOCD IN RCRD: CPT | Mod: CPTII,S$GLB,, | Performed by: INTERNAL MEDICINE

## 2024-04-11 PROCEDURE — 4010F ACE/ARB THERAPY RXD/TAKEN: CPT | Mod: CPTII,S$GLB,, | Performed by: INTERNAL MEDICINE

## 2024-04-11 PROCEDURE — 3008F BODY MASS INDEX DOCD: CPT | Mod: CPTII,S$GLB,, | Performed by: INTERNAL MEDICINE

## 2024-04-11 PROCEDURE — 99214 OFFICE O/P EST MOD 30 MIN: CPT | Mod: S$GLB,,, | Performed by: INTERNAL MEDICINE

## 2024-04-18 ENCOUNTER — PATIENT OUTREACH (OUTPATIENT)
Dept: ADMINISTRATIVE | Facility: HOSPITAL | Age: 74
End: 2024-04-18
Payer: COMMERCIAL

## 2024-04-19 RX ORDER — ALPRAZOLAM 0.25 MG/1
TABLET ORAL
Qty: 30 TABLET | Refills: 2 | Status: SHIPPED | OUTPATIENT
Start: 2024-04-19

## 2024-04-19 NOTE — TELEPHONE ENCOUNTER
Care Due:                  Date            Visit Type   Department     Provider  --------------------------------------------------------------------------------                                EP -                              PRIMARY      [facility] INTERNAL  Last Visit: 04-      [facility] ([facility])   MEDICINE       Henrysurendra Mcbride                              EP -                              PRIMARY      [facility] INTERNAL  Next Visit: 10-      [facility] ([facility])   MEDICINE       Henryjackson Mcbride                                                            Last  Test          Frequency    Reason                     Performed    Due Date  --------------------------------------------------------------------------------    TSH.........  12 months..  levothyroxine............  03- 03-    [facility] Embedded Care Due Messages. Reference number: [id number].   4/19/2024 3:52:56 PM CDT

## 2024-05-03 DIAGNOSIS — I25.10 CORONARY ARTERY DISEASE INVOLVING NATIVE CORONARY ARTERY OF NATIVE HEART WITHOUT ANGINA PECTORIS: Chronic | ICD-10-CM

## 2024-05-03 DIAGNOSIS — I21.4 NSTEMI (NON-ST ELEVATED MYOCARDIAL INFARCTION): ICD-10-CM

## 2024-05-03 RX ORDER — ASPIRIN 81 MG/1
TABLET ORAL
Qty: 90 TABLET | Refills: 2 | Status: SHIPPED | OUTPATIENT
Start: 2024-05-03

## 2024-05-03 NOTE — TELEPHONE ENCOUNTER
Refill Routing Note   Medication(s) are not appropriate for processing by [facility] for the following reason(s):        Outside of protocol    [facility] action(s):  Route               Appointments  past 12m or future 3m with PCP    Date Provider   Last Visit   4/11/2024 Henry Mcbride MD   Next Visit   10/17/2024 Henry Mcbride MD   ED visits in past 90 days: 0        Note composed:8:15 AM 05/03/2024

## 2024-05-05 ENCOUNTER — ANESTHESIA EVENT (OUTPATIENT)
Dept: SURGERY | Facility: HOSPITAL | Age: 74
End: 2024-05-05
Payer: COMMERCIAL

## 2024-05-05 ENCOUNTER — HOSPITAL ENCOUNTER (OUTPATIENT)
Facility: HOSPITAL | Age: 74
LOS: 1 days | Discharge: HOME | End: 2024-05-07
Attending: EMERGENCY MEDICINE | Admitting: SURGERY
Payer: COMMERCIAL

## 2024-05-05 DIAGNOSIS — I10 ESSENTIAL HYPERTENSION: Chronic | ICD-10-CM

## 2024-05-05 DIAGNOSIS — E78.2 MIXED HYPERLIPIDEMIA: ICD-10-CM

## 2024-05-05 DIAGNOSIS — E11.65 TYPE 2 DIABETES MELLITUS WITH HYPERGLYCEMIA, WITHOUT LONG-TERM CURRENT USE OF INSULIN: ICD-10-CM

## 2024-05-05 DIAGNOSIS — K80.50 BILIARY COLIC: Primary | ICD-10-CM

## 2024-05-05 DIAGNOSIS — E03.9 HYPOTHYROIDISM, UNSPECIFIED TYPE: ICD-10-CM

## 2024-05-05 DIAGNOSIS — K42.9 UMBILICAL HERNIA WITHOUT OBSTRUCTION AND WITHOUT GANGRENE: ICD-10-CM

## 2024-05-05 DIAGNOSIS — R10.13 EPIGASTRIC PAIN: ICD-10-CM

## 2024-05-05 LAB
ALBUMIN SERPL BCP-MCNC: 4 G/DL (ref 3.5–5.2)
ALP SERPL-CCNC: 133 U/L (ref 55–135)
ALT SERPL W/O P-5'-P-CCNC: 24 U/L (ref 10–44)
ANION GAP SERPL CALC-SCNC: 12 MMOL/L (ref 8–16)
AST SERPL-CCNC: 23 U/L (ref 10–40)
BACTERIA #/AREA URNS AUTO: NORMAL /HPF
BASOPHILS # BLD AUTO: 0.06 K/UL (ref 0–0.2)
BASOPHILS NFR BLD: 0.3 % (ref 0–1.9)
BILIRUB SERPL-MCNC: 0.5 MG/DL (ref 0.1–1)
BILIRUB UR QL STRIP: NEGATIVE
BUN SERPL-MCNC: 8 MG/DL (ref 6–30)
BUN SERPL-MCNC: 9 MG/DL (ref 8–23)
CALCIUM SERPL-MCNC: 10.8 MG/DL (ref 8.7–10.5)
CHLORIDE SERPL-SCNC: 95 MMOL/L (ref 95–110)
CHLORIDE SERPL-SCNC: 97 MMOL/L (ref 95–110)
CLARITY UR REFRACT.AUTO: CLEAR
CO2 SERPL-SCNC: 22 MMOL/L (ref 23–29)
COLOR UR AUTO: ABNORMAL
CREAT SERPL-MCNC: 0.5 MG/DL (ref 0.5–1.4)
CREAT SERPL-MCNC: 0.8 MG/DL (ref 0.5–1.4)
DIFFERENTIAL METHOD BLD: ABNORMAL
EOSINOPHIL # BLD AUTO: 0 K/UL (ref 0–0.5)
EOSINOPHIL NFR BLD: 0 % (ref 0–8)
ERYTHROCYTE [DISTWIDTH] IN BLOOD BY AUTOMATED COUNT: 13.3 % (ref 11.5–14.5)
EST. GFR  (NO RACE VARIABLE): >60 ML/MIN/1.73 M^2
GLUCOSE SERPL-MCNC: 175 MG/DL (ref 70–110)
GLUCOSE SERPL-MCNC: 176 MG/DL (ref 70–110)
GLUCOSE UR QL STRIP: ABNORMAL
HCT VFR BLD AUTO: 41.3 % (ref 37–48.5)
HCT VFR BLD CALC: 43 %PCV (ref 36–54)
HGB BLD-MCNC: 13.5 G/DL (ref 12–16)
HGB UR QL STRIP: ABNORMAL
IMM GRANULOCYTES # BLD AUTO: 0.08 K/UL (ref 0–0.04)
IMM GRANULOCYTES NFR BLD AUTO: 0.4 % (ref 0–0.5)
KETONES UR QL STRIP: ABNORMAL
LEUKOCYTE ESTERASE UR QL STRIP: NEGATIVE
LIPASE SERPL-CCNC: 19 U/L (ref 4–60)
LYMPHOCYTES # BLD AUTO: 1.5 K/UL (ref 1–4.8)
LYMPHOCYTES NFR BLD: 8.1 % (ref 18–48)
MCH RBC QN AUTO: 28.4 PG (ref 27–31)
MCHC RBC AUTO-ENTMCNC: 32.7 G/DL (ref 32–36)
MCV RBC AUTO: 87 FL (ref 82–98)
MICROSCOPIC COMMENT: NORMAL
MONOCYTES # BLD AUTO: 1.2 K/UL (ref 0.3–1)
MONOCYTES NFR BLD: 6.5 % (ref 4–15)
NEUTROPHILS # BLD AUTO: 15.7 K/UL (ref 1.8–7.7)
NEUTROPHILS NFR BLD: 84.7 % (ref 38–73)
NITRITE UR QL STRIP: NEGATIVE
NRBC BLD-RTO: 0 /100 WBC
PH UR STRIP: 7 [PH] (ref 5–8)
PLATELET # BLD AUTO: 330 K/UL (ref 150–450)
PMV BLD AUTO: 10.6 FL (ref 9.2–12.9)
POC IONIZED CALCIUM: 1.22 MMOL/L (ref 1.06–1.42)
POC TCO2 (MEASURED): 23 MMOL/L (ref 23–29)
POCT GLUCOSE: 184 MG/DL (ref 70–110)
POTASSIUM BLD-SCNC: 5.1 MMOL/L (ref 3.5–5.1)
POTASSIUM SERPL-SCNC: 5.1 MMOL/L (ref 3.5–5.1)
PROT SERPL-MCNC: 8.1 G/DL (ref 6–8.4)
PROT UR QL STRIP: NEGATIVE
RBC # BLD AUTO: 4.76 M/UL (ref 4–5.4)
RBC #/AREA URNS AUTO: 4 /HPF (ref 0–4)
SAMPLE: ABNORMAL
SODIUM BLD-SCNC: 131 MMOL/L (ref 136–145)
SODIUM SERPL-SCNC: 131 MMOL/L (ref 136–145)
SP GR UR STRIP: 1.01 (ref 1–1.03)
SQUAMOUS #/AREA URNS AUTO: 0 /HPF
TROPONIN I SERPL DL<=0.01 NG/ML-MCNC: <0.006 NG/ML (ref 0–0.03)
URN SPEC COLLECT METH UR: ABNORMAL
WBC # BLD AUTO: 18.54 K/UL (ref 3.9–12.7)
WBC #/AREA URNS AUTO: 1 /HPF (ref 0–5)

## 2024-05-05 PROCEDURE — 84484 ASSAY OF TROPONIN QUANT: CPT | Performed by: EMERGENCY MEDICINE

## 2024-05-05 PROCEDURE — 81001 URINALYSIS AUTO W/SCOPE: CPT | Performed by: EMERGENCY MEDICINE

## 2024-05-05 PROCEDURE — 63600175 PHARM REV CODE 636 W HCPCS: Performed by: EMERGENCY MEDICINE

## 2024-05-05 PROCEDURE — 93005 ELECTROCARDIOGRAM TRACING: CPT

## 2024-05-05 PROCEDURE — 63600175 PHARM REV CODE 636 W HCPCS: Performed by: STUDENT IN AN ORGANIZED HEALTH CARE EDUCATION/TRAINING PROGRAM

## 2024-05-05 PROCEDURE — 83690 ASSAY OF LIPASE: CPT | Performed by: EMERGENCY MEDICINE

## 2024-05-05 PROCEDURE — 99285 EMERGENCY DEPT VISIT HI MDM: CPT | Mod: 25

## 2024-05-05 PROCEDURE — 96375 TX/PRO/DX INJ NEW DRUG ADDON: CPT

## 2024-05-05 PROCEDURE — 96361 HYDRATE IV INFUSION ADD-ON: CPT

## 2024-05-05 PROCEDURE — G0378 HOSPITAL OBSERVATION PER HR: HCPCS

## 2024-05-05 PROCEDURE — 82962 GLUCOSE BLOOD TEST: CPT

## 2024-05-05 PROCEDURE — 96372 THER/PROPH/DIAG INJ SC/IM: CPT | Mod: 59 | Performed by: STUDENT IN AN ORGANIZED HEALTH CARE EDUCATION/TRAINING PROGRAM

## 2024-05-05 PROCEDURE — 25500020 PHARM REV CODE 255: Performed by: EMERGENCY MEDICINE

## 2024-05-05 PROCEDURE — 80053 COMPREHEN METABOLIC PANEL: CPT | Performed by: EMERGENCY MEDICINE

## 2024-05-05 PROCEDURE — 96374 THER/PROPH/DIAG INJ IV PUSH: CPT | Mod: 59

## 2024-05-05 PROCEDURE — 85025 COMPLETE CBC W/AUTO DIFF WBC: CPT | Performed by: EMERGENCY MEDICINE

## 2024-05-05 PROCEDURE — 93010 ELECTROCARDIOGRAM REPORT: CPT | Mod: ,,, | Performed by: INTERNAL MEDICINE

## 2024-05-05 RX ORDER — IBUPROFEN 200 MG
24 TABLET ORAL
Status: DISCONTINUED | OUTPATIENT
Start: 2024-05-05 | End: 2024-05-07 | Stop reason: HOSPADM

## 2024-05-05 RX ORDER — ENOXAPARIN SODIUM 100 MG/ML
40 INJECTION SUBCUTANEOUS EVERY 24 HOURS
Status: DISCONTINUED | OUTPATIENT
Start: 2024-05-05 | End: 2024-05-07 | Stop reason: HOSPADM

## 2024-05-05 RX ORDER — ASPIRIN 81 MG/1
81 TABLET ORAL DAILY
Status: DISCONTINUED | OUTPATIENT
Start: 2024-05-06 | End: 2024-05-07 | Stop reason: HOSPADM

## 2024-05-05 RX ORDER — IBUPROFEN 200 MG
16 TABLET ORAL
Status: DISCONTINUED | OUTPATIENT
Start: 2024-05-05 | End: 2024-05-07 | Stop reason: HOSPADM

## 2024-05-05 RX ORDER — ONDANSETRON HYDROCHLORIDE 2 MG/ML
4 INJECTION, SOLUTION INTRAVENOUS
Status: COMPLETED | OUTPATIENT
Start: 2024-05-05 | End: 2024-05-05

## 2024-05-05 RX ORDER — MORPHINE SULFATE 4 MG/ML
4 INJECTION, SOLUTION INTRAMUSCULAR; INTRAVENOUS EVERY 4 HOURS PRN
Status: DISCONTINUED | OUTPATIENT
Start: 2024-05-05 | End: 2024-05-07 | Stop reason: HOSPADM

## 2024-05-05 RX ORDER — AMLODIPINE BESYLATE 5 MG/1
10 TABLET ORAL DAILY
Status: DISCONTINUED | OUTPATIENT
Start: 2024-05-06 | End: 2024-05-07 | Stop reason: HOSPADM

## 2024-05-05 RX ORDER — LEVOTHYROXINE SODIUM 50 UG/1
50 TABLET ORAL
Status: DISCONTINUED | OUTPATIENT
Start: 2024-05-06 | End: 2024-05-07 | Stop reason: HOSPADM

## 2024-05-05 RX ORDER — PROCHLORPERAZINE EDISYLATE 5 MG/ML
5 INJECTION INTRAMUSCULAR; INTRAVENOUS EVERY 6 HOURS PRN
Status: DISCONTINUED | OUTPATIENT
Start: 2024-05-05 | End: 2024-05-07 | Stop reason: HOSPADM

## 2024-05-05 RX ORDER — MORPHINE SULFATE 2 MG/ML
2 INJECTION, SOLUTION INTRAMUSCULAR; INTRAVENOUS EVERY 4 HOURS PRN
Status: DISCONTINUED | OUTPATIENT
Start: 2024-05-05 | End: 2024-05-07 | Stop reason: HOSPADM

## 2024-05-05 RX ORDER — ATORVASTATIN CALCIUM 20 MG/1
20 TABLET, FILM COATED ORAL DAILY
Status: DISCONTINUED | OUTPATIENT
Start: 2024-05-06 | End: 2024-05-07 | Stop reason: HOSPADM

## 2024-05-05 RX ORDER — SODIUM CHLORIDE, SODIUM LACTATE, POTASSIUM CHLORIDE, CALCIUM CHLORIDE 600; 310; 30; 20 MG/100ML; MG/100ML; MG/100ML; MG/100ML
INJECTION, SOLUTION INTRAVENOUS CONTINUOUS
Status: DISCONTINUED | OUTPATIENT
Start: 2024-05-05 | End: 2024-05-06

## 2024-05-05 RX ORDER — ONDANSETRON 8 MG/1
8 TABLET, ORALLY DISINTEGRATING ORAL EVERY 8 HOURS PRN
Status: DISCONTINUED | OUTPATIENT
Start: 2024-05-05 | End: 2024-05-07 | Stop reason: HOSPADM

## 2024-05-05 RX ORDER — ACETAMINOPHEN 325 MG/1
650 TABLET ORAL EVERY 8 HOURS PRN
Status: DISCONTINUED | OUTPATIENT
Start: 2024-05-05 | End: 2024-05-07 | Stop reason: HOSPADM

## 2024-05-05 RX ORDER — MORPHINE SULFATE 4 MG/ML
4 INJECTION, SOLUTION INTRAMUSCULAR; INTRAVENOUS
Status: COMPLETED | OUTPATIENT
Start: 2024-05-05 | End: 2024-05-05

## 2024-05-05 RX ORDER — INSULIN ASPART 100 [IU]/ML
0-5 INJECTION, SOLUTION INTRAVENOUS; SUBCUTANEOUS
Status: DISCONTINUED | OUTPATIENT
Start: 2024-05-05 | End: 2024-05-07 | Stop reason: HOSPADM

## 2024-05-05 RX ORDER — GLUCAGON 1 MG
1 KIT INJECTION
Status: DISCONTINUED | OUTPATIENT
Start: 2024-05-05 | End: 2024-05-07 | Stop reason: HOSPADM

## 2024-05-05 RX ORDER — SODIUM CHLORIDE, SODIUM LACTATE, POTASSIUM CHLORIDE, CALCIUM CHLORIDE 600; 310; 30; 20 MG/100ML; MG/100ML; MG/100ML; MG/100ML
1000 INJECTION, SOLUTION INTRAVENOUS
Status: COMPLETED | OUTPATIENT
Start: 2024-05-05 | End: 2024-05-05

## 2024-05-05 RX ORDER — METOPROLOL SUCCINATE 50 MG/1
50 TABLET, EXTENDED RELEASE ORAL DAILY
Status: DISCONTINUED | OUTPATIENT
Start: 2024-05-06 | End: 2024-05-07 | Stop reason: HOSPADM

## 2024-05-05 RX ADMIN — IOHEXOL 100 ML: 350 INJECTION, SOLUTION INTRAVENOUS at 12:05

## 2024-05-05 RX ADMIN — SODIUM CHLORIDE, POTASSIUM CHLORIDE, SODIUM LACTATE AND CALCIUM CHLORIDE: 600; 310; 30; 20 INJECTION, SOLUTION INTRAVENOUS at 04:05

## 2024-05-05 RX ADMIN — ENOXAPARIN SODIUM 40 MG: 40 INJECTION SUBCUTANEOUS at 05:05

## 2024-05-05 RX ADMIN — ONDANSETRON 4 MG: 2 INJECTION INTRAMUSCULAR; INTRAVENOUS at 11:05

## 2024-05-05 RX ADMIN — SODIUM CHLORIDE, POTASSIUM CHLORIDE, SODIUM LACTATE AND CALCIUM CHLORIDE 1000 ML: 600; 310; 30; 20 INJECTION, SOLUTION INTRAVENOUS at 11:05

## 2024-05-05 RX ADMIN — MORPHINE SULFATE 4 MG: 4 INJECTION INTRAVENOUS at 11:05

## 2024-05-05 NOTE — ANESTHESIA PREPROCEDURE EVALUATION
[facility]  Anesthesia Pre-Operative Evaluation         Patient Name: Noah Bailey  YOB: 1950  MRN: [id number]    SUBJECTIVE:     Pre-operative evaluation for Procedure(s) (LRB):  CHOLECYSTECTOMY, LAPAROSCOPIC (N/A)     05/05/2024    Noah Bailey is a 73 y.o. female w/ a significant PMHx of HTN, DM, CAD (stent to mid LCX), HLD and hypothyroidism with a known history of cholelithiasis who presents with right upper quadrant abdominal pain.     Patient now presents for the above procedure(s).    TTE: 7/10/23  The left ventricle is normal in size with concentric remodeling and normal systolic function.  The estimated ejection fraction is 60%.  Normal left ventricular diastolic function.  Normal right ventricular size with normal right ventricular systolic function.  Normal central venous pressure (3 mmHg).  Small anterior pericardial effusion.    LDA:        Peripheral IV - Single Lumen 05/05/24 1044 20 G Left Antecubital (Active)   Site Assessment Clean;Dry;Intact 05/05/24 1045   Extremity Assessment Distal to IV No abnormal discoloration;No redness;No swelling 05/05/24 1045   Dressing Status Clean;Dry;Intact 05/05/24 1045   Dressing Intervention Integrity maintained 05/05/24 1045   Number of days: 0            Peripheral IV - Single Lumen 05/05/24 1636 22 G Left;Posterior Hand (Active)   Site Assessment Clean;Dry;Intact;No redness;No swelling 05/05/24 1636   Number of days: 0       Prev airway: None documented.    Drips: None documented.    Patient Active Problem List   Diagnosis    Bronchial asthma    Chronic rhinitis    Essential hypertension    Hyperlipidemia    Colon polyps    Umbilical hernia    Nuclear sclerosis - Both Eyes    Chalazion - Right Eye    Myopia - Both Eyes    Coronary artery disease involving native coronary artery of native heart without angina pectoris    Leukocytosis    Nuclear sclerotic cataract of left eye    Nuclear senile cataract of right eye     Biliary colic    Hypothyroidism    Hyperkalemia    Right lower quadrant abdominal pain    BMI 30.0-30.9,adult       Review of patient's allergies indicates:   Allergen Reactions    Sulfa (sulfonamide antibiotics) Rash       Current Outpatient Medications:    Current Facility-Administered Medications:     acetaminophen tablet 650 mg, 650 mg, Oral, Q8H PRN, David Correa MD    [START ON 5/6/2024] amLODIPine tablet 10 mg, 10 mg, Oral, Daily, David Correa MD    [START ON 5/6/2024] aspirin EC tablet 81 mg, 81 mg, Oral, Daily, David Correa MD    [START ON 5/6/2024] atorvastatin tablet 20 mg, 20 mg, Oral, Daily, David Correa MD    dextrose 10% bolus 125 mL 125 mL, 12.5 g, Intravenous, PRN, Errol Kohli MD    dextrose 10% bolus 250 mL 250 mL, 25 g, Intravenous, PRN, Errol Kohli MD    enoxaparin injection 40 mg, 40 mg, Subcutaneous, Daily, David Correa MD, 40 mg at 05/05/24 1743    glucagon (human recombinant) injection 1 mg, 1 mg, Intramuscular, PRN, David Correa MD    glucose chewable tablet 16 g, 16 g, Oral, PRN, David Correa MD    glucose chewable tablet 24 g, 24 g, Oral, PRN, David Correa MD    insulin aspart U-100 pen 0-5 Units, 0-5 Units, Subcutaneous, QID (AC + HS) PRN, David Correa MD    lactated ringers infusion, , Intravenous, Continuous, David Correa MD, Last Rate: 75 mL/hr at 05/05/24 1640, New Bag at 05/05/24 1640    [START ON 5/6/2024] levothyroxine tablet 50 mcg, 50 mcg, Oral, Before breakfast, David Correa MD    [START ON 5/6/2024] metoprolol succinate (TOPROL-XL) 24 hr tablet 50 mg, 50 mg, Oral, Daily, David Correa MD    morphine injection 2 mg, 2 mg, Intravenous, Q4H PRN, David Correa MD    morphine injection 4 mg, 4 mg, Intravenous, Q4H PRN, David Correa MD    ondansetron disintegrating tablet 8 mg, 8 mg, Oral, Q8H PRN, David Correa MD    prochlorperazine injection Soln 5  mg, 5 mg, Intravenous, Q6H PRN, David Correa MD    Current Outpatient Medications:     acetaminophen (TYLENOL) 500 MG tablet, Take 1,500 mg by mouth 2 (two) times daily as needed for Pain., Disp: , Rfl:     albuterol (PROVENTIL/VENTOLIN HFA) 90 mcg/actuation inhaler, Inhale 1-2 puffs into the lungs every 4 (four) hours as needed for Wheezing or Shortness of Breath. Rescue, Disp: 18 g, Rfl: 4    ALPRAZolam (XANAX) 0.25 MG tablet, TAKE ONE TABLET BY MOUTH NIGHTLY AS NEEDED FOR ANXIETY OR INSOMNIA, Disp: 30 tablet, Rfl: 2    amlodipine-olmesartan (KARISSA) 10-40 mg per tablet, TAKE 1 TABLET BY MOUTH ONCE DAILY FOR BLOOD PRESSURE. REPELACE WITH BENAZEPRIL/AMLODIPINE (LOTREL), Disp: 90 tablet, Rfl: 3    aspirin (ECOTRIN) 81 MG EC tablet, TAKE 1 TABLET BY MOUTH EVERY DAY, Disp: 90 tablet, Rfl: 2    atorvastatin (LIPITOR) 20 MG tablet, Take 1 tablet (20 mg total) by mouth once daily., Disp: 90 tablet, Rfl: 3    blood-glucose meter (FREESTYLE LITE METER) kit, Use as instructed, Disp: 1 each, Rfl: 0    diphenhydrAMINE (BENADRYL) 25 mg capsule, Take 50 mg by mouth 2 (two) times daily as needed for Allergies. (Patient not taking: Reported on 4/11/2024), Disp: , Rfl:     doxycycline (PERIOSTAT) 20 MG tablet, Take 20 mg by mouth 2 (two) times daily., Disp: , Rfl:     fluticasone-salmeterol diskus inhaler 250-50 mcg, INHALE 1 PUFF BY MOUTH TWICE A DAY (Patient taking differently: Inhale 1 puff into the lungs 2 (two) times daily as needed (shortness of breath).), Disp: 180 each, Rfl: 3    lancets (MICROLET LANCET) Misc, Test blood sugar once daily., Disp: 100 each, Rfl: 3    levothyroxine (SYNTHROID) 50 MCG tablet, Take 1 tablet (50 mcg total) by mouth once daily., Disp: 90 tablet, Rfl: 3    metFORMIN (GLUCOPHAGE) 500 MG tablet, 1 tablet by mouth with dinner, Disp: 90 tablet, Rfl: 3    metoprolol succinate (TOPROL-XL) 50 MG 24 hr tablet, Take 1 tablet (50 mg total) by mouth once daily. For heart and blood presure, Disp: 90  tablet, Rfl: 3    mupirocin (BACTROBAN) 2 % ointment, Apply topically 3 (three) times daily. (Patient not taking: Reported on 2024), Disp: 15 g, Rfl: 0    nitroGLYCERIN (NITROSTAT) 0.4 MG SL tablet, Place 1 tablet (0.4 mg total) under the tongue every 5 (five) minutes as needed for Chest pain ((Notify MD))., Disp: 25 tablet, Rfl: 11    PRECISION XTRA TEST Strp, USE AS DIRECTED DAILY, Disp: 100 strip, Rfl: 3    theophylline 400 mg Tb24, TAKE 1 TABLET BY MOUTH EVERY DAY, Disp: 90 tablet, Rfl: 1    ursodioL (ACTIGALL) 300 mg capsule, Take 1 capsule (300 mg total) by mouth 2 (two) times daily. for 14 days, Disp: 28 capsule, Rfl: 0    Facility-Administered Medications Ordered in Other Encounters:     sodium chloride 0.9% flush 10 mL, 10 mL, Intravenous, PRN, Galileo Murphy MD    Past Surgical History:   Procedure Laterality Date    CARDIAC SURGERY      STENTS    CATARACT EXTRACTION W/  INTRAOCULAR LENS IMPLANT Left 2021    Procedure: EXTRACTION, CATARACT, WITH IOL INSERTION;  Surgeon: Galileo Murphy MD;  Location: [facility] OR;  Service: Ophthalmology;  Laterality: Left;    CATARACT EXTRACTION W/  INTRAOCULAR LENS IMPLANT Right 2021    Procedure: EXTRACTION, CATARACT, WITH IOL INSERTION;  Surgeon: Galileo Murphy MD;  Location: [facility] OR;  Service: Ophthalmology;  Laterality: Right;    COLONOSCOPY         Social History     Socioeconomic History    Marital status: Single   Tobacco Use    Smoking status: Former     Current packs/day: 0.00     Average packs/day: 1 pack/day for 20.0 years (20.0 ttl pk-yrs)     Types: Cigarettes     Start date: 1974     Quit date: 1994     Years since quittin.4    Smokeless tobacco: Never   Substance and Sexual Activity    Alcohol use: Yes     Comment: occasional use    Drug use: No    Sexual activity: Not Currently       OBJECTIVE:     Vital Signs Range (Last 24H):  Temp:  [36.4 °C (97.5 °F)-36.6 °C (97.9 °F)]   Pulse:  [70-90]   Resp:   [16-18]   BP: (121-167)/(72-78)   SpO2:  [92 %-96 %]       Significant Labs:  Lab Results   Component Value Date    WBC 18.54 (H) 05/05/2024    HGB 13.5 05/05/2024    HCT 43 05/05/2024     05/05/2024    CHOL 149 04/04/2024    TRIG 132 04/04/2024    HDL 43 04/04/2024    ALT 24 05/05/2024    AST 23 05/05/2024     (L) 05/05/2024    K 5.1 05/05/2024    CL 97 05/05/2024    CREATININE 0.8 05/05/2024    BUN 9 05/05/2024    CO2 22 (L) 05/05/2024    TSH 3.160 03/30/2023    HGBA1C 6.3 (H) 04/04/2024       Diagnostic Studies: No relevant studies.    EKG:   Results for orders placed or performed during the hospital encounter of 07/09/23   EKG 12-lead    Collection Time: 07/09/23  6:18 PM    Narrative    Test Reason : R07.89,    Vent. Rate : 086 BPM     Atrial Rate : 086 BPM     P-R Int : 156 ms          QRS Dur : 068 ms      QT Int : 346 ms       P-R-T Axes : 043 010 -13 degrees     QTc Int : 414 ms    Normal sinus rhythm  Low voltage QRS  Low anterior forces  Diffuse Nonspecific ST and T wave abnormality  Abnormal ECG  When compared with ECG of 09-JUL-2023 17:18,  No significant change was found  Confirmed by JOCELYN TOVAR MD ([id number]) on 7/10/2023 11:07:49 AM    Referred By: [email or web address]   SELF           Confirmed By:JOCELYN TOVAR MD       2D ECHO:  TTE:  Results for orders placed or performed during the hospital encounter of 07/09/23   Echo   Result Value Ref Range    Ascending aorta 3.10 cm    STJ 2.58 cm    AV mean gradient 7 mmHg    Ao peak aracelis 1.80 m/s    Ao VTI 41.47 cm    IVRT 105.61 msec    IVS 0.84 0.6 - 1.1 cm    LA size 2.88 cm    Left Atrium Major Axis 5.13 cm    Left Atrium Minor Axis 4.97 cm    LVIDd 3.87 3.5 - 6.0 cm    LVIDs 2.33 2.1 - 4.0 cm    LVOT diameter 2.04 cm    LVOT peak VTI 31.37 cm    Posterior Wall 0.90 0.6 - 1.1 cm    MV Peak A Aracelis 1.00 m/s    E wave deceleration time 220.85 msec    MV Peak E Aracelis 0.82 m/s    RA Major Axis 4.60 cm    RA Width 3.38 cm    RVDD 2.51 cm    Sinus 2.95 cm     TAPSE 2.27 cm    LA WIDTH 3.45 cm    MV stenosis pressure 1/2 time 64.05 ms    LV Diastolic Volume 64.64 mL    LV Systolic Volume 18.79 mL    LVOT peak aracelis 1.19 m/s    TDI LATERAL 0.08 m/s    TDI SEPTAL 0.09 m/s    LA volume (mod) 41.23 cm3    LV LATERAL E/E' RATIO 10.25 m/s    LV SEPTAL E/E' RATIO 9.11 m/s    FS 40 %    LA volume 42.64 cm3    LV mass 99.27 g    Left Ventricle Relative Wall Thickness 0.47 cm    AV valve area 2.47 cm2    AV Velocity Ratio 0.66     AV index (prosthetic) 0.76     MV valve area p 1/2 method 3.43 cm2    E/A ratio 0.82     Mean e' 0.09 m/s    LVOT area 3.3 cm2    LVOT stroke volume 102.48 cm3    AV peak gradient 13 mmHg    E/E' ratio 9.65 m/s    LV Systolic Volume Index 10.4 mL/m2    LV Diastolic Volume Index 35.91 mL/m2    LA Volume Index 23.7 mL/m2    LV Mass Index 55 g/m2    LA Volume Index (Mod) 22.9 mL/m2    BSA 1.85 m2    Right Atrial Pressure (from IVC) 3 mmHg    EF 60 %    Narrative    · The left ventricle is normal in size with concentric remodeling and   normal systolic function.  · The estimated ejection fraction is 60%.  · Normal left ventricular diastolic function.  · Normal right ventricular size with normal right ventricular systolic   function.  · Normal central venous pressure (3 mmHg).  · Small anterior pericardial effusion.          NBA:  No results found for this or any previous visit.    ASSESSMENT/PLAN:                                                                                                                  05/05/2024  Noah Bailey is a 73 y.o., female.      Pre-op Assessment    I have reviewed the Patient Summary Reports.     I have reviewed the Nursing Notes. I have reviewed the NPO Status.   I have reviewed the Medications.     Review of Systems  Anesthesia Hx:  No problems with previous Anesthesia   History of prior surgery of interest to airway management or planning:          Denies Family Hx of Anesthesia complications.    Denies Personal Hx of  Anesthesia complications.                    Cardiovascular:     Hypertension   CAD           hyperlipidemia                             Pulmonary:    Asthma                    Endocrine:   Hypothyroidism        Denies Obesity / BMI > 30      Physical Exam  General: Well nourished, Cooperative, Alert and Oriented    Airway:  Mallampati: I   Mouth Opening: Normal  TM Distance: Normal  Tongue: Normal  Neck ROM: Normal ROM    Dental:  Partial Dentures    Chest/Lungs:  Normal Respiratory Rate, Clear to auscultation    Heart:  Rate: Normal  Rhythm: Regular Rhythm        Anesthesia Plan  Type of Anesthesia, risks & benefits discussed:    Anesthesia Type: Gen ETT  Intra-op Monitoring Plan: Standard ASA Monitors  Post Op Pain Control Plan: multimodal analgesia and IV/PO Opioids PRN  Induction:  IV  Airway Plan: Direct, Post-Induction  Informed Consent: Informed consent signed with the Patient and all parties understand the risks and agree with anesthesia plan.  All questions answered.   ASA Score: 3  Day of Surgery Review of History & Physical: H&P Update referred to the surgeon/provider.    Ready For Surgery From Anesthesia Perspective.     .

## 2024-05-05 NOTE — Clinical Note
Diagnosis: Epigastric pain [224456]   Future Attending Provider: CLAUDE MILIAN [[id number]]   Reason for IP Medical Treatment  (Clinical interventions that can only be accomplished in the IP setting? ) :: biliary colic requiring gallbladder removal   I certify that Inpatient services for greater than or equal to 2 midnights are medically necessary:: No   Plans for Post-Acute care--if anticipated (pick the single best option):: A. No post acute care anticipated at this time

## 2024-05-05 NOTE — SUBJECTIVE & OBJECTIVE
Current Facility-Administered Medications   Medication Dose Route Frequency Provider Last Rate Last Admin    acetaminophen tablet 650 mg  650 mg Oral Q8H PRN David Correa MD        [START ON 5/6/2024] amLODIPine tablet 10 mg  10 mg Oral Daily David Correa MD        [START ON 5/6/2024] aspirin EC tablet 81 mg  81 mg Oral Daily David Correa MD        [START ON 5/6/2024] atorvastatin tablet 20 mg  20 mg Oral Daily David Correa MD        dextrose 50% injection 12.5 g  12.5 g Intravenous PRN David Correa MD        dextrose 50% injection 25 g  25 g Intravenous PRN David Correa MD        enoxaparin injection 40 mg  40 mg Subcutaneous Daily David Correa MD        glucagon (human recombinant) injection 1 mg  1 mg Intramuscular PRN David Correa MD        glucose chewable tablet 16 g  16 g Oral PRN David Correa MD        glucose chewable tablet 24 g  24 g Oral PRN David Correa MD        insulin aspart U-100 pen 0-5 Units  0-5 Units Subcutaneous QID (AC + HS) PRN David Correa MD        lactated ringers infusion   Intravenous Continuous David Correa MD        [START ON 5/6/2024] levothyroxine tablet 50 mcg  50 mcg Oral Daily David Correa MD        [START ON 5/6/2024] metoprolol succinate (TOPROL-XL) 24 hr tablet 50 mg  50 mg Oral Daily David Correa MD        morphine injection 2 mg  2 mg Intravenous Q4H PRN David Correa MD        morphine injection 4 mg  4 mg Intravenous Q4H PRN David Correa MD        ondansetron disintegrating tablet 8 mg  8 mg Oral Q8H PRN David Correa MD        prochlorperazine injection Soln 5 mg  5 mg Intravenous Q6H PRN David Correa MD         Current Outpatient Medications   Medication Sig Dispense Refill    acetaminophen (TYLENOL) 500 MG tablet Take 1,500 mg by mouth 2 (two) times daily as needed for Pain.      albuterol (PROVENTIL/VENTOLIN HFA) 90 mcg/actuation  inhaler Inhale 1-2 puffs into the lungs every 4 (four) hours as needed for Wheezing or Shortness of Breath. Rescue 18 g 4    ALPRAZolam (XANAX) 0.25 MG tablet TAKE ONE TABLET BY MOUTH NIGHTLY AS NEEDED FOR ANXIETY OR INSOMNIA 30 tablet 2    amlodipine-olmesartan (KARISSA) 10-40 mg per tablet TAKE 1 TABLET BY MOUTH ONCE DAILY FOR BLOOD PRESSURE. REPELACE WITH BENAZEPRIL/AMLODIPINE (LOTREL) 90 tablet 3    aspirin (ECOTRIN) 81 MG EC tablet TAKE 1 TABLET BY MOUTH EVERY DAY 90 tablet 2    atorvastatin (LIPITOR) 20 MG tablet Take 1 tablet (20 mg total) by mouth once daily. 90 tablet 3    blood-glucose meter (FREESTYLE LITE METER) kit Use as instructed 1 each 0    diphenhydrAMINE (BENADRYL) 25 mg capsule Take 50 mg by mouth 2 (two) times daily as needed for Allergies. (Patient not taking: Reported on 4/11/2024)      doxycycline (PERIOSTAT) 20 MG tablet Take 20 mg by mouth 2 (two) times daily.      fluticasone-salmeterol diskus inhaler 250-50 mcg INHALE 1 PUFF BY MOUTH TWICE A DAY (Patient taking differently: Inhale 1 puff into the lungs 2 (two) times daily as needed (shortness of breath).) 180 each 3    lancets (MICROLET LANCET) Misc Test blood sugar once daily. 100 each 3    levothyroxine (SYNTHROID) 50 MCG tablet Take 1 tablet (50 mcg total) by mouth once daily. 90 tablet 3    metFORMIN (GLUCOPHAGE) 500 MG tablet 1 tablet by mouth with dinner 90 tablet 3    metoprolol succinate (TOPROL-XL) 50 MG 24 hr tablet Take 1 tablet (50 mg total) by mouth once daily. For heart and blood presure 90 tablet 3    mupirocin (BACTROBAN) 2 % ointment Apply topically 3 (three) times daily. (Patient not taking: Reported on 4/11/2024) 15 g 0    nitroGLYCERIN (NITROSTAT) 0.4 MG SL tablet Place 1 tablet (0.4 mg total) under the tongue every 5 (five) minutes as needed for Chest pain ((Shakira RUST)). 25 tablet 11    PRECISION XTRA TEST Strp USE AS DIRECTED DAILY 100 strip 3    theophylline 400 mg Tb24 TAKE 1 TABLET BY MOUTH EVERY DAY 90 tablet 1     ursodioL (ACTIGALL) 300 mg capsule Take 1 capsule (300 mg total) by mouth 2 (two) times daily. for 14 days 28 capsule 0     Facility-Administered Medications Ordered in Other Encounters   Medication Dose Route Frequency Provider Last Rate Last Admin    sodium chloride 0.9% flush 10 mL  10 mL Intravenous PRN Galileo Murphy MD           Review of patient's allergies indicates:   Allergen Reactions    Sulfa (sulfonamide antibiotics) Rash       Past Medical History:   Diagnosis Date    ALLERGIC RHINITIS     Asthma     Cataract     Colon polyps     Diabetes mellitus     Hyperlipidemia     Hypertension     Hypothyroidism     Umbilical hernia      Past Surgical History:   Procedure Laterality Date    CARDIAC SURGERY      STENTS    CATARACT EXTRACTION W/  INTRAOCULAR LENS IMPLANT Left 2021    Procedure: EXTRACTION, CATARACT, WITH IOL INSERTION;  Surgeon: Galileo Murphy MD;  Location: [facility] OR;  Service: Ophthalmology;  Laterality: Left;    CATARACT EXTRACTION W/  INTRAOCULAR LENS IMPLANT Right 2021    Procedure: EXTRACTION, CATARACT, WITH IOL INSERTION;  Surgeon: Galileo Murphy MD;  Location: [facility] OR;  Service: Ophthalmology;  Laterality: Right;    COLONOSCOPY       Family History       Problem Relation (Age of Onset)    Cancer Mother (49)    Cataracts Mother    Glaucoma Paternal Grandfather    Heart disease Mother (40), Father    Heart failure Sister    Hyperlipidemia Mother, Maternal Grandmother    Ovarian cancer Mother          Tobacco Use    Smoking status: Former     Current packs/day: 0.00     Average packs/day: 1 pack/day for 20.0 years (20.0 ttl pk-yrs)     Types: Cigarettes     Start date: 1974     Quit date: 1994     Years since quittin.4    Smokeless tobacco: Never   Substance and Sexual Activity    Alcohol use: Yes     Comment: occasional use    Drug use: No    Sexual activity: Not Currently     Review of Systems   Constitutional:  Positive for appetite change.  Negative for activity change, fatigue and fever.   HENT: Negative.     Respiratory:  Negative for cough and shortness of breath.    Cardiovascular:  Negative for chest pain and leg swelling.   Gastrointestinal:  Positive for abdominal distention, abdominal pain and nausea. Negative for constipation, diarrhea and vomiting.     Objective:     Vital Signs (Most Recent):  Temp: 97.5 °F (36.4 °C) (05/05/24 1136)  Pulse: 72 (05/05/24 1136)  Resp: 18 (05/05/24 1136)  BP: (!) 157/72 (05/05/24 1136)  SpO2: (!) 93 % (05/05/24 1136) Vital Signs (24h Range):  Temp:  [97.5 °F (36.4 °C)-97.9 °F (36.6 °C)] 97.5 °F (36.4 °C)  Pulse:  [72-90] 72  Resp:  [16-18] 18  SpO2:  [93 %-96 %] 93 %  BP: (157-167)/(72-78) 157/72     Weight: 81.6 kg (180 lb)  Body mass index is 32.92 kg/m².     Physical Exam  Vitals and nursing note reviewed.   Constitutional:       General: She is not in acute distress.     Appearance: Normal appearance.   HENT:      Nose: Nose normal.      Mouth/Throat:      Mouth: Mucous membranes are moist.   Cardiovascular:      Rate and Rhythm: Normal rate and regular rhythm.   Pulmonary:      Effort: Pulmonary effort is normal. No respiratory distress.   Abdominal:      Comments: Abdomen soft, non-distended  There is a freely reducible umbilical hernia. Defect measures roughly 4cm  Tender to palpation to the epigastric region and RUQ; radiates to her right flank   Musculoskeletal:         General: Normal range of motion.   Skin:     General: Skin is warm and dry.   Neurological:      General: No focal deficit present.      Mental Status: She is alert.            I have reviewed all pertinent lab results within the past 24 hours.  CBC:   Recent Labs   Lab 05/05/24  1042 05/05/24  1050   WBC 18.54*  --    RBC 4.76  --    HGB 13.5  --    HCT 41.3 43     --    MCV 87  --    MCH 28.4  --    MCHC 32.7  --      CMP:   Recent Labs   Lab 05/05/24  1042   *   CALCIUM 10.8*   ALBUMIN 4.0   PROT 8.1   *   K 5.1    CO2 22*   CL 97   BUN 9   CREATININE 0.8   ALKPHOS 133   ALT 24   AST 23   BILITOT 0.5       Significant Diagnostics:  I have reviewed all pertinent imaging results/findings within the past 24 hours.

## 2024-05-05 NOTE — ED PROVIDER NOTES
"Encounter Date: 5/5/2024       History     Chief Complaint   Patient presents with    Abdominal Pain     73-year-old female with history of biliary colic, coronary artery disease status post coronary artery bypass graft, diabetes, hyperlipidemia, hypertension, umbilical hernia     Patient presents today with complaint of "gallbladder attack".  She reports pain to her bilateral upper quadrants and epigastrium underneath her ribs.  This began after eating dinner yesterday at 6:00 p.m..  It was similar to prior episodes of biliary colic.  She declined operative intervention in July 2023.  She reports extreme nausea but no vomiting.  She reports anorexia.  She denies any urinary symptoms she denies any fever or chills.      Review of patient's allergies indicates:   Allergen Reactions    Sulfa (sulfonamide antibiotics) Rash     Past Medical History:   Diagnosis Date    ALLERGIC RHINITIS     Asthma     Cataract     Colon polyps     Diabetes mellitus     Hyperlipidemia     Hypertension     Hypothyroidism     Umbilical hernia      Past Surgical History:   Procedure Laterality Date    CARDIAC SURGERY      STENTS    CATARACT EXTRACTION W/  INTRAOCULAR LENS IMPLANT Left 12/7/2021    Procedure: EXTRACTION, CATARACT, WITH IOL INSERTION;  Surgeon: Galileo Murphy MD;  Location: [facility] OR;  Service: Ophthalmology;  Laterality: Left;    CATARACT EXTRACTION W/  INTRAOCULAR LENS IMPLANT Right 12/21/2021    Procedure: EXTRACTION, CATARACT, WITH IOL INSERTION;  Surgeon: Galileo Murphy MD;  Location: [facility] OR;  Service: Ophthalmology;  Laterality: Right;    COLONOSCOPY       Family History   Problem Relation Name Age of Onset    Cancer Mother  49        ovarian    Heart disease Mother  40        M.I.    Hyperlipidemia Mother      Ovarian cancer Mother      Cataracts Mother      Heart disease Father      Glaucoma Paternal Grandfather      Heart failure Sister Maria L     Hyperlipidemia Maternal Grandmother      Amblyopia " Neg Hx      Blindness Neg Hx      Diabetes Neg Hx      Hypertension Neg Hx      Macular degeneration Neg Hx      Retinal detachment Neg Hx      Strabismus Neg Hx      Stroke Neg Hx      Thyroid disease Neg Hx      Heart attack Neg Hx       Social History     Tobacco Use    Smoking status: Former     Current packs/day: 0.00     Average packs/day: 1 pack/day for 20.0 years (20.0 ttl pk-yrs)     Types: Cigarettes     Start date: 1974     Quit date: 1994     Years since quittin.4    Smokeless tobacco: Never   Substance Use Topics    Alcohol use: Yes     Comment: occasional use    Drug use: No     Review of Systems    Physical Exam     Initial Vitals [24 0952]   BP Pulse Resp Temp SpO2   (!) 167/78 90 16 97.9 °F (36.6 °C) 96 %      MAP       --         Physical Exam    Nursing note and vitals reviewed.  Constitutional: She appears well-developed and well-nourished.   HENT:   Head: Normocephalic and atraumatic.   Eyes: Conjunctivae and EOM are normal. Pupils are equal, round, and reactive to light.   Neck: Neck supple.   Normal range of motion.  Cardiovascular:  Normal rate, regular rhythm, normal heart sounds and intact distal pulses.           Pulmonary/Chest: Breath sounds normal. No respiratory distress.   Abdominal: There is abdominal tenderness (epigastrium).   Negative baptiste's sign   Musculoskeletal:         General: No tenderness or edema. Normal range of motion.      Cervical back: Normal range of motion and neck supple.     Neurological: She is alert and oriented to person, place, and time. She has normal strength and normal reflexes. GCS score is 15. GCS eye subscore is 4. GCS verbal subscore is 5. GCS motor subscore is 6.   Skin: Skin is warm and dry. Capillary refill takes less than 2 seconds.         ED Course   Procedures  Labs Reviewed   CBC W/ AUTO DIFFERENTIAL - Abnormal; Notable for the following components:       Result Value    WBC 18.54 (*)     Gran # (ANC) 15.7 (*)      Immature Grans (Abs) 0.08 (*)     Mono # 1.2 (*)     Gran % 84.7 (*)     Lymph % 8.1 (*)     All other components within normal limits   COMPREHENSIVE METABOLIC PANEL - Abnormal; Notable for the following components:    Sodium 131 (*)     CO2 22 (*)     Glucose 176 (*)     Calcium 10.8 (*)     All other components within normal limits   URINALYSIS, REFLEX TO URINE CULTURE - Abnormal; Notable for the following components:    Glucose, UA 1+ (*)     Ketones, UA Trace (*)     Occult Blood UA 1+ (*)     All other components within normal limits    Narrative:     Specimen Source->Urine   COMPREHENSIVE METABOLIC PANEL - Abnormal; Notable for the following components:    BUN 5 (*)     All other components within normal limits   CBC W/ AUTO DIFFERENTIAL - Abnormal; Notable for the following components:    Lymph % 16.3 (*)     All other components within normal limits   ISTAT PROCEDURE - Abnormal; Notable for the following components:    POC Glucose 175 (*)     POC Sodium 131 (*)     All other components within normal limits   POCT GLUCOSE - Abnormal; Notable for the following components:    POCT Glucose 184 (*)     All other components within normal limits   LIPASE   TROPONIN I   URINALYSIS MICROSCOPIC    Narrative:     Specimen Source->Urine   MAGNESIUM   PHOSPHORUS   ISTAT CHEM8   POCT GLUCOSE MONITORING CONTINUOUS     EKG Readings: (Independently Interpreted)   Initial Reading: No STEMI. Previous EKG: Compared with most recent EKG Previous EKG Date: 7/9/23. Heart Rate: 83.   No significant change from previous       Imaging Results              CT Abdomen Pelvis With IV Contrast NO Oral Contrast (Final result)  Result time 05/05/24 13:27:51      Final result by Mica Mcdowell MD (05/05/24 13:27:51)                   Impression:      Cholelithiasis.    Fat and bowel containing umbilical hernia without obstructive process seen.    Mild diverticulosis coli.    Significant atherosclerotic plaque of the abdominal  aorta.    Stable mild wedge configuration T12 vertebrae.      Electronically signed by: Mica Mcdowell MD  Date:    05/05/2024  Time:    13:27               Narrative:    EXAMINATION:  CT ABDOMEN PELVIS WITH IV CONTRAST    CLINICAL HISTORY:  Epigastric pain;    TECHNIQUE:  Low dose axial images, sagittal and coronal reformations were obtained from the lung bases to the pubic symphysis following the IV administration of 100 mL of Omnipaque 350 .  Oral contrast was not given. Axial and coronal images reformatted.    COMPARISON:  07/09/2023 CT abdomen and pelvis    FINDINGS:  The lung basis demonstrate very minimal band of atelectasis, no pleural effusion.  No pericardial effusion.    The liver is upper normal limit in size.  The biliary ducts are not dilated.  Numerous stones noted in the gallbladder.  The common bile duct appears to taper normally.    The distal esophagus and stomach appear normal.    The pancreas, spleen, bilateral adrenal glands, bilateral kidneys and ureters appear normal.    The abdominal aorta tapers normally, there is severe calcified atherosclerotic plaque, no para-aortic lymphadenopathy.    Mild stool retention, mild burden of diverticula, no bowel dilation.  There is a fat and small bowel containing umbilical hernia without obstructive process seen.  No free air, no free fluid.  No mesenteric inflammatory change.    The bladder is nondistended.  The uterus and ovaries demonstrate nothing unusual.    The inguinal regions appear normal.    The osseous structures demonstrate no osseous lesions.  There is mild loss of height of the T12 vertebrae, unchanged from 07/09/2023 exam.                                       US Abdomen Limited (Final result)  Result time 05/05/24 12:40:07      Final result by Lambert Finley MD (05/05/24 12:40:07)                   Impression:      Cholelithiasis without sonographic findings to suggest acute cholecystitis.    Mild extrahepatic biliary  dilatation.    Electronically signed by resident: Esther Park  Date:    05/05/2024  Time:    11:45    Electronically signed by: Lambert Finley MD  Date:    05/05/2024  Time:    12:40               Narrative:    EXAMINATION:  US ABDOMEN LIMITED    CLINICAL HISTORY:  Biliary colic;    TECHNIQUE:  Limited ultrasound of the right upper quadrant of the abdomen (including pancreas, liver, gallbladder, common bile duct, and spleen) was performed.    COMPARISON:  Limited abdominal ultrasound 07/09/2023.  CT abdomen pelvis 07/09/2023.    FINDINGS:  Visualized portions of the pancreas are unremarkable.    Gallbladder: Multiple gallstones are seen, the largest measures 0.5 cm.  There is no gallbladder wall thickening or pericholecystic fluid.  No sonographic Everett's sign.    Biliary system: The common duct is mildly, measuring 8 mm. No intrahepatic ductal dilatation.    Miscellaneous: No upper abdominal ascites.                                       Medications   ondansetron disintegrating tablet 8 mg (has no administration in time range)   acetaminophen tablet 650 mg (has no administration in time range)   lactated ringers infusion ( Intravenous New Bag 5/5/24 1640)   enoxaparin injection 40 mg (40 mg Subcutaneous Given 5/5/24 1743)   morphine injection 2 mg (has no administration in time range)   morphine injection 4 mg (has no administration in time range)   prochlorperazine injection Soln 5 mg (has no administration in time range)   aspirin EC tablet 81 mg (81 mg Oral Given 5/6/24 0840)   amLODIPine tablet 10 mg (10 mg Oral Given 5/6/24 0840)   atorvastatin tablet 20 mg (20 mg Oral Given 5/6/24 0840)   levothyroxine tablet 50 mcg (50 mcg Oral Given 5/6/24 0615)   metoprolol succinate (TOPROL-XL) 24 hr tablet 50 mg (50 mg Oral Given 5/6/24 0840)   glucose chewable tablet 16 g (has no administration in time range)   glucose chewable tablet 24 g (has no administration in time range)   glucagon (human recombinant)  injection 1 mg (has no administration in time range)   insulin aspart U-100 pen 0-5 Units (has no administration in time range)   dextrose 10% bolus 125 mL 125 mL (has no administration in time range)   dextrose 10% bolus 250 mL 250 mL (has no administration in time range)   LIDOcaine (PF) 10 mg/ml (1%) injection 10 mg (has no administration in time range)   0.9%  NaCl infusion (has no administration in time range)   morphine injection 4 mg (4 mg Intravenous Given 5/5/24 1104)   ondansetron injection 4 mg (4 mg Intravenous Given 5/5/24 1104)   lactated ringers infusion (0 mLs Intravenous Stopped 5/5/24 1400)   iohexoL (OMNIPAQUE 350) injection 100 mL (100 mLs Intravenous Given 5/5/24 1249)     Medical Decision Making  Greatest concern is for cholecystitis, choledocholithiasis, cholangitis, or pancreatitis.  Can not rule out atypical ACS given patient's history.  Peptic ulcer disease or gastritis would also present with the symptoms.  Will obtain CT of abdomen.  Will obtain dedicated ultrasound right upper quadrant.  Will check liver function tests and lipase.  Will obtain white blood cell count.  Will discuss with General surgery if imaging is consistent with cholecystitis or choledocholithiasis.    Amount and/or Complexity of Data Reviewed  External Data Reviewed: labs, radiology, ECG and notes.     Details: Patient had observation stay for chest pain on July 9, 2023.  She was found to have gallstones and right upper quadrant TTP at that time.  She declined general surgery follow-up.  Labs: ordered. Decision-making details documented in ED Course.  Radiology: ordered and independent interpretation performed. Decision-making details documented in ED Course.  ECG/medicine tests: ordered and independent interpretation performed. Decision-making details documented in ED Course.  Discussion of management or test interpretation with external provider(s): D/w general surgery who will admit patient for likely  cholecystectomy    Risk  Prescription drug management.  Parenteral controlled substances.  Drug therapy requiring intensive monitoring for toxicity.  Decision regarding hospitalization.  Elective major surgery with no identified risk factors.               ED Course as of 05/06/24 1007   Sun May 05, 2024   1126 Comp. Metabolic Panel(!) [DS]   1126 CBC W/ AUTO DIFFERENTIAL(!) [DS]   1126 Urinalysis, Reflex to Urine Culture Urine, Clean Catch(!)  Lab assays remarkable for leukocytosis. [DS]   1129 EKG nonischemic.  Troponin negative.  Doubt atypical ACS presentation [DS]   1253 Imaging is consistent with cholelithiasis with mild extrahepatic biliary duct dilation [DS]      ED Course User Index  [DS] Sessions, Teofilo ESCOBAR MD                             Clinical Impression:  Final diagnoses:  [R10.13] Epigastric pain  [K80.50] Biliary colic (Primary)          ED Disposition Condition    Observation                 Sessions, Teofilo ESCOBAR MD  05/06/24 1008

## 2024-05-05 NOTE — CONSULTS
"[facility] - Emergency Dept  General Surgery  Consult Note    Patient Name: Noah Bailey  MRN: [id number]  Code Status: Full Code  Admission Date: 5/5/2024  Hospital Length of Stay: 0 days  Attending Physician: Errol Kohli MD  Primary Care Provider: Henry Mcbride MD    Patient information was obtained from patient and past medical records.     Inpatient consult to General surgery  Consult performed by: David Correa MD  Consult ordered by: Teofilo Bangura MD        Subjective:     Principal Problem: <principal problem not specified>    History of Present Illness: Noah Bailey is a 73yoF with a history of HTN, DM, HLD and hypothyroidism with a known history of cholelithiasis who presents with 18-hours of persistent epigastric and right upper quadrant abdominal pain. She states that for years she has experienced episodes of "gallstone pains." This episode began in a similar fashion but has lasted much longer than her typical episodes and has persistently worsened. She endorses associated heart burn and nausea. This prompted her presentation to the emergency room. In the emergency room, her work-up is significant for an elevated leukocytosis, normal LFTs and bilirubin. She underwent imaging with both an ultrasound and CT scan which demonstrates significant stone burden, no evidence of cholecystitis. General surgery was consulted.     On my exam, the patient is sitting up in the chair. She states that her pain remains present despite the IV narcotics. She is tender in the epigastric region and right upper quadrant. She has a reducible umbilical hernia. She denies any previous intra-abdominal surgeries and takes a baby aspirin daily.     Current Facility-Administered Medications   Medication Dose Route Frequency Provider Last Rate Last Admin    acetaminophen tablet 650 mg  650 mg Oral Q8H PRN David Correa MD        [START ON 5/6/2024] amLODIPine tablet 10 mg  10 mg Oral Daily Madeline, " David LIND MD        [START ON 5/6/2024] aspirin EC tablet 81 mg  81 mg Oral Daily David Correa MD        [START ON 5/6/2024] atorvastatin tablet 20 mg  20 mg Oral Daily David Correa MD        dextrose 50% injection 12.5 g  12.5 g Intravenous PRN David Correa MD        dextrose 50% injection 25 g  25 g Intravenous PRN David Correa MD        enoxaparin injection 40 mg  40 mg Subcutaneous Daily David Correa MD        glucagon (human recombinant) injection 1 mg  1 mg Intramuscular PRN David Correa MD        glucose chewable tablet 16 g  16 g Oral PRN David Correa MD        glucose chewable tablet 24 g  24 g Oral PRN David Correa MD        insulin aspart U-100 pen 0-5 Units  0-5 Units Subcutaneous QID (AC + HS) PRN David Correa MD        lactated ringers infusion   Intravenous Continuous David Correa MD        [START ON 5/6/2024] levothyroxine tablet 50 mcg  50 mcg Oral Daily David Correa MD        [START ON 5/6/2024] metoprolol succinate (TOPROL-XL) 24 hr tablet 50 mg  50 mg Oral Daily David Correa MD        morphine injection 2 mg  2 mg Intravenous Q4H PRN David Correa MD        morphine injection 4 mg  4 mg Intravenous Q4H PRN David Correa MD        ondansetron disintegrating tablet 8 mg  8 mg Oral Q8H PRN David Correa MD        prochlorperazine injection Soln 5 mg  5 mg Intravenous Q6H PRN David Correa MD         Current Outpatient Medications   Medication Sig Dispense Refill    acetaminophen (TYLENOL) 500 MG tablet Take 1,500 mg by mouth 2 (two) times daily as needed for Pain.      albuterol (PROVENTIL/VENTOLIN HFA) 90 mcg/actuation inhaler Inhale 1-2 puffs into the lungs every 4 (four) hours as needed for Wheezing or Shortness of Breath. Rescue 18 g 4    ALPRAZolam (XANAX) 0.25 MG tablet TAKE ONE TABLET BY MOUTH NIGHTLY AS NEEDED FOR ANXIETY OR INSOMNIA 30 tablet 2    amlodipine-olmesartan  (KARISSA) 10-40 mg per tablet TAKE 1 TABLET BY MOUTH ONCE DAILY FOR BLOOD PRESSURE. REPELACE WITH BENAZEPRIL/AMLODIPINE (LOTREL) 90 tablet 3    aspirin (ECOTRIN) 81 MG EC tablet TAKE 1 TABLET BY MOUTH EVERY DAY 90 tablet 2    atorvastatin (LIPITOR) 20 MG tablet Take 1 tablet (20 mg total) by mouth once daily. 90 tablet 3    blood-glucose meter (FREESTYLE LITE METER) kit Use as instructed 1 each 0    diphenhydrAMINE (BENADRYL) 25 mg capsule Take 50 mg by mouth 2 (two) times daily as needed for Allergies. (Patient not taking: Reported on 4/11/2024)      doxycycline (PERIOSTAT) 20 MG tablet Take 20 mg by mouth 2 (two) times daily.      fluticasone-salmeterol diskus inhaler 250-50 mcg INHALE 1 PUFF BY MOUTH TWICE A DAY (Patient taking differently: Inhale 1 puff into the lungs 2 (two) times daily as needed (shortness of breath).) 180 each 3    lancets (MICROLET LANCET) Misc Test blood sugar once daily. 100 each 3    levothyroxine (SYNTHROID) 50 MCG tablet Take 1 tablet (50 mcg total) by mouth once daily. 90 tablet 3    metFORMIN (GLUCOPHAGE) 500 MG tablet 1 tablet by mouth with dinner 90 tablet 3    metoprolol succinate (TOPROL-XL) 50 MG 24 hr tablet Take 1 tablet (50 mg total) by mouth once daily. For heart and blood presure 90 tablet 3    mupirocin (BACTROBAN) 2 % ointment Apply topically 3 (three) times daily. (Patient not taking: Reported on 4/11/2024) 15 g 0    nitroGLYCERIN (NITROSTAT) 0.4 MG SL tablet Place 1 tablet (0.4 mg total) under the tongue every 5 (five) minutes as needed for Chest pain ((Shakira RUST)). 25 tablet 11    PRECISION XTRA TEST Strp USE AS DIRECTED DAILY 100 strip 3    theophylline 400 mg Tb24 TAKE 1 TABLET BY MOUTH EVERY DAY 90 tablet 1    ursodioL (ACTIGALL) 300 mg capsule Take 1 capsule (300 mg total) by mouth 2 (two) times daily. for 14 days 28 capsule 0     Facility-Administered Medications Ordered in Other Encounters   Medication Dose Route Frequency Provider Last Rate Last Admin    sodium  chloride 0.9% flush 10 mL  10 mL Intravenous PRN Galileo Murphy MD           Review of patient's allergies indicates:   Allergen Reactions    Sulfa (sulfonamide antibiotics) Rash       Past Medical History:   Diagnosis Date    ALLERGIC RHINITIS     Asthma     Cataract     Colon polyps     Diabetes mellitus     Hyperlipidemia     Hypertension     Hypothyroidism     Umbilical hernia      Past Surgical History:   Procedure Laterality Date    CARDIAC SURGERY      STENTS    CATARACT EXTRACTION W/  INTRAOCULAR LENS IMPLANT Left 2021    Procedure: EXTRACTION, CATARACT, WITH IOL INSERTION;  Surgeon: Galileo Murphy MD;  Location: [facility] OR;  Service: Ophthalmology;  Laterality: Left;    CATARACT EXTRACTION W/  INTRAOCULAR LENS IMPLANT Right 2021    Procedure: EXTRACTION, CATARACT, WITH IOL INSERTION;  Surgeon: Galileo Murphy MD;  Location: [facility] OR;  Service: Ophthalmology;  Laterality: Right;    COLONOSCOPY       Family History       Problem Relation (Age of Onset)    Cancer Mother (49)    Cataracts Mother    Glaucoma Paternal Grandfather    Heart disease Mother (40), Father    Heart failure Sister    Hyperlipidemia Mother, Maternal Grandmother    Ovarian cancer Mother          Tobacco Use    Smoking status: Former     Current packs/day: 0.00     Average packs/day: 1 pack/day for 20.0 years (20.0 ttl pk-yrs)     Types: Cigarettes     Start date: 1974     Quit date: 1994     Years since quittin.4    Smokeless tobacco: Never   Substance and Sexual Activity    Alcohol use: Yes     Comment: occasional use    Drug use: No    Sexual activity: Not Currently     Review of Systems   Constitutional:  Positive for appetite change. Negative for activity change, fatigue and fever.   HENT: Negative.     Respiratory:  Negative for cough and shortness of breath.    Cardiovascular:  Negative for chest pain and leg swelling.   Gastrointestinal:  Positive for abdominal distention, abdominal  pain and nausea. Negative for constipation, diarrhea and vomiting.     Objective:     Vital Signs (Most Recent):  Temp: 97.5 °F (36.4 °C) (05/05/24 1136)  Pulse: 72 (05/05/24 1136)  Resp: 18 (05/05/24 1136)  BP: (!) 157/72 (05/05/24 1136)  SpO2: (!) 93 % (05/05/24 1136) Vital Signs (24h Range):  Temp:  [97.5 °F (36.4 °C)-97.9 °F (36.6 °C)] 97.5 °F (36.4 °C)  Pulse:  [72-90] 72  Resp:  [16-18] 18  SpO2:  [93 %-96 %] 93 %  BP: (157-167)/(72-78) 157/72     Weight: 81.6 kg (180 lb)  Body mass index is 32.92 kg/m².     Physical Exam  Vitals and nursing note reviewed.   Constitutional:       General: She is not in acute distress.     Appearance: Normal appearance.   HENT:      Nose: Nose normal.      Mouth/Throat:      Mouth: Mucous membranes are moist.   Cardiovascular:      Rate and Rhythm: Normal rate and regular rhythm.   Pulmonary:      Effort: Pulmonary effort is normal. No respiratory distress.   Abdominal:      Comments: Abdomen soft, non-distended  There is a freely reducible umbilical hernia. Defect measures roughly 4cm  Tender to palpation to the epigastric region and RUQ; radiates to her right flank   Musculoskeletal:         General: Normal range of motion.   Skin:     General: Skin is warm and dry.   Neurological:      General: No focal deficit present.      Mental Status: She is alert.            I have reviewed all pertinent lab results within the past 24 hours.  CBC:   Recent Labs   Lab 05/05/24  1042 05/05/24  1050   WBC 18.54*  --    RBC 4.76  --    HGB 13.5  --    HCT 41.3 43     --    MCV 87  --    MCH 28.4  --    MCHC 32.7  --      CMP:   Recent Labs   Lab 05/05/24  1042   *   CALCIUM 10.8*   ALBUMIN 4.0   PROT 8.1   *   K 5.1   CO2 22*   CL 97   BUN 9   CREATININE 0.8   ALKPHOS 133   ALT 24   AST 23   BILITOT 0.5       Significant Diagnostics:  I have reviewed all pertinent imaging results/findings within the past 24 hours.    Assessment/Plan:     Biliary colic  Kirsten Bailey is a  73yoF with a history of HTN, DM, HLD, and hypothyroidism who presents with biliary colic versus early cholecystitis.     - patient seen and examined   - labs and imaging reviewed   - normal LFTs and bilirubin; marginal dilation of common bile duct on ultrasound  - discussed options of conservative management versus laparoscopic cholecystectomy   - will plan to proceed with laparoscopic cholecystectomy on 5/6/24   - case request placed   - consent to be obtained  - ok for clears now; NPO at midnight  - no antibiotics warranted at this time  - home meds restarted as appropriate      VTE Risk Mitigation (From admission, onward)           Ordered     enoxaparin injection 40 mg  Daily         05/05/24 1525     IP VTE HIGH RISK PATIENT  Once         05/05/24 1525     Place sequential compression device  Until discontinued         05/05/24 1525                    Thank you for your consult. I will follow-up with patient. Please contact us if you have any additional questions.    David Correa MD  General Surgery  [facility] - Emergency Dept

## 2024-05-05 NOTE — ASSESSMENT & PLAN NOTE
Kirsten Bailey is a 73yoF with a history of HTN, DM, HLD, and hypothyroidism who presents with biliary colic versus early cholecystitis.     - patient seen and examined   - labs and imaging reviewed   - normal LFTs and bilirubin; marginal dilation of common bile duct on ultrasound  - discussed options of conservative management versus laparoscopic cholecystectomy   - will plan to proceed with laparoscopic cholecystectomy on 5/6/24   - case request placed   - consent to be obtained  - ok for clears now; NPO at midnight  - no antibiotics warranted at this time  - home meds restarted as appropriate

## 2024-05-05 NOTE — ED NOTES
Patient identifiers for Noah Bailey 73 y.o. female checked and correct.  Chief Complaint   Patient presents with    Abdominal Pain     Past Medical History:   Diagnosis Date    ALLERGIC RHINITIS     Asthma     Cataract     Colon polyps     Diabetes mellitus     Hyperlipidemia     Hypertension     Hypothyroidism     Umbilical hernia      Allergies reported:   Review of patient's allergies indicates:   Allergen Reactions    Sulfa (sulfonamide antibiotics) Rash     Pt endorses having gallstones for 20 years. Unable to ambulate far due to back pian, pt prefers to stay in wheelchair     LOC: Patient is awake, alert, and aware of environment with an appropriate affect. Patient is oriented x 4 and speaking appropriately.  APPEARANCE: Patient resting comfortably and in no acute distress. Patient is clean and well groomed, patient's clothing is properly fastened.  HEENT: WDL  SKIN: The skin is warm and dry. Patient has normal skin turgor and moist mucus membranes.   MUSCULOSKELETAL: Patient is moving all extremities well, no obvious deformities noted. Pulses intact.   RESPIRATORY: Airway is open and patent. Respirations are spontaneous and non-labored with normal effort and rate.  CARDIAC: Patient has a normal rate and rhythm. 88 on cardiac monitor. No peripheral edema noted. Denies chest pain and SOB at at time of assessment.   ABDOMEN: No distention noted. Soft and non-tender upon palpation.  NEUROLOGICAL: pupils 3mm, PERRL. Facial expression is symmetrical. Hand grasps are equal bilaterally. Normal sensation in all extremities when touched with finger.

## 2024-05-05 NOTE — ED NOTES
I-STAT Chem-8+ Results:   Value Reference Range   Sodium 131 136-145 mmol/L   Potassium  5.1 3.5-5.1 mmol/L   Chloride 95  mmol/L   Ionized Calcium 1.22 1.06-1.42 mmol/L   CO2 (measured) 23 23-29 mmol/L   Glucose 175  mg/dL   BUN 8 6-30 mg/dL   Creatinine 0.5 0.5-1.4 mg/dL   Hematocrit 43 36-54%

## 2024-05-05 NOTE — HPI
"Noah Bailey is a 73yoF with a history of HTN, DM, HLD and hypothyroidism with a known history of cholelithiasis who presents with 18-hours of persistent epigastric and right upper quadrant abdominal pain. She states that for years she has experienced episodes of "gallstone pains." This episode began in a similar fashion but has lasted much longer than her typical episodes and has persistently worsened. She endorses associated heart burn and nausea. This prompted her presentation to the emergency room. In the emergency room, her work-up is significant for an elevated leukocytosis, normal LFTs and bilirubin. She underwent imaging with both an ultrasound and CT scan which demonstrates significant stone burden, no evidence of cholecystitis. General surgery was consulted.     On my exam, the patient is sitting up in the chair. She states that her pain remains present despite the IV narcotics. She is tender in the epigastric region and right upper quadrant. She has a reducible umbilical hernia. She denies any previous intra-abdominal surgeries and takes a baby aspirin daily.   "

## 2024-05-06 ENCOUNTER — ANESTHESIA (OUTPATIENT)
Dept: SURGERY | Facility: HOSPITAL | Age: 74
End: 2024-05-06
Payer: COMMERCIAL

## 2024-05-06 PROBLEM — E11.65 TYPE 2 DIABETES MELLITUS WITH HYPERGLYCEMIA, WITHOUT LONG-TERM CURRENT USE OF INSULIN: Status: ACTIVE | Noted: 2024-05-06

## 2024-05-06 LAB
ALBUMIN SERPL BCP-MCNC: 3.6 G/DL (ref 3.5–5.2)
ALP SERPL-CCNC: 117 U/L (ref 55–135)
ALT SERPL W/O P-5'-P-CCNC: 19 U/L (ref 10–44)
ANION GAP SERPL CALC-SCNC: 10 MMOL/L (ref 8–16)
AST SERPL-CCNC: 17 U/L (ref 10–40)
BASOPHILS # BLD AUTO: 0.05 K/UL (ref 0–0.2)
BASOPHILS NFR BLD: 0.5 % (ref 0–1.9)
BILIRUB SERPL-MCNC: 0.7 MG/DL (ref 0.1–1)
BUN SERPL-MCNC: 5 MG/DL (ref 8–23)
CALCIUM SERPL-MCNC: 9.8 MG/DL (ref 8.7–10.5)
CHLORIDE SERPL-SCNC: 104 MMOL/L (ref 95–110)
CO2 SERPL-SCNC: 25 MMOL/L (ref 23–29)
CREAT SERPL-MCNC: 0.7 MG/DL (ref 0.5–1.4)
DIFFERENTIAL METHOD BLD: ABNORMAL
EOSINOPHIL # BLD AUTO: 0.1 K/UL (ref 0–0.5)
EOSINOPHIL NFR BLD: 1.1 % (ref 0–8)
ERYTHROCYTE [DISTWIDTH] IN BLOOD BY AUTOMATED COUNT: 14 % (ref 11.5–14.5)
EST. GFR  (NO RACE VARIABLE): >60 ML/MIN/1.73 M^2
GLUCOSE SERPL-MCNC: 106 MG/DL (ref 70–110)
HCT VFR BLD AUTO: 40.6 % (ref 37–48.5)
HGB BLD-MCNC: 13.1 G/DL (ref 12–16)
IMM GRANULOCYTES # BLD AUTO: 0.03 K/UL (ref 0–0.04)
IMM GRANULOCYTES NFR BLD AUTO: 0.3 % (ref 0–0.5)
LYMPHOCYTES # BLD AUTO: 1.7 K/UL (ref 1–4.8)
LYMPHOCYTES NFR BLD: 16.3 % (ref 18–48)
MAGNESIUM SERPL-MCNC: 2 MG/DL (ref 1.6–2.6)
MCH RBC QN AUTO: 27.9 PG (ref 27–31)
MCHC RBC AUTO-ENTMCNC: 32.3 G/DL (ref 32–36)
MCV RBC AUTO: 87 FL (ref 82–98)
MONOCYTES # BLD AUTO: 1 K/UL (ref 0.3–1)
MONOCYTES NFR BLD: 9.5 % (ref 4–15)
NEUTROPHILS # BLD AUTO: 7.5 K/UL (ref 1.8–7.7)
NEUTROPHILS NFR BLD: 72.3 % (ref 38–73)
NRBC BLD-RTO: 0 /100 WBC
OHS QRS DURATION: 70 MS
OHS QTC CALCULATION: 415 MS
PHOSPHATE SERPL-MCNC: 4 MG/DL (ref 2.7–4.5)
PLATELET # BLD AUTO: 273 K/UL (ref 150–450)
PMV BLD AUTO: 11.5 FL (ref 9.2–12.9)
POCT GLUCOSE: 120 MG/DL (ref 70–110)
POCT GLUCOSE: 134 MG/DL (ref 70–110)
POCT GLUCOSE: 209 MG/DL (ref 70–110)
POTASSIUM SERPL-SCNC: 4.3 MMOL/L (ref 3.5–5.1)
PROT SERPL-MCNC: 7.3 G/DL (ref 6–8.4)
RBC # BLD AUTO: 4.69 M/UL (ref 4–5.4)
SODIUM SERPL-SCNC: 139 MMOL/L (ref 136–145)
WBC # BLD AUTO: 10.39 K/UL (ref 3.9–12.7)

## 2024-05-06 PROCEDURE — D9220A PRA ANESTHESIA: Mod: CRNA,,, | Performed by: NURSE ANESTHETIST, CERTIFIED REGISTERED

## 2024-05-06 PROCEDURE — 82962 GLUCOSE BLOOD TEST: CPT | Performed by: SURGERY

## 2024-05-06 PROCEDURE — 36000708 HC OR TIME LEV III 1ST 15 MIN: Performed by: SURGERY

## 2024-05-06 PROCEDURE — 37000008 HC ANESTHESIA 1ST 15 MINUTES: Performed by: SURGERY

## 2024-05-06 PROCEDURE — 85025 COMPLETE CBC W/AUTO DIFF WBC: CPT | Performed by: STUDENT IN AN ORGANIZED HEALTH CARE EDUCATION/TRAINING PROGRAM

## 2024-05-06 PROCEDURE — 25000003 PHARM REV CODE 250: Performed by: STUDENT IN AN ORGANIZED HEALTH CARE EDUCATION/TRAINING PROGRAM

## 2024-05-06 PROCEDURE — 80053 COMPREHEN METABOLIC PANEL: CPT | Performed by: STUDENT IN AN ORGANIZED HEALTH CARE EDUCATION/TRAINING PROGRAM

## 2024-05-06 PROCEDURE — 25000003 PHARM REV CODE 250: Performed by: NURSE ANESTHETIST, CERTIFIED REGISTERED

## 2024-05-06 PROCEDURE — 99232 SBSQ HOSP IP/OBS MODERATE 35: CPT | Mod: 57,,, | Performed by: STUDENT IN AN ORGANIZED HEALTH CARE EDUCATION/TRAINING PROGRAM

## 2024-05-06 PROCEDURE — 71000016 HC POSTOP RECOV ADDL HR: Performed by: SURGERY

## 2024-05-06 PROCEDURE — 47562 LAPAROSCOPIC CHOLECYSTECTOMY: CPT | Mod: ,,, | Performed by: SURGERY

## 2024-05-06 PROCEDURE — 88302 TISSUE EXAM BY PATHOLOGIST: CPT | Mod: 26,,, | Performed by: STUDENT IN AN ORGANIZED HEALTH CARE EDUCATION/TRAINING PROGRAM

## 2024-05-06 PROCEDURE — 96372 THER/PROPH/DIAG INJ SC/IM: CPT | Performed by: STUDENT IN AN ORGANIZED HEALTH CARE EDUCATION/TRAINING PROGRAM

## 2024-05-06 PROCEDURE — 36000709 HC OR TIME LEV III EA ADD 15 MIN: Performed by: SURGERY

## 2024-05-06 PROCEDURE — 27000221 HC OXYGEN, UP TO 24 HOURS

## 2024-05-06 PROCEDURE — 88304 TISSUE EXAM BY PATHOLOGIST: CPT | Performed by: STUDENT IN AN ORGANIZED HEALTH CARE EDUCATION/TRAINING PROGRAM

## 2024-05-06 PROCEDURE — G0378 HOSPITAL OBSERVATION PER HR: HCPCS

## 2024-05-06 PROCEDURE — 88302 TISSUE EXAM BY PATHOLOGIST: CPT | Performed by: STUDENT IN AN ORGANIZED HEALTH CARE EDUCATION/TRAINING PROGRAM

## 2024-05-06 PROCEDURE — 63600175 PHARM REV CODE 636 W HCPCS: Mod: JZ,JG | Performed by: SURGERY

## 2024-05-06 PROCEDURE — D9220A PRA ANESTHESIA: Mod: ANES,,, | Performed by: ANESTHESIOLOGY

## 2024-05-06 PROCEDURE — 71000033 HC RECOVERY, INTIAL HOUR: Performed by: SURGERY

## 2024-05-06 PROCEDURE — 63600175 PHARM REV CODE 636 W HCPCS: Performed by: NURSE ANESTHETIST, CERTIFIED REGISTERED

## 2024-05-06 PROCEDURE — 71000039 HC RECOVERY, EACH ADD'L HOUR: Performed by: SURGERY

## 2024-05-06 PROCEDURE — 27201423 OPTIME MED/SURG SUP & DEVICES STERILE SUPPLY: Performed by: SURGERY

## 2024-05-06 PROCEDURE — 94761 N-INVAS EAR/PLS OXIMETRY MLT: CPT

## 2024-05-06 PROCEDURE — 84100 ASSAY OF PHOSPHORUS: CPT | Performed by: STUDENT IN AN ORGANIZED HEALTH CARE EDUCATION/TRAINING PROGRAM

## 2024-05-06 PROCEDURE — 63600175 PHARM REV CODE 636 W HCPCS: Performed by: ANESTHESIOLOGY

## 2024-05-06 PROCEDURE — 96361 HYDRATE IV INFUSION ADD-ON: CPT

## 2024-05-06 PROCEDURE — 88304 TISSUE EXAM BY PATHOLOGIST: CPT | Mod: 26,,, | Performed by: STUDENT IN AN ORGANIZED HEALTH CARE EDUCATION/TRAINING PROGRAM

## 2024-05-06 PROCEDURE — 63600175 PHARM REV CODE 636 W HCPCS: Performed by: STUDENT IN AN ORGANIZED HEALTH CARE EDUCATION/TRAINING PROGRAM

## 2024-05-06 PROCEDURE — 83735 ASSAY OF MAGNESIUM: CPT | Performed by: STUDENT IN AN ORGANIZED HEALTH CARE EDUCATION/TRAINING PROGRAM

## 2024-05-06 PROCEDURE — 37000009 HC ANESTHESIA EA ADD 15 MINS: Performed by: SURGERY

## 2024-05-06 PROCEDURE — 25000003 PHARM REV CODE 250: Performed by: SURGERY

## 2024-05-06 PROCEDURE — 99900035 HC TECH TIME PER 15 MIN (STAT)

## 2024-05-06 PROCEDURE — 71000015 HC POSTOP RECOV 1ST HR: Performed by: SURGERY

## 2024-05-06 RX ORDER — LIDOCAINE HYDROCHLORIDE 20 MG/ML
INJECTION, SOLUTION EPIDURAL; INFILTRATION; INTRACAUDAL; PERINEURAL
Status: DISCONTINUED | OUTPATIENT
Start: 2024-05-06 | End: 2024-05-06

## 2024-05-06 RX ORDER — ROCURONIUM BROMIDE 10 MG/ML
INJECTION, SOLUTION INTRAVENOUS
Status: DISCONTINUED | OUTPATIENT
Start: 2024-05-06 | End: 2024-05-06

## 2024-05-06 RX ORDER — BUPIVACAINE HYDROCHLORIDE 2.5 MG/ML
INJECTION, SOLUTION EPIDURAL; INFILTRATION; INTRACAUDAL
Status: DISCONTINUED | OUTPATIENT
Start: 2024-05-06 | End: 2024-05-06 | Stop reason: HOSPADM

## 2024-05-06 RX ORDER — ONDANSETRON HYDROCHLORIDE 2 MG/ML
4 INJECTION, SOLUTION INTRAVENOUS ONCE AS NEEDED
Status: DISCONTINUED | OUTPATIENT
Start: 2024-05-06 | End: 2024-05-06 | Stop reason: HOSPADM

## 2024-05-06 RX ORDER — DROPERIDOL 2.5 MG/ML
0.62 INJECTION, SOLUTION INTRAMUSCULAR; INTRAVENOUS ONCE AS NEEDED
Status: DISCONTINUED | OUTPATIENT
Start: 2024-05-06 | End: 2024-05-06 | Stop reason: HOSPADM

## 2024-05-06 RX ORDER — LIDOCAINE HYDROCHLORIDE 10 MG/ML
1 INJECTION, SOLUTION EPIDURAL; INFILTRATION; INTRACAUDAL; PERINEURAL ONCE
Status: DISCONTINUED | OUTPATIENT
Start: 2024-05-06 | End: 2024-05-06 | Stop reason: HOSPADM

## 2024-05-06 RX ORDER — DEXMEDETOMIDINE HYDROCHLORIDE 100 UG/ML
INJECTION, SOLUTION INTRAVENOUS
Status: DISCONTINUED | OUTPATIENT
Start: 2024-05-06 | End: 2024-05-06

## 2024-05-06 RX ORDER — SODIUM CHLORIDE 9 MG/ML
INJECTION, SOLUTION INTRAVENOUS CONTINUOUS
Status: DISCONTINUED | OUTPATIENT
Start: 2024-05-06 | End: 2024-05-06

## 2024-05-06 RX ORDER — HYDROMORPHONE HYDROCHLORIDE 1 MG/ML
0.2 INJECTION, SOLUTION INTRAMUSCULAR; INTRAVENOUS; SUBCUTANEOUS EVERY 5 MIN PRN
Status: DISCONTINUED | OUTPATIENT
Start: 2024-05-06 | End: 2024-05-06 | Stop reason: HOSPADM

## 2024-05-06 RX ORDER — ACETAMINOPHEN 10 MG/ML
INJECTION, SOLUTION INTRAVENOUS
Status: DISCONTINUED | OUTPATIENT
Start: 2024-05-06 | End: 2024-05-06

## 2024-05-06 RX ORDER — PHENYLEPHRINE HCL IN 0.9% NACL 1 MG/10 ML
SYRINGE (ML) INTRAVENOUS
Status: DISCONTINUED | OUTPATIENT
Start: 2024-05-06 | End: 2024-05-06

## 2024-05-06 RX ORDER — PROPOFOL 10 MG/ML
VIAL (ML) INTRAVENOUS
Status: DISCONTINUED | OUTPATIENT
Start: 2024-05-06 | End: 2024-05-06

## 2024-05-06 RX ORDER — ONDANSETRON HYDROCHLORIDE 2 MG/ML
INJECTION, SOLUTION INTRAVENOUS
Status: DISCONTINUED | OUTPATIENT
Start: 2024-05-06 | End: 2024-05-06

## 2024-05-06 RX ORDER — CEFAZOLIN 2 G/1
INJECTION, POWDER, FOR SOLUTION INTRAMUSCULAR; INTRAVENOUS
Status: DISCONTINUED | OUTPATIENT
Start: 2024-05-06 | End: 2024-05-06

## 2024-05-06 RX ORDER — FENTANYL CITRATE 50 UG/ML
INJECTION, SOLUTION INTRAMUSCULAR; INTRAVENOUS
Status: DISCONTINUED | OUTPATIENT
Start: 2024-05-06 | End: 2024-05-06

## 2024-05-06 RX ORDER — DEXAMETHASONE SODIUM PHOSPHATE 4 MG/ML
INJECTION, SOLUTION INTRA-ARTICULAR; INTRALESIONAL; INTRAMUSCULAR; INTRAVENOUS; SOFT TISSUE
Status: DISCONTINUED | OUTPATIENT
Start: 2024-05-06 | End: 2024-05-06

## 2024-05-06 RX ADMIN — Medication 200 MCG: at 12:05

## 2024-05-06 RX ADMIN — LIDOCAINE HYDROCHLORIDE 100 MG: 20 INJECTION, SOLUTION EPIDURAL; INFILTRATION; INTRACAUDAL at 11:05

## 2024-05-06 RX ADMIN — GLYCOPYRROLATE 0.2 MG: 0.2 INJECTION INTRAMUSCULAR; INTRAVENOUS at 12:05

## 2024-05-06 RX ADMIN — DEXMEDETOMIDINE 8 MCG: 200 INJECTION, SOLUTION INTRAVENOUS at 11:05

## 2024-05-06 RX ADMIN — Medication 100 MCG: at 12:05

## 2024-05-06 RX ADMIN — Medication 100 MCG: at 11:05

## 2024-05-06 RX ADMIN — DEXAMETHASONE SODIUM PHOSPHATE 8 MG: 4 INJECTION INTRA-ARTICULAR; INTRALESIONAL; INTRAMUSCULAR; INTRAVENOUS; SOFT TISSUE at 11:05

## 2024-05-06 RX ADMIN — ENOXAPARIN SODIUM 40 MG: 40 INJECTION SUBCUTANEOUS at 06:05

## 2024-05-06 RX ADMIN — ATORVASTATIN CALCIUM 20 MG: 20 TABLET, FILM COATED ORAL at 08:05

## 2024-05-06 RX ADMIN — LEVOTHYROXINE SODIUM 50 MCG: 50 TABLET ORAL at 06:05

## 2024-05-06 RX ADMIN — AMLODIPINE BESYLATE 10 MG: 10 TABLET ORAL at 08:05

## 2024-05-06 RX ADMIN — GLYCOPYRROLATE 1 MG: 0.2 INJECTION INTRAMUSCULAR; INTRAVENOUS at 11:05

## 2024-05-06 RX ADMIN — HYDROMORPHONE HYDROCHLORIDE 0.2 MG: 1 INJECTION, SOLUTION INTRAMUSCULAR; INTRAVENOUS; SUBCUTANEOUS at 02:05

## 2024-05-06 RX ADMIN — SODIUM CHLORIDE: 0.9 INJECTION, SOLUTION INTRAVENOUS at 11:05

## 2024-05-06 RX ADMIN — HYDROMORPHONE HYDROCHLORIDE 0.2 MG: 1 INJECTION, SOLUTION INTRAMUSCULAR; INTRAVENOUS; SUBCUTANEOUS at 01:05

## 2024-05-06 RX ADMIN — CEFAZOLIN 2 G: 330 INJECTION, POWDER, FOR SOLUTION INTRAMUSCULAR; INTRAVENOUS at 11:05

## 2024-05-06 RX ADMIN — FENTANYL CITRATE 100 MCG: 50 INJECTION INTRAMUSCULAR; INTRAVENOUS at 11:05

## 2024-05-06 RX ADMIN — Medication 200 MCG: at 11:05

## 2024-05-06 RX ADMIN — ASPIRIN 81 MG: 81 TABLET, COATED ORAL at 08:05

## 2024-05-06 RX ADMIN — PROPOFOL 150 MG: 10 INJECTION, EMULSION INTRAVENOUS at 11:05

## 2024-05-06 RX ADMIN — SUGAMMADEX 200 MG: 100 INJECTION, SOLUTION INTRAVENOUS at 12:05

## 2024-05-06 RX ADMIN — ACETAMINOPHEN 1000 MG: 10 INJECTION, SOLUTION INTRAVENOUS at 11:05

## 2024-05-06 RX ADMIN — ROCURONIUM BROMIDE 50 MG: 10 INJECTION, SOLUTION INTRAVENOUS at 11:05

## 2024-05-06 RX ADMIN — METOPROLOL SUCCINATE 50 MG: 50 TABLET, EXTENDED RELEASE ORAL at 08:05

## 2024-05-06 RX ADMIN — SODIUM CHLORIDE: 0.9 INJECTION, SOLUTION INTRAVENOUS at 12:05

## 2024-05-06 RX ADMIN — ONDANSETRON 4 MG: 2 INJECTION INTRAMUSCULAR; INTRAVENOUS at 12:05

## 2024-05-06 NOTE — SUBJECTIVE & OBJECTIVE
Interval History: NAEON. Afebrile. HDS. Pain is controlled with current regimen. No new symptoms or concerns this morning. All questions answered.   WBC 18 > 10  To OR today for cholecystectomy     Medications:  Continuous Infusions:  Current Facility-Administered Medications   Medication Dose Route Frequency Last Rate Last Admin    lactated ringers   Intravenous Continuous 75 mL/hr at 05/05/24 1640 New Bag at 05/05/24 1640     Scheduled Meds:  Current Facility-Administered Medications   Medication Dose Route Frequency    amLODIPine  10 mg Oral Daily    aspirin  81 mg Oral Daily    atorvastatin  20 mg Oral Daily    enoxparin  40 mg Subcutaneous Daily    levothyroxine  50 mcg Oral Before breakfast    metoprolol succinate  50 mg Oral Daily     PRN Meds:  Current Facility-Administered Medications:     acetaminophen, 650 mg, Oral, Q8H PRN    dextrose 10%, 12.5 g, Intravenous, PRN    dextrose 10%, 25 g, Intravenous, PRN    glucagon (human recombinant), 1 mg, Intramuscular, PRN    glucose, 16 g, Oral, PRN    glucose, 24 g, Oral, PRN    insulin aspart U-100, 0-5 Units, Subcutaneous, QID (AC + HS) PRN    morphine, 2 mg, Intravenous, Q4H PRN    morphine, 4 mg, Intravenous, Q4H PRN    ondansetron, 8 mg, Oral, Q8H PRN    prochlorperazine, 5 mg, Intravenous, Q6H PRN     Review of patient's allergies indicates:   Allergen Reactions    Sulfa (sulfonamide antibiotics) Rash     Objective:     Vital Signs (Most Recent):  Temp: 98 °F (36.7 °C) (05/05/24 2346)  Pulse: 73 (05/06/24 0409)  Resp: 16 (05/05/24 1918)  BP: 131/66 (05/06/24 0409)  SpO2: (S) (!) 90 % (05/06/24 0615) Vital Signs (24h Range):  Temp:  [97.5 °F (36.4 °C)-98.4 °F (36.9 °C)] 98 °F (36.7 °C)  Pulse:  [65-90] 73  Resp:  [16-18] 16  SpO2:  [89 %-97 %] 90 %  BP: (121-167)/(66-81) 131/66     Weight: 81.6 kg (180 lb)  Body mass index is 32.92 kg/m².    Intake/Output - Last 3 Shifts       None             Physical Exam  Vitals and nursing note reviewed.   Constitutional:        General: She is not in acute distress.     Appearance: She is not diaphoretic.      Comments: Room air   HENT:      Head: Normocephalic and atraumatic.      Mouth/Throat:      Mouth: Mucous membranes are moist.      Pharynx: Oropharynx is clear.   Eyes:      Extraocular Movements: Extraocular movements intact.      Conjunctiva/sclera: Conjunctivae normal.   Cardiovascular:      Rate and Rhythm: Normal rate.   Pulmonary:      Effort: Pulmonary effort is normal. No respiratory distress.   Abdominal:      General: There is no distension.      Palpations: Abdomen is soft.      Tenderness: There is no guarding or rebound.      Hernia: A hernia is present.      Comments: Soft, reducible umbilical hernia without erythema or overlying skin changes   Tender to palpation to the epigastric region and RUQ; radiates to her right flank. No peritonitic signs    Musculoskeletal:         General: No deformity.   Skin:     General: Skin is warm and dry.   Neurological:      Mental Status: She is alert and oriented to person, place, and time.          Significant Labs:  I have reviewed all pertinent lab results within the past 24 hours.    Significant Diagnostics:  I have reviewed all pertinent imaging results/findings within the past 24 hours.

## 2024-05-06 NOTE — ASSESSMENT & PLAN NOTE
- SSI   - POCT glucose checks  - Hypoglycemia protocol  - Holding home meds in setting of acute disease process

## 2024-05-06 NOTE — NURSING
Patient a 73 female admitted on the unit after having a lap karly and umbilical hernia repair.patient has four lap sites three covered with derma bond and the umbilical covered with dry gauze and secured with tegaderm. Patient now lying in bed with T.V. on call bell in reach.

## 2024-05-06 NOTE — ED NOTES
I received report from BRANT Zamora, and assumed care of pt at this time. Pt resting comfortably and independently repositioned in stretcher with bed locked in lowest position for safety. NAD noted at this time. Respirations even and unlabored and visible chest rise noted.  Patient offered bathroom assistance and denies need at this time. Pt instructed to call if assistance is needed. Call light within reach. No needs at this time. Will continue to monitor.  Family at bedside.

## 2024-05-06 NOTE — PROGRESS NOTES
"[facility] - Emergency Dept  General Surgery  Progress Note    Subjective:     History of Present Illness:  Noah Bailey is a 73yoF with a history of HTN, DM, HLD and hypothyroidism with a known history of cholelithiasis who presents with 18-hours of persistent epigastric and right upper quadrant abdominal pain. She states that for years she has experienced episodes of "gallstone pains." This episode began in a similar fashion but has lasted much longer than her typical episodes and has persistently worsened. She endorses associated heart burn and nausea. This prompted her presentation to the emergency room. In the emergency room, her work-up is significant for an elevated leukocytosis, normal LFTs and bilirubin. She underwent imaging with both an ultrasound and CT scan which demonstrates significant stone burden, no evidence of cholecystitis. General surgery was consulted.     On my exam, the patient is sitting up in the chair. She states that her pain remains present despite the IV narcotics. She is tender in the epigastric region and right upper quadrant. She has a reducible umbilical hernia. She denies any previous intra-abdominal surgeries and takes a baby aspirin daily.     Post-Op Info:  Procedure(s) (LRB):  CHOLECYSTECTOMY, LAPAROSCOPIC (N/A)   Day of Surgery     Interval History: NAEON. Afebrile. HDS. Pain is controlled with current regimen. No new symptoms or concerns this morning. All questions answered.   WBC 18 > 10  To OR today for cholecystectomy     Medications:  Continuous Infusions:  Current Facility-Administered Medications   Medication Dose Route Frequency Last Rate Last Admin    lactated ringers   Intravenous Continuous 75 mL/hr at 05/05/24 1640 New Bag at 05/05/24 1640     Scheduled Meds:  Current Facility-Administered Medications   Medication Dose Route Frequency    amLODIPine  10 mg Oral Daily    aspirin  81 mg Oral Daily    atorvastatin  20 mg Oral Daily    enoxparin  40 mg Subcutaneous " Daily    levothyroxine  50 mcg Oral Before breakfast    metoprolol succinate  50 mg Oral Daily     PRN Meds:  Current Facility-Administered Medications:     acetaminophen, 650 mg, Oral, Q8H PRN    dextrose 10%, 12.5 g, Intravenous, PRN    dextrose 10%, 25 g, Intravenous, PRN    glucagon (human recombinant), 1 mg, Intramuscular, PRN    glucose, 16 g, Oral, PRN    glucose, 24 g, Oral, PRN    insulin aspart U-100, 0-5 Units, Subcutaneous, QID (AC + HS) PRN    morphine, 2 mg, Intravenous, Q4H PRN    morphine, 4 mg, Intravenous, Q4H PRN    ondansetron, 8 mg, Oral, Q8H PRN    prochlorperazine, 5 mg, Intravenous, Q6H PRN     Review of patient's allergies indicates:   Allergen Reactions    Sulfa (sulfonamide antibiotics) Rash     Objective:     Vital Signs (Most Recent):  Temp: 98 °F (36.7 °C) (05/05/24 2346)  Pulse: 73 (05/06/24 0409)  Resp: 16 (05/05/24 1918)  BP: 131/66 (05/06/24 0409)  SpO2: (S) (!) 90 % (05/06/24 0615) Vital Signs (24h Range):  Temp:  [97.5 °F (36.4 °C)-98.4 °F (36.9 °C)] 98 °F (36.7 °C)  Pulse:  [65-90] 73  Resp:  [16-18] 16  SpO2:  [89 %-97 %] 90 %  BP: (121-167)/(66-81) 131/66     Weight: 81.6 kg (180 lb)  Body mass index is 32.92 kg/m².    Intake/Output - Last 3 Shifts       None             Physical Exam  Vitals and nursing note reviewed.   Constitutional:       General: She is not in acute distress.     Appearance: She is not diaphoretic.      Comments: Room air   HENT:      Head: Normocephalic and atraumatic.      Mouth/Throat:      Mouth: Mucous membranes are moist.      Pharynx: Oropharynx is clear.   Eyes:      Extraocular Movements: Extraocular movements intact.      Conjunctiva/sclera: Conjunctivae normal.   Cardiovascular:      Rate and Rhythm: Normal rate.   Pulmonary:      Effort: Pulmonary effort is normal. No respiratory distress.   Abdominal:      General: There is no distension.      Palpations: Abdomen is soft.      Tenderness: There is no guarding or rebound.      Hernia: A hernia  is present.      Comments: Soft, reducible umbilical hernia without erythema or overlying skin changes   Tender to palpation to the epigastric region and RUQ; radiates to her right flank. No peritonitic signs    Musculoskeletal:         General: No deformity.   Skin:     General: Skin is warm and dry.   Neurological:      Mental Status: She is alert and oriented to person, place, and time.          Significant Labs:  I have reviewed all pertinent lab results within the past 24 hours.    Significant Diagnostics:  I have reviewed all pertinent imaging results/findings within the past 24 hours.  Assessment/Plan:     * Biliary colic  Kirsten Bailey is a 73 year old female with a history of HTN, DM, HLD, and hypothyroidism who presents with biliary colic versus early cholecystitis.     - NPO  - To OR today for cholecystectomy  - Consent obtained  - IVF  - PRN pain and nausea medications  - Daily labs  - Replete electrolytes PRN  - DVT prophylaxis (SCDs and lovenox)  - OOB, ambulate  - Home meds as appropriate    Dispo: not yet medically stable for dc       Type 2 diabetes mellitus with hyperglycemia, without long-term current use of insulin  - SSI   - POCT glucose checks  - Hypoglycemia protocol  - Holding home meds in setting of acute disease process    Hypothyroidism  - Continue home synthroid 50mcg daily     Coronary artery disease involving native coronary artery of native heart without angina pectoris  - Continue home metoprolol 50mg daily and ASA 81mg daily     Hyperlipidemia  - Continue home atorvastatin 20mg daily     Essential hypertension  - Continue home norvasc  - Continue to monitor with q4 hours vitals and adjust regimen PRN      Case discussed with Dr. Kohli.      LUANN BarretoC  General Surgery  [facility] - Emergency Dept

## 2024-05-06 NOTE — ASSESSMENT & PLAN NOTE
Kirsten Bailey is a 73 year old female with a history of HTN, DM, HLD, and hypothyroidism who presents with biliary colic versus early cholecystitis.     - NPO  - To OR today for cholecystectomy  - Consent obtained  - IVF  - PRN pain and nausea medications  - Daily labs  - Replete electrolytes PRN  - DVT prophylaxis (SCDs and lovenox)  - OOB, ambulate  - Home meds as appropriate    Dispo: not yet medically stable for dc

## 2024-05-06 NOTE — ANESTHESIA PROCEDURE NOTES
Intubation    Date/Time: 5/6/2024 11:32 AM    Performed by: Veto Clemens III, CRNA  Authorized by: Damir Michelle MD    Intubation:     Induction:  Intravenous    Intubated:  Postinduction    Mask Ventilation:  Easy mask    Attempts:  1    Attempted By:  MARTHA    Blade:  Chan 3    Laryngeal View Grade: Grade I - full view of cords      Difficult Airway Encountered?: No      Complications:  None    Airway Device:  Oral endotracheal tube    Airway Device Size:  7.5    Style/Cuff Inflation:  Cuffed (inflated to minimal occlusive pressure)    Inflation Amount (mL):  4    Tube secured:  21    Secured at:  The lips    Placement Verified By:  Capnometry    Findings Post-Intubation:  BS equal bilateral and atraumatic/condition of teeth unchanged

## 2024-05-06 NOTE — BRIEF OP NOTE
[facility] - [facility] ([facility])  Brief Operative Note    Surgery Date: 5/6/2024     Surgeons and Role:     * Errol Kohli MD - Primary     * Richard Marks MD - Assisting     * David Quach MD - Resident - Assisting        Pre-op Diagnosis:  Biliary colic [K80.50]    Post-op Diagnosis:  Post-Op Diagnosis Codes:     * Biliary colic [K80.50]    Procedure(s) (LRB):  CHOLECYSTECTOMY, LAPAROSCOPIC (N/A)  REPAIR, HERNIA, UMBILICAL    Anesthesia: General    Operative Findings:   - Acute on chronic cholecystitis   - Umbilical hernia repair     Estimated Blood Loss: * No values recorded between 5/6/2024 11:46 AM and 5/6/2024 12:55 PM *         Specimens:   Specimen (24h ago, onward)       Start     Ordered    05/06/24 1234  Specimen to Pathology, Surgery General Surgery  Once        Comments: Pre-op Diagnosis: Biliary colic [K80.50]Procedure(s):CHOLECYSTECTOMY, LAPAROSCOPIC Number of specimens: 2Name of specimens: 1. Gallbladder - Perm. 2. Hernia sac - Perm.     References:    Click here for ordering Quick Tip   Question Answer Comment   Procedure Type: General Surgery    Specimen Class: Routine/Screening    Release to patient Immediate        05/06/24 1234                     Detail Level: Zone Detail Level: Generalized Detail Level: Detailed

## 2024-05-06 NOTE — ED NOTES
Pt reports the pain has subsided and it is much better than before. Pt reports 4/10 pain at this time.

## 2024-05-06 NOTE — PLAN OF CARE
[facility] - Surgery (2nd Fl)  Initial Discharge Assessment       Primary Care Provider: Henry Mcbride MD    Admission Diagnosis: Mixed hyperlipidemia [E78.2]  Biliary colic [K80.50]  Epigastric pain [R10.13]  Essential hypertension [I10]  Umbilical hernia without obstruction and without gangrene [K42.9]  Hypothyroidism, unspecified type [E03.9]  Type 2 diabetes mellitus with hyperglycemia, without long-term current use of insulin [E11.65]    Admission Date: 5/5/2024  Expected Discharge Date:     Pt stated she is independent with her ambulation and ADL's and does not require assistance or equipment    Pt to d/c home with no needs when ready    Transition of Care Barriers: (P) None    Payor: BLUE CROSS BLUE SHIELD / Plan: BCBS [facility] / Product Type: Commercial /     Extended Emergency Contact Information  Primary Emergency Contact: Damir Dawson  Mobile Phone: [phone]  Relation: Friend    Discharge Plan A: (P) Home  Discharge Plan B: (P) Home      [facility] - [place] - [address]  [address]  [place] [address]  Phone: [phone] Fax: [phone]    [id number] - [place] - [address]  [id number] [facility]  [place] [address]  Phone: [phone] Fax: [phone]      Initial Assessment (most recent)       Adult Discharge Assessment - 05/06/24 1026          Discharge Assessment    Assessment Type Discharge Planning Assessment (P)      Confirmed/corrected address, phone number and insurance Yes (P)      Confirmed Demographics Correct on Facesheet (P)      Source of Information patient (P)      Reason For Admission Biliary colic (P)      People in Home alone (P)      Facility Arrived From: home (P)      Do you expect to return to your current living situation? Yes (P)      Do you have help at home or someone to help you manage your care at home? No (P)      Prior to hospitilization cognitive status: Alert/Oriented;No  Deficits (P)      Current cognitive status: No Deficits;Alert/Oriented (P)      Walking or Climbing Stairs Difficulty no (P)      Dressing/Bathing Difficulty no (P)      Home Accessibility wheelchair accessible (P)      Home Layout Able to live on 1st floor (P)      Equipment Currently Used at Home none (P)      Patient currently being followed by outpatient case management? No (P)      Do you currently have service(s) that help you manage your care at home? No (P)      Do you have any problems affording any of your prescribed medications? No (P)      Is the patient taking medications as prescribed? yes (P)      Who is going to help you get home at discharge? family/friends (P)      How do you get to doctors appointments? car, drives self (P)      Are you on dialysis? No (P)      Do you take coumadin? No (P)      Discharge Plan A Home (P)      Discharge Plan B Home (P)      DME Needed Upon Discharge  none (P)      Discharge Plan discussed with: Patient (P)      Transition of Care Barriers None (P)         Physical Activity    On average, how many days per week do you engage in moderate to strenuous exercise (like a brisk walk)? 3 days (P)      On average, how many minutes do you engage in exercise at this level? 30 min (P)         Financial Resource Strain    How hard is it for you to pay for the very basics like food, housing, medical care, and heating? Not very hard (P)         Housing Stability    In the last 12 months, was there a time when you were not able to pay the mortgage or rent on time? No (P)      At any time in the past 12 months, were you homeless or living in a shelter (including now)? No (P)         Transportation Needs    In the past 12 months, has lack of transportation kept you from medical appointments or from getting medications? No (P)      In the past 12 months, has lack of transportation kept you from meetings, work, or from getting things needed for daily living? No (P)         Food  Insecurity    Within the past 12 months, you worried that your food would run out before you got the money to buy more. Never true (P)      Within the past 12 months, the food you bought just didn't last and you didn't have money to get more. Never true (P)         Stress    Do you feel stress - tense, restless, nervous, or anxious, or unable to sleep at night because your mind is troubled all the time - these days? To some extent (P)         Alcohol Use    Q1: How often do you have a drink containing alcohol? Monthly or less (P)      Q2: How many drinks containing alcohol do you have on a typical day when you are drinking? 1 or 2 (P)      Q3: How often do you have six or more drinks on one occasion? Never (P)                         Alexsandra Fernandes CD, MSW, LMSW, RSW   Case Management  [facility]  Email: [email or web address]

## 2024-05-06 NOTE — OP NOTE
DATE OF PROCEDURE: 05/06/2024.     PREOPERATIVE DIAGNOSIS:   Acute cholecystitis.   Umbilical hernia.    POSTOPERATIVE DIAGNOSIS: Same.     PROCEDURE PERFORMED:   Laparoscopic cholecystectomy.   Umbilical hernia repair (1.5 cm)    ATTENDING SURGEON: Errol Kohli MD.     RESIDENT: David Quach MD ([facility]).    : Richard Hoffman MD (RES).    ANESTHESIA: General endotracheal.     INDICATIONS: Noah Bailey is a 73 y.o.female who presented to the ER right upper quadrant abdominal pain. The history and exam were consistent with acute cholecystitis, which was confirmed by laboratory studies and ultrasound. We recommended laparoscopic cholecystectomy and the patient agreed to proceed. We will also repair an existing umbilical hernia. The patient signed informed consent and expressed understanding of the risks and benefits of surgery.     OPERATIVE PROCEDURE: The patient was taken to the operating room and placed supine. After induction of general endotracheal tube anesthesia, the abdomen was prepped and draped in the standard fashion. Timeout was performed. A  infraumbilical skin incision was made. Subcutaneous tissue was bluntly dissected from the umbilical hernia sac.  The hernia sac was opened and the abdomen was entered without difficulty.  Defect measured 1.5 cm. A Wanda trocar was placed and the abdomen was insufflated with carbon dioxide to a pressure of 15 mmHg. A 10-mm laparoscope was placed and the abdomen was examined. There was no evidence of injury related to entry. Three 5-mm trocars were placed under direct vision through separate stab incisions, one subxiphoid and two in the right upper quadrant. We directed our attention to the right upper quadrant. The gallbladder was identified and noted to have acute on chronic inflammatory change. The fundus was grasped and retracted cranially and the infundibulum was grasped and retracted laterally. We bluntly dissected the peritoneal reflection  off the infundibulum and neck of the gallbladder. With careful blunt dissection in this area, we were able to identify the cystic duct. Further careful dissection identified the cystic artery and we did obtain a critical view of safety. Both duct and artery were triply clipped and divided. The gallbladder was dissected off the gallbladder fossa using Bovie electrocautery from infundibulum to fundus until free. It was placed into an EndoCatch bag and removed from the umbilical port site with no difficulty. The gallbladder fossa was examined and no bleeding or bile leak were noted. The clips on the cystic duct and artery were intact. The right upper quadrant was irrigated with saline briefly until the returning effluent was clear. All ports were removed under direct vision and no bleeding was noted. The insufflation was evacuated. The umbilical hernia defect was closed with 0 Vicryl suture. Local anesthetic was applied to each incision were closed with subcuticular 4-0 Monocryl. Dermabond was applied. The patient was extubated and transferred to the Recovery Room in good condition. All needle and sponge counts were correct at the conclusion of the case. I was present for the procedure in its entirety.    ESTIMATED BLOOD LOSS: 15 mL.     FINDINGS: Critical view obtained prior to clip placement.      SPECIMEN: Gallbladder.

## 2024-05-06 NOTE — NURSING TRANSFER
Nursing Transfer Note      5/6/2024   2:33 PM    Nurse giving handoff:Dre LINDA Rn  Nurse receiving handoff: Sudha Cuevas    Reason patient is being transferred: postop    Transfer To: 522    Transfer via stretcher    Transfer with n/a    Transported by transport    Transfer Vital Signs:  Blood Pressure:see flowsheet  Heart Rate:  O2:  Temperature:  Respirations:    Telemetry: n/a  Order for Tele Monitor? N/a    Additional Lines: Oxygen        Medicines sent: n/a    Any special needs or follow-up needed: n/a    Patient belongings transferred with patient: Yes x2 bags    Chart send with patient: Yes    Notified: daughter    Patient reassessed at: 5/6/24  1  Upon arrival to floor: bed in lowest position

## 2024-05-06 NOTE — TRANSFER OF CARE
"Anesthesia Transfer of Care Note    Patient: Noah Bailey    Procedure(s) Performed: Procedure(s) (LRB):  CHOLECYSTECTOMY, LAPAROSCOPIC (N/A)  REPAIR, HERNIA, UMBILICAL    Patient location: PACU    Transport from OR: Transported from OR on 6-10 L/min O2 by face mask with adequate spontaneous ventilation    Post pain: adequate analgesia    Post assessment: no apparent anesthetic complications and tolerated procedure well    Post vital signs: stable    Level of consciousness: awake and alert    Nausea/Vomiting: no nausea/vomiting    Complications: none    Transfer of care protocol was followed      Last vitals: Visit Vitals  /78 (BP Location: Right forearm, Patient Position: Lying)   Pulse 86   Temp 36.8 °C (98.3 °F) (Oral)   Resp 16   Ht 5' 2" (1.575 m)   Wt 81.6 kg (180 lb)   SpO2 (!) 93%   Breastfeeding No   BMI 32.92 kg/m²     "

## 2024-05-07 VITALS
HEART RATE: 79 BPM | BODY MASS INDEX: 33.26 KG/M2 | OXYGEN SATURATION: 91 % | RESPIRATION RATE: 16 BRPM | HEIGHT: 62 IN | TEMPERATURE: 98 F | DIASTOLIC BLOOD PRESSURE: 60 MMHG | WEIGHT: 180.75 LBS | SYSTOLIC BLOOD PRESSURE: 130 MMHG

## 2024-05-07 PROBLEM — E87.1 HYPONATREMIA: Status: ACTIVE | Noted: 2024-05-07

## 2024-05-07 LAB
ALBUMIN SERPL BCP-MCNC: 3.3 G/DL (ref 3.5–5.2)
ALP SERPL-CCNC: 110 U/L (ref 55–135)
ALT SERPL W/O P-5'-P-CCNC: 27 U/L (ref 10–44)
ANION GAP SERPL CALC-SCNC: 8 MMOL/L (ref 8–16)
AST SERPL-CCNC: 31 U/L (ref 10–40)
BASOPHILS # BLD AUTO: 0.03 K/UL (ref 0–0.2)
BASOPHILS NFR BLD: 0.2 % (ref 0–1.9)
BILIRUB SERPL-MCNC: 0.3 MG/DL (ref 0.1–1)
BUN SERPL-MCNC: 14 MG/DL (ref 8–23)
CALCIUM SERPL-MCNC: 9.3 MG/DL (ref 8.7–10.5)
CHLORIDE SERPL-SCNC: 104 MMOL/L (ref 95–110)
CO2 SERPL-SCNC: 23 MMOL/L (ref 23–29)
CREAT SERPL-MCNC: 0.8 MG/DL (ref 0.5–1.4)
DIFFERENTIAL METHOD BLD: ABNORMAL
EOSINOPHIL # BLD AUTO: 0 K/UL (ref 0–0.5)
EOSINOPHIL NFR BLD: 0 % (ref 0–8)
ERYTHROCYTE [DISTWIDTH] IN BLOOD BY AUTOMATED COUNT: 13.9 % (ref 11.5–14.5)
EST. GFR  (NO RACE VARIABLE): >60 ML/MIN/1.73 M^2
GLUCOSE SERPL-MCNC: 134 MG/DL (ref 70–110)
HCT VFR BLD AUTO: 38.3 % (ref 37–48.5)
HGB BLD-MCNC: 12.6 G/DL (ref 12–16)
IMM GRANULOCYTES # BLD AUTO: 0.09 K/UL (ref 0–0.04)
IMM GRANULOCYTES NFR BLD AUTO: 0.5 % (ref 0–0.5)
LYMPHOCYTES # BLD AUTO: 0.9 K/UL (ref 1–4.8)
LYMPHOCYTES NFR BLD: 5.4 % (ref 18–48)
MAGNESIUM SERPL-MCNC: 2.1 MG/DL (ref 1.6–2.6)
MCH RBC QN AUTO: 28.8 PG (ref 27–31)
MCHC RBC AUTO-ENTMCNC: 32.9 G/DL (ref 32–36)
MCV RBC AUTO: 87 FL (ref 82–98)
MONOCYTES # BLD AUTO: 1 K/UL (ref 0.3–1)
MONOCYTES NFR BLD: 5.7 % (ref 4–15)
NEUTROPHILS # BLD AUTO: 15 K/UL (ref 1.8–7.7)
NEUTROPHILS NFR BLD: 88.2 % (ref 38–73)
NRBC BLD-RTO: 0 /100 WBC
PHOSPHATE SERPL-MCNC: 2.3 MG/DL (ref 2.7–4.5)
PLATELET # BLD AUTO: 310 K/UL (ref 150–450)
PMV BLD AUTO: 11.3 FL (ref 9.2–12.9)
POCT GLUCOSE: 126 MG/DL (ref 70–110)
POTASSIUM SERPL-SCNC: 5.2 MMOL/L (ref 3.5–5.1)
PROT SERPL-MCNC: 7 G/DL (ref 6–8.4)
RBC # BLD AUTO: 4.38 M/UL (ref 4–5.4)
SODIUM SERPL-SCNC: 135 MMOL/L (ref 136–145)
WBC # BLD AUTO: 17 K/UL (ref 3.9–12.7)

## 2024-05-07 PROCEDURE — 63600175 PHARM REV CODE 636 W HCPCS: Performed by: STUDENT IN AN ORGANIZED HEALTH CARE EDUCATION/TRAINING PROGRAM

## 2024-05-07 PROCEDURE — 25000003 PHARM REV CODE 250

## 2024-05-07 PROCEDURE — 85025 COMPLETE CBC W/AUTO DIFF WBC: CPT | Performed by: STUDENT IN AN ORGANIZED HEALTH CARE EDUCATION/TRAINING PROGRAM

## 2024-05-07 PROCEDURE — 36415 COLL VENOUS BLD VENIPUNCTURE: CPT | Performed by: STUDENT IN AN ORGANIZED HEALTH CARE EDUCATION/TRAINING PROGRAM

## 2024-05-07 PROCEDURE — 96374 THER/PROPH/DIAG INJ IV PUSH: CPT | Mod: 59

## 2024-05-07 PROCEDURE — 84100 ASSAY OF PHOSPHORUS: CPT | Performed by: STUDENT IN AN ORGANIZED HEALTH CARE EDUCATION/TRAINING PROGRAM

## 2024-05-07 PROCEDURE — 80053 COMPREHEN METABOLIC PANEL: CPT | Performed by: STUDENT IN AN ORGANIZED HEALTH CARE EDUCATION/TRAINING PROGRAM

## 2024-05-07 PROCEDURE — 96372 THER/PROPH/DIAG INJ SC/IM: CPT | Performed by: STUDENT IN AN ORGANIZED HEALTH CARE EDUCATION/TRAINING PROGRAM

## 2024-05-07 PROCEDURE — 25000003 PHARM REV CODE 250: Performed by: STUDENT IN AN ORGANIZED HEALTH CARE EDUCATION/TRAINING PROGRAM

## 2024-05-07 PROCEDURE — 83735 ASSAY OF MAGNESIUM: CPT | Performed by: STUDENT IN AN ORGANIZED HEALTH CARE EDUCATION/TRAINING PROGRAM

## 2024-05-07 RX ORDER — SODIUM,POTASSIUM PHOSPHATES 280-250MG
2 POWDER IN PACKET (EA) ORAL ONCE
Status: COMPLETED | OUTPATIENT
Start: 2024-05-07 | End: 2024-05-07

## 2024-05-07 RX ORDER — OXYCODONE HYDROCHLORIDE 5 MG/1
5 TABLET ORAL EVERY 4 HOURS PRN
Qty: 10 TABLET | Refills: 0 | Status: SHIPPED | OUTPATIENT
Start: 2024-05-07

## 2024-05-07 RX ADMIN — POTASSIUM & SODIUM PHOSPHATES POWDER PACK 280-160-250 MG 2 PACKET: 280-160-250 PACK at 05:05

## 2024-05-07 RX ADMIN — INSULIN ASPART 1 UNITS: 100 INJECTION, SOLUTION INTRAVENOUS; SUBCUTANEOUS at 12:05

## 2024-05-07 RX ADMIN — AMLODIPINE BESYLATE 10 MG: 10 TABLET ORAL at 09:05

## 2024-05-07 RX ADMIN — LEVOTHYROXINE SODIUM 50 MCG: 50 TABLET ORAL at 05:05

## 2024-05-07 RX ADMIN — ATORVASTATIN CALCIUM 20 MG: 20 TABLET, FILM COATED ORAL at 09:05

## 2024-05-07 RX ADMIN — ASPIRIN 81 MG: 81 TABLET, COATED ORAL at 09:05

## 2024-05-07 RX ADMIN — MORPHINE SULFATE 2 MG: 2 INJECTION, SOLUTION INTRAMUSCULAR; INTRAVENOUS at 12:05

## 2024-05-07 RX ADMIN — METOPROLOL SUCCINATE 50 MG: 50 TABLET, EXTENDED RELEASE ORAL at 09:05

## 2024-05-07 NOTE — ANESTHESIA POSTPROCEDURE EVALUATION
Anesthesia Post Evaluation    Patient: Noah Bailey    Procedure(s) Performed: Procedure(s) (LRB):  CHOLECYSTECTOMY, LAPAROSCOPIC (N/A)  REPAIR, HERNIA, UMBILICAL    Final Anesthesia Type: general      Patient location during evaluation: PACU  Patient participation: Yes- Able to Participate  Level of consciousness: awake  Post-procedure vital signs: reviewed and stable  Pain management: adequate  Airway patency: patent    PONV status at discharge: No PONV  Anesthetic complications: no      Cardiovascular status: blood pressure returned to baseline  Respiratory status: unassisted  Hydration status: euvolemic  Follow-up not needed.              Vitals Value Taken Time   /61 05/07/24 0525   Temp 36.9 °C (98.4 °F) 05/07/24 0525   Pulse 73 05/07/24 0525   Resp 20 05/07/24 0525   SpO2 95 % 05/07/24 0525         Event Time   Out of Recovery 05/06/2024 14:00:00         Pain/Izabela Score: Pain Rating Prior to Med Admin: 10 (5/7/2024 12:18 AM)  Pain Rating Post Med Admin: 6 (5/6/2024  3:00 PM)  Izabela Score: 9 (5/6/2024  3:00 PM)

## 2024-05-07 NOTE — NURSING
Dsg to abd C/D/I , Iv site wnl , pt tolerating meds and meals ,pt voiding without difficulties , vss, no distress

## 2024-05-07 NOTE — CARE UPDATE
I have reviewed the chart of Noah Bailey and collaborated with Errol Kohli MD in the care of the patient who is hospitalized for the following:    Active Hospital Problems    Diagnosis    *Biliary colic    Hyponatremia     Monitor labs      Type 2 diabetes mellitus with hyperglycemia, without long-term current use of insulin    Hypothyroidism    BMI 30.0-30.9,adult    Coronary artery disease involving native coronary artery of native heart without angina pectoris    Umbilical hernia    Hyperlipidemia    Essential hypertension          I have reviewed Noah Bailey with the multidisciplinary team during discharge huddle.       Jelly Garcia PA-C  [facility]

## 2024-05-07 NOTE — DISCHARGE SUMMARY
"[facility] - Surgery  General Surgery  Discharge Summary      Patient Name: Noah Bailey  MRN: [id number]  Admission Date: 5/5/2024  Hospital Length of Stay: 1 days  Discharge Date and Time:  05/07/2024 7:32 AM  Attending Physician: Errol Kohli MD   Discharging Provider: Richard Hoffman MD  Primary Care Provider: Henry Mcbride MD     HPI:   73yoF with a history of HTN, DM, HLD and hypothyroidism with a known history of cholelithiasis who presents with 18-hours of persistent epigastric and right upper quadrant abdominal pain. She states that for years she has experienced episodes of "gallstone pains." This episode began in a similar fashion but has lasted much longer than her typical episodes and has persistently worsened. She endorses associated heart burn and nausea. This prompted her presentation to the emergency room. In the emergency room, her work-up is significant for an elevated leukocytosis, normal LFTs and bilirubin. She underwent imaging with both an ultrasound and CT scan which demonstrates significant stone burden, no evidence of cholecystitis. General surgery was consulted.      On my exam, the patient is sitting up in the chair. She states that her pain remains present despite the IV narcotics. She is tender in the epigastric region and right upper quadrant. She has a reducible umbilical hernia. She denies any previous intra-abdominal surgeries and takes a baby aspirin daily.     Procedure(s) (LRB):  CHOLECYSTECTOMY, LAPAROSCOPIC (N/A)  REPAIR, HERNIA, UMBILICAL     Hospital Course:   Pt underwent lap karly and umbilical hernia repair on 5/16/24. See operative note for procedure details. Tolerated procedure well. Post-operative course was uncomplicated. All post-operative milestones were met without delay. She was tolerating diet, ambulating, voiding spontaneously, and her pain was well controlled. Patient was instructed and educated on discharge instructions. Hyperkalemic in morning " "labs, patient refused a new set of labs before discharge. States that this has happened before and will touch base with her PCP.    Physical exam  /61 (BP Location: Left arm, Patient Position: Sitting)   Pulse 73   Temp 98.4 °F (36.9 °C) (Oral)   Resp 20   Ht 5' 2" (1.575 m)   Wt 82 kg (180 lb 12.4 oz)   SpO2 95%   Breastfeeding No   BMI 33.06 kg/m²   Constitutional:       General: She is not in acute distress.     Appearance: She is not diaphoretic.      Comments: Room air   HENT:      Head: Normocephalic and atraumatic.      Mouth/Throat:      Mouth: Mucous membranes are moist.      Pharynx: Oropharynx is clear.   Eyes:      Extraocular Movements: Extraocular movements intact.      Conjunctiva/sclera: Conjunctivae normal.   Cardiovascular:      Rate and Rhythm: Normal rate.   Pulmonary:      Effort: Pulmonary effort is normal. No respiratory distress.   Abdominal:      General: There is no distension.      Palpations: Abdomen is soft.      Tenderness: There is no guarding or rebound. Incisions c/d/i     General: No deformity.   Skin:     General: Skin is warm and dry.   Neurological:      Mental Status: She is alert and oriented to person, place, and time.      Consults:   Consults (From admission, onward)          Status Ordering Provider     Inpatient consult to General surgery  Once        Provider:  (Not yet assigned)    Completed SESSIONS, GREGORY ESCOBAR            Significant Diagnostic Studies: Labs: BMP:   Recent Labs   Lab 05/05/24  1042 05/06/24  0410 05/07/24  0320   * 106 134*   * 139 135*   K 5.1 4.3 5.2*   CL 97 104 104   CO2 22* 25 23   BUN 9 5* 14   CREATININE 0.8 0.7 0.8   CALCIUM 10.8* 9.8 9.3   MG  --  2.0 2.1    and CBC   Recent Labs   Lab 05/05/24  1042 05/05/24  1050 05/06/24  0410 05/07/24  0320   WBC 18.54*  --  10.39 17.00*   HGB 13.5  --  13.1 12.6   HCT 41.3   < > 40.6 38.3     --  273 310    < > = values in this interval not displayed.       Pending " Diagnostic Studies:       Procedure Component Value Units Date/Time    Specimen to Pathology, Surgery General Surgery [[id number]] Collected: 05/06/24 1234    Order Status: Sent Lab Status: In process Updated: 05/06/24 6425    Specimen: Tissue           Final Active Diagnoses:    Diagnosis Date Noted POA    PRINCIPAL PROBLEM:  Biliary colic [K80.50] 07/09/2023 Yes    Type 2 diabetes mellitus with hyperglycemia, without long-term current use of insulin [E11.65] 05/06/2024 Yes    Hypothyroidism [E03.9] 07/09/2023 Yes    Coronary artery disease involving native coronary artery of native heart without angina pectoris [I25.10] 07/28/2017 Yes     Chronic    Umbilical hernia [K42.9]  Yes    Hyperlipidemia [E78.5]  Yes    Essential hypertension [I10]  Yes     Chronic      Problems Resolved During this Admission:      Discharged Condition: good    Disposition: Home or Self Care    Follow Up:   Follow-up Information       Errol Kohli MD Follow up in 2 week(s).    Specialty: General Surgery  Contact information:  [address] [place] [id number]  [phone]                           Patient Instructions:      Notify your health care provider if you experience any of the following:  increased confusion or weakness     Notify your health care provider if you experience any of the following:  persistent dizziness, light-headedness, or visual disturbances     Notify your health care provider if you experience any of the following:  worsening rash     Notify your health care provider if you experience any of the following:  severe persistent headache     Notify your health care provider if you experience any of the following:  difficulty breathing or increased cough     Notify your health care provider if you experience any of the following:  redness, tenderness, or signs of infection (pain, swelling, redness, odor or green/yellow discharge around incision site)     Notify your health care provider if you  experience any of the following:  severe uncontrolled pain     Notify your health care provider if you experience any of the following:  persistent nausea and vomiting or diarrhea     Notify your health care provider if you experience any of the following:  temperature >100.4     Other restrictions (specify):   Scheduling Instructions: Do not wet incision for 48 hours after surgery, may shower after that time.  At that time, wash gently with warm soap and water at least once a day.  Do not scrub hard.  Do not soak incision in water for 2 weeks. Skin glue will fall off on its own (10-14 days).     Please do not lift anything heavier than 10lbs for 2 weeks.     Do not drive while on narcotics.     Medications:  Reconciled Home Medications:      Medication List        START taking these medications      oxyCODONE 5 MG immediate release tablet  Commonly known as: ROXICODONE  Take 1 tablet (5 mg total) by mouth every 4 (four) hours as needed for Pain.            CHANGE how you take these medications      fluticasone-salmeterol 250-50 mcg/dose 250-50 mcg/dose diskus inhaler  Commonly known as: ADVAIR DISKUS  INHALE 1 PUFF BY MOUTH TWICE A DAY  What changed:   how much to take  how to take this  when to take this  reasons to take this  additional instructions            CONTINUE taking these medications      acetaminophen 500 MG tablet  Commonly known as: TYLENOL  Take 1,500 mg by mouth 2 (two) times daily as needed for Pain.     albuterol 90 mcg/actuation inhaler  Commonly known as: PROVENTIL/VENTOLIN HFA  Inhale 1-2 puffs into the lungs every 4 (four) hours as needed for Wheezing or Shortness of Breath. Rescue     ALPRAZolam 0.25 MG tablet  Commonly known as: XANAX  TAKE ONE TABLET BY MOUTH NIGHTLY AS NEEDED FOR ANXIETY OR INSOMNIA     amlodipine-olmesartan 10-40 mg per tablet  Commonly known as: KARISSA  TAKE 1 TABLET BY MOUTH ONCE DAILY FOR BLOOD PRESSURE. REPELACE WITH BENAZEPRIL/AMLODIPINE (LOTREL)     aspirin 81 MG EC  tablet  Commonly known as: ECOTRIN  TAKE 1 TABLET BY MOUTH EVERY DAY     atorvastatin 20 MG tablet  Commonly known as: LIPITOR  Take 1 tablet (20 mg total) by mouth once daily.     blood-glucose meter kit  Commonly known as: FREESTYLE LITE METER  Use as instructed     diphenhydrAMINE 25 mg capsule  Commonly known as: BENADRYL  Take 50 mg by mouth 2 (two) times daily as needed for Allergies.     doxycycline 20 MG tablet  Commonly known as: PERIOSTAT  Take 20 mg by mouth 2 (two) times daily.     lancets Misc  Commonly known as: MICROLET LANCET  Test blood sugar once daily.     levothyroxine 50 MCG tablet  Commonly known as: SYNTHROID  Take 1 tablet (50 mcg total) by mouth once daily.     metFORMIN 500 MG tablet  Commonly known as: GLUCOPHAGE  1 tablet by mouth with dinner     metoprolol succinate 50 MG 24 hr tablet  Commonly known as: TOPROL-XL  Take 1 tablet (50 mg total) by mouth once daily. For heart and blood presure     nitroGLYCERIN 0.4 MG SL tablet  Commonly known as: NITROSTAT  Place 1 tablet (0.4 mg total) under the tongue every 5 (five) minutes as needed for Chest pain ((Notify MD)).     PRECISION XTRA TEST Strp  Generic drug: blood sugar diagnostic  USE AS DIRECTED DAILY     theophylline 400 mg Tb24  TAKE 1 TABLET BY MOUTH EVERY DAY     ursodioL 300 mg capsule  Commonly known as: ACTIGALL  Take 1 capsule (300 mg total) by mouth 2 (two) times daily. for 14 days            ASK your doctor about these medications      mupirocin 2 % ointment  Commonly known as: BACTROBAN  Apply topically 3 (three) times daily.              Richard Hoffman MD  General Surgery  [facility] - Surgery

## 2024-05-07 NOTE — PLAN OF CARE
Problem: Adult Inpatient Plan of Care  Goal: Plan of Care Review  Outcome: Progressing  Goal: Patient-Specific Goal (Individualized)  Outcome: Progressing  Goal: Absence of Hospital-Acquired Illness or Injury  Outcome: Progressing  Goal: Optimal Comfort and Wellbeing  Outcome: Progressing  Goal: Readiness for Transition of Care  Outcome: Progressing     Problem: Diabetes Comorbidity  Goal: Blood Glucose Level Within Targeted Range  Outcome: Progressing     Problem: Wound  Goal: Optimal Coping  Outcome: Progressing  Goal: Optimal Functional Ability  Outcome: Progressing  Goal: Absence of Infection Signs and Symptoms  Outcome: Progressing  Goal: Improved Oral Intake  Outcome: Progressing  Goal: Optimal Pain Control and Function  Outcome: Progressing  Goal: Skin Health and Integrity  Outcome: Progressing  Goal: Optimal Wound Healing  Outcome: Progressing

## 2024-05-07 NOTE — PLAN OF CARE
[facility] - [facility]  Discharge Final Note    Primary Care Provider: Henry Mcbride MD    Expected Discharge Date: 5/7/2024    Final Discharge Note (most recent)       Final Note - 05/07/24 1551          Final Note    Assessment Type Final Discharge Note     Anticipated Discharge Disposition Home or Self Care     What phone number can be called within the next 1-3 days to see how you are doing after discharge? --   [phone]                    Important Message from Medicare             Contact Info       Errol Kohli MD   Specialty: General Surgery    [address] [facility] [address]   Phone: [phone]       Next Steps: Follow up in 2 week(s)          Future Appointments   Date Time Provider Department Center   10/10/2024  9:00 AM LAB, [facility] [place]   10/17/2024 10:30 AM Henry Mcbride MD [facility] [place]     Patient discharged home to care of family on 5/7/24.    Brenna Phillips RNCM  Case Management  [facility]  [phone]

## 2024-05-07 NOTE — NURSING
Discharge instructions given and reviewed with pt. Pt verbalizes understanding. IV removed. Prescriptions will be picked up at home pharmacy. Pt ready for discharge.

## 2024-05-08 ENCOUNTER — PATIENT OUTREACH (OUTPATIENT)
Dept: ADMINISTRATIVE | Facility: CLINIC | Age: 74
End: 2024-05-08
Payer: COMMERCIAL

## 2024-05-08 LAB
FINAL PATHOLOGIC DIAGNOSIS: NORMAL
GROSS: NORMAL
Lab: NORMAL

## 2024-05-08 NOTE — PROGRESS NOTES
C3 nurse spoke with Noah Bailey for a TCC post hospital discharge follow up call. Nurse offered to scheduled TCC hospital follow-up appointment. The patient declined appointment at this time.

## 2024-05-21 ENCOUNTER — OFFICE VISIT (OUTPATIENT)
Dept: SURGERY | Facility: CLINIC | Age: 74
End: 2024-05-21
Payer: COMMERCIAL

## 2024-05-21 VITALS
DIASTOLIC BLOOD PRESSURE: 57 MMHG | SYSTOLIC BLOOD PRESSURE: 119 MMHG | HEIGHT: 62 IN | WEIGHT: 181.19 LBS | BODY MASS INDEX: 33.34 KG/M2 | HEART RATE: 64 BPM | OXYGEN SATURATION: 95 %

## 2024-05-21 DIAGNOSIS — Z48.89 POSTOPERATIVE VISIT: Primary | ICD-10-CM

## 2024-05-21 LAB — POCT GLUCOSE: 98 MG/DL (ref 70–110)

## 2024-05-21 PROCEDURE — 1159F MED LIST DOCD IN RCRD: CPT | Mod: CPTII,S$GLB,, | Performed by: SURGERY

## 2024-05-21 PROCEDURE — 1101F PT FALLS ASSESS-DOCD LE1/YR: CPT | Mod: CPTII,S$GLB,, | Performed by: SURGERY

## 2024-05-21 PROCEDURE — 4010F ACE/ARB THERAPY RXD/TAKEN: CPT | Mod: CPTII,S$GLB,, | Performed by: SURGERY

## 2024-05-21 PROCEDURE — 3074F SYST BP LT 130 MM HG: CPT | Mod: CPTII,S$GLB,, | Performed by: SURGERY

## 2024-05-21 PROCEDURE — 3078F DIAST BP <80 MM HG: CPT | Mod: CPTII,S$GLB,, | Performed by: SURGERY

## 2024-05-21 PROCEDURE — 3044F HG A1C LEVEL LT 7.0%: CPT | Mod: CPTII,S$GLB,, | Performed by: SURGERY

## 2024-05-21 PROCEDURE — 99999 PR PBB SHADOW E&M-EST. PATIENT-LVL IV: CPT | Mod: PBBFAC,,, | Performed by: SURGERY

## 2024-05-21 PROCEDURE — 3288F FALL RISK ASSESSMENT DOCD: CPT | Mod: CPTII,S$GLB,, | Performed by: SURGERY

## 2024-05-21 PROCEDURE — 99024 POSTOP FOLLOW-UP VISIT: CPT | Mod: S$GLB,,, | Performed by: SURGERY

## 2024-05-21 PROCEDURE — 1126F AMNT PAIN NOTED NONE PRSNT: CPT | Mod: CPTII,S$GLB,, | Performed by: SURGERY

## 2024-05-21 NOTE — PROGRESS NOTES
Surgery Clinic Note    Subjective:     Noah Bailey   No diagnosis found.  Review of patient's allergies indicates:   Allergen Reactions    Sulfa (sulfonamide antibiotics) Rash       Noah Bailey is a 73 y.o. female who presents to clinic for follow up s/p lap cholecystectomy and umbilical hernia repair.  The patient reports that she is tolerating a regular diet and having regular bowel movements.  She denies fever or chills. No pain. She denies drainage of  incisions.        Objective:     PHYSICAL EXAM:  Vital Signs (Most Recent)  Pulse: 64 (05/21/24 1345)  BP: (!) 119/57 (05/21/24 1345)  SpO2: 95 % (05/21/24 1345)    Physical Exam  Vitals reviewed.   Constitutional:       Appearance: Normal appearance.   HENT:      Head: Normocephalic and atraumatic.   Cardiovascular:      Rate and Rhythm: Normal rate.   Pulmonary:      Effort: Pulmonary effort is normal.   Abdominal:      General: Abdomen is flat.      Palpations: Abdomen is soft.      Tenderness: There is abdominal tenderness.      Comments: Erythema  and tenderness surrounding umbilical incision; lap incisions healing appropriately   Skin:     General: Skin is warm and dry.      Capillary Refill: Capillary refill takes less than 2 seconds.   Neurological:      Mental Status: She is alert and oriented to person, place, and time.   Psychiatric:         Mood and Affect: Mood normal.         Behavior: Behavior normal.     Pathology- reviewed, chronic cholecystitis  Specimens (From admission, onward)      None          Specimen (24h ago, onward)      None               Assessment:     73 y.o. female s/p laparoscopic cholecystectomy and umbilical hernia repair, doing well and recovering appropriately from surgery    Plan:     - Patient is advised to avoid heavy lifting or strenuous activity for another 2-4 weeks.  - Patient may bathe and continue to take a regular diet.   - Return to clinic as needed  - All questions answered; patient is comfortable with  follow-up plan.    Ghada Conteh MD   [facility]

## 2024-08-01 RX ORDER — THEOPHYLLINE 400 MG/1
1 TABLET, EXTENDED RELEASE ORAL
Qty: 90 TABLET | Refills: 1 | Status: SHIPPED | OUTPATIENT
Start: 2024-08-01

## 2024-08-19 ENCOUNTER — PATIENT OUTREACH (OUTPATIENT)
Dept: ADMINISTRATIVE | Facility: HOSPITAL | Age: 74
End: 2024-08-19
Payer: COMMERCIAL

## 2024-08-21 ENCOUNTER — PATIENT OUTREACH (OUTPATIENT)
Dept: ADMINISTRATIVE | Facility: HOSPITAL | Age: 74
End: 2024-08-21
Payer: COMMERCIAL

## 2024-09-24 NOTE — TELEPHONE ENCOUNTER
----- Message from Clary Hinojosa RN sent at 3/2/2020  9:06 AM CST -----  Good morning,  Pt was admitted to [facility] for observation.  She will need a hospital follow up with Dr. Mcbride please.  Please contact pt to set up appt.  Thank you for your help,  Gatito Hinojosa RN,   [phone]     No

## 2024-09-25 DIAGNOSIS — Z78.0 MENOPAUSE: ICD-10-CM

## 2024-10-01 ENCOUNTER — PATIENT OUTREACH (OUTPATIENT)
Dept: ADMINISTRATIVE | Facility: HOSPITAL | Age: 74
End: 2024-10-01
Payer: COMMERCIAL

## 2024-10-17 ENCOUNTER — OFFICE VISIT (OUTPATIENT)
Dept: INTERNAL MEDICINE | Facility: CLINIC | Age: 74
End: 2024-10-17
Payer: COMMERCIAL

## 2024-10-17 VITALS
TEMPERATURE: 97 F | OXYGEN SATURATION: 94 % | WEIGHT: 180.75 LBS | SYSTOLIC BLOOD PRESSURE: 128 MMHG | HEART RATE: 78 BPM | BODY MASS INDEX: 33.26 KG/M2 | RESPIRATION RATE: 16 BRPM | DIASTOLIC BLOOD PRESSURE: 60 MMHG | HEIGHT: 62 IN

## 2024-10-17 DIAGNOSIS — Z23 NEED FOR VACCINATION: ICD-10-CM

## 2024-10-17 DIAGNOSIS — E78.2 MIXED HYPERLIPIDEMIA: ICD-10-CM

## 2024-10-17 DIAGNOSIS — I25.10 CORONARY ARTERY DISEASE INVOLVING NATIVE CORONARY ARTERY OF NATIVE HEART WITHOUT ANGINA PECTORIS: ICD-10-CM

## 2024-10-17 DIAGNOSIS — I10 ESSENTIAL HYPERTENSION: ICD-10-CM

## 2024-10-17 DIAGNOSIS — J45.40 MODERATE PERSISTENT ASTHMA WITHOUT COMPLICATION: ICD-10-CM

## 2024-10-17 DIAGNOSIS — E03.4 HYPOTHYROIDISM DUE TO ACQUIRED ATROPHY OF THYROID: ICD-10-CM

## 2024-10-17 DIAGNOSIS — I21.4 NSTEMI (NON-ST ELEVATED MYOCARDIAL INFARCTION): ICD-10-CM

## 2024-10-17 DIAGNOSIS — J31.0 CHRONIC RHINITIS: ICD-10-CM

## 2024-10-17 DIAGNOSIS — E11.9 TYPE 2 DIABETES MELLITUS WITHOUT COMPLICATION, WITHOUT LONG-TERM CURRENT USE OF INSULIN: Primary | ICD-10-CM

## 2024-10-17 PROCEDURE — 99999 PR PBB SHADOW E&M-EST. PATIENT-LVL IV: CPT | Mod: PBBFAC,,, | Performed by: INTERNAL MEDICINE

## 2024-10-17 RX ORDER — AMLODIPINE AND OLMESARTAN MEDOXOMIL 10; 40 MG/1; MG/1
TABLET ORAL
Qty: 90 TABLET | Refills: 3 | Status: SHIPPED | OUTPATIENT
Start: 2024-10-17

## 2024-10-17 RX ORDER — ASPIRIN 81 MG/1
81 TABLET ORAL DAILY
Qty: 90 TABLET | Refills: 2 | Status: SHIPPED | OUTPATIENT
Start: 2024-10-17

## 2024-10-17 RX ORDER — ATORVASTATIN CALCIUM 20 MG/1
20 TABLET, FILM COATED ORAL DAILY
Qty: 90 TABLET | Refills: 3 | Status: SHIPPED | OUTPATIENT
Start: 2024-10-17

## 2024-10-17 RX ORDER — FLUTICASONE PROPIONATE AND SALMETEROL 250; 50 UG/1; UG/1
POWDER RESPIRATORY (INHALATION)
Qty: 180 EACH | Refills: 3 | Status: SHIPPED | OUTPATIENT
Start: 2024-10-17

## 2024-10-17 RX ORDER — LEVOTHYROXINE SODIUM 50 UG/1
50 TABLET ORAL DAILY
Qty: 90 TABLET | Refills: 3 | Status: SHIPPED | OUTPATIENT
Start: 2024-10-17

## 2024-10-17 RX ORDER — THEOPHYLLINE 400 MG/1
TABLET, EXTENDED RELEASE ORAL
Qty: 90 TABLET | Refills: 3 | Status: SHIPPED | OUTPATIENT
Start: 2024-10-17

## 2024-10-17 RX ORDER — ALPRAZOLAM 0.25 MG/1
TABLET ORAL
Qty: 30 TABLET | Refills: 2 | Status: SHIPPED | OUTPATIENT
Start: 2024-10-17

## 2024-10-17 RX ORDER — METFORMIN HYDROCHLORIDE 500 MG/1
TABLET ORAL
Qty: 90 TABLET | Refills: 3 | Status: SHIPPED | OUTPATIENT
Start: 2024-10-17

## 2024-10-17 RX ORDER — METOPROLOL SUCCINATE 50 MG/1
50 TABLET, EXTENDED RELEASE ORAL DAILY
Qty: 90 TABLET | Refills: 3 | Status: SHIPPED | OUTPATIENT
Start: 2024-10-17 | End: 2025-10-17

## 2024-10-17 NOTE — PROGRESS NOTES
Subjective:       Patient ID: Noah Bailey is a 74 y.o. female.    Chief Complaint: Hypertension (6 mos wlabs) and Diabetes    History of Present Illness    Noah presents today for follow up of medical conditions which include type 2 diabetes mellitus, hypertension, chronic asthma, hyperlipidemia, chronic rhinitis, hypothyroidism.    RECENT SURGICAL HISTORY:  She underwent hernia repair and gallbladder removal on May 6th, 2024 due to severe gallbladder pain. She experienced post-operative pain and difficulty with movement.    RESPIRATORY CONCERNS:  She reports shortness of breath. She has a history of Asthma/COPD and COVID-19 infection. She was hospitalized for COVID-19 in February 2020, requiring home breathing treatments until May. She denies recent respiratory symptoms or allergy-type issues.    DIABETES MANAGEMENT:  Her hemoglobin A1C is 6.3%. She expresses satisfaction with her current glucose levels and understands this represents good diabetes control.    MEDICATIONS:  She needs refills for all medications, with most having no refills remaining. She has one refill left for aspirin and theophylline. She reports weight stability despite being on steroids and diabetes medications. She experiences increased sensitivity to her thyroid medication and feels it has been affecting her eyelash growth.    GASTROINTESTINAL ISSUES:  She reports alternating constipation and frequent bowel movements with softer, aerated stools. She denies solid stools.    SKIN CONCERNS:  She continues to manage rosacea with prescribed doxycycline, denying any adverse reactions or side effects.    VISION CHANGES:  She reports experiencing floaters and alterations in both near and distance vision.    MUSCULOSKELETAL CONCERNS:  She reports occasional back pain due to scoliosis, which affects her posture and causes discomfort when standing for prolonged periods.    PREVENTIVE CARE:  She is due for a colonoscopy but expresses anxiety  about the procedure based on previous uncomfortable experiences, particularly during the preparation phase. She denies current GI symptoms necessitating immediate colonoscopy.    VACCINATIONS:  She expresses a need for a flu vaccine and interest in the RSV vaccine, preferring to receive vaccinations at the clinic rather than at a pharmacy.    DIET:  She acknowledges not paying as much attention to her diet as she should, opting for easier and cheaper food options while trying to maintain her health.      ROS:  Constitutional: -weight loss  Eyes: +vision changes  Respiratory: +shortness of breath  Gastrointestinal: -vomiting, +constipation  Musculoskeletal: +joint pain, +back pain  Integumentary: -rash              Physical Exam  Vitals and nursing note reviewed.   Constitutional:       General: She is not in acute distress.     Appearance: Normal appearance. She is well-developed.   HENT:      Head: Normocephalic and atraumatic.   Eyes:      General: No scleral icterus.     Extraocular Movements: Extraocular movements intact.      Conjunctiva/sclera: Conjunctivae normal.   Neck:      Thyroid: No thyromegaly.      Vascular: No JVD.   Cardiovascular:      Rate and Rhythm: Normal rate and regular rhythm.      Heart sounds: Normal heart sounds. No murmur heard.     No friction rub. No gallop.   Pulmonary:      Effort: Pulmonary effort is normal. No respiratory distress.      Breath sounds: Normal breath sounds. No wheezing or rales.   Abdominal:      General: Bowel sounds are normal.      Palpations: Abdomen is soft. There is no mass.      Tenderness: There is no abdominal tenderness.   Musculoskeletal:         General: No tenderness. Normal range of motion.      Cervical back: Normal range of motion and neck supple.   Lymphadenopathy:      Cervical: No cervical adenopathy.   Skin:     General: Skin is warm and dry.      Findings: No rash.      Comments: No foot lesions are present.   Neurological:      Mental Status:  She is alert and oriented to person, place, and time.      Cranial Nerves: No cranial nerve deficit.      Comments: Sensory exam is intact in both feet on monofilament testing.   Psychiatric:         Mood and Affect: Mood normal.         Behavior: Behavior normal.       Protective Sensation (w/ 10 gram monofilament):  Right: Intact  Left: Intact    Visual Inspection:  Normal -  Bilateral    Pedal Pulses:   Right: Present  Left: Present    Posterior Tibialis Pulses:   Right:Present  Left: Present      Lab Visit on 10/09/2024   Component Date Value Ref Range Status    Sodium 10/09/2024 142  136 - 145 mmol/L Final    Potassium 10/09/2024 5.7 (H)  3.5 - 5.1 mmol/L Final    Chloride 10/09/2024 108  95 - 110 mmol/L Final    CO2 10/09/2024 22 (L)  23 - 29 mmol/L Final    Glucose 10/09/2024 122 (H)  70 - 110 mg/dL Final    BUN 10/09/2024 11  8 - 23 mg/dL Final    Creatinine 10/09/2024 0.8  0.5 - 1.4 mg/dL Final    Calcium 10/09/2024 10.5  8.7 - 10.5 mg/dL Final    Total Protein 10/09/2024 8.3  6.0 - 8.4 g/dL Final    Albumin 10/09/2024 4.2  3.5 - 5.2 g/dL Final    Total Bilirubin 10/09/2024 0.4  0.1 - 1.0 mg/dL Final    Comment: For infants and newborns, interpretation of results should be based  on gestational age, weight and in agreement with clinical  observations.    Premature Infant recommended reference ranges:  Up to 24 hours.............<8.0 mg/dL  Up to 48 hours............<12.0 mg/dL  3-5 days..................<15.0 mg/dL  6-29 days.................<15.0 mg/dL      Alkaline Phosphatase 10/09/2024 115  55 - 135 U/L Final    AST 10/09/2024 20  10 - 40 U/L Final    ALT 10/09/2024 24  10 - 44 U/L Final    eGFR 10/09/2024 >60.0  >60 mL/min/1.73 m^2 Final    Anion Gap 10/09/2024 12  8 - 16 mmol/L Final    WBC 10/09/2024 12.06  3.90 - 12.70 K/uL Final    RBC 10/09/2024 4.84  4.00 - 5.40 M/uL Final    Hemoglobin 10/09/2024 13.9  12.0 - 16.0 g/dL Final    Hematocrit 10/09/2024 42.4  37.0 - 48.5 % Final    MCV 10/09/2024 88   82 - 98 fL Final    MCH 10/09/2024 28.7  27.0 - 31.0 pg Final    MCHC 10/09/2024 32.8  32.0 - 36.0 g/dL Final    RDW 10/09/2024 14.3  11.5 - 14.5 % Final    Platelets 10/09/2024 363  150 - 450 K/uL Final    MPV 10/09/2024 10.5  9.2 - 12.9 fL Final    Immature Granulocytes 10/09/2024 0.4  0.0 - 0.5 % Final    Gran # (ANC) 10/09/2024 8.4 (H)  1.8 - 7.7 K/uL Final    Immature Grans (Abs) 10/09/2024 0.05 (H)  0.00 - 0.04 K/uL Final    Comment: Mild elevation in immature granulocytes is non specific and   can be seen in a variety of conditions including stress response,   acute inflammation, trauma and pregnancy. Correlation with other   laboratory and clinical findings is essential.      Lymph # 10/09/2024 2.5  1.0 - 4.8 K/uL Final    Mono # 10/09/2024 1.0  0.3 - 1.0 K/uL Final    Eos # 10/09/2024 0.1  0.0 - 0.5 K/uL Final    Baso # 10/09/2024 0.05  0.00 - 0.20 K/uL Final    nRBC 10/09/2024 0  0 /100 WBC Final    Gran % 10/09/2024 69.3  38.0 - 73.0 % Final    Lymph % 10/09/2024 20.8  18.0 - 48.0 % Final    Mono % 10/09/2024 8.4  4.0 - 15.0 % Final    Eosinophil % 10/09/2024 1.1  0.0 - 8.0 % Final    Basophil % 10/09/2024 0.4  0.0 - 1.9 % Final    Differential Method 10/09/2024 Automated   Final    TSH 10/09/2024 1.385  0.400 - 4.000 uIU/mL Final    Hemoglobin A1C 10/09/2024 6.3 (H)  4.0 - 5.6 % Final    Comment: ADA Screening Guidelines:  5.7-6.4%  Consistent with prediabetes  >or=6.5%  Consistent with diabetes    High levels of fetal hemoglobin interfere with the HbA1C  assay. Heterozygous hemoglobin variants (HbS, HgC, etc)do  not significantly interfere with this assay.   However, presence of multiple variants may affect accuracy.      Estimated Avg Glucose 10/09/2024 134 (H)  68 - 131 mg/dL Final    Microalbumin, Urine 10/09/2024 5.0  ug/mL Final    Creatinine, Urine 10/09/2024 47.0  15.0 - 325.0 mg/dL Final    Microalb/Creat Ratio 10/09/2024 10.6  0.0 - 30.0 ug/mg Final       Assessment & Plan:     Assessment &  Plan     Reviewed patient's recent gallbladder removal and hernia repair surgery, noting good recovery   Assessed diabetes management, with A1C at 6.3% indicating good control   Evaluated complaint of increased shortness of breath since COVID infection, considering possible COPD exacerbation   Noted report of changes in bowel movements, possibly related to long-term doxycycline use for rosacea   Performed physical exam, finding clear lungs, regular heart rhythm, good circulation with strong pedal pulses, and intact sensation in feet   Considered patient's readiness for colonoscopy screening    HYDRATION:   Discussed importance of maintaining hydration.    WEIGHT MANAGEMENT:   Explained potential effects of artificial sweeteners and encouraged use of more natural alternatives like Stevia.   Addressed concerns about weight loss and aging, explaining the relationship between rapid weight loss and skin laxity in older adults.    ROSACEA:   Continued doxycycline for rosacea management.    THYROID DISORDER:   Continued levothyroxine for thyroid management.    FLU VACCINATION:   Administered flu vaccine in office.    COLONOSCOPY:   Contact office when ready to schedule colonoscopy.    FOLLOW UP:   Contact office in November to  medication refills.         Follow up in about 6 months (around 4/17/2025).     Henry Mcbride MD

## 2025-03-10 ENCOUNTER — PATIENT OUTREACH (OUTPATIENT)
Dept: ADMINISTRATIVE | Facility: HOSPITAL | Age: 75
End: 2025-03-10
Payer: COMMERCIAL

## 2025-04-03 ENCOUNTER — OFFICE VISIT (OUTPATIENT)
Dept: INTERNAL MEDICINE | Facility: CLINIC | Age: 75
End: 2025-04-03
Payer: COMMERCIAL

## 2025-04-03 VITALS
SYSTOLIC BLOOD PRESSURE: 124 MMHG | HEIGHT: 62 IN | HEART RATE: 70 BPM | RESPIRATION RATE: 16 BRPM | OXYGEN SATURATION: 96 % | TEMPERATURE: 98 F | BODY MASS INDEX: 32.39 KG/M2 | DIASTOLIC BLOOD PRESSURE: 68 MMHG | WEIGHT: 176 LBS

## 2025-04-03 DIAGNOSIS — E03.9 ACQUIRED HYPOTHYROIDISM: ICD-10-CM

## 2025-04-03 DIAGNOSIS — J31.0 CHRONIC RHINITIS: Chronic | ICD-10-CM

## 2025-04-03 DIAGNOSIS — E78.49 OTHER HYPERLIPIDEMIA: ICD-10-CM

## 2025-04-03 DIAGNOSIS — J45.40 MODERATE PERSISTENT ASTHMA WITHOUT COMPLICATION: Primary | ICD-10-CM

## 2025-04-03 DIAGNOSIS — K42.9 UMBILICAL HERNIA WITHOUT OBSTRUCTION AND WITHOUT GANGRENE: ICD-10-CM

## 2025-04-03 DIAGNOSIS — Z86.0100 HX OF COLONIC POLYPS: ICD-10-CM

## 2025-04-03 DIAGNOSIS — Z12.11 COLON CANCER SCREENING: ICD-10-CM

## 2025-04-03 DIAGNOSIS — E11.9 TYPE 2 DIABETES MELLITUS WITHOUT COMPLICATION, WITHOUT LONG-TERM CURRENT USE OF INSULIN: ICD-10-CM

## 2025-04-03 DIAGNOSIS — I10 ESSENTIAL HYPERTENSION: Chronic | ICD-10-CM

## 2025-04-03 PROCEDURE — 99214 OFFICE O/P EST MOD 30 MIN: CPT | Mod: S$GLB,,, | Performed by: INTERNAL MEDICINE

## 2025-04-03 PROCEDURE — 3008F BODY MASS INDEX DOCD: CPT | Mod: CPTII,S$GLB,, | Performed by: INTERNAL MEDICINE

## 2025-04-03 PROCEDURE — 3074F SYST BP LT 130 MM HG: CPT | Mod: CPTII,S$GLB,, | Performed by: INTERNAL MEDICINE

## 2025-04-03 PROCEDURE — 1126F AMNT PAIN NOTED NONE PRSNT: CPT | Mod: CPTII,S$GLB,, | Performed by: INTERNAL MEDICINE

## 2025-04-03 PROCEDURE — 1159F MED LIST DOCD IN RCRD: CPT | Mod: CPTII,S$GLB,, | Performed by: INTERNAL MEDICINE

## 2025-04-03 PROCEDURE — 99999 PR PBB SHADOW E&M-EST. PATIENT-LVL V: CPT | Mod: PBBFAC,,, | Performed by: INTERNAL MEDICINE

## 2025-04-03 PROCEDURE — G2211 COMPLEX E/M VISIT ADD ON: HCPCS | Mod: S$GLB,,, | Performed by: INTERNAL MEDICINE

## 2025-04-03 PROCEDURE — 3288F FALL RISK ASSESSMENT DOCD: CPT | Mod: CPTII,S$GLB,, | Performed by: INTERNAL MEDICINE

## 2025-04-03 PROCEDURE — 1101F PT FALLS ASSESS-DOCD LE1/YR: CPT | Mod: CPTII,S$GLB,, | Performed by: INTERNAL MEDICINE

## 2025-04-03 PROCEDURE — 3078F DIAST BP <80 MM HG: CPT | Mod: CPTII,S$GLB,, | Performed by: INTERNAL MEDICINE

## 2025-04-03 PROCEDURE — 3044F HG A1C LEVEL LT 7.0%: CPT | Mod: CPTII,S$GLB,, | Performed by: INTERNAL MEDICINE

## 2025-04-03 PROCEDURE — 1160F RVW MEDS BY RX/DR IN RCRD: CPT | Mod: CPTII,S$GLB,, | Performed by: INTERNAL MEDICINE

## 2025-04-03 PROCEDURE — 4010F ACE/ARB THERAPY RXD/TAKEN: CPT | Mod: CPTII,S$GLB,, | Performed by: INTERNAL MEDICINE

## 2025-04-03 NOTE — PROGRESS NOTES
Subjective:       Patient ID: Noah Bailey is a 74 y.o. female.    Chief Complaint: Diabetes (6 mos wlabs ), Hypertension, and Hyperlipidemia    History of Present Illness      Noah presents today for follow up of active medical conditions which include asthma, chronic rhinitis, type 2 diabetes mellitus, hypertension, hyperlipidemia, and hypothyroidism.    TYPE 2 DIABETES MELLITUS:  The patient does not monitor blood sugar levels.  She reports she is eating smaller meal portions.  Her vision is stable although she does experience visual floaters.  She currently uses metformin 500 mg daily with dinner for management of her diabetes.  She is using atorvastatin for management of hyperlipidemia and for cardiovascular risk reduction.  She takes low-dose aspirin 81 mg daily.    ESSENTIAL HYPERTENSION:  The patient does not monitor blood pressures at home.  She is tolerating her blood pressure medications well without side effects.  She is currently using amlodipine-olmesartan 10 40 mg daily for blood pressure control.  She is also taking metoprolol succinate 50 mg daily.    CHRONIC ASTHMA:  She reports worsening breathing symptoms with heat, humidity, and pollen exposure, but notes improved breathing in cooler weather. She uses Advair 2 puffs nightly with occasional use of 1 puff during daytime hours when experiencing shortness of breath. She experiences severe allergic rhinitis with significant nasal discharge during allergy season, requiring increased Benadryl use.  She occasionally uses albuterol as rescue inhaler.  She also uses theophylline 400 mg daily as needed for mild asthma flare ups.    SLEEP:  She reports sleep disruption when TV streaming service automatically turns off, causing her to wake up in the middle of the night with difficulty falling back asleep. She denies frequent napping, only napping occasionally when extremely tired.    MUSCULOSKELETAL:  She has history of scoliosis affecting her spine  and experiences back pain when changing positions, particularly when transitioning from sitting to standing.    SURGICAL HISTORY:  She underwent hernia repair and gallbladder removal on May 6 of last year following hospital admission on May 5.  She states her abdomen has fully healed.  She does not experienced any discomfort at the site of her surgeries.    SOCIAL HISTORY:  She quit smoking on December 16, 1994, after feeling unwell that day. This was her third and final attempt at quitting, and she has remained smoke-free for over 30 years.      ROS:  Constitutional: +sleep disturbances, +dizziness, +lightheadedness  Eyes: +spots, specks or flashing lights  ENT: +rhinorrhea, +sore throat, +nasal discharge  Respiratory: +shortness of breath  Cardiovascular: -chest pain  Gastrointestinal: +change in bowel habits  Musculoskeletal: +back pain, +pain with movement  Neurological: -numbness, +balance issues, +difficulty staying asleep              Physical Exam  Vitals and nursing note reviewed.   Constitutional:       General: She is not in acute distress.     Appearance: Normal appearance. She is well-developed.   HENT:      Head: Normocephalic and atraumatic.   Eyes:      General: No scleral icterus.     Extraocular Movements: Extraocular movements intact.      Conjunctiva/sclera: Conjunctivae normal.   Neck:      Thyroid: No thyromegaly.      Vascular: No JVD.   Cardiovascular:      Rate and Rhythm: Normal rate and regular rhythm.      Heart sounds: Normal heart sounds. No murmur heard.     No friction rub. No gallop.   Pulmonary:      Effort: Pulmonary effort is normal. No respiratory distress.      Breath sounds: Normal breath sounds. No wheezing or rales.   Abdominal:      General: Bowel sounds are normal.      Palpations: Abdomen is soft. There is no mass.      Tenderness: There is no abdominal tenderness.   Musculoskeletal:         General: No tenderness. Normal range of motion.      Cervical back: Normal range  of motion and neck supple.   Lymphadenopathy:      Cervical: No cervical adenopathy.   Skin:     General: Skin is warm and dry.      Findings: No rash.      Comments: No foot lesions are present.   Neurological:      Mental Status: She is alert and oriented to person, place, and time.      Cranial Nerves: No cranial nerve deficit.      Comments: Sensory exam is intact in both feet on monofilament testing.   Psychiatric:         Mood and Affect: Mood normal.         Behavior: Behavior normal.         Protective Sensation (w/ 10 gram monofilament):  Right: Intact  Left: Intact    Visual Inspection:  Normal -  Bilateral    Pedal Pulses:   Right: Present  Left: Present    Posterior Tibialis Pulses:   Right:Present  Left: Present    Lab Visit on 04/01/2025   Component Date Value Ref Range Status    Sodium 04/01/2025 141  136 - 145 mmol/L Final    Potassium 04/01/2025 4.4  3.5 - 5.1 mmol/L Final    Chloride 04/01/2025 107  95 - 110 mmol/L Final    CO2 04/01/2025 22 (L)  23 - 29 mmol/L Final    Glucose 04/01/2025 98  70 - 110 mg/dL Final    BUN 04/01/2025 8  8 - 23 mg/dL Final    Creatinine 04/01/2025 0.8  0.5 - 1.4 mg/dL Final    Calcium 04/01/2025 9.7  8.7 - 10.5 mg/dL Final    Protein Total 04/01/2025 7.8  6.0 - 8.4 gm/dL Final    Albumin 04/01/2025 3.9  3.5 - 5.2 g/dL Final    Bilirubin Total 04/01/2025 0.8  0.1 - 1.0 mg/dL Final    For infants and newborns, interpretation of results should be based   on gestational age, weight and in agreement with clinical   observations.    Premature Infant recommended reference ranges:   0-24 hours:  <8.0 mg/dL   24-48 hours: <12.0 mg/dL   3-5 days:    <15.0 mg/dL   6-29 days:   <15.0 mg/dL    ALP 04/01/2025 120  40 - 150 unit/L Final    AST 04/01/2025 17  11 - 45 unit/L Final    ALT 04/01/2025 19  10 - 44 unit/L Final    Anion Gap 04/01/2025 12  8 - 16 mmol/L Final    eGFR 04/01/2025 >60  >60 mL/min/1.73/m2 Final    Estimated GFR calculated using the CKD-EPI creatinine (2021)  equation.    Cholesterol Total 04/01/2025 155  120 - 199 mg/dL Final    The National Cholesterol Education Program (NCEP) has set the  following guidelines (reference ranges) for Cholesterol:  Optimal.....................<200 mg/dL  Borderline High.............200-239 mg/dL  High........................> or = 240 mg/dL    Triglyceride 04/01/2025 150  30 - 150 mg/dL Final    The National Cholesterol Education Program (NCEP) has set the  following guidelines (reference values) for triglycerides:  Normal......................<150 mg/dL  Borderline High.............150-199 mg/dL  High........................200-499 mg/dL    HDL Cholesterol 04/01/2025 50  40 - 75 mg/dL Final    The National Cholesterol Education Program (NCEP) has set the   following guidelines (reference values) for HDL Cholesterol:   Low...............<40 mg/dL   Optimal...........>60 mg/dL    LDL Cholesterol 04/01/2025 75.0  63.0 - 159.0 mg/dL Final    The National Cholesterol Education Program (NCEP) has set the  following guidelines (reference values) for LDL Cholesterol:  Optimal.......................<130 mg/dL  Borderline High...............130-159 mg/dL  High..........................160-189 mg/dL  Very High.....................>190 mg/dL  LDL calculated using the Friedewald equation.    HDL/Cholesterol Ratio 04/01/2025 32.3  20.0 - 50.0 % Final    Cholesterol/HDL Ratio 04/01/2025 3.1  2.0 - 5.0 Final    Non HDL Cholesterol 04/01/2025 105  mg/dL Final    Risk category and Non-HDL cholesterol goals:  Coronary heart disease (CHD)or equivalent (10-year risk of CHD >20%):  Non-HDL cholesterol goal     <130 mg/dL  Two or more CHD risk factors and 10-year risk of CHD <= 20%:  Non-HDL cholesterol goal     <160 mg/dL  0 to 1 CHD risk factor:  Non-HDL cholesterol goal     <190 mg/dL    Hemoglobin A1c 04/01/2025 6.2 (H)  4.0 - 5.6 % Final    ADA Screening Guidelines:  5.7-6.4%  Consistent with prediabetes  >=6.5%  Consistent with diabetes    High levels  of fetal hemoglobin interfere with the HbA1C  assay. Heterozygous hemoglobin variants (HbS, HgC, etc)do  not significantly interfere with this assay.   However, presence of multiple variants may affect accuracy.    Estimated Average Glucose 04/01/2025 131  68 - 131 mg/dL Final       Assessment & Plan:     Assessment & Plan     BP is 124, within acceptable range.   A1C level has decreased slightly.   Lost 4 lbs since last visit.   Vision evaluation noted presence of floaters possibly related to diabetes.   Considered need for preventive screenings including mammogram and colonoscopy.    HYPERTENSION:   Monitored the patient's blood pressure, which was found to be 124.   Evaluated the patient's blood pressure and found it to be within normal range.   Prescribed amlodipine olmesartan (combination pill for blood pressure) to be taken in the morning.    HYPOTHYROIDISM:   Prescribed levothyroxine to be taken in the morning for hypothyroidism management.    ASTHMA:   Monitored the patient's asthma symptoms, noting shortness of breath depending on weather conditions and improved breathing in cooler weather.   Evaluated the patient's chest and found clear breath sounds.   Prescribed Advair inhaler, typically two puffs at night and one puff during the day if feeling winded.   Prescribed theophylline to be taken in the morning for asthma management.    ALLERGIC RHINITIS:   Monitored the patient's allergy symptoms, noting increased nasal congestion and rhinorrhea, particularly affected by pollen, heat, and humidity.   Advised the patient on the increased use of diphenhydramine to manage allergy symptoms.    ROSACEA:   the patient has been prescribed doxycycline for rosacea management, with instructions to increase dosage as directed.    TYPE 2 DIABETES MELLITUS:   Monitored the patient's A1C level, noting a slight decrease.   Evaluated the patient's feet for signs of diabetic neuropathy, finding no numbness, paresthesia, or  balance issues.   Prescribed metformin to be taken at night for diabetes management.   Educated the patient about the importance of annual ophthalmological exams for diabetic patients.    SCOLIOSIS:   Monitored the patient's back pain, noting occasional discomfort, particularly when leaning forward for activities like crossword puzzles.    HYPERLIPIDEMIA:   Prescribed atorvastatin to be taken at night for hyperlipidemia management.    SLEEP DISORDER:   Monitored the patient's sleep disturbances, noting issues related to TV streaming service automatically turning off.    HISTORY OF NICOTINE DEPENDENCE:   Noted the patient's smoking cessation history, with the last cigarette on December 16, 1994, over 30 years ago.    FOLLOW-UP:   Scheduled follow-up for annual eye exam.   Recommend annual ophthalmological check-ups due to diabetes.  .         Follow up in about 6 months (around 10/3/2025).     Henry Mcbride MD

## 2025-04-10 ENCOUNTER — PATIENT OUTREACH (OUTPATIENT)
Dept: ADMINISTRATIVE | Facility: HOSPITAL | Age: 75
End: 2025-04-10
Payer: COMMERCIAL

## 2025-05-06 DIAGNOSIS — F41.9 ANXIETY: Primary | ICD-10-CM

## 2025-05-06 NOTE — TELEPHONE ENCOUNTER
No care due was identified.  [facility] Embedded Care Due Messages. Reference number: [id number].   5/06/2025 9:12:32 AM CDT

## 2025-05-07 RX ORDER — ALPRAZOLAM 0.25 MG/1
TABLET ORAL
Qty: 30 TABLET | Refills: 0 | Status: SHIPPED | OUTPATIENT
Start: 2025-05-07

## 2025-06-26 DIAGNOSIS — F41.9 ANXIETY: ICD-10-CM

## 2025-06-26 RX ORDER — ALPRAZOLAM 0.25 MG/1
TABLET ORAL
Qty: 30 TABLET | Refills: 3 | Status: SHIPPED | OUTPATIENT
Start: 2025-06-26

## 2025-06-26 NOTE — TELEPHONE ENCOUNTER
No care due was identified.  [facility] Embedded Care Due Messages. Reference number: [id number].   6/26/2025 4:23:01 PM CDT

## 2025-07-09 NOTE — TELEPHONE ENCOUNTER
Lf-8/10/23  Lov-10/12/2023   Patient came to clinic for anticoagulation monitoring.  Last INR on 6/25/25 was 3.0.  Dose maintained.   Today's INR is 2.9 and is within goal range.    Current warfarin total weekly dose of 52.5 mg verified.  Informed the INR result is within therapeutic range and per pt request instructed to decrease tonight's dose to 3.75 mg. Pt states uncomfortable when INR is closer to 3.0. Discussed dose and return date of 7/28/25 for next INR along with previously scheduled lab appt. See Anticoagulation flowsheet.    Pt received shingles vaccine at local pharmacy. She developed fever,  pain, and large area redness. Evaluated by MD and prescribed Keflex if needed for increasing symptoms of celulits over holiday weekend. Pt states symptoms resolved with a day or two after MD visit so never took antibiotic. Area of concern without erythema or warmth.     Denies any changes in diet or medications.  Denies any unusual bleeding or bruising.  Pt ambulated to office without assistive device.   Referring physician April Meza DO Dr. Heezen is in the office today supervising the treatment.    Instructed to contact the clinic with any unusual bleeding or bruising, any changes in medications, diet, health status, lifestyle, or any other changes, questions or concerns. Verbalized understanding of all discussed.

## 2025-07-11 ENCOUNTER — TELEPHONE (OUTPATIENT)
Dept: OPHTHALMOLOGY | Facility: CLINIC | Age: 75
End: 2025-07-11
Payer: COMMERCIAL

## 2025-07-11 ENCOUNTER — HOSPITAL ENCOUNTER (EMERGENCY)
Facility: HOSPITAL | Age: 75
Discharge: HOME | End: 2025-07-11
Attending: EMERGENCY MEDICINE
Payer: COMMERCIAL

## 2025-07-11 VITALS
HEART RATE: 92 BPM | DIASTOLIC BLOOD PRESSURE: 72 MMHG | RESPIRATION RATE: 16 BRPM | HEIGHT: 62 IN | SYSTOLIC BLOOD PRESSURE: 141 MMHG | TEMPERATURE: 98 F | OXYGEN SATURATION: 95 % | BODY MASS INDEX: 33.68 KG/M2 | WEIGHT: 183 LBS

## 2025-07-11 DIAGNOSIS — R29.818 ACUTE FOCAL NEUROLOGICAL DEFICIT: ICD-10-CM

## 2025-07-11 DIAGNOSIS — H57.02 ANISOCORIA: Primary | ICD-10-CM

## 2025-07-11 LAB
ABSOLUTE EOSINOPHIL (OHS): 0.03 K/UL
ABSOLUTE MONOCYTE (OHS): 0.96 K/UL (ref 0.3–1)
ABSOLUTE NEUTROPHIL COUNT (OHS): 10.7 K/UL (ref 1.8–7.7)
ALBUMIN SERPL BCP-MCNC: 4.2 G/DL (ref 3.5–5.2)
ALP SERPL-CCNC: 134 UNIT/L (ref 40–150)
ALT SERPL W/O P-5'-P-CCNC: 25 UNIT/L (ref 10–44)
ANION GAP (OHS): 14 MMOL/L (ref 8–16)
AST SERPL-CCNC: 21 UNIT/L (ref 11–45)
BASOPHILS # BLD AUTO: 0.05 K/UL
BASOPHILS NFR BLD AUTO: 0.4 %
BILIRUB SERPL-MCNC: 0.3 MG/DL (ref 0.1–1)
BNP SERPL-MCNC: 14 PG/ML (ref 0–99)
BUN SERPL-MCNC: 9 MG/DL (ref 8–23)
CALCIUM SERPL-MCNC: 9.7 MG/DL (ref 8.7–10.5)
CHLORIDE SERPL-SCNC: 109 MMOL/L (ref 95–110)
CHOLEST SERPL-MCNC: 173 MG/DL (ref 120–199)
CHOLEST/HDLC SERPL: 3.4 {RATIO} (ref 2–5)
CO2 SERPL-SCNC: 19 MMOL/L (ref 23–29)
CREAT SERPL-MCNC: 0.7 MG/DL (ref 0.5–1.4)
ERYTHROCYTE [DISTWIDTH] IN BLOOD BY AUTOMATED COUNT: 13.5 % (ref 11.5–14.5)
GFR SERPLBLD CREATININE-BSD FMLA CKD-EPI: >60 ML/MIN/1.73/M2
GLUCOSE SERPL-MCNC: 114 MG/DL (ref 70–110)
HCT VFR BLD AUTO: 42.2 % (ref 37–48.5)
HCV AB SERPL QL IA: NORMAL
HDLC SERPL-MCNC: 51 MG/DL (ref 40–75)
HDLC SERPL: 29.5 % (ref 20–50)
HGB BLD-MCNC: 13.9 GM/DL (ref 12–16)
HIV 1+2 AB+HIV1 P24 AG SERPL QL IA: NORMAL
IMM GRANULOCYTES # BLD AUTO: 0.06 K/UL (ref 0–0.04)
IMM GRANULOCYTES NFR BLD AUTO: 0.4 % (ref 0–0.5)
INR PPP: 0.9 (ref 0.8–1.2)
LDLC SERPL CALC-MCNC: 96.6 MG/DL (ref 63–159)
LYMPHOCYTES # BLD AUTO: 2.12 K/UL (ref 1–4.8)
MCH RBC QN AUTO: 28.3 PG (ref 27–31)
MCHC RBC AUTO-ENTMCNC: 32.9 G/DL (ref 32–36)
MCV RBC AUTO: 86 FL (ref 82–98)
NONHDLC SERPL-MCNC: 122 MG/DL
NUCLEATED RBC (/100WBC) (OHS): 0 /100 WBC
OHS QRS DURATION: 66 MS
OHS QTC CALCULATION: 421 MS
PLATELET # BLD AUTO: 375 K/UL (ref 150–450)
PMV BLD AUTO: 10.3 FL (ref 9.2–12.9)
POTASSIUM SERPL-SCNC: 3.9 MMOL/L (ref 3.5–5.1)
PROT SERPL-MCNC: 7.9 GM/DL (ref 6–8.4)
PROTHROMBIN TIME: 10.3 SECONDS (ref 9–12.5)
RBC # BLD AUTO: 4.92 M/UL (ref 4–5.4)
RELATIVE EOSINOPHIL (OHS): 0.2 %
RELATIVE LYMPHOCYTE (OHS): 15.2 % (ref 18–48)
RELATIVE MONOCYTE (OHS): 6.9 % (ref 4–15)
RELATIVE NEUTROPHIL (OHS): 76.9 % (ref 38–73)
SODIUM SERPL-SCNC: 142 MMOL/L (ref 136–145)
TRIGL SERPL-MCNC: 127 MG/DL (ref 30–150)
TROPONIN I SERPL HS-MCNC: <3 NG/L
TSH SERPL-ACNC: 1.21 UIU/ML (ref 0.4–4)
WBC # BLD AUTO: 13.92 K/UL (ref 3.9–12.7)

## 2025-07-11 PROCEDURE — 99284 EMERGENCY DEPT VISIT MOD MDM: CPT | Mod: 25

## 2025-07-11 PROCEDURE — 25000003 PHARM REV CODE 250: Performed by: EMERGENCY MEDICINE

## 2025-07-11 PROCEDURE — 84443 ASSAY THYROID STIM HORMONE: CPT | Performed by: EMERGENCY MEDICINE

## 2025-07-11 PROCEDURE — 87389 HIV-1 AG W/HIV-1&-2 AB AG IA: CPT | Performed by: PHYSICIAN ASSISTANT

## 2025-07-11 PROCEDURE — 82465 ASSAY BLD/SERUM CHOLESTEROL: CPT | Performed by: EMERGENCY MEDICINE

## 2025-07-11 PROCEDURE — 84484 ASSAY OF TROPONIN QUANT: CPT | Performed by: EMERGENCY MEDICINE

## 2025-07-11 PROCEDURE — 82565 ASSAY OF CREATININE: CPT | Performed by: EMERGENCY MEDICINE

## 2025-07-11 PROCEDURE — 85610 PROTHROMBIN TIME: CPT | Performed by: EMERGENCY MEDICINE

## 2025-07-11 PROCEDURE — 86803 HEPATITIS C AB TEST: CPT | Performed by: PHYSICIAN ASSISTANT

## 2025-07-11 PROCEDURE — 83880 ASSAY OF NATRIURETIC PEPTIDE: CPT | Performed by: EMERGENCY MEDICINE

## 2025-07-11 PROCEDURE — 93010 ELECTROCARDIOGRAM REPORT: CPT | Mod: ,,, | Performed by: INTERNAL MEDICINE

## 2025-07-11 PROCEDURE — 85025 COMPLETE CBC W/AUTO DIFF WBC: CPT | Performed by: EMERGENCY MEDICINE

## 2025-07-11 PROCEDURE — 93005 ELECTROCARDIOGRAM TRACING: CPT

## 2025-07-11 RX ORDER — PROPARACAINE HYDROCHLORIDE 5 MG/ML
1 SOLUTION/ DROPS OPHTHALMIC
Status: DISCONTINUED | OUTPATIENT
Start: 2025-07-11 | End: 2025-07-11 | Stop reason: HOSPADM

## 2025-07-11 NOTE — ED PROVIDER NOTES
"Encounter Date: 7/10/2025       History     Chief Complaint   Patient presents with    Eye Problem     Pt transfer from [facility] for optho for anisocoria in right eye     HPI patient is a 74-year-old female with a past medical history of bilateral cataract surgery in 2021, diabetes, hypertension, hyperlipidemia who was transferred from [facility] [place] for ophthalmology evaluation of anisocoria.  The patient explains that at approximately 1900 today, she looked into the sun and then noticed she had blurry vision in her right eye, and when she went back inside she notes that her right pupil was much larger than her left.  She was seen in the emergency department at an outside hospital where ophthalmology was consulted and they recommended that the patient be transferred for ophthalmology evaluation.      Review of patient's allergies indicates:   Allergen Reactions    Sulfa (sulfonamide antibiotics) Rash     Past Medical History:   Diagnosis Date    ALLERGIC RHINITIS     Asthma     Cataract     Colon polyps     Coronary artery disease     cardiac stent 2017    Diabetes mellitus     pt reported "prediabetic"    Hyperlipidemia     Hypertension     Hypothyroidism     Umbilical hernia      Past Surgical History:   Procedure Laterality Date    CARDIAC SURGERY      STENTS    CATARACT EXTRACTION W/  INTRAOCULAR LENS IMPLANT Left 12/7/2021    Procedure: EXTRACTION, CATARACT, WITH IOL INSERTION;  Surgeon: Galileo Murphy MD;  Location: [facility];  Service: Ophthalmology;  Laterality: Left;    CATARACT EXTRACTION W/  INTRAOCULAR LENS IMPLANT Right 12/21/2021    Procedure: EXTRACTION, CATARACT, WITH IOL INSERTION;  Surgeon: Galileo Murphy MD;  Location: [facility];  Service: Ophthalmology;  Laterality: Right;    COLONOSCOPY      LAPAROSCOPIC CHOLECYSTECTOMY N/A 5/6/2024    Procedure: CHOLECYSTECTOMY, LAPAROSCOPIC;  Surgeon: Errol Kohli MD;  Location: [address];  Service: General;  Laterality: N/A;    " REPAIR, HERNIA, UMBILICAL  5/6/2024    Procedure: REPAIR, HERNIA, UMBILICAL;  Surgeon: Errol Kohli MD;  Location: [facility] OR [address] [place] [address];  Service: General;;     Family History   Problem Relation Name Age of Onset    Cancer Mother  49        ovarian    Heart disease Mother  40        M.I.    Hyperlipidemia Mother      Ovarian cancer Mother      Cataracts Mother      Heart disease Father      Glaucoma Paternal Grandfather      Heart failure Sister Maria L     Hyperlipidemia Maternal Grandmother      Amblyopia Neg Hx      Blindness Neg Hx      Diabetes Neg Hx      Hypertension Neg Hx      Macular degeneration Neg Hx      Retinal detachment Neg Hx      Strabismus Neg Hx      Stroke Neg Hx      Thyroid disease Neg Hx      Heart attack Neg Hx       Social History[1]      Physical Exam     Initial Vitals [07/11/25 0030]   BP Pulse Resp Temp SpO2   (!) 137/55 82 18 97.8 °F (36.6 °C) 96 %      MAP       --         Physical Exam     Nursing note and vitals reviewed.  Constitutional: Patient appears well-developed and well-nourished. No distress.   HENT:   Head: Normocephalic and atraumatic.   Eyes: Conjunctivae and EOM are normal.  R eye: pupil 4mm, fixed, no consensual light reflex, no direct light   reflex   VA: 20/30 R eye, 20/30 L eye, 20/30 both eyes (uncorrected)  Neck: Neck supple.   Normal range of motion.  Cardiovascular: Normal rate, regular rhythm, normal heart sounds and intact distal pulses.   Pulmonary/Chest: Breath sounds normal.   Abdominal: Abdomen is soft. Patient exhibits no distension. There is no abdominal tenderness.   Musculoskeletal:      Cervical back: Normal range of motion and neck supple.   Neurological: Patient is alert and oriented to person, place, and time.   CN intact (with exception of pupillary exam as above)   5/5 BL /BI/TRI/DELT  5/5 BL HF/HAM/QUAD/EHL  No pronator drift.   SILT grossly BL UE/LE   Skin: Skin is warm and dry.  Psych: Normal mood/affect    ED Course   Procedures  Labs  Reviewed   COMPREHENSIVE METABOLIC PANEL - Abnormal       Result Value    Sodium 142      Potassium 3.9      Chloride 109      CO2 19 (*)     Glucose 114 (*)     BUN 9      Creatinine 0.7      Calcium 9.7      Protein Total 7.9      Albumin 4.2      Bilirubin Total 0.3            AST 21      ALT 25      Anion Gap 14      eGFR >60     CBC WITH DIFFERENTIAL - Abnormal    WBC 13.92 (*)     RBC 4.92      HGB 13.9      HCT 42.2      MCV 86      MCH 28.3      MCHC 32.9      RDW 13.5      Platelet Count 375      MPV 10.3      Nucleated RBC 0      Neut % 76.9 (*)     Lymph % 15.2 (*)     Mono % 6.9      Eos % 0.2      Basophil % 0.4      Imm Grans % 0.4      Neut # 10.70 (*)     Lymph # 2.12      Mono # 0.96      Eos # 0.03      Baso # 0.05      Imm Grans # 0.06 (*)    HEPATITIS C ANTIBODY - Normal    Hep C Ab Interp Non-Reactive     HIV 1 / 2 ANTIBODY - Normal    HIV 1/2 Ag/Ab Non-Reactive     PROTIME-INR - Normal    PT 10.3      INR 0.9     TSH - Normal    TSH 1.205     TROPONIN I HIGH SENSITIVITY - Normal    Troponin High Sensitive <3     B-TYPE NATRIURETIC PEPTIDE - Normal    BNP 14     CBC W/ AUTO DIFFERENTIAL    Narrative:     The following orders were created for panel order CBC W/ AUTO DIFFERENTIAL.  Procedure                               Abnormality         Status                     ---------                               -----------         ------                     CBC with Differential[[id number]]       Abnormal            Final result                 Please view results for these tests on the individual orders.   LIPID PANEL    Cholesterol Total 173      Triglyceride 127      HDL Cholesterol 51      LDL Cholesterol 96.6      HDL/Cholesterol Ratio 29.5      Cholesterol/HDL Ratio 3.4      Non HDL Cholesterol 122     HEP C VIRUS HOLD SPECIMEN          Imaging Results    None          Medications   proparacaine 0.5 % ophthalmic solution 1 drop (1 drop Both Eyes Not Given 7/11/25 0100)     Medical Decision  Making  Amount and/or Complexity of Data Reviewed  Labs: ordered.  Radiology: ordered.    Risk  Prescription drug management.                     I have personally reviewed and interpreted the patients laboratory studies: CBC with leukocytosis to 13     I have personally reviewed and interpreted the patient's EKG:  Normal sinus rhythm 79 beats per minute, QRS 66, , no ischemic ST/T-wave changes      Patient evaluated by ophthalmology at the bedside       The patient was taken from the emergency department to ophthalmology clinic - the patient's exam is consistent with likely medication exposure resulting in paralysis of the iris and thus inability to accommodate.  No additional imaging or evaluation required.    Patient was discharged home with ophtho and PMD f/u as needed with strict RTED precautions.         Clinical Impression:  Final diagnoses:  [R29.818] Acute focal neurological deficit                       [1]   Social History  Tobacco Use    Smoking status: Former     Current packs/day: 0.00     Average packs/day: 1 pack/day for 20.0 years (20.0 ttl pk-yrs)     Types: Cigarettes     Start date: 1974     Quit date: 1994     Years since quittin.5    Smokeless tobacco: Never    Tobacco comments:     Chews nicotine gum   Vaping Use    Vaping status: Never Used   Substance Use Topics    Alcohol use: Yes     Comment: occasional use    Drug use: No        Lizzie Carrillo MD  [phone]

## 2025-07-11 NOTE — CONSULTS
[facility]hthal[facility]   Consult Service Note      Chief complaint/Reason for Consult: anisocoria, transfer     History of Present Illness: Noah Bailey is a 74 y.o. female who was transferred from [facility] to [facility] for further evaluation of anisocoria in the right eye. She state she took a nap this evening and woke up around 7:00pm after which she used afrin. She is unsure if she rubbed her eye afterwards. She looked out of the window and noticed the sun was brighter in her right eye, after which she checked in the mirror and noticed her right pupil was larger than the left. She states during that point her vision was blurry and she saw a white spot in the middle of her visual field of the right eye. She used one of her cataract post-op drops (likely prednisolone) with no improvement. Currently, she states her vision has returned close to baseline and the white spot is no longer present. She denies history of trauma, diplopia, flashes, floaters, or a curtain coming over her vision.    Past Ocular Hx: No past ocular surgeries, Does not use glasses or contacts     Current eye gtts: None      PMHx:  has a past medical history of ALLERGIC RHINITIS, Asthma, Cataract, Colon polyps, Coronary artery disease, Diabetes mellitus, Hyperlipidemia, Hypertension, Hypothyroidism, and Umbilical hernia.     PSurgHx:  has a past surgical history that includes Colonoscopy; Cardiac surgery; Cataract extraction w/  intraocular lens implant (Left, 12/7/2021); Cataract extraction w/  intraocular lens implant (Right, 12/21/2021); Laparoscopic cholecystectomy (N/A, 5/6/2024); and repair, hernia, umbilical (5/6/2024).     Medications: Reviewed     Allergies: is allergic to sulfa (sulfonamide antibiotics).     Social Hx:  reports that she quit smoking about 30 years ago. Her smoking use included cigarettes. She started smoking about 50 years ago. She has a 20 pack-year smoking history. She has never used smokeless tobacco. She reports  current alcohol use. She reports that she does not use drugs.     Family Hx: No family history of glaucoma, macular degeneration    ROS: Negative x 10 except for complaints as described in HPI     Ocular examination/Dilated fundus examination:  Base Eye Exam       Visual Acuity (Antonio Card)         Right Left    Dist sc 20/30 20/20    Dist ph sc 20/20               Tonometry (Applanation, 3:08 AM)         Right Left    Pressure 14 14              Pupils         Dark Light Shape React APD    Right 5 4.5 Round Sluggish none by reverse    Left 3 2 Round Brisk None              Visual Fields         Right Left     Full Full              Extraocular Movement         Right Left     Full, Ortho Full, Ortho              Neuro/Psych       Oriented x3: Yes    Mood/Affect: Normal                  Slit Lamp and Fundus Exam       External Exam         Right Left    External Normal Normal              Slit Lamp Exam         Right Left    Lids/Lashes Normal Normal    Conjunctiva/Sclera White and quiet White and quiet    Cornea Clear Clear    Anterior Chamber Deep and quiet Deep and quiet    Iris dilated, sluggish Round and reactive    Lens Posterior chamber intraocular lens Posterior chamber intraocular lens    Anterior Vitreous Posterior vitreous detachment               Fundus Exam         Right Left    Disc Normal pt defered    C/D Ratio 0.4     Macula No Diabetic Retinopathy     Vessels Normal     Periphery No Diabetic Retinopathy                   Assessment/Plan:   1. Anisocoria, R>L  2. Likely 2/2 pharmacological exposure  - Around 7 PM 7/10/2025, had congested nose, used afrin, shortly after felt sensitive to light and saw dilated right eye in mirror  - Patient state vision has been getting better and is close to baseline. Questionable scatoma noted prior to transfer no longer present.  - Anisocoria greater in light than dark  - Denies recent trauma  - No constriction on accomodation or 0.1% pilocarpine to both eyes after  15 minutes  - No concern for CN3 palsy, no ptosis, EOM full, ortho on primary gaze   - No response to 1% pilocarpine after 20 minute consistent with pharmacologic dilation.  - Counseled patient not to use post op cataract drop     3. Pseudophakia, OU  - BCVA 20/20, continue to monitor     Recommendations:  - reassured patient  - will set up for 2-3 month follow up for routine eye exam.  - return precaution provided (if there is no improvement in dilation after 1-2 days.)    Seen with Dr. Car Pratt MD  PGY2, Ophthalmology Resident  07/11/2025  3:09 AM

## 2025-07-11 NOTE — TELEPHONE ENCOUNTER
L/M in regards to resident message--- 2-3 months in clinic for routine eye exam (optom ok) vicky

## 2025-07-11 NOTE — ED NOTES
Pt transported to \[facility]\"" clinic in wheelchair by opthomologist.

## 2025-07-11 NOTE — DISCHARGE INSTRUCTIONS
Return to the ED immediately for severe headache, new numbness, tingling, or weakness anywhere, any new or increased loss of vision, fever greater than 101F or any additional concerns.

## 2025-07-11 NOTE — ED TRIAGE NOTES
"Noah Bailey, a 74 y.o. female presents to the ED as a ground transfer from [facility] for vision problems. Pt reports that her right visual field is white in the middle, and blurry. Pt also has a larger pupil on r than on L. R pupil is 6mm, L pupil 2mm. R pupil nonreactive, does not accommodate. Pt denies unilateral weakness. Pt is AAOx4, GCS 15.     Triage note:  Chief Complaint   Patient presents with    Eye Problem     Pt transfer from [facility] for optho for anisocoria in right eye     Review of patient's allergies indicates:   Allergen Reactions    Sulfa (sulfonamide antibiotics) Rash     Past Medical History:   Diagnosis Date    ALLERGIC RHINITIS     Asthma     Cataract     Colon polyps     Coronary artery disease     cardiac stent 2017    Diabetes mellitus     pt reported "prediabetic"    Hyperlipidemia     Hypertension     Hypothyroidism     Umbilical hernia        "

## 2025-07-14 LAB — HOLD SPECIMEN: NORMAL

## 2025-08-02 DIAGNOSIS — I21.4 NSTEMI (NON-ST ELEVATED MYOCARDIAL INFARCTION): ICD-10-CM

## 2025-08-02 DIAGNOSIS — I25.10 CORONARY ARTERY DISEASE INVOLVING NATIVE CORONARY ARTERY OF NATIVE HEART WITHOUT ANGINA PECTORIS: ICD-10-CM

## 2025-08-04 RX ORDER — ASPIRIN 81 MG/1
81 TABLET ORAL
Qty: 90 TABLET | Refills: 2 | Status: SHIPPED | OUTPATIENT
Start: 2025-08-04

## 2025-08-04 NOTE — TELEPHONE ENCOUNTER
Refill Routing Note   Medication(s) are not appropriate for processing by [facility] for the following reason(s):        Outside of protocol    [facility] action(s):  Route               Appointments  past 12m or future 3m with PCP    Date Provider   Last Visit   4/3/2025 Henry Mcbride MD   Next Visit   10/7/2025 Henry Mcbride MD   ED visits in past 90 days: 2        Note composed:8:30 AM 08/04/2025

## 2025-08-18 RX ORDER — THEOPHYLLINE 400 MG/1
1 TABLET, EXTENDED RELEASE ORAL
Qty: 90 TABLET | Refills: 3 | OUTPATIENT
Start: 2025-08-18

## 2025-08-18 RX ORDER — THEOPHYLLINE 300 MG/1
300 TABLET, EXTENDED RELEASE ORAL DAILY
Qty: 90 TABLET | Refills: 3 | Status: SHIPPED | OUTPATIENT
Start: 2025-08-18

## (undated) DEVICE — TOWEL OR DISP STRL BLUE 4/PK

## (undated) DEVICE — CASSETTE INFINITI

## (undated) DEVICE — GLOVE GAMMEX SURG LF PI SZ 7.5

## (undated) DEVICE — SOL PVP-I SCRUB 7.5% 4OZ

## (undated) DEVICE — GLOVE BIOGEL SKINSENSE PI 7.5

## (undated) DEVICE — BLADE SURG BVL ANG COAX 2.4MM

## (undated) DEVICE — SOL IRR STRL WATER 500ML

## (undated) DEVICE — SYR SLIP TIP 1CC

## (undated) DEVICE — GOWN SMART IMP BREATHABLE XXLG

## (undated) DEVICE — NDL HYPO REG 25G X 1 1/2

## (undated) DEVICE — ADHESIVE DERMABOND ADVANCED

## (undated) DEVICE — TROCAR SPACEMAKER BLUNT 10MM

## (undated) DEVICE — Device

## (undated) DEVICE — SCISSOR 5MMX35CM DIRECT DRIVE

## (undated) DEVICE — SOL BETADINE 5%

## (undated) DEVICE — DRAPE ABDOMINAL TIBURON 14X11

## (undated) DEVICE — DRAPE STERI INSTRUMENT 1018

## (undated) DEVICE — TROCAR ENDOPATH XCEL 5X75MM

## (undated) DEVICE — SHEILD & GARTERS FOX METAL EYE

## (undated) DEVICE — SOL NS 1000CC

## (undated) DEVICE — CLIP HEMO-LOK ML

## (undated) DEVICE — TRAY MINOR GEN SURG OMC

## (undated) DEVICE — SUT 0 VICRYL / UR6 (J603)

## (undated) DEVICE — DRAPE CORETEMP FLD WRM 56X62IN

## (undated) DEVICE — BAG TISS RETRV MONARCH 10MM

## (undated) DEVICE — SUT MCRYL PLUS 4-0 PS2 27IN

## (undated) DEVICE — TROCAR ENDOPATH XCEL 5MM 7.5CM

## (undated) DEVICE — BLADE SURG CARBON STEEL SZ11

## (undated) DEVICE — ELECTRODE REM PLYHSV RETURN 9

## (undated) DEVICE — IRRIGATOR ENDOSCOPY DISP.

## (undated) DEVICE — TUBING HF INSUFFLATION W/ FLTR

## (undated) DEVICE — KIT ANTIFOG W/SPONG & FLUID